# Patient Record
Sex: FEMALE | Race: BLACK OR AFRICAN AMERICAN | NOT HISPANIC OR LATINO | Employment: OTHER | URBAN - METROPOLITAN AREA
[De-identification: names, ages, dates, MRNs, and addresses within clinical notes are randomized per-mention and may not be internally consistent; named-entity substitution may affect disease eponyms.]

---

## 2017-01-06 ENCOUNTER — OFFICE VISIT (OUTPATIENT)
Dept: FAMILY MEDICINE | Facility: CLINIC | Age: 72
End: 2017-01-06
Payer: MEDICARE

## 2017-01-06 ENCOUNTER — PATIENT MESSAGE (OUTPATIENT)
Dept: FAMILY MEDICINE | Facility: CLINIC | Age: 72
End: 2017-01-06

## 2017-01-06 ENCOUNTER — TELEPHONE (OUTPATIENT)
Dept: FAMILY MEDICINE | Facility: CLINIC | Age: 72
End: 2017-01-06

## 2017-01-06 VITALS
SYSTOLIC BLOOD PRESSURE: 138 MMHG | WEIGHT: 177.69 LBS | DIASTOLIC BLOOD PRESSURE: 88 MMHG | BODY MASS INDEX: 33.55 KG/M2 | TEMPERATURE: 98 F | HEIGHT: 61 IN | HEART RATE: 76 BPM

## 2017-01-06 DIAGNOSIS — H92.03 OTALGIA, BILATERAL: Primary | ICD-10-CM

## 2017-01-06 PROCEDURE — 1160F RVW MEDS BY RX/DR IN RCRD: CPT | Mod: S$GLB,,, | Performed by: INTERNAL MEDICINE

## 2017-01-06 PROCEDURE — 1125F AMNT PAIN NOTED PAIN PRSNT: CPT | Mod: S$GLB,,, | Performed by: INTERNAL MEDICINE

## 2017-01-06 PROCEDURE — 1157F ADVNC CARE PLAN IN RCRD: CPT | Mod: S$GLB,,, | Performed by: INTERNAL MEDICINE

## 2017-01-06 PROCEDURE — 99999 PR PBB SHADOW E&M-EST. PATIENT-LVL III: CPT | Mod: PBBFAC,,, | Performed by: INTERNAL MEDICINE

## 2017-01-06 PROCEDURE — 1159F MED LIST DOCD IN RCRD: CPT | Mod: S$GLB,,, | Performed by: INTERNAL MEDICINE

## 2017-01-06 PROCEDURE — 99214 OFFICE O/P EST MOD 30 MIN: CPT | Mod: S$GLB,,, | Performed by: INTERNAL MEDICINE

## 2017-01-06 RX ORDER — ACETIC ACID 20.65 MG/ML
3 SOLUTION AURICULAR (OTIC) EVERY 6 HOURS PRN
Qty: 15 ML | Refills: 1 | Status: SHIPPED | OUTPATIENT
Start: 2017-01-06 | End: 2017-01-23

## 2017-01-06 RX ORDER — HYDROCORTISONE AND ACETIC ACID 20.75; 10.375 MG/ML; MG/ML
3 SOLUTION AURICULAR (OTIC) EVERY 6 HOURS PRN
Qty: 10 ML | Refills: 1 | Status: SHIPPED | OUTPATIENT
Start: 2017-01-06 | End: 2017-01-06

## 2017-01-06 NOTE — TELEPHONE ENCOUNTER
----- Message from Delmis Haywood sent at 1/6/2017 12:25 PM CST -----  Contact: Self- Daisha- [phone]  Patient was told to call if the prescription Dr. Mart prescribed would not be covered by her insurance. Please call to discuss.

## 2017-01-06 NOTE — PROGRESS NOTES
"Subjective:        Patient ID: Daisha Mayo is a 71 y.o. female.    Chief Complaint: Sinus Problem and Otalgia (bilateral, worst post antibiotic)    HPI   Daisha Mayo presents with c/o bilateral ear pain and muffled hearing.  This has been on and off since October.  Pt was initially dx with sinus infection, treated with abx and steroid injection.  She felt a little better afterwards but then traveled by plane and the sx recurred.  She was already taking Zyrtec daily so switched to cetirizine D daily x ~1 week.  The ear ache, pressure and muffled hearing seem to be worse.  Sx come and go throughout the day.  She denies rhinorrhea, postnasal drip, nasal congestion.  She reports "feeling dry" since taking cetirizine D daily.    Review of Systems  as per HPI      Objective:        Vitals:    01/06/17 1137   BP: 138/88   Pulse:    Temp:      Physical Exam   Constitutional: She is oriented to person, place, and time. She appears well-developed and well-nourished. No distress.   HENT:   Head: Normocephalic and atraumatic.   Right Ear: External ear normal.   Left Ear: External ear normal.   Nose: Nose normal.   Mouth/Throat: Oropharynx is clear and moist.   - b/l ear canals clear, TMs normal color and light reflex  - no middle ear effusions  - nasal turbinates normal   Eyes: Conjunctivae and EOM are normal.   Neck: Normal range of motion. Neck supple.   Cardiovascular: Normal rate and regular rhythm.    Pulmonary/Chest: Effort normal. No respiratory distress.   Neurological: She is alert and oriented to person, place, and time.   Vitals reviewed.          Assessment:         1. Otalgia, bilateral              Plan:         Daisha was seen today for sinus problem and otalgia.    Diagnoses and all orders for this visit:    Otalgia, bilateral: No signs of infxn at this time.  Will try acetic acid/hydrocortisone gtt b/l for sx relief.  Pt traveling to [place] (driving) next week.  Recommend trying prophylactic " decongestant before plane travel in the future.  DC scheduled antihistamine, take only as needed for allergic sx.  -     acetic acid-hydrocortisone (VOSOL-HC) otic solution; Place 3 drops into both ears every 6 (six) hours as needed (ear pain).        Pt has appt w ENT next week, will follow up with ENT if otalgia worse or not improving with above gtt.

## 2017-01-06 NOTE — MR AVS SNAPSHOT
[facility]  [address] [facility], [address]  [facility] [address]  Phone: [phone]  Fax: [phone]                  Daisha Mayo   2017 11:20 AM   Office Visit    Description:  Female : 1945   Provider:  Isamar Mart MD   Department:  [facility]           Reason for Visit     Sinus Problem     Otalgia           Diagnoses this Visit        Comments    Otalgia, bilateral    -  Primary            To Do List           Future Appointments        Provider Department Dept Phone    2017 2:30 PM AUDIOGRAM, AUDIO [facility] - Audiology [phone]    2017 3:15 PM MD Cuate [facility] - Otorhinolaryngology [phone]    3/7/2017 8:00 AM LAB, [facility] [phone]    3/14/2017 10:00 AM Odilia Eckert MD [place] - Internal Medicine [phone]      Goals (5 Years of Data)     None      Follow-Up and Disposition     Return if symptoms worsen or fail to improve.       These Medications        Disp Refills Start End    acetic acid-hydrocortisone (VOSOL-HC) otic solution 10 mL 1 2017     Place 3 drops into both ears every 6 (six) hours as needed (ear pain). - Both Ears    Pharmacy: [facility] [address] [place] [address] Ph #: [phone]         [facility] On Call     [facility] -  Assistance  Registered nurses in the [facility] provide clinical advisement, health education, appointment booking, and other advisory services.  Call for this free service at [phone].             Medications           Message regarding Medications     Verify the changes and/or additions to your medication regime listed below are the same as discussed with your clinician today.  If any of these changes or additions are incorrect, please notify your healthcare provider.        START taking these NEW  medications        Refills    acetic acid-hydrocortisone (VOSOL-HC) otic solution 1    Sig: Place 3 drops into both ears every 6 (six) hours as needed (ear pain).    Class: Normal    Route: Both Ears           Verify that the below list of medications is an accurate representation of the medications you are currently taking.  If none reported, the list may be blank. If incorrect, please contact your healthcare provider. Carry this list with you in case of emergency.           Current Medications     ASCORBATE CALCIUM (VITAMIN C ORAL) Take 1,000 mg by mouth once daily.     calcium carbonate (OS-SUNIL) 600 mg (1,500 mg) Tab Take 600 mg by mouth once daily.    cetirizine-pseudoephedrine 5-120 mg Tb12 Take 1 tablet by mouth 2 (two) times daily.    cholecalciferol, vitamin D3, 2,000 unit Cap Take 1 capsule (2,000 Units total) by mouth once daily.    cyanocobalamin (VITAMIN B-12) 250 MCG tablet Take 1,000 mcg by mouth once daily. 1 Tablet Oral Every day    dorzolamide-timolol (COSOPT) 2-0.5 % ophthalmic solution Place 1 drop into both eyes 2 (two) times daily.      ergocalciferol (VITAMIN D2) 50,000 unit Cap Take 1 capsule (50,000 Units total) by mouth every 7 days.    inhalation device (AEROCHAMBER PLUS FLOW-VU) Use with inhaler dispense with mouthpiece    latanoprost 0.005 % ophthalmic solution Place 1 drop into both eyes every evening.    levothyroxine (SYNTHROID) 25 MCG tablet TAKE 1 TABLET ONE TIME DAILY    MULTIVITAMIN WITH MINERALS (HAIR,SKIN AND NAILS ORAL) Take 1 tablet by mouth 2 (two) times daily.    multivitamin-minerals-lutein (CENTRUM SILVER) Tab Take 1 tablet by mouth Daily. 1 Tablet Oral Every day    omega-3 fatty acids-vitamin E (FISH OIL) 1,000 mg Cap     acetic acid-hydrocortisone (VOSOL-HC) otic solution Place 3 drops into both ears every 6 (six) hours as needed (ear pain).           Clinical Reference Information           Vital Signs - Last Recorded  Most recent update: 1/6/2017 11:39 AM by Isamar ALMAZAN  "MD Brittny    BP Pulse Temp Ht Wt BMI    138/88 76 98 °F (36.7 °C) 5' 1" (1.549 m) 80.6 kg (177 lb 11.1 oz) 33.57 kg/m2      Blood Pressure          Most Recent Value    BP  138/88      Allergies as of 1/6/2017     Latex      Immunizations Administered on Date of Encounter - 1/6/2017     None      "

## 2017-01-06 NOTE — TELEPHONE ENCOUNTER
----- Message from Delmis Haywood sent at 1/6/2017 12:25 PM CST -----  Contact: Self- Daisha- [phone]  Patient was told to call if the prescription Dr. Mart prescribed would not be covered by her insurance. Please call to discuss.

## 2017-01-06 NOTE — TELEPHONE ENCOUNTER
----- Message from Delmis Haywood sent at 1/6/2017 12:25 PM CST -----  Contact: Self- Daisha- [phone]  Patient was told to call if the prescription Dr. Mart prescribed would not be covered by her insurance. Please call to discuss.

## 2017-01-11 ENCOUNTER — OFFICE VISIT (OUTPATIENT)
Dept: OTOLARYNGOLOGY | Facility: CLINIC | Age: 72
End: 2017-01-11
Payer: MEDICARE

## 2017-01-11 ENCOUNTER — CLINICAL SUPPORT (OUTPATIENT)
Dept: AUDIOLOGY | Facility: CLINIC | Age: 72
End: 2017-01-11
Payer: MEDICARE

## 2017-01-11 VITALS — HEIGHT: 61 IN | BODY MASS INDEX: 33.71 KG/M2 | WEIGHT: 178.56 LBS

## 2017-01-11 DIAGNOSIS — H91.92 HEARING LOSS OF LEFT EAR, UNSPECIFIED HEARING LOSS TYPE: Primary | ICD-10-CM

## 2017-01-11 DIAGNOSIS — M26.609 TEMPOROMANDIBULAR JOINT DYSFUNCTION: Primary | ICD-10-CM

## 2017-01-11 DIAGNOSIS — H93.19 TINNITUS, UNSPECIFIED LATERALITY: ICD-10-CM

## 2017-01-11 DIAGNOSIS — M26.622 ARTHRALGIA OF LEFT TEMPOROMANDIBULAR JOINT: ICD-10-CM

## 2017-01-11 PROBLEM — M26.629 TMJ ARTHRALGIA: Status: ACTIVE | Noted: 2017-01-11

## 2017-01-11 PROCEDURE — 99213 OFFICE O/P EST LOW 20 MIN: CPT | Mod: S$GLB,,, | Performed by: OTOLARYNGOLOGY

## 2017-01-11 PROCEDURE — 1126F AMNT PAIN NOTED NONE PRSNT: CPT | Mod: S$GLB,,, | Performed by: OTOLARYNGOLOGY

## 2017-01-11 PROCEDURE — 1157F ADVNC CARE PLAN IN RCRD: CPT | Mod: S$GLB,,, | Performed by: OTOLARYNGOLOGY

## 2017-01-11 PROCEDURE — 99999 PR PBB SHADOW E&M-EST. PATIENT-LVL IV: CPT | Mod: PBBFAC,,, | Performed by: OTOLARYNGOLOGY

## 2017-01-11 PROCEDURE — 1159F MED LIST DOCD IN RCRD: CPT | Mod: S$GLB,,, | Performed by: OTOLARYNGOLOGY

## 2017-01-11 PROCEDURE — 1160F RVW MEDS BY RX/DR IN RCRD: CPT | Mod: S$GLB,,, | Performed by: OTOLARYNGOLOGY

## 2017-01-11 PROCEDURE — 92557 COMPREHENSIVE HEARING TEST: CPT | Mod: S$GLB,,, | Performed by: AUDIOLOGIST-HEARING AID FITTER

## 2017-01-11 PROCEDURE — 92567 TYMPANOMETRY: CPT | Mod: S$GLB,,, | Performed by: AUDIOLOGIST-HEARING AID FITTER

## 2017-01-11 PROCEDURE — 99999 PR PBB SHADOW E&M-EST. PATIENT-LVL I: CPT | Mod: PBBFAC,,,

## 2017-01-11 RX ORDER — CARISOPRODOL 350 MG/1
350 TABLET ORAL 2 TIMES DAILY
Qty: 20 TABLET | Refills: 0 | Status: SHIPPED | OUTPATIENT
Start: 2017-01-11 | End: 2017-01-21

## 2017-01-11 RX ORDER — HYDROCODONE BITARTRATE AND ACETAMINOPHEN 10; 325 MG/1; MG/1
1 TABLET ORAL EVERY 6 HOURS PRN
Qty: 30 TABLET | Refills: 0 | Status: SHIPPED | OUTPATIENT
Start: 2017-01-11 | End: 2017-01-21

## 2017-01-11 NOTE — PROGRESS NOTES
Otolaryngology - Head and Neck Surgery  Consultation Report      Chief Complaint: left ear fullness and pain x 3 months    History of Present Illness: 71 y.o. female presents with several month history of left ear pain and fullness following a sinus infection. She states she was treated with steroids and antibiotics at that time and although her PND and cough improved, her left ear fullness and pain have remained essentially unchanged. No change to hearing. Unchanged tinnitus. No otorrhea. She grinds her teeth.     Review of Systems - Negative except as mentioned in HPI.   History obtained from chart review and the patient  General ROS: negative  Psychological ROS: negative  Ophthalmic ROS: negative  ENT ROS: positive for - ear fullness and pain  negative for - hearing change, tinnitus or vertigo  Allergy and Immunology ROS: negative  Hematological and Lymphatic ROS: negative  Endocrine ROS: negative  Respiratory ROS: no cough, shortness of breath, or wheezing  Cardiovascular ROS: no chest pain or dyspnea on exertion  Gastrointestinal ROS: no abdominal pain, change in bowel habits, or black or bloody stools  Genito-Urinary ROS: no dysuria, trouble voiding, or hematuria  Musculoskeletal ROS: negative  Neurological ROS: no TIA or stroke symptoms  Dermatological ROS: negative    Past Medical History: she  has a past medical history of Arthritis; Cataract; Depression; Early cataracts, bilateral; GERD (gastroesophageal reflux disease); Glaucoma; Hyperprolactinemia; Hypothyroidism; Need for prophylactic vaccination against Streptococcus pneumoniae (pneumococcus) (10/14/2015); Osteopenia; Recurrent UTI; Sciatica neuralgia; Seizures (2006); Temporal lobe seizure (2006); Trigeminal neuralgia; and Vitamin D deficiency disease.    Past Surgical History: she  has a past surgical history that includes Cholecystectomy (1994); Hysterectomy (1985); Breast surgery; Colonoscopy; Eye surgery; and Total abdominal hysterectomy w/  bilateral salpingoophorectomy.    Social History: she  reports that she has never smoked. She has never used smokeless tobacco. She reports that she does not drink alcohol or use illicit drugs.    Family History: family history includes Breast cancer (age of onset: 104) in her mother; Cancer (age of onset: 103) in her mother; Cancer (age of onset: 31) in her cousin; Colon cancer (age of onset: 90) in her mother; Fibromyalgia in her brother; Glaucoma in her brother, paternal aunt, paternal grandfather, paternal uncle, and sister; Heart disease in her brother, mother, and sister; Hypertension in her brother, mother, and sister; Kidney disease in her brother and father; Sleep apnea in her brother; Thyroid disease in her mother and sister. There is no history of Celiac disease, Cirrhosis, Colon polyps, Crohn's disease, Cystic fibrosis, Esophageal cancer, Hemochromatosis, Inflammatory bowel disease, Irritable bowel syndrome, Liver cancer, Liver disease, Rectal cancer, Stomach cancer, Ulcerative colitis, or Moses's disease.    Medications:     Allergies: she is allergic to latex.    Physical Exam:  afvss    General: nad, alert, oriented, pleasant  Head: ncat  Eyes: eoomi, perrl  Ears: AU: auricle normal. Pinna, mastoid normal. EAC clear. TM intact. No GRISELDA.   Nose: septum straight, no rhinorrhea or epistaxis  Mouth: +Clicking at TMJ. MMM. Adequate dentition, no lesions.   Neck: soft, supple, no lad  Pulmonary: normal effort  Cardiovascular: RRR            Assessment: 71F with TMJ. No evidence of GRISELDA. Hearing stable.     Plan:   Avoid chewy foods. Low dose NSAID. Soma. If no improvement, may benefit from oral surgery evaluation. RTC PRN.

## 2017-01-11 NOTE — PROGRESS NOTES
Daisha Mayo was seen in the clinic today for a hearing evaluation. Ms. Mayo reported aural fullness and pain, worse in her left ear.      Audiological testing revealed hearing within normal limits in the right ear and a mild hearing loss at 4000 Hz in the left ear. A speech reception threshold was obtained at 5 dBHL in the right ear and 10 dBHL in the left ear. Speech discrimination was 100% in the right ear and 96% in the left ear.    Tympanometry revealed normal Type A tympanograms, bilaterally.    Recommendations:  1. Otologic evaluation  2. Annual hearing evaluation

## 2017-01-11 NOTE — MR AVS SNAPSHOT
[facility] - Otorhinolaryngology  [address]  [facility] [address]  Phone: [phone]  Fax: [phone]                  Daisha Mayo   2017 3:15 PM   Office Visit    Description:  Female : 1945   Provider:  Prudencio Ansari MD   Department:  [facility] - Otorhinolaryngology           Reason for Visit     Otitis Media           Diagnoses this Visit        Comments    Temporomandibular joint dysfunction    -  Primary            To Do List           Future Appointments        Provider Department Dept Phone    3/7/2017 8:00 AM LAB, [facility] [phone]    3/14/2017 10:00 AM Odilia Eckert MD [place] - Internal Medicine [phone]      Goals (5 Years of Data)     None      Follow-Up and Disposition     Return if symptoms worsen or fail to improve.       These Medications        Disp Refills Start End    carisoprodol (SOMA) 350 MG tablet 20 tablet 0 2017    Take 1 tablet (350 mg total) by mouth 2 (two) times daily. - Oral    Pharmacy: [facility] [address] [place] [address] Ph #: [phone]       hydrocodone-acetaminophen 10-325mg (NORCO)  mg Tab 30 tablet 0 2017    Take 1 tablet by mouth every 6 (six) hours as needed. - Oral    Pharmacy: [facility] [address] Ph #: [phone]         [facility] On Call     [facility] Nurse Care [facility] -  Assistance  Registered nurses in the [facility] provide clinical advisement, health education, appointment booking, and other advisory services.  Call for this free service at [phone].             Medications           Message regarding Medications     Verify the changes and/or additions to your medication regime listed below are the same as discussed with your clinician today.  If any of these changes  or additions are incorrect, please notify your healthcare provider.        START taking these NEW medications        Refills    carisoprodol (SOMA) 350 MG tablet 0    Sig: Take 1 tablet (350 mg total) by mouth 2 (two) times daily.    Class: Print    Route: Oral    hydrocodone-acetaminophen 10-325mg (NORCO)  mg Tab 0    Sig: Take 1 tablet by mouth every 6 (six) hours as needed.    Class: Normal    Route: Oral           Verify that the below list of medications is an accurate representation of the medications you are currently taking.  If none reported, the list may be blank. If incorrect, please contact your healthcare provider. Carry this list with you in case of emergency.           Current Medications     acetic acid (VOSOL) 2 % otic solution Place 3 drops into both ears every 6 (six) hours as needed (ear pain).    ASCORBATE CALCIUM (VITAMIN C ORAL) Take 1,000 mg by mouth once daily.     calcium carbonate (OS-SUNIL) 600 mg (1,500 mg) Tab Take 600 mg by mouth once daily.    carisoprodol (SOMA) 350 MG tablet Take 1 tablet (350 mg total) by mouth 2 (two) times daily.    CETIRIZINE HCL (ZYRTEC ORAL) Take by mouth.    cholecalciferol, vitamin D3, 2,000 unit Cap Take 1 capsule (2,000 Units total) by mouth once daily.    cyanocobalamin (VITAMIN B-12) 250 MCG tablet Take 1,000 mcg by mouth once daily. 1 Tablet Oral Every day    dorzolamide-timolol (COSOPT) 2-0.5 % ophthalmic solution Place 1 drop into both eyes 2 (two) times daily.      ergocalciferol (VITAMIN D2) 50,000 unit Cap Take 1 capsule (50,000 Units total) by mouth every 7 days.    hydrocodone-acetaminophen 10-325mg (NORCO)  mg Tab Take 1 tablet by mouth every 6 (six) hours as needed.    inhalation device (AEROCHAMBER PLUS FLOW-VU) Use with inhaler dispense with mouthpiece    latanoprost 0.005 % ophthalmic solution Place 1 drop into both eyes every evening.    levothyroxine (SYNTHROID) 25 MCG tablet TAKE 1 TABLET ONE TIME DAILY    MULTIVITAMIN WITH  "MINERALS (HAIR,SKIN AND NAILS ORAL) Take 1 tablet by mouth 2 (two) times daily.    multivitamin-minerals-lutein (CENTRUM SILVER) Tab Take 1 tablet by mouth Daily. 1 Tablet Oral Every day    omega-3 fatty acids-vitamin E (FISH OIL) 1,000 mg Cap            Clinical Reference Information           Vital Signs - Last Recorded  Most recent update: 1/11/2017  3:18 PM by Emmy Worley MA    Ht Wt BMI          5' 1" (1.549 m) 81 kg (178 lb 9.2 oz) 33.74 kg/m2        Allergies as of 1/11/2017     Latex      Immunizations Administered on Date of Encounter - 1/11/2017     None      "

## 2017-01-12 ENCOUNTER — TELEPHONE (OUTPATIENT)
Dept: OTOLARYNGOLOGY | Facility: CLINIC | Age: 72
End: 2017-01-12

## 2017-01-12 NOTE — TELEPHONE ENCOUNTER
----- Message from Shawnee Ellison sent at 1/11/2017  4:47 PM CST -----  Contact: Pt  Would like someone to call her, regarding her medication. The pharmacy did not receive both medication.     Please call: [phone]    Pharmacy: [phone]

## 2017-01-12 NOTE — TELEPHONE ENCOUNTER
Message left on v/m that Soma Rx was a printed Rx. If she really needs it she would have to come back to the clinic and pick it up. Informed her that Dr. Ansari is in the OR so it would be written in b/w cases. I also stated that this Rx can not be called in, sent electronically or faxed to pharmacy.  Patient would have to come pick it up (if she really needed it) when it's available.  Office # was left on v/m with my name.

## 2017-01-23 ENCOUNTER — OFFICE VISIT (OUTPATIENT)
Dept: INTERNAL MEDICINE | Facility: CLINIC | Age: 72
End: 2017-01-23
Payer: MEDICARE

## 2017-01-23 VITALS
BODY MASS INDEX: 33.22 KG/M2 | HEART RATE: 76 BPM | HEIGHT: 61 IN | TEMPERATURE: 99 F | WEIGHT: 175.94 LBS | SYSTOLIC BLOOD PRESSURE: 142 MMHG | OXYGEN SATURATION: 97 % | DIASTOLIC BLOOD PRESSURE: 90 MMHG

## 2017-01-23 DIAGNOSIS — R05.9 COUGH: Primary | ICD-10-CM

## 2017-01-23 PROCEDURE — 99999 PR PBB SHADOW E&M-EST. PATIENT-LVL IV: CPT | Mod: PBBFAC,,, | Performed by: NURSE PRACTITIONER

## 2017-01-23 PROCEDURE — 1159F MED LIST DOCD IN RCRD: CPT | Mod: S$GLB,,, | Performed by: NURSE PRACTITIONER

## 2017-01-23 PROCEDURE — 1126F AMNT PAIN NOTED NONE PRSNT: CPT | Mod: S$GLB,,, | Performed by: NURSE PRACTITIONER

## 2017-01-23 PROCEDURE — 99213 OFFICE O/P EST LOW 20 MIN: CPT | Mod: S$GLB,,, | Performed by: NURSE PRACTITIONER

## 2017-01-23 PROCEDURE — 1157F ADVNC CARE PLAN IN RCRD: CPT | Mod: S$GLB,,, | Performed by: NURSE PRACTITIONER

## 2017-01-23 PROCEDURE — 1160F RVW MEDS BY RX/DR IN RCRD: CPT | Mod: S$GLB,,, | Performed by: NURSE PRACTITIONER

## 2017-01-23 RX ORDER — FLUTICASONE PROPIONATE 50 MCG
1 SPRAY, SUSPENSION (ML) NASAL DAILY
Qty: 16 G | Refills: 0 | Status: SHIPPED | OUTPATIENT
Start: 2017-01-23 | End: 2017-03-14

## 2017-01-23 RX ORDER — BENZONATATE 200 MG/1
200 CAPSULE ORAL 3 TIMES DAILY PRN
Qty: 30 CAPSULE | Refills: 0 | Status: SHIPPED | OUTPATIENT
Start: 2017-01-23 | End: 2017-02-02

## 2017-01-23 NOTE — MR AVS SNAPSHOT
[facility] - Internal Medicine  [address]  [facility] [address]  Phone: [phone]  Fax: [phone]                  Daisha Mayo   2017 7:00 PM   Office Visit    Description:  Female : 1945   Provider:  Berkley Floyd NP   Department:  [facility] - Internal Medicine           Reason for Visit     URI           Diagnoses this Visit        Comments    Cough    -  Primary            To Do List           Future Appointments        Provider Department Dept Phone    2017 3:00 PM Odilia cEkert MD [facility] Internal Medicine [phone]    2017 11:00 AM Maciej Britt PA-C [facility] - Gastroenterology [phone]    3/7/2017 8:00 AM [facility], [facility] [phone]    3/14/2017 10:00 AM Odilia Eckert MD [facility] Internal Medicine [phone]      Goals (5 Years of Data)     None       These Medications        Disp Refills Start End    cetirizine (ZYRTEC) 10 mg Cap 30 capsule 0 2017    Take 1 tablet by mouth every evening. - Oral    Pharmacy: [facility] [address] Ph #: [phone]       fluticasone (FLONASE) 50 mcg/actuation nasal spray 16 g 0 2017     1 spray by Each Nare route once daily. - Each Nare    Pharmacy: [facility] [address] Ph #: [phone]       benzonatate (TESSALON) 200 MG capsule 30 capsule 0 2017    Take 1 capsule (200 mg total) by mouth 3 (three) times daily as needed for Cough. - Oral    Pharmacy: [facility] [address] [place] [address] Ph #: [phone]         [facility] On Call     [facility] Nurse Care Line -  Assistance  Registered nurses in the [facility] provide clinical advisement, health education, appointment booking, and  other advisory services.  Call for this free service at [phone].             Medications           Message regarding Medications     Verify the changes and/or additions to your medication regime listed below are the same as discussed with your clinician today.  If any of these changes or additions are incorrect, please notify your healthcare provider.        START taking these NEW medications        Refills    fluticasone (FLONASE) 50 mcg/actuation nasal spray 0    Si spray by Each Nare route once daily.    Class: Normal    Route: Each Nare    benzonatate (TESSALON) 200 MG capsule 0    Sig: Take 1 capsule (200 mg total) by mouth 3 (three) times daily as needed for Cough.    Class: Normal    Route: Oral      CHANGE how you are taking these medications     Start Taking Instead of    cetirizine (ZYRTEC) 10 mg Cap CETIRIZINE HCL (ZYRTEC ORAL)    Dosage:  Take 1 tablet by mouth every evening. Dosage:  Take by mouth.    Reason for Change:  Reorder       STOP taking these medications     acetic acid (VOSOL) 2 % otic solution Place 3 drops into both ears every 6 (six) hours as needed (ear pain).           Verify that the below list of medications is an accurate representation of the medications you are currently taking.  If none reported, the list may be blank. If incorrect, please contact your healthcare provider. Carry this list with you in case of emergency.           Current Medications     ASCORBATE CALCIUM (VITAMIN C ORAL) Take 1,000 mg by mouth once daily.     calcium carbonate (OS-SUNIL) 600 mg (1,500 mg) Tab Take 600 mg by mouth once daily.    cetirizine (ZYRTEC) 10 mg Cap Take 1 tablet by mouth every evening.    cholecalciferol, vitamin D3, 2,000 unit Cap Take 1 capsule (2,000 Units total) by mouth once daily.    cyanocobalamin (VITAMIN B-12) 250 MCG tablet Take 1,000 mcg by mouth once daily. 1 Tablet Oral Every day    dorzolamide-timolol (COSOPT) 2-0.5 % ophthalmic solution Place 1 drop into both eyes 2  "(two) times daily.      ergocalciferol (VITAMIN D2) 50,000 unit Cap Take 1 capsule (50,000 Units total) by mouth every 7 days.    inhalation device (AEROCHAMBER PLUS FLOW-VU) Use with inhaler dispense with mouthpiece    latanoprost 0.005 % ophthalmic solution Place 1 drop into both eyes every evening.    levothyroxine (SYNTHROID) 25 MCG tablet TAKE 1 TABLET ONE TIME DAILY    MULTIVITAMIN WITH MINERALS (HAIR,SKIN AND NAILS ORAL) Take 1 tablet by mouth 2 (two) times daily.    multivitamin-minerals-lutein (CENTRUM SILVER) Tab Take 1 tablet by mouth Daily. 1 Tablet Oral Every day    omega-3 fatty acids-vitamin E (FISH OIL) 1,000 mg Cap     benzonatate (TESSALON) 200 MG capsule Take 1 capsule (200 mg total) by mouth 3 (three) times daily as needed for Cough.    fluticasone (FLONASE) 50 mcg/actuation nasal spray 1 spray by Each Nare route once daily.           Clinical Reference Information           Vital Signs - Last Recorded  Most recent update: 1/23/2017  7:20 PM by Berkley Floyd NP    BP Pulse Temp Ht Wt SpO2    (!) 142/90 76 98.5 °F (36.9 °C) (Oral) 5' 1" (1.549 m) 79.8 kg (175 lb 14.8 oz) 97%    BMI                33.24 kg/m2          Blood Pressure          Most Recent Value    BP  (!)  142/90      Allergies as of 1/23/2017     Latex      Immunizations Administered on Date of Encounter - 1/23/2017     None      "

## 2017-01-24 NOTE — PROGRESS NOTES
Subjective:       Patient ID: Daisha Mayo is a 71 y.o. female.    Chief Complaint: URI  Ms Mayo presents today because she has had a cough since October.  She says it comes and goes.  She keeps improving and then getting ill again.  She feels her cough may be related to GERD and has scheduled an appointment with GI later this week.  URI    This is a new problem. The current episode started more than 1 month ago. The problem has been waxing and waning. There has been no fever. Associated symptoms include congestion, coughing, diarrhea, headaches, rhinorrhea, sneezing and a sore throat. Pertinent negatives include no abdominal pain, chest pain, dysuria, ear pain, joint pain, joint swelling, nausea, neck pain, plugged ear sensation, rash, sinus pain, swollen glands, vomiting or wheezing.     Review of Systems   Constitutional: Negative for fever.   HENT: Positive for congestion, rhinorrhea, sneezing and sore throat. Negative for ear pain.    Eyes: Negative for visual disturbance.   Respiratory: Positive for cough. Negative for wheezing.    Cardiovascular: Negative for chest pain.   Gastrointestinal: Positive for diarrhea. Negative for abdominal pain, nausea and vomiting.   Genitourinary: Negative for dysuria.   Musculoskeletal: Negative for joint pain and neck pain.   Skin: Negative for rash.   Neurological: Positive for headaches.   Psychiatric/Behavioral: Negative for confusion.       Objective:      Physical Exam   Constitutional: She is oriented to person, place, and time.   HENT:   Head: Atraumatic.   Right Ear: Ear canal normal. A middle ear effusion is present.   Left Ear: Tympanic membrane and ear canal normal.   Nose: Mucosal edema and rhinorrhea present. No sinus tenderness. Right sinus exhibits no maxillary sinus tenderness and no frontal sinus tenderness. Left sinus exhibits no maxillary sinus tenderness and no frontal sinus tenderness.   Mouth/Throat: Oropharyngeal exudate and posterior  oropharyngeal erythema present.   Eyes: No scleral icterus.   Neck: Normal range of motion. Neck supple.   Cardiovascular: Normal rate, regular rhythm and normal heart sounds.    Pulmonary/Chest: Effort normal and breath sounds normal. No respiratory distress. She has no wheezes. She has no rales.   Bronchospasm with deep inhalation   Abdominal: Soft. Bowel sounds are normal. She exhibits no distension. There is no tenderness.   Musculoskeletal: She exhibits no edema.   Lymphadenopathy:     She has no cervical adenopathy.   Neurological: She is alert and oriented to person, place, and time.   Skin: Skin is warm and dry.   Psychiatric: She has a normal mood and affect. Her behavior is normal.   Nursing note and vitals reviewed.      Assessment:       1. Cough        Plan:   1. Cough  - cetirizine (ZYRTEC) 10 mg Cap; Take 1 tablet by mouth every evening.  Dispense: 30 capsule; Refill: 0  - fluticasone (FLONASE) 50 mcg/actuation nasal spray; 1 spray by Each Nare route once daily.  Dispense: 16 g; Refill: 0  - benzonatate (TESSALON) 200 MG capsule; Take 1 capsule (200 mg total) by mouth 3 (three) times daily as needed for Cough.  Dispense: 30 capsule; Refill: 0      Pt has been given instructions populated from [employer] database and has verbalized understanding of the after visit summary and information contained wherein.    Follow up with a primary care provider. May go to ER for acute shortness of breath, lightheadedness, fever, or any other emergent complaints or changes in condition.

## 2017-01-25 ENCOUNTER — OFFICE VISIT (OUTPATIENT)
Dept: INTERNAL MEDICINE | Facility: CLINIC | Age: 72
End: 2017-01-25
Payer: MEDICARE

## 2017-01-25 ENCOUNTER — HOSPITAL ENCOUNTER (OUTPATIENT)
Dept: RADIOLOGY | Facility: HOSPITAL | Age: 72
Discharge: HOME | End: 2017-01-25
Attending: INTERNAL MEDICINE
Payer: MEDICARE

## 2017-01-25 VITALS
RESPIRATION RATE: 16 BRPM | HEIGHT: 61 IN | BODY MASS INDEX: 33.09 KG/M2 | DIASTOLIC BLOOD PRESSURE: 76 MMHG | HEART RATE: 77 BPM | TEMPERATURE: 98 F | SYSTOLIC BLOOD PRESSURE: 106 MMHG | WEIGHT: 175.25 LBS

## 2017-01-25 DIAGNOSIS — J20.9 ACUTE BRONCHITIS, UNSPECIFIED ORGANISM: Primary | ICD-10-CM

## 2017-01-25 DIAGNOSIS — E03.8 SUBCLINICAL HYPOTHYROIDISM: ICD-10-CM

## 2017-01-25 DIAGNOSIS — J20.9 ACUTE BRONCHITIS, UNSPECIFIED ORGANISM: ICD-10-CM

## 2017-01-25 DIAGNOSIS — R53.83 FATIGUE, UNSPECIFIED TYPE: ICD-10-CM

## 2017-01-25 PROCEDURE — 1157F ADVNC CARE PLAN IN RCRD: CPT | Mod: S$GLB,,, | Performed by: INTERNAL MEDICINE

## 2017-01-25 PROCEDURE — 1126F AMNT PAIN NOTED NONE PRSNT: CPT | Mod: S$GLB,,, | Performed by: INTERNAL MEDICINE

## 2017-01-25 PROCEDURE — 1159F MED LIST DOCD IN RCRD: CPT | Mod: S$GLB,,, | Performed by: INTERNAL MEDICINE

## 2017-01-25 PROCEDURE — 71020 XR CHEST PA AND LATERAL: CPT | Mod: TC,PO

## 2017-01-25 PROCEDURE — 96372 THER/PROPH/DIAG INJ SC/IM: CPT | Mod: S$GLB,,, | Performed by: INTERNAL MEDICINE

## 2017-01-25 PROCEDURE — 99214 OFFICE O/P EST MOD 30 MIN: CPT | Mod: 25,S$GLB,, | Performed by: INTERNAL MEDICINE

## 2017-01-25 PROCEDURE — 1160F RVW MEDS BY RX/DR IN RCRD: CPT | Mod: S$GLB,,, | Performed by: INTERNAL MEDICINE

## 2017-01-25 PROCEDURE — 99999 PR PBB SHADOW E&M-EST. PATIENT-LVL III: CPT | Mod: PBBFAC,,, | Performed by: INTERNAL MEDICINE

## 2017-01-25 PROCEDURE — 71020 XR CHEST PA AND LATERAL: CPT | Mod: 26,,, | Performed by: RADIOLOGY

## 2017-01-25 PROCEDURE — 99499 UNLISTED E&M SERVICE: CPT | Mod: S$GLB,,, | Performed by: INTERNAL MEDICINE

## 2017-01-25 RX ORDER — CYANOCOBALAMIN 1000 UG/ML
1000 INJECTION, SOLUTION INTRAMUSCULAR; SUBCUTANEOUS
Status: COMPLETED | OUTPATIENT
Start: 2017-01-25 | End: 2017-01-25

## 2017-01-25 RX ORDER — METHYLPREDNISOLONE 4 MG/1
TABLET ORAL
Qty: 1 PACKAGE | Refills: 0 | Status: SHIPPED | OUTPATIENT
Start: 2017-01-25 | End: 2017-02-15

## 2017-01-25 RX ADMIN — CYANOCOBALAMIN 1000 MCG: 1000 INJECTION, SOLUTION INTRAMUSCULAR; SUBCUTANEOUS at 04:01

## 2017-01-25 NOTE — PROGRESS NOTES
CC: fatigue and cough  HPI:  The patient is a 71 y.o. year old female who presents to the office for fatigue and cough.  Her symptoms started in October.  She went to urgent care November 1st and was given antibiotics and a steroid shot with temporary improvement.  She went to urgent care and then ENT.  She complains of fatigue, dry cough and difficulty sleeping.  She has taken zyrtec.  She had a CXR in November, which reportedly showed a mild pneumonia.  Sneezing, sore throat and rhinorrhea have resolved.       PAST MEDICAL HISTORY:  Past Medical History   Diagnosis Date    Arthritis     Cataract     Depression      Around [place]; treated with Wellbutrin    Early cataracts, bilateral     GERD (gastroesophageal reflux disease)     Glaucoma     Hyperprolactinemia     Hypothyroidism     Need for prophylactic vaccination against Streptococcus pneumoniae (pneumococcus) 10/14/2015    Osteopenia     Recurrent UTI      Every few months    Sciatica neuralgia     Seizures 2006     temporal lobe seizure    Temporal lobe seizure 2006    Trigeminal neuralgia     Vitamin D deficiency disease        SURGICAL HISTORY:  Past Surgical History   Procedure Laterality Date    Cholecystectomy  1994     laporascopic    Hysterectomy  1985     low transverse incision.    Breast surgery       to remove ducts causing discharge in left breast    Colonoscopy      Eye surgery       LASIK surgery to one eye; cannot remember which one    Total abdominal hysterectomy w/ bilateral salpingoophorectomy       1985; diffinitive treatment for menorrhagia       MEDS:  Medcard reviewed and updated    ALLERGIES: Allergy Card reviewed and updated    SOCIAL HISTORY:   The patient is a nonsmoker.    PE:   APPEARANCE: Well nourished, well developed, in no acute distress.    EARS: TM's intact. No retraction or perforation.    NOSE: Mucosa pink. Airway clear.  MOUTH & THROAT: No tonsillar enlargement. No pharyngeal erythema or exudate. No  stridor.  CHEST: Lungs clear to auscultation with unlabored respirations.  CARDIOVASCULAR: Normal S1, S2. No murmurs. No carotid bruits. No pedal edema.  ABDOMEN: Bowel sounds normal. Not distended. Soft. No tenderness or masses.   PSYCHIATRIC: The patient is oriented to person, place, and time and has a pleasant affect.        ASSESSMENT/PLAN:  Daisha was seen today for cough and fatigue.    Diagnoses and all orders for this visit:    Acute bronchitis, unspecified organism  -     X-Ray Chest PA And Lateral; Future  -     Medrol dosepak    Subclinical hypothyroidism  -     T4, free; Future    Fatigue, unspecified type  -     cyanocobalamin injection 1,000 mcg; Inject 1 mL (1,000 mcg total) into the muscle one time.    Other orders  -     methylPREDNISolone (MEDROL DOSEPACK) 4 mg tablet; use as directed

## 2017-01-25 NOTE — MR AVS SNAPSHOT
[facility]   [facility] [place] [address]  Phone: [phone]  Fax: [phone]                  Daisha Mayo   2017 3:00 PM   Office Visit    Description:  Female : 1945   Provider:  Odilia Eckert MD   Department:  [facility]           Reason for Visit     Cough     Fatigue           Diagnoses this Visit        Comments    Acute bronchitis, unspecified organism    -  Primary     Subclinical hypothyroidism         Fatigue, unspecified type                To Do List           Future Appointments        Provider Department Dept Phone    2017 11:00 AM [facility] - Gastroenterology [phone]    3/7/2017 8:00 AM [facility] [phone]    3/14/2017 10:00 AM Odilia Eckert MD [facility] [phone]      Goals (5 Years of Data)     None      Follow-Up and Disposition     Return in about 2 months (around 3/25/2017).       These Medications        Disp Refills Start End    methylPREDNISolone (MEDROL DOSEPACK) 4 mg tablet 1 Package 0 2017 2/15/2017    use as directed    Pharmacy: [facility] [address] [place] [address] #: [phone]         [facility] On Call     [facility] On Call Nurse [facility] - /7 Assistance  Registered nurses in the [facility] On Call Center provide clinical advisement, health education, appointment booking, and other advisory services.  Call for this free service at [phone].             Medications           Message regarding Medications     Verify the changes and/or additions to your medication regime listed below are the same as discussed with your clinician today.  If any of these changes or additions are incorrect, please notify your healthcare provider.        START taking these NEW medications        Refills    methylPREDNISolone (MEDROL  DOSEPACK) 4 mg tablet 0    Sig: use as directed    Class: Normal      These medications were administered today        Dose Freq    cyanocobalamin injection 1,000 mcg 1,000 mcg Clinic/HOD 1 time    Sig: Inject 1 mL (1,000 mcg total) into the muscle one time.    Class: Normal    Route: Intramuscular           Verify that the below list of medications is an accurate representation of the medications you are currently taking.  If none reported, the list may be blank. If incorrect, please contact your healthcare provider. Carry this list with you in case of emergency.           Current Medications     ASCORBATE CALCIUM (VITAMIN C ORAL) Take 1,000 mg by mouth once daily.     benzonatate (TESSALON) 200 MG capsule Take 1 capsule (200 mg total) by mouth 3 (three) times daily as needed for Cough.    calcium carbonate (OS-SUNIL) 600 mg (1,500 mg) Tab Take 600 mg by mouth once daily.    cetirizine (ZYRTEC) 10 mg Cap Take 1 tablet by mouth every evening.    cholecalciferol, vitamin D3, 2,000 unit Cap Take 1 capsule (2,000 Units total) by mouth once daily.    cyanocobalamin (VITAMIN B-12) 250 MCG tablet Take 1,000 mcg by mouth once daily. 1 Tablet Oral Every day    dorzolamide-timolol (COSOPT) 2-0.5 % ophthalmic solution Place 1 drop into both eyes 2 (two) times daily.      ergocalciferol (VITAMIN D2) 50,000 unit Cap Take 1 capsule (50,000 Units total) by mouth every 7 days.    fluticasone (FLONASE) 50 mcg/actuation nasal spray 1 spray by Each Nare route once daily.    inhalation device (AEROCHAMBER PLUS FLOW-VU) Use with inhaler dispense with mouthpiece    latanoprost 0.005 % ophthalmic solution Place 1 drop into both eyes every evening.    levothyroxine (SYNTHROID) 25 MCG tablet TAKE 1 TABLET ONE TIME DAILY    MULTIVITAMIN WITH MINERALS (HAIR,SKIN AND NAILS ORAL) Take 1 tablet by mouth 2 (two) times daily.    multivitamin-minerals-lutein (CENTRUM SILVER) Tab Take 1 tablet by mouth Daily. 1 Tablet Oral Every day    omega-3 fatty  "acids-vitamin E (FISH OIL) 1,000 mg Cap     methylPREDNISolone (MEDROL DOSEPACK) 4 mg tablet use as directed           Clinical Reference Information           Vital Signs - Last Recorded  Most recent update: 1/25/2017  3:01 PM by Isabelle Benavides LPN    BP Pulse Temp Resp Ht Wt    106/76 (BP Location: Left arm, Patient Position: Sitting, BP Method: Manual) 77 97.9 °F (36.6 °C) (Oral) 16 5' 1" (1.549 m) 79.5 kg (175 lb 4.3 oz)    BMI                33.12 kg/m2          Blood Pressure          Most Recent Value    BP  106/76      Allergies as of 1/25/2017     Latex      Immunizations Administered on Date of Encounter - 1/25/2017     None      Orders Placed During Today's Visit     Future Labs/Procedures Expected by Expdank    T4, free  1/25/2017 3/26/2018    X-Ray Chest PA And Lateral  1/25/2017 1/25/2018      "

## 2017-03-07 ENCOUNTER — LAB VISIT (OUTPATIENT)
Dept: LAB | Facility: HOSPITAL | Age: 72
End: 2017-03-07
Attending: INTERNAL MEDICINE
Payer: MEDICARE

## 2017-03-07 DIAGNOSIS — E22.1 HYPERPROLACTINEMIA: ICD-10-CM

## 2017-03-07 DIAGNOSIS — E03.8 SUBCLINICAL HYPOTHYROIDISM: ICD-10-CM

## 2017-03-07 DIAGNOSIS — M89.9 DISORDER OF BONE: ICD-10-CM

## 2017-03-07 LAB
25(OH)D3+25(OH)D2 SERPL-MCNC: 72 NG/ML
ALBUMIN SERPL BCP-MCNC: 3.4 G/DL
ALP SERPL-CCNC: 95 U/L
ALT SERPL W/O P-5'-P-CCNC: 16 U/L
ANION GAP SERPL CALC-SCNC: 7 MMOL/L
AST SERPL-CCNC: 16 U/L
BASOPHILS # BLD AUTO: 0.03 K/UL
BASOPHILS NFR BLD: 0.6 %
BILIRUB SERPL-MCNC: 0.5 MG/DL
BUN SERPL-MCNC: 10 MG/DL
CALCIUM SERPL-MCNC: 8.7 MG/DL
CHLORIDE SERPL-SCNC: 106 MMOL/L
CHOLEST/HDLC SERPL: 4.3 {RATIO}
CO2 SERPL-SCNC: 28 MMOL/L
CREAT SERPL-MCNC: 0.8 MG/DL
DIFFERENTIAL METHOD: ABNORMAL
EOSINOPHIL # BLD AUTO: 0.1 K/UL
EOSINOPHIL NFR BLD: 1.7 %
ERYTHROCYTE [DISTWIDTH] IN BLOOD BY AUTOMATED COUNT: 13.1 %
EST. GFR  (AFRICAN AMERICAN): >60 ML/MIN/1.73 M^2
EST. GFR  (NON AFRICAN AMERICAN): >60 ML/MIN/1.73 M^2
GLUCOSE SERPL-MCNC: 80 MG/DL
HCT VFR BLD AUTO: 41 %
HDL/CHOLESTEROL RATIO: 23.3 %
HDLC SERPL-MCNC: 215 MG/DL
HDLC SERPL-MCNC: 50 MG/DL
HGB BLD-MCNC: 13.5 G/DL
LDLC SERPL CALC-MCNC: 145.8 MG/DL
LYMPHOCYTES # BLD AUTO: 2 K/UL
LYMPHOCYTES NFR BLD: 40.9 %
MCH RBC QN AUTO: 31.9 PG
MCHC RBC AUTO-ENTMCNC: 32.9 %
MCV RBC AUTO: 97 FL
MONOCYTES # BLD AUTO: 0.4 K/UL
MONOCYTES NFR BLD: 8.5 %
NEUTROPHILS # BLD AUTO: 2.3 K/UL
NEUTROPHILS NFR BLD: 48.1 %
NONHDLC SERPL-MCNC: 165 MG/DL
PLATELET # BLD AUTO: 232 K/UL
PMV BLD AUTO: 10.1 FL
POTASSIUM SERPL-SCNC: 3.9 MMOL/L
PROLACTIN SERPL IA-MCNC: 23.8 NG/ML
PROT SERPL-MCNC: 6.6 G/DL
RBC # BLD AUTO: 4.23 M/UL
SODIUM SERPL-SCNC: 141 MMOL/L
T4 FREE SERPL-MCNC: 1.07 NG/DL
TRIGL SERPL-MCNC: 96 MG/DL
TSH SERPL DL<=0.005 MIU/L-ACNC: 2.62 UIU/ML
WBC # BLD AUTO: 4.84 K/UL

## 2017-03-07 PROCEDURE — 84146 ASSAY OF PROLACTIN: CPT

## 2017-03-07 PROCEDURE — 36415 COLL VENOUS BLD VENIPUNCTURE: CPT | Mod: PO

## 2017-03-07 PROCEDURE — 84443 ASSAY THYROID STIM HORMONE: CPT

## 2017-03-07 PROCEDURE — 82306 VITAMIN D 25 HYDROXY: CPT

## 2017-03-07 PROCEDURE — 84439 ASSAY OF FREE THYROXINE: CPT

## 2017-03-07 PROCEDURE — 85025 COMPLETE CBC W/AUTO DIFF WBC: CPT

## 2017-03-07 PROCEDURE — 80053 COMPREHEN METABOLIC PANEL: CPT

## 2017-03-07 PROCEDURE — 80061 LIPID PANEL: CPT

## 2017-03-13 ENCOUNTER — TELEPHONE (OUTPATIENT)
Dept: INTERNAL MEDICINE | Facility: CLINIC | Age: 72
End: 2017-03-13

## 2017-03-13 NOTE — TELEPHONE ENCOUNTER
----- Message from Fabiana Drew sent at 3/11/2017 11:35 AM CST -----  Contact: SELF [phone]  Patient would like a call back from the office to discuss her appointment on 03/14 . Please call and advise, Thanks !

## 2017-03-14 ENCOUNTER — OFFICE VISIT (OUTPATIENT)
Dept: INTERNAL MEDICINE | Facility: CLINIC | Age: 72
End: 2017-03-14
Payer: MEDICARE

## 2017-03-14 VITALS
DIASTOLIC BLOOD PRESSURE: 80 MMHG | TEMPERATURE: 99 F | RESPIRATION RATE: 16 BRPM | HEIGHT: 61 IN | HEART RATE: 70 BPM | WEIGHT: 174.19 LBS | SYSTOLIC BLOOD PRESSURE: 130 MMHG | BODY MASS INDEX: 32.89 KG/M2

## 2017-03-14 DIAGNOSIS — R53.83 FATIGUE, UNSPECIFIED TYPE: ICD-10-CM

## 2017-03-14 DIAGNOSIS — R07.9 CHEST PAIN, UNSPECIFIED TYPE: Primary | ICD-10-CM

## 2017-03-14 DIAGNOSIS — E03.8 SUBCLINICAL HYPOTHYROIDISM: ICD-10-CM

## 2017-03-14 DIAGNOSIS — E22.1 HYPERPROLACTINEMIA: ICD-10-CM

## 2017-03-14 PROCEDURE — 99499 UNLISTED E&M SERVICE: CPT | Mod: S$GLB,,, | Performed by: INTERNAL MEDICINE

## 2017-03-14 PROCEDURE — 99999 PR PBB SHADOW E&M-EST. PATIENT-LVL IV: CPT | Mod: PBBFAC,,, | Performed by: INTERNAL MEDICINE

## 2017-03-14 PROCEDURE — 1159F MED LIST DOCD IN RCRD: CPT | Mod: S$GLB,,, | Performed by: INTERNAL MEDICINE

## 2017-03-14 PROCEDURE — 1160F RVW MEDS BY RX/DR IN RCRD: CPT | Mod: S$GLB,,, | Performed by: INTERNAL MEDICINE

## 2017-03-14 PROCEDURE — 1126F AMNT PAIN NOTED NONE PRSNT: CPT | Mod: S$GLB,,, | Performed by: INTERNAL MEDICINE

## 2017-03-14 PROCEDURE — 99214 OFFICE O/P EST MOD 30 MIN: CPT | Mod: S$GLB,,, | Performed by: INTERNAL MEDICINE

## 2017-03-14 PROCEDURE — 1157F ADVNC CARE PLAN IN RCRD: CPT | Mod: S$GLB,,, | Performed by: INTERNAL MEDICINE

## 2017-03-14 RX ORDER — CETIRIZINE HYDROCHLORIDE 10 MG/1
TABLET ORAL
Refills: 0 | COMMUNITY
Start: 2017-01-23 | End: 2017-08-25 | Stop reason: SDUPTHER

## 2017-03-14 NOTE — PROGRESS NOTES
CC: Annual review of chronic medical problems  HPI:  The patient is a 71 y.o. old female with hyperprolactinema who presents to the office for annual review of chronic medical problems.  Review of labs reveals an elevated cholesterol.    PAST MEDICAL HISTORY  Past Medical History:   Diagnosis Date    Arthritis     Cataract     Depression     Around [place]; treated with Wellbutrin    Early cataracts, bilateral     GERD (gastroesophageal reflux disease)     Glaucoma     Hyperprolactinemia     Hypothyroidism     Need for prophylactic vaccination against Streptococcus pneumoniae (pneumococcus) 10/14/2015    Osteopenia     Recurrent UTI     Every few months    Sciatica neuralgia     Seizures 2006    temporal lobe seizure    Temporal lobe seizure 2006    Trigeminal neuralgia     Vitamin D deficiency disease        SURGICAL HISTORY:  Past Surgical History:   Procedure Laterality Date    BREAST SURGERY      to remove ducts causing discharge in left breast    CHOLECYSTECTOMY  1994    laporascopic    COLONOSCOPY      EYE SURGERY      LASIK surgery to one eye; cannot remember which one    HYSTERECTOMY  1985    low transverse incision.    TOTAL ABDOMINAL HYSTERECTOMY W/ BILATERAL SALPINGOOPHORECTOMY      1985; diffinitive treatment for menorrhagia         MEDS:  Medcard reviewed and updated    ALLERGIES: Allergy Card reviewed and updated    SOCIAL HISTORY:   The patient is a nonsmoker, denies alcohol or illicit drug use.    ROS:  GENERAL: No fever, chills, fatigability or weight loss.  SKIN: No rashes.  HEAD: No headaches or recent head trauma.  EYES: No photophobia, ocular pain or diplopia.  EARS: Denies ear pain, discharge or vertigo.  NOSE: No epistaxis or postnasal drip.  MOUTH & THROAT: No hoarseness or change in voice.   NODES: Denies swollen glands.  CHEST: Denies shortness of breath or wheezing.  Occasional cough withour sputum production.  CARDIOVASCULAR: Positive chest pain, pressure sensation  about 3 -4 weeks ago or palpitations.  ABDOMEN: Appetite fine. Denies diarrhea, abdominal pain, constipation or blood in stool.  URINARY: No dysuria or hematuria.  MUSCULOSKELETAL: No joint stiffness or swelling. Positive back pain.  NEUROLOGIC: No history of seizures.  ENDOCRINE: Denies polyuria or polydipsia.  PSYCHIATRIC: Denies mood swings, depression, anxiety, homicidal or suicidal thoughts.    SCREENINGS:  Last cholesterol: March 2017  Last colonoscopy: 2014  Last mammogram: 2016  Last Pap smear: unknown  Last tetanus: over 10 years   Zoster: 2015  Last Pneumovax: unknown  Prevnar 13: 2015  Last eye exam: 2017  Last bone density: 2016    PE:   Vitals:  Vitals:    03/14/17 0953   BP: 130/80   Pulse: 70   Resp: 16   Temp: 98.7 °F (37.1 °C)       APPEARANCE: Well nourished, well developed, in no acute distress.    EYES: Sclerae anicteric. PERRL. EOMI.      EARS: TM's intact. No retraction or perforation.    NOSE: Mucosa pink. Airway clear.  MOUTH & THROAT: No tonsillar enlargement. No pharyngeal erythema or exudate. No stridor.  NECK: Supple, no thyromegaly.  NODES: No cervical, axillary or inguinal lymph node enlargement.  CHEST: Lungs clear to auscultation with unlabored respirations.  CARDIOVASCULAR: Normal S1, S2. No murmurs. No carotid bruits. No pedal edema.  ABDOMEN: Bowel sounds normal. Not distended. Soft. No tenderness or masses. No organomegaly.  MUSCULOSKELETAL:  Normal gait, no cyanosis or clubbing.   SKIN: Normal skin turgor, warm and dry.  NEUROLOGIC:       Cranial Nerves: Intact.      DTR's: Knees, 2+ and equal bilaterally.  PSYCHIATRIC: The patient is oriented to person, place, and time and has a pleasant affect.        ASSESSMENT/PLAN:  Daisha was seen today for annual exam.    Diagnoses and all orders for this visit:    Chest pain, unspecified type  -     SCHEDULED EKG 12-LEAD (to Muse); Future    Subclinical hypothyroidism  -     Continue Synthroid    Fatigue, unspecified type  -      Continue Synthroid and vitamin D supplementation    Hyperprolactinemia  -     Stable, PTH is currently within range

## 2017-03-14 NOTE — MR AVS SNAPSHOT
[facility]   [facility]  [place] [address]  Phone: [phone]  Fax: [phone]                  Daisha Mayo   3/14/2017 10:00 AM   Office Visit    Description:  Female : 1945   Provider:  Odilia Eckert MD   Department:  [facility]           Reason for Visit     Annual Exam           Diagnoses this Visit        Comments    Chest pain, unspecified type    -  Primary     Subclinical hypothyroidism         Fatigue, unspecified type         Hyperprolactinemia                To Do List           Goals (5 Years of Data)     None      Follow-Up and Disposition     Return in about 6 months (around 2017).      [facility]     [facility] On Call Nurse Care Line -  Assistance  Registered nurses in the [facility] On Call Center provide clinical advisement, health education, appointment booking, and other advisory services.  Call for this free service at [phone].             Medications           Message regarding Medications     Verify the changes and/or additions to your medication regime listed below are the same as discussed with your clinician today.  If any of these changes or additions are incorrect, please notify your healthcare provider.        STOP taking these medications     fluticasone (FLONASE) 50 mcg/actuation nasal spray 1 spray by Each Nare route once daily.           Verify that the below list of medications is an accurate representation of the medications you are currently taking.  If none reported, the list may be blank. If incorrect, please contact your healthcare provider. Carry this list with you in case of emergency.           Current Medications     ASCORBATE CALCIUM (VITAMIN C ORAL) Take 1,000 mg by mouth once daily.     calcium carbonate (OS-SUNIL) 600 mg (1,500 mg) Tab Take 600 mg by mouth once daily.    cholecalciferol, vitamin D3, 2,000 unit Cap Take 1 capsule (2,000 Units total) by mouth once daily.     "cyanocobalamin (VITAMIN B-12) 250 MCG tablet Take 1,000 mcg by mouth once daily. 1 Tablet Oral Every day    dorzolamide-timolol (COSOPT) 2-0.5 % ophthalmic solution Place 1 drop into both eyes 2 (two) times daily.      ergocalciferol (VITAMIN D2) 50,000 unit Cap Take 1 capsule (50,000 Units total) by mouth every 7 days.    inhalation device (AEROCHAMBER PLUS FLOW-VU) Use with inhaler dispense with mouthpiece    latanoprost 0.005 % ophthalmic solution Place 1 drop into both eyes every evening.    levothyroxine (SYNTHROID) 25 MCG tablet TAKE 1 TABLET ONE TIME DAILY    MULTIVITAMIN WITH MINERALS (HAIR,SKIN AND NAILS ORAL) Take 1 tablet by mouth 2 (two) times daily.    multivitamin-minerals-lutein (CENTRUM SILVER) Tab Take 1 tablet by mouth Daily. 1 Tablet Oral Every day    omega-3 fatty acids-vitamin E (FISH OIL) 1,000 mg Cap     cetirizine (ZYRTEC) 10 mg Cap Take 1 tablet by mouth every evening.    cetirizine (ZYRTEC) 10 MG tablet TK 1 T PO  Q EVENING           Clinical Reference Information           Your Vitals Were     BP Pulse Temp Resp Height Weight    130/80 (BP Location: Left arm, Patient Position: Sitting, BP Method: Manual) 70 98.7 °F (37.1 °C) (Oral) 16 5' 1" (1.549 m) 79 kg (174 lb 2.6 oz)    BMI                32.91 kg/m2          Blood Pressure          Most Recent Value    BP  130/80      Allergies as of 3/14/2017     Latex      Immunizations Administered on Date of Encounter - 3/14/2017     None      Orders Placed During Today's Visit     Future Labs/Procedures Expected by Expires    SCHEDULED EKG 12-LEAD (to Muse)  As directed 3/14/2018      Language Assistance Services     ATTENTION: Language assistance services are available, free of charge. Please call [phone].      ATENCIÓN: Si perrila sohail, tiene a herrera disposición servicios gratuitos de asistencia lingüística. Llame al [phone].     CHÚ Ý: N?u b?n nói Ti?ng Vi?t, có các d?ch v? h? tr? ngôn ng? mi?n phí dành cho b?n. G?i s? " [phone].         [facility] complies with applicable Federal civil rights laws and does not discriminate on the basis of race, color, national origin, age, disability, or sex.

## 2017-03-17 ENCOUNTER — TELEPHONE (OUTPATIENT)
Dept: INTERNAL MEDICINE | Facility: CLINIC | Age: 72
End: 2017-03-17

## 2017-03-17 NOTE — TELEPHONE ENCOUNTER
----- Message from Anna Ch sent at 3/17/2017  8:56 AM CDT -----  Contact: Self. Call [phone]  She want to rescheduled her EKG on 3/21 to 8:00 am. She has a  that she would like to attend. Call to let her know.

## 2017-03-21 ENCOUNTER — CLINICAL SUPPORT (OUTPATIENT)
Dept: INTERNAL MEDICINE | Facility: CLINIC | Age: 72
End: 2017-03-21
Payer: MEDICARE

## 2017-03-21 DIAGNOSIS — R07.9 CHEST PAIN, UNSPECIFIED TYPE: ICD-10-CM

## 2017-03-21 PROCEDURE — 93010 ELECTROCARDIOGRAM REPORT: CPT | Mod: S$GLB,,, | Performed by: INTERNAL MEDICINE

## 2017-03-21 PROCEDURE — 93005 ELECTROCARDIOGRAM TRACING: CPT | Mod: S$GLB,,, | Performed by: INTERNAL MEDICINE

## 2017-03-24 ENCOUNTER — TELEPHONE (OUTPATIENT)
Dept: INTERNAL MEDICINE | Facility: CLINIC | Age: 72
End: 2017-03-24

## 2017-03-24 DIAGNOSIS — R07.9 CHEST PAIN, UNSPECIFIED TYPE: Primary | ICD-10-CM

## 2017-03-24 NOTE — TELEPHONE ENCOUNTER
Please informed patient that EKG shows right bundle branch block.  This is a stable finding from previous EKG.  However, given recent sensation of chest heaviness, I recommend referral to cardiology.

## 2017-03-31 ENCOUNTER — OFFICE VISIT (OUTPATIENT)
Dept: CARDIOLOGY | Facility: CLINIC | Age: 72
End: 2017-03-31
Payer: MEDICARE

## 2017-03-31 VITALS
DIASTOLIC BLOOD PRESSURE: 78 MMHG | HEART RATE: 74 BPM | WEIGHT: 174 LBS | HEIGHT: 61 IN | BODY MASS INDEX: 32.85 KG/M2 | SYSTOLIC BLOOD PRESSURE: 118 MMHG

## 2017-03-31 DIAGNOSIS — Z82.49: ICD-10-CM

## 2017-03-31 DIAGNOSIS — E78.00 PURE HYPERCHOLESTEROLEMIA: ICD-10-CM

## 2017-03-31 DIAGNOSIS — R07.2 PRECORDIAL PAIN: Primary | ICD-10-CM

## 2017-03-31 PROBLEM — E78.1 PURE HYPERGLYCERIDEMIA: Status: ACTIVE | Noted: 2017-03-31

## 2017-03-31 PROCEDURE — 1160F RVW MEDS BY RX/DR IN RCRD: CPT | Mod: S$GLB,,, | Performed by: INTERNAL MEDICINE

## 2017-03-31 PROCEDURE — 99999 PR PBB SHADOW E&M-EST. PATIENT-LVL IV: CPT | Mod: PBBFAC,,, | Performed by: INTERNAL MEDICINE

## 2017-03-31 PROCEDURE — 99204 OFFICE O/P NEW MOD 45 MIN: CPT | Mod: S$GLB,,, | Performed by: INTERNAL MEDICINE

## 2017-03-31 PROCEDURE — 99499 UNLISTED E&M SERVICE: CPT | Mod: S$GLB,,, | Performed by: INTERNAL MEDICINE

## 2017-03-31 PROCEDURE — 1126F AMNT PAIN NOTED NONE PRSNT: CPT | Mod: S$GLB,,, | Performed by: INTERNAL MEDICINE

## 2017-03-31 PROCEDURE — 1157F ADVNC CARE PLAN IN RCRD: CPT | Mod: S$GLB,,, | Performed by: INTERNAL MEDICINE

## 2017-03-31 PROCEDURE — 1159F MED LIST DOCD IN RCRD: CPT | Mod: S$GLB,,, | Performed by: INTERNAL MEDICINE

## 2017-03-31 NOTE — LETTER
March 31, 2017      Odilia Eckert MD  [address] [facility] [place] [address]           [facility] - Cardiology  [address], [address]  [facility] [address]  Phone: [phone]          Patient: Daisha Mayo   MR Number: [id number]   YOB: 1945   Date of Visit: 3/31/2017       Dear Dr. Odilia Eckert:    Thank you for referring Daisha Mayo to me for evaluation. Attached you will find relevant portions of my assessment and plan of care.    If you have questions, please do not hesitate to call me. I look forward to following Daisha Mayo along with you.    Sincerely,    Tad Puri MD    Enclosure  CC:  No Recipients    If you would like to receive this communication electronically, please contact [email or web address] or [phone] to request more information on [employer] Link access.    For providers and/or their staff who would like to refer a patient to [facility], please contact us through our one-stop-shop provider referral line, [facility] , at [phone].    If you feel you have received this communication in error or would no longer like to receive these types of communications, please e-mail [email or web address]

## 2017-03-31 NOTE — PROGRESS NOTES
Subjective:    Patient ID:  Daisha Mayo is a 71 y.o. female who presents for evaluation of Chest Pain      HPI  The patient is a 71 year old female referred by Dr Eckert for evaluation of chest pain. She was awakened at 4 AM with a brief pressure in her chest which has not recurred. A EKG was done by Dr Eckert which  revealed RBBB and is unchanged from prior EKG. She has a distant history of a heart murmur. She denies hypertension, diabetes, and is a non smoker. Her mother has CAD and post MI. Her brother  of an MI.    Lab Results   Component Value Date     2017    K 3.9 2017     2017    CO2 28 2017    BUN 10 2017    CREATININE 0.8 2017    GLU 80 2017    HGBA1C 5.1 2006    MG 2.0 2006    AST 16 2017    ALT 16 2017    ALBUMIN 3.4 (L) 2017    PROT 6.6 2017    BILITOT 0.5 2017    WBC 4.84 2017    HGB 13.5 2017    HCT 41.0 2017    MCV 97 2017     2017    INR 1.0 2006    TSH 2.622 2017         Lab Results   Component Value Date    CHOL 215 (H) 2017    HDL 50 2017    TRIG 96 2017       Lab Results   Component Value Date    LDLCALC 145.8 2017     Past Medical History:   Diagnosis Date    Arthritis     Cataract     Depression     [place]; treated with Wellbutrin    Early cataracts, bilateral     GERD (gastroesophageal reflux disease)     Glaucoma     Hyperprolactinemia     Hypothyroidism     Need for prophylactic vaccination against Streptococcus pneumoniae (pneumococcus) 10/14/2015    Osteopenia     Pure hyperglyceridemia 3/31/2017    Recurrent UTI     Every few months    Sciatica neuralgia     Seizures 2006    temporal lobe seizure    Temporal lobe seizure 2006    Trigeminal neuralgia     Vitamin D deficiency disease      Current Outpatient Prescriptions   Medication Sig    ASCORBATE CALCIUM (VITAMIN C ORAL) Take 1,000  "mg by mouth once daily.     calcium carbonate (OS-SUNIL) 600 mg (1,500 mg) Tab Take 600 mg by mouth once daily.    cetirizine (ZYRTEC) 10 mg Cap Take 1 tablet by mouth every evening.    cetirizine (ZYRTEC) 10 MG tablet TK 1 T PO  Q EVENING    cholecalciferol, vitamin D3, 2,000 unit Cap Take 1 capsule (2,000 Units total) by mouth once daily.    cyanocobalamin (VITAMIN B-12) 250 MCG tablet Take 1,000 mcg by mouth once daily. 1 Tablet Oral Every day    dorzolamide-timolol (COSOPT) 2-0.5 % ophthalmic solution Place 1 drop into both eyes 2 (two) times daily.      ergocalciferol (VITAMIN D2) 50,000 unit Cap Take 1 capsule (50,000 Units total) by mouth every 7 days.    inhalation device (AEROCHAMBER PLUS FLOW-VU) Use with inhaler dispense with mouthpiece    latanoprost 0.005 % ophthalmic solution Place 1 drop into both eyes every evening.    levothyroxine (SYNTHROID) 25 MCG tablet TAKE 1 TABLET ONE TIME DAILY    MULTIVITAMIN WITH MINERALS (HAIR,SKIN AND NAILS ORAL) Take 1 tablet by mouth 2 (two) times daily.    multivitamin-minerals-lutein (CENTRUM SILVER) Tab Take 1 tablet by mouth Daily. 1 Tablet Oral Every day    omega-3 fatty acids-vitamin E (FISH OIL) 1,000 mg Cap      No current facility-administered medications for this visit.      Review of Systems   Cardiovascular: Positive for chest pain and dyspnea on exertion.        Objective:/78  Pulse 74  Ht 5' 1" (1.549 m)  Wt 78.9 kg (174 lb)  BMI 32.88 kg/m2    Physical Exam   Constitutional: She is oriented to person, place, and time. She appears well-developed and well-nourished.   obese   HENT:   Head: Normocephalic.   Right Ear: External ear normal.   Left Ear: External ear normal.   Nose: Nose normal.   Inspection of lips, teeth and gums normal   Eyes: Conjunctivae and EOM are normal. Pupils are equal, round, and reactive to light. No scleral icterus.   Neck: Normal range of motion. No JVD present. No tracheal deviation present. No thyromegaly " present.   Cardiovascular: Normal rate, regular rhythm, normal heart sounds and intact distal pulses.  Exam reveals no gallop and no friction rub.    No murmur heard.  Pulses:       Dorsalis pedis pulses are 2+ on the right side, and 2+ on the left side.        Posterior tibial pulses are 2+ on the right side, and 2+ on the left side.   Pulmonary/Chest: Effort normal and breath sounds normal. No respiratory distress. She has no wheezes. She has no rales. She exhibits no tenderness.   Abdominal: Soft. Bowel sounds are normal. She exhibits no distension. There is no hepatosplenomegaly. There is no tenderness. There is no guarding.   Musculoskeletal: Normal range of motion. She exhibits no edema or tenderness.   Lymphadenopathy:   Palpation of lymph nodes of neck and groin normal   Neurological: She is oriented to person, place, and time. No cranial nerve deficit. She exhibits normal muscle tone. Coordination normal.   Skin: Skin is warm and dry. No rash noted. No erythema. No pallor.   Palpation of skin normal   Psychiatric: She has a normal mood and affect. Her behavior is normal. Judgment and thought content normal.         Assessment:       1. Precordial pain    2. Family history of acute inferior wall myocardial infarction    3. Pure hypercholesterolemia         Plan:       Daisha was seen today for chest pain.    Diagnoses and all orders for this visit:    Precordial pain  -     Exercise stress echo with color flow; Future  -     EKG 12-LEAD - [place]; Future    Family history of acute inferior wall myocardial infarction  -     Exercise stress echo with color flow; Future  -     EKG 12-LEAD - [place]; Future    Pure hypercholesterolemia  -     Exercise stress echo with color flow; Future  -     EKG 12-LEAD - [place]; Future

## 2017-03-31 NOTE — MR AVS SNAPSHOT
[facility] - Cardiology  [address] [place] [address], [address]  [facility] [address]  Phone: [phone]                  Daisha D Gael   3/31/2017 4:00 PM   Office Visit    Description:  Female : 1945   Provider:  Tad Puri MD   Department:  [facility] - Cardiology           Reason for Visit     Chest Pain           Diagnoses this Visit        Comments    Precordial pain    -  Primary     Family history of acute inferior wall myocardial infarction         Pure hypercholesterolemia                To Do List           Goals (5 Years of Data)     None      Follow-Up and Disposition     Return if symptoms worsen or fail to improve.    Follow-up and Disposition History      [facility]     [facility] Care Line -  Assistance  Unless otherwise directed by your provider, please contact [facility], our nurse care line that is available for  assistance.     Registered nurses in the [facility] provide: appointment scheduling, clinical advisement, health education, and other advisory services.  Call: [phone] (toll free)               Medications           Message regarding Medications     Verify the changes and/or additions to your medication regime listed below are the same as discussed with your clinician today.  If any of these changes or additions are incorrect, please notify your healthcare provider.             Verify that the below list of medications is an accurate representation of the medications you are currently taking.  If none reported, the list may be blank. If incorrect, please contact your healthcare provider. Carry this list with you in case of emergency.           Current Medications     ASCORBATE CALCIUM (VITAMIN C ORAL) Take 1,000 mg by mouth once daily.     calcium carbonate (OS-SUNIL) 600 mg (1,500 mg) Tab Take 600 mg by mouth once daily.    cetirizine (ZYRTEC) 10 mg Cap Take 1 tablet by mouth every evening.    cetirizine (ZYRTEC) 10 MG  "tablet TK 1 T PO  Q EVENING    cholecalciferol, vitamin D3, 2,000 unit Cap Take 1 capsule (2,000 Units total) by mouth once daily.    cyanocobalamin (VITAMIN B-12) 250 MCG tablet Take 1,000 mcg by mouth once daily. 1 Tablet Oral Every day    dorzolamide-timolol (COSOPT) 2-0.5 % ophthalmic solution Place 1 drop into both eyes 2 (two) times daily.      ergocalciferol (VITAMIN D2) 50,000 unit Cap Take 1 capsule (50,000 Units total) by mouth every 7 days.    inhalation device (AEROCHAMBER PLUS FLOW-VU) Use with inhaler dispense with mouthpiece    latanoprost 0.005 % ophthalmic solution Place 1 drop into both eyes every evening.    levothyroxine (SYNTHROID) 25 MCG tablet TAKE 1 TABLET ONE TIME DAILY    MULTIVITAMIN WITH MINERALS (HAIR,SKIN AND NAILS ORAL) Take 1 tablet by mouth 2 (two) times daily.    multivitamin-minerals-lutein (CENTRUM SILVER) Tab Take 1 tablet by mouth Daily. 1 Tablet Oral Every day    omega-3 fatty acids-vitamin E (FISH OIL) 1,000 mg Cap            Clinical Reference Information           Your Vitals Were     BP Pulse Height Weight BMI    118/78 74 5' 1" (1.549 m) 78.9 kg (174 lb) 32.88 kg/m2      Blood Pressure          Most Recent Value    Right Arm BP - Sitting  120/70    Left Arm BP - Sitting  118/78    BP  118/78      Allergies as of 3/31/2017     Latex      Immunizations Administered on Date of Encounter - 3/31/2017     None      Orders Placed During Today's Visit     Future Labs/Procedures Expected by Expires    EKG 12-LEAD - [place]  As directed 3/31/2018    Exercise stress echo with color flow  As directed 3/31/2018      Instructions    Exercise  [facility]       Language Assistance Services     ATTENTION: Language assistance services are available, free of charge. Please call [phone].      ATENCIÓN: Si perrila sohail, tiene a herrera disposición servicios gratuitos de asistencia lingüística. Llame al [phone].     CHÚ Ý: N?u b?n nói Ti?ng Vi?t, có các d?ch v? h? tr? ngôn ng? mi?n phí " dành cho b?n. G?i s? [phone].         [facility] complies with applicable Federal civil rights laws and does not discriminate on the basis of race, color, national origin, age, disability, or sex.

## 2017-04-04 ENCOUNTER — HOSPITAL ENCOUNTER (OUTPATIENT)
Dept: CARDIOLOGY | Facility: CLINIC | Age: 72
Discharge: HOME | End: 2017-04-04
Payer: MEDICARE

## 2017-04-04 DIAGNOSIS — Z82.49: ICD-10-CM

## 2017-04-04 DIAGNOSIS — R07.2 PRECORDIAL PAIN: ICD-10-CM

## 2017-04-04 DIAGNOSIS — E78.00 PURE HYPERCHOLESTEROLEMIA: ICD-10-CM

## 2017-04-04 LAB
AORTIC VALVE REGURGITATION: NORMAL
DIASTOLIC DYSFUNCTION: NO
ESTIMATED PA SYSTOLIC PRESSURE: 17.64
RETIRED EF AND QEF - SEE NOTES: 60 (ref 55–65)
TRICUSPID VALVE REGURGITATION: NORMAL

## 2017-04-04 PROCEDURE — 93351 STRESS TTE COMPLETE: CPT | Mod: S$GLB,,, | Performed by: INTERNAL MEDICINE

## 2017-04-04 PROCEDURE — 93320 DOPPLER ECHO COMPLETE: CPT | Mod: S$GLB,,, | Performed by: INTERNAL MEDICINE

## 2017-04-04 PROCEDURE — 93325 DOPPLER ECHO COLOR FLOW MAPG: CPT | Mod: S$GLB,,, | Performed by: INTERNAL MEDICINE

## 2017-06-02 ENCOUNTER — PATIENT MESSAGE (OUTPATIENT)
Dept: INTERNAL MEDICINE | Facility: CLINIC | Age: 72
End: 2017-06-02

## 2017-06-12 ENCOUNTER — TELEPHONE (OUTPATIENT)
Dept: INTERNAL MEDICINE | Facility: CLINIC | Age: 72
End: 2017-06-12

## 2017-06-12 ENCOUNTER — OFFICE VISIT (OUTPATIENT)
Dept: INTERNAL MEDICINE | Facility: CLINIC | Age: 72
End: 2017-06-12
Payer: MEDICARE

## 2017-06-12 VITALS
SYSTOLIC BLOOD PRESSURE: 130 MMHG | TEMPERATURE: 98 F | WEIGHT: 174.81 LBS | HEART RATE: 72 BPM | DIASTOLIC BLOOD PRESSURE: 80 MMHG | HEIGHT: 61 IN | BODY MASS INDEX: 33 KG/M2

## 2017-06-12 DIAGNOSIS — E78.5 HYPERLIPIDEMIA, UNSPECIFIED HYPERLIPIDEMIA TYPE: ICD-10-CM

## 2017-06-12 DIAGNOSIS — H26.9 CATARACT, UNSPECIFIED CATARACT TYPE, UNSPECIFIED LATERALITY: Primary | ICD-10-CM

## 2017-06-12 DIAGNOSIS — R51.9 HEADACHE, UNSPECIFIED HEADACHE TYPE: ICD-10-CM

## 2017-06-12 DIAGNOSIS — Z12.31 ENCOUNTER FOR SCREENING MAMMOGRAM FOR BREAST CANCER: Primary | ICD-10-CM

## 2017-06-12 DIAGNOSIS — I10 ESSENTIAL HYPERTENSION: ICD-10-CM

## 2017-06-12 PROCEDURE — 1159F MED LIST DOCD IN RCRD: CPT | Mod: S$GLB,,, | Performed by: INTERNAL MEDICINE

## 2017-06-12 PROCEDURE — 99499 UNLISTED E&M SERVICE: CPT | Mod: S$GLB,,, | Performed by: INTERNAL MEDICINE

## 2017-06-12 PROCEDURE — 1126F AMNT PAIN NOTED NONE PRSNT: CPT | Mod: S$GLB,,, | Performed by: INTERNAL MEDICINE

## 2017-06-12 PROCEDURE — 99999 PR PBB SHADOW E&M-EST. PATIENT-LVL IV: CPT | Mod: PBBFAC,,, | Performed by: INTERNAL MEDICINE

## 2017-06-12 PROCEDURE — 99214 OFFICE O/P EST MOD 30 MIN: CPT | Mod: S$GLB,,, | Performed by: INTERNAL MEDICINE

## 2017-06-12 RX ORDER — AMLODIPINE BESYLATE 5 MG/1
TABLET ORAL
COMMUNITY
Start: 2017-06-02 | End: 2017-08-25 | Stop reason: ALTCHOICE

## 2017-06-12 RX ORDER — ATORVASTATIN CALCIUM 10 MG/1
10 TABLET, FILM COATED ORAL DAILY
Qty: 90 TABLET | Refills: 1 | Status: SHIPPED | OUTPATIENT
Start: 2017-06-12 | End: 2017-11-14 | Stop reason: SDUPTHER

## 2017-06-12 NOTE — PROGRESS NOTES
CC: Cataract  HPI:  The patient is a 71 y.o. old female who presents to the office for cataract.  She reports ambulatory blood pressures have been elevated recently.  She reports eating a lot crawfish lately.  Blood pressures were 150s-160s, systolic.    PAST MEDICAL HISTORY  Past Medical History:   Diagnosis Date    Arthritis     Cataract     Depression     [place]; treated with Wellbutrin    Early cataracts, bilateral     GERD (gastroesophageal reflux disease)     Glaucoma     Hyperprolactinemia     Hypothyroidism     Need for prophylactic vaccination against Streptococcus pneumoniae (pneumococcus) 10/14/2015    Osteopenia     Pure hyperglyceridemia 3/31/2017    Recurrent UTI     Every few months    Sciatica neuralgia     Seizures 2006    temporal lobe seizure    Temporal lobe seizure 2006    Trigeminal neuralgia     Vitamin D deficiency disease        SURGICAL HISTORY:  Past Surgical History:   Procedure Laterality Date    BREAST SURGERY      to remove ducts causing discharge in left breast    CHOLECYSTECTOMY  1994    laporascopic    COLONOSCOPY      EYE SURGERY      LASIK surgery to one eye; cannot remember which one    HYSTERECTOMY  1985    low transverse incision.    TOTAL ABDOMINAL HYSTERECTOMY W/ BILATERAL SALPINGOOPHORECTOMY      1985; diffinitive treatment for menorrhagia         MEDS:  Medcard reviewed and updated    ALLERGIES: Allergy Card reviewed and updated    SOCIAL HISTORY:   The patient is a nonsmoker, denies alcohol or illicit drug use.    ROS:  GENERAL: No fever, chills, fatigability or weight loss.  SKIN: No rashes.  HEAD: Positive headaches, primarily on the right, intermittent.  Denies recent head trauma.  EYES: Positive photophobia and blurred vision.  EARS: Denies ear pain, discharge or vertigo.  NOSE: No epistaxis or postnasal drip.  MOUTH & THROAT: No hoarseness or change in voice.   NODES: Denies swollen glands.  CHEST: Denies shortness of breath, wheezing,  cough and sputum production.  CARDIOVASCULAR: Denies chest pain or palpitations.  ABDOMEN: Appetite decreased. Denies diarrhea, abdominal pain, constipation or blood in stool.  URINARY: No dysuria or hematuria.  MUSCULOSKELETAL: Positive swelling of hands. Denies back pain.  NEUROLOGIC: Remote history of seizures.  Positive lightheadedness  ENDOCRINE: Positive polyuria.  Denies polydipsia.  PSYCHIATRIC: Denies mood swings, depression, anxiety, homicidal or suicidal thoughts.    PE:   Vitals:  Vitals:    06/12/17 0852   BP: 130/80, recheck 126/84   Pulse: 72   Temp: 97.8 °F (36.6 °C)       APPEARANCE: Well nourished, well developed, in no acute distress.    EYES: Sclerae anicteric. PERRL. EOMI.      EARS: TM's intact. No retraction or perforation.    NOSE: Mucosa pink. Airway clear.  MOUTH & THROAT: No tonsillar enlargement. No pharyngeal erythema or exudate. No stridor.  CHEST: Lungs clear to auscultation with unlabored respirations.  CARDIOVASCULAR: Normal S1, S2. No murmurs. No carotid bruits. No pedal edema.  ABDOMEN: Bowel sounds normal. Not distended. Soft. No tenderness or masses. No organomegaly.  NEUROLOGIC: Cranial Nerves: Intact.  PSYCHIATRIC: The patient is oriented to person, place, and time and has a pleasant affect.        ASSESSMENT/PLAN:  Daisha was seen today for pre-op exam.    Diagnoses and all orders for this visit:    Cataract, unspecified cataract type, unspecified laterality  -     Await cataract surgery    Essential hypertension  -     Comprehensive metabolic panel; Future  -     Lipid panel; Future  -     Blood pressure is controlled, continue current medications    Headache, unspecified headache type  -     C-reactive protein; Future  -     Sedimentation rate, manual; Future    Hyperlipidemia, unspecified hyperlipidemia type  -     CK; Future  -     10 year ASCVD risk is greater than 7.5%, start atorvastatin 10 mg daily    Other orders  -     atorvastatin (LIPITOR) 10 MG tablet; Take 1  tablet (10 mg total) by mouth once daily.

## 2017-06-13 ENCOUNTER — HOSPITAL ENCOUNTER (OUTPATIENT)
Dept: RADIOLOGY | Facility: HOSPITAL | Age: 72
Discharge: HOME | End: 2017-06-13
Attending: INTERNAL MEDICINE
Payer: MEDICARE

## 2017-06-13 DIAGNOSIS — Z12.31 ENCOUNTER FOR SCREENING MAMMOGRAM FOR BREAST CANCER: ICD-10-CM

## 2017-06-13 PROCEDURE — 77067 SCR MAMMO BI INCL CAD: CPT | Mod: 26,,, | Performed by: RADIOLOGY

## 2017-06-13 PROCEDURE — 77063 BREAST TOMOSYNTHESIS BI: CPT | Mod: 26,,, | Performed by: RADIOLOGY

## 2017-06-13 PROCEDURE — 77067 SCR MAMMO BI INCL CAD: CPT | Mod: TC

## 2017-08-17 ENCOUNTER — PATIENT MESSAGE (OUTPATIENT)
Dept: INTERNAL MEDICINE | Facility: CLINIC | Age: 72
End: 2017-08-17

## 2017-08-23 ENCOUNTER — PATIENT MESSAGE (OUTPATIENT)
Dept: INTERNAL MEDICINE | Facility: CLINIC | Age: 72
End: 2017-08-23

## 2017-08-25 ENCOUNTER — LAB VISIT (OUTPATIENT)
Dept: LAB | Facility: HOSPITAL | Age: 72
End: 2017-08-25
Attending: INTERNAL MEDICINE
Payer: MEDICARE

## 2017-08-25 ENCOUNTER — OFFICE VISIT (OUTPATIENT)
Dept: INTERNAL MEDICINE | Facility: CLINIC | Age: 72
End: 2017-08-25
Payer: MEDICARE

## 2017-08-25 VITALS
DIASTOLIC BLOOD PRESSURE: 64 MMHG | SYSTOLIC BLOOD PRESSURE: 133 MMHG | BODY MASS INDEX: 33.68 KG/M2 | WEIGHT: 178.38 LBS | HEART RATE: 63 BPM | TEMPERATURE: 98 F | HEIGHT: 61 IN

## 2017-08-25 DIAGNOSIS — M25.471 ANKLE EDEMA, BILATERAL: ICD-10-CM

## 2017-08-25 DIAGNOSIS — M25.472 ANKLE EDEMA, BILATERAL: ICD-10-CM

## 2017-08-25 DIAGNOSIS — I10 ESSENTIAL HYPERTENSION: Primary | ICD-10-CM

## 2017-08-25 DIAGNOSIS — I10 ESSENTIAL HYPERTENSION: ICD-10-CM

## 2017-08-25 LAB
ALBUMIN SERPL BCP-MCNC: 3.7 G/DL
ALP SERPL-CCNC: 120 U/L
ALT SERPL W/O P-5'-P-CCNC: 17 U/L
ANION GAP SERPL CALC-SCNC: 7 MMOL/L
AST SERPL-CCNC: 20 U/L
BILIRUB SERPL-MCNC: 0.6 MG/DL
BNP SERPL-MCNC: 54 PG/ML
BUN SERPL-MCNC: 9 MG/DL
CALCIUM SERPL-MCNC: 9.5 MG/DL
CHLORIDE SERPL-SCNC: 107 MMOL/L
CO2 SERPL-SCNC: 27 MMOL/L
CREAT SERPL-MCNC: 0.7 MG/DL
EST. GFR  (AFRICAN AMERICAN): >60 ML/MIN/1.73 M^2
EST. GFR  (NON AFRICAN AMERICAN): >60 ML/MIN/1.73 M^2
GLUCOSE SERPL-MCNC: 84 MG/DL
POTASSIUM SERPL-SCNC: 4.4 MMOL/L
PROT SERPL-MCNC: 7.5 G/DL
SODIUM SERPL-SCNC: 141 MMOL/L

## 2017-08-25 PROCEDURE — 3008F BODY MASS INDEX DOCD: CPT | Mod: S$GLB,,, | Performed by: INTERNAL MEDICINE

## 2017-08-25 PROCEDURE — 1159F MED LIST DOCD IN RCRD: CPT | Mod: S$GLB,,, | Performed by: INTERNAL MEDICINE

## 2017-08-25 PROCEDURE — 99999 PR PBB SHADOW E&M-EST. PATIENT-LVL V: CPT | Mod: PBBFAC,,, | Performed by: INTERNAL MEDICINE

## 2017-08-25 PROCEDURE — 3078F DIAST BP <80 MM HG: CPT | Mod: S$GLB,,, | Performed by: INTERNAL MEDICINE

## 2017-08-25 PROCEDURE — 3075F SYST BP GE 130 - 139MM HG: CPT | Mod: S$GLB,,, | Performed by: INTERNAL MEDICINE

## 2017-08-25 PROCEDURE — 99213 OFFICE O/P EST LOW 20 MIN: CPT | Mod: S$GLB,,, | Performed by: INTERNAL MEDICINE

## 2017-08-25 PROCEDURE — 99499 UNLISTED E&M SERVICE: CPT | Mod: S$GLB,,, | Performed by: INTERNAL MEDICINE

## 2017-08-25 PROCEDURE — 36415 COLL VENOUS BLD VENIPUNCTURE: CPT | Mod: PO

## 2017-08-25 PROCEDURE — 83880 ASSAY OF NATRIURETIC PEPTIDE: CPT

## 2017-08-25 PROCEDURE — 80053 COMPREHEN METABOLIC PANEL: CPT

## 2017-08-25 PROCEDURE — 1126F AMNT PAIN NOTED NONE PRSNT: CPT | Mod: S$GLB,,, | Performed by: INTERNAL MEDICINE

## 2017-08-25 RX ORDER — NEPAFENAC 3 MG/ML
SUSPENSION/ DROPS OPHTHALMIC
COMMUNITY
Start: 2017-06-13 | End: 2017-09-22

## 2017-08-25 RX ORDER — DUREZOL 0.5 MG/ML
EMULSION OPHTHALMIC
COMMUNITY
Start: 2017-06-13 | End: 2017-09-22

## 2017-08-25 RX ORDER — HYDROCHLOROTHIAZIDE 25 MG/1
25 TABLET ORAL DAILY
Qty: 30 TABLET | Refills: 0 | Status: SHIPPED | OUTPATIENT
Start: 2017-08-25 | End: 2017-09-22 | Stop reason: SDUPTHER

## 2017-08-25 RX ORDER — CIPROFLOXACIN HYDROCHLORIDE 3 MG/ML
SOLUTION/ DROPS OPHTHALMIC
COMMUNITY
Start: 2017-07-15 | End: 2017-09-22

## 2017-08-25 NOTE — PROGRESS NOTES
Subjective:       Patient ID: Daisha Mayo is a 71 y.o. female who presents for Foot Swelling (both feet)      Edema   This is a new problem. The current episode started in the past 7 days. The problem occurs constantly. The problem has been gradually improving. Pertinent negatives include no abdominal pain, arthralgias, change in bowel habit, chest pain, chills, congestion, coughing, fatigue, fever, headaches, myalgias, nausea, rash, urinary symptoms, vertigo, vomiting or weakness. Nothing aggravates the symptoms. She has tried nothing for the symptoms.   Hypertension   This is a chronic problem. The current episode started more than 1 month ago. The problem is unchanged. The problem is controlled. Associated symptoms include peripheral edema. Pertinent negatives include no anxiety, chest pain, headaches, malaise/fatigue, orthopnea, PND or shortness of breath. Associated agents: started amlodipine a few months ago. Past treatments include calcium channel blockers. The current treatment provides moderate improvement. There is no history of kidney disease, heart failure or a thyroid problem. There is no history of chronic renal disease.      No significant increase in sodium intake, denies CP, no SOB, no ALCARAZ, no PND, no orthopnea, no decrease in urine output. Leg edema is improved when she wakes up in morning but returns each day.    BP range at home 108-135/73-85      Review of Systems   Constitutional: Negative for chills, fatigue, fever and malaise/fatigue.   HENT: Negative for congestion, rhinorrhea and sinus pressure.    Eyes: Negative for visual disturbance.   Respiratory: Negative for cough, chest tightness and shortness of breath.    Cardiovascular: Negative for chest pain, orthopnea, leg swelling and PND.   Gastrointestinal: Negative for abdominal pain, change in bowel habit, diarrhea, nausea and vomiting.   Genitourinary: Negative for decreased urine volume, dysuria and hematuria.   Musculoskeletal:  Negative for arthralgias and myalgias.   Skin: Negative for rash.   Neurological: Negative for dizziness, vertigo, weakness, light-headedness and headaches.       Objective:      Physical Exam   Constitutional: She is oriented to person, place, and time. Vital signs are normal. She appears well-developed and well-nourished. No distress.   HENT:   Head: Normocephalic and atraumatic.   Right Ear: Hearing and external ear normal.   Left Ear: Hearing and external ear normal.   Nose: Nose normal.   Mouth/Throat: Uvula is midline and oropharynx is clear and moist. No oropharyngeal exudate.   Eyes: EOM and lids are normal.   Neck: Normal range of motion.   Cardiovascular: Normal rate, regular rhythm, normal heart sounds and intact distal pulses.    No murmur heard.  Pulmonary/Chest: Effort normal and breath sounds normal. She has no wheezes.   Abdominal: Soft. Bowel sounds are normal. There is no tenderness.   Musculoskeletal: Normal range of motion. She exhibits edema (ankle edema).   Neurological: She is alert and oriented to person, place, and time. Coordination and gait normal.   Skin: Skin is warm, dry and intact. No rash noted. She is not diaphoretic.   Psychiatric: She has a normal mood and affect.   Vitals reviewed.      Assessment:       1. Essential hypertension    2. Ankle edema, bilateral        Plan:       -- will stop amlodipine  -- change to lisinopril 5mg daily  -- check liver and kidney function, bnp  -- elevate legs daily, reduce sodium intake  -- monitor BP regularly and bring BP log to review with PCP at follow-up visit    Zoë Bello MD

## 2017-09-15 ENCOUNTER — LAB VISIT (OUTPATIENT)
Dept: LAB | Facility: HOSPITAL | Age: 72
End: 2017-09-15
Attending: INTERNAL MEDICINE
Payer: MEDICARE

## 2017-09-15 DIAGNOSIS — E78.5 HYPERLIPIDEMIA, UNSPECIFIED HYPERLIPIDEMIA TYPE: ICD-10-CM

## 2017-09-15 DIAGNOSIS — R51.9 HEADACHE, UNSPECIFIED HEADACHE TYPE: ICD-10-CM

## 2017-09-15 DIAGNOSIS — I10 ESSENTIAL HYPERTENSION: ICD-10-CM

## 2017-09-15 LAB
ALBUMIN SERPL BCP-MCNC: 3.5 G/DL
ALP SERPL-CCNC: 108 U/L
ALT SERPL W/O P-5'-P-CCNC: 23 U/L
ANION GAP SERPL CALC-SCNC: 12 MMOL/L
AST SERPL-CCNC: 22 U/L
BILIRUB SERPL-MCNC: 1.2 MG/DL
BUN SERPL-MCNC: 9 MG/DL
CALCIUM SERPL-MCNC: 9.3 MG/DL
CHLORIDE SERPL-SCNC: 97 MMOL/L
CHOLEST SERPL-MCNC: 225 MG/DL
CHOLEST/HDLC SERPL: 4.2 {RATIO}
CK SERPL-CCNC: 78 U/L
CO2 SERPL-SCNC: 27 MMOL/L
CREAT SERPL-MCNC: 0.8 MG/DL
CRP SERPL-MCNC: 3.4 MG/L
ERYTHROCYTE [SEDIMENTATION RATE] IN BLOOD BY WESTERGREN METHOD: 9 MM/HR
EST. GFR  (AFRICAN AMERICAN): >60 ML/MIN/1.73 M^2
EST. GFR  (NON AFRICAN AMERICAN): >60 ML/MIN/1.73 M^2
GLUCOSE SERPL-MCNC: 101 MG/DL
HDLC SERPL-MCNC: 54 MG/DL
HDLC SERPL: 24 %
LDLC SERPL CALC-MCNC: 152 MG/DL
NONHDLC SERPL-MCNC: 171 MG/DL
POTASSIUM SERPL-SCNC: 3.5 MMOL/L
PROT SERPL-MCNC: 6.9 G/DL
SODIUM SERPL-SCNC: 136 MMOL/L
TRIGL SERPL-MCNC: 95 MG/DL

## 2017-09-15 PROCEDURE — 86140 C-REACTIVE PROTEIN: CPT

## 2017-09-15 PROCEDURE — 82550 ASSAY OF CK (CPK): CPT

## 2017-09-15 PROCEDURE — 36415 COLL VENOUS BLD VENIPUNCTURE: CPT | Mod: PO

## 2017-09-15 PROCEDURE — 80061 LIPID PANEL: CPT

## 2017-09-15 PROCEDURE — 80053 COMPREHEN METABOLIC PANEL: CPT

## 2017-09-15 PROCEDURE — 85651 RBC SED RATE NONAUTOMATED: CPT

## 2017-09-22 ENCOUNTER — OFFICE VISIT (OUTPATIENT)
Dept: INTERNAL MEDICINE | Facility: CLINIC | Age: 72
End: 2017-09-22
Payer: MEDICARE

## 2017-09-22 VITALS
WEIGHT: 174.81 LBS | OXYGEN SATURATION: 97 % | TEMPERATURE: 98 F | SYSTOLIC BLOOD PRESSURE: 123 MMHG | HEART RATE: 68 BPM | DIASTOLIC BLOOD PRESSURE: 82 MMHG | HEIGHT: 61 IN | BODY MASS INDEX: 33 KG/M2

## 2017-09-22 DIAGNOSIS — E22.1 HYPERPROLACTINEMIA: ICD-10-CM

## 2017-09-22 DIAGNOSIS — I10 ESSENTIAL HYPERTENSION: Primary | ICD-10-CM

## 2017-09-22 DIAGNOSIS — E03.8 SUBCLINICAL HYPOTHYROIDISM: ICD-10-CM

## 2017-09-22 DIAGNOSIS — E04.2 MULTINODULAR GOITER: ICD-10-CM

## 2017-09-22 DIAGNOSIS — E78.5 HYPERLIPIDEMIA, UNSPECIFIED HYPERLIPIDEMIA TYPE: ICD-10-CM

## 2017-09-22 PROCEDURE — 99499 UNLISTED E&M SERVICE: CPT | Mod: S$GLB,,, | Performed by: INTERNAL MEDICINE

## 2017-09-22 PROCEDURE — 99214 OFFICE O/P EST MOD 30 MIN: CPT | Mod: S$GLB,,, | Performed by: INTERNAL MEDICINE

## 2017-09-22 PROCEDURE — 99999 PR PBB SHADOW E&M-EST. PATIENT-LVL IV: CPT | Mod: PBBFAC,,, | Performed by: INTERNAL MEDICINE

## 2017-09-22 PROCEDURE — 3008F BODY MASS INDEX DOCD: CPT | Mod: S$GLB,,, | Performed by: INTERNAL MEDICINE

## 2017-09-22 PROCEDURE — 3079F DIAST BP 80-89 MM HG: CPT | Mod: S$GLB,,, | Performed by: INTERNAL MEDICINE

## 2017-09-22 PROCEDURE — 1159F MED LIST DOCD IN RCRD: CPT | Mod: S$GLB,,, | Performed by: INTERNAL MEDICINE

## 2017-09-22 PROCEDURE — 3074F SYST BP LT 130 MM HG: CPT | Mod: S$GLB,,, | Performed by: INTERNAL MEDICINE

## 2017-09-22 PROCEDURE — 1126F AMNT PAIN NOTED NONE PRSNT: CPT | Mod: S$GLB,,, | Performed by: INTERNAL MEDICINE

## 2017-09-22 RX ORDER — HYDROCHLOROTHIAZIDE 25 MG/1
25 TABLET ORAL DAILY
Qty: 90 TABLET | Refills: 3 | Status: SHIPPED | OUTPATIENT
Start: 2017-09-22 | End: 2018-09-12 | Stop reason: SDUPTHER

## 2017-09-22 RX ORDER — LEVOTHYROXINE SODIUM 25 UG/1
TABLET ORAL
Qty: 90 TABLET | Refills: 3 | Status: SHIPPED | OUTPATIENT
Start: 2017-09-22 | End: 2018-08-31 | Stop reason: SDUPTHER

## 2017-09-22 RX ORDER — CHOLECALCIFEROL (VITAMIN D3) 1250 MCG
1 TABLET ORAL
Qty: 7 TABLET | Refills: 4 | Status: SHIPPED | OUTPATIENT
Start: 2017-09-22 | End: 2017-11-14

## 2017-09-22 NOTE — PROGRESS NOTES
CC: followup of hypertension and hyperlipidemia  HPI:  The patient is a 71 y.o. year old female who presents to the office for followup of hypertension and hyperlipidemia.  The patient denies any chest pain, shortness of breath, headache, blurred vision, excessive fatigue, nausea or vomiting.  Review of labs reveals elevated cholesterol.  She has not started taking cholesterol medication.  Lower extremity swelling has improved since amlodipine was changed to HCTZ.  She has been feeling depressed lately.  Her sister in law recently passed away.    PAST MEDICAL HISTORY:  Past Medical History:   Diagnosis Date    Arthritis     Cataract     Depression     [place]; treated with Wellbutrin    Early cataracts, bilateral     GERD (gastroesophageal reflux disease)     Glaucoma     Hyperprolactinemia     Hypertension     Hypothyroidism     Need for prophylactic vaccination against Streptococcus pneumoniae (pneumococcus) 10/14/2015    Osteopenia     Pure hyperglyceridemia 3/31/2017    Recurrent UTI     Every few months    Sciatica neuralgia     Seizures 2006    temporal lobe seizure    Temporal lobe seizure 2006    Trigeminal neuralgia     Vitamin D deficiency disease        SURGICAL HISTORY:  Past Surgical History:   Procedure Laterality Date    BREAST SURGERY      to remove ducts causing discharge in left breast    CHOLECYSTECTOMY  1994    laporascopic    COLONOSCOPY      EYE SURGERY      LASIK surgery to one eye; cannot remember which one    HYSTERECTOMY  1985    low transverse incision.    TOTAL ABDOMINAL HYSTERECTOMY W/ BILATERAL SALPINGOOPHORECTOMY      1985; diffinitive treatment for menorrhagia       MEDS:  Medcard reviewed and updated    ALLERGIES: Allergy Card reviewed and updated    SOCIAL HISTORY:   The patient is a nonsmoker.    PE:   APPEARANCE: Well nourished, well developed, in no acute distress.    NECK: Supple, positive left supraclavicular swelling.  CHEST: Lungs clear to  auscultation with unlabored respirations.  CARDIOVASCULAR: Normal S1, S2. No murmurs. No carotid bruits. No pedal edema.  ABDOMEN: Bowel sounds normal. Not distended. Soft. No tenderness or masses.   PSYCHIATRIC: The patient is oriented to person, place, and time and has a pleasant affect.        ASSESSMENT/PLAN:  Daisha was seen today for follow-up.    Diagnoses and all orders for this visit:    Essential hypertension  -     Blood pressure is controlled, continue current medication    Hyperlipidemia, unspecified hyperlipidemia type  -     Cholesterol remains elevated, patient has not started Lipitor yet    Multinodular goiter  -     US Soft Tissue Head Neck Thyroid; Future    Hyperprolactinemia    Subclinical hypothyroidism  -     levothyroxine (SYNTHROID) 25 MCG tablet; TAKE 1 TABLET ONE TIME DAILY    Other orders  -     hydrochlorothiazide (HYDRODIURIL) 25 MG tablet; Take 1 tablet (25 mg total) by mouth once daily.  -     cholecalciferol, vitamin D3, 50,000 unit Tab; Take 1 tablet by mouth every 14 (fourteen) days.

## 2017-09-26 ENCOUNTER — HOSPITAL ENCOUNTER (OUTPATIENT)
Dept: RADIOLOGY | Facility: HOSPITAL | Age: 72
Discharge: HOME | End: 2017-09-26
Attending: INTERNAL MEDICINE
Payer: MEDICARE

## 2017-09-26 DIAGNOSIS — E04.2 MULTINODULAR GOITER: ICD-10-CM

## 2017-09-26 PROCEDURE — 76536 US EXAM OF HEAD AND NECK: CPT | Mod: TC

## 2017-09-26 PROCEDURE — 76536 US EXAM OF HEAD AND NECK: CPT | Mod: 26,,, | Performed by: RADIOLOGY

## 2017-11-06 ENCOUNTER — NURSE TRIAGE (OUTPATIENT)
Dept: ADMINISTRATIVE | Facility: CLINIC | Age: 72
End: 2017-11-06

## 2017-11-06 ENCOUNTER — HOSPITAL ENCOUNTER (EMERGENCY)
Facility: HOSPITAL | Age: 72
Discharge: HOME | End: 2017-11-06
Attending: EMERGENCY MEDICINE
Payer: MEDICARE

## 2017-11-06 ENCOUNTER — TELEPHONE (OUTPATIENT)
Dept: INTERNAL MEDICINE | Facility: CLINIC | Age: 72
End: 2017-11-06

## 2017-11-06 VITALS
BODY MASS INDEX: 32.66 KG/M2 | TEMPERATURE: 98 F | OXYGEN SATURATION: 100 % | DIASTOLIC BLOOD PRESSURE: 81 MMHG | WEIGHT: 173 LBS | HEIGHT: 61 IN | RESPIRATION RATE: 16 BRPM | HEART RATE: 59 BPM | SYSTOLIC BLOOD PRESSURE: 195 MMHG

## 2017-11-06 DIAGNOSIS — R42 DIZZINESS: ICD-10-CM

## 2017-11-06 DIAGNOSIS — J01.90 ACUTE SINUSITIS, RECURRENCE NOT SPECIFIED, UNSPECIFIED LOCATION: Primary | ICD-10-CM

## 2017-11-06 DIAGNOSIS — R93.89 ABNORMAL MRI, NECK: ICD-10-CM

## 2017-11-06 LAB
ANION GAP SERPL CALC-SCNC: 6 MMOL/L
BASOPHILS # BLD AUTO: 0.04 K/UL
BASOPHILS NFR BLD: 0.7 %
BILIRUB UR QL STRIP: NEGATIVE
BUN SERPL-MCNC: 9 MG/DL
CALCIUM SERPL-MCNC: 9.2 MG/DL
CHLORIDE SERPL-SCNC: 98 MMOL/L
CLARITY UR REFRACT.AUTO: CLEAR
CO2 SERPL-SCNC: 31 MMOL/L
COLOR UR AUTO: NORMAL
CREAT SERPL-MCNC: 0.7 MG/DL
DIFFERENTIAL METHOD: ABNORMAL
EOSINOPHIL # BLD AUTO: 0.1 K/UL
EOSINOPHIL NFR BLD: 1.7 %
ERYTHROCYTE [DISTWIDTH] IN BLOOD BY AUTOMATED COUNT: 12.3 %
EST. GFR  (AFRICAN AMERICAN): >60 ML/MIN/1.73 M^2
EST. GFR  (NON AFRICAN AMERICAN): >60 ML/MIN/1.73 M^2
GLUCOSE SERPL-MCNC: 90 MG/DL
GLUCOSE UR QL STRIP: NEGATIVE
HCT VFR BLD AUTO: 40.5 %
HGB BLD-MCNC: 14 G/DL
HGB UR QL STRIP: NEGATIVE
IMM GRANULOCYTES # BLD AUTO: 0.01 K/UL
IMM GRANULOCYTES NFR BLD AUTO: 0.2 %
KETONES UR QL STRIP: NEGATIVE
LEUKOCYTE ESTERASE UR QL STRIP: NEGATIVE
LYMPHOCYTES # BLD AUTO: 2.5 K/UL
LYMPHOCYTES NFR BLD: 43.7 %
MCH RBC QN AUTO: 32.8 PG
MCHC RBC AUTO-ENTMCNC: 34.6 G/DL
MCV RBC AUTO: 95 FL
MONOCYTES # BLD AUTO: 0.5 K/UL
MONOCYTES NFR BLD: 9.3 %
NEUTROPHILS # BLD AUTO: 2.5 K/UL
NEUTROPHILS NFR BLD: 44.4 %
NITRITE UR QL STRIP: NEGATIVE
NRBC BLD-RTO: 0 /100 WBC
PH UR STRIP: 7 [PH] (ref 5–8)
PLATELET # BLD AUTO: 244 K/UL
PMV BLD AUTO: 9.7 FL
POTASSIUM SERPL-SCNC: 3.7 MMOL/L
PROT UR QL STRIP: NEGATIVE
RBC # BLD AUTO: 4.27 M/UL
SODIUM SERPL-SCNC: 135 MMOL/L
SP GR UR STRIP: 1 (ref 1–1.03)
TROPONIN I SERPL DL<=0.01 NG/ML-MCNC: 0.01 NG/ML
URN SPEC COLLECT METH UR: NORMAL
UROBILINOGEN UR STRIP-ACNC: NEGATIVE EU/DL
WBC # BLD AUTO: 5.72 K/UL

## 2017-11-06 PROCEDURE — 99285 EMERGENCY DEPT VISIT HI MDM: CPT | Mod: ,,, | Performed by: PHYSICIAN ASSISTANT

## 2017-11-06 PROCEDURE — 25000003 PHARM REV CODE 250: Performed by: PHYSICIAN ASSISTANT

## 2017-11-06 PROCEDURE — 99284 EMERGENCY DEPT VISIT MOD MDM: CPT | Mod: 25

## 2017-11-06 PROCEDURE — 81003 URINALYSIS AUTO W/O SCOPE: CPT

## 2017-11-06 PROCEDURE — 84484 ASSAY OF TROPONIN QUANT: CPT

## 2017-11-06 PROCEDURE — 93010 ELECTROCARDIOGRAM REPORT: CPT | Mod: ,,, | Performed by: INTERNAL MEDICINE

## 2017-11-06 PROCEDURE — 85025 COMPLETE CBC W/AUTO DIFF WBC: CPT

## 2017-11-06 PROCEDURE — 80048 BASIC METABOLIC PNL TOTAL CA: CPT

## 2017-11-06 PROCEDURE — 93005 ELECTROCARDIOGRAM TRACING: CPT

## 2017-11-06 RX ORDER — FLUTICASONE PROPIONATE 50 MCG
1 SPRAY, SUSPENSION (ML) NASAL 2 TIMES DAILY PRN
Qty: 15 G | Refills: 0 | Status: SHIPPED | OUTPATIENT
Start: 2017-11-06 | End: 2017-11-14

## 2017-11-06 RX ORDER — MECLIZINE HCL 12.5 MG 12.5 MG/1
25 TABLET ORAL
Status: COMPLETED | OUTPATIENT
Start: 2017-11-06 | End: 2017-11-06

## 2017-11-06 RX ORDER — AMOXICILLIN AND CLAVULANATE POTASSIUM 875; 125 MG/1; MG/1
1 TABLET, FILM COATED ORAL EVERY 12 HOURS
Qty: 20 TABLET | Refills: 0 | Status: SHIPPED | OUTPATIENT
Start: 2017-11-06 | End: 2018-01-08 | Stop reason: ALTCHOICE

## 2017-11-06 RX ADMIN — MECLIZINE 25 MG: 12.5 TABLET ORAL at 03:11

## 2017-11-06 NOTE — TELEPHONE ENCOUNTER
Called and spoke with the patient and she states she was feeling Dizzy and she spoke with the OOC Nurse and was advised to go to the ED. Patient states she has spoken with her  and he is on the way to get her and take her to the ED, Termed the call

## 2017-11-06 NOTE — ED NOTES
Patient reports dizziness for a week intermittent, today dizziness has been constant. Reports history of vertigo, but states it is different from what she is experiencing now.  Denies recent changes in vision. Denies SOB, denies chest pain.  Denies recent falls or changes to medications.

## 2017-11-06 NOTE — TELEPHONE ENCOUNTER
----- Message from Fela Rocha sent at 11/6/2017 10:05 AM CST -----  Contact: Patient [phone]  Patient is experiencing dizziness and was triaged by nurse on call but she would like to speak with you asap.    Please call and advise.    Thank You

## 2017-11-06 NOTE — TELEPHONE ENCOUNTER
Pt reports dizziness on and off for about a week. When she stands up she does feel like she is going to pass out sometimes. Unsure if she is going to ER at this time    Please contact patient to advise    Reason for Disposition   SEVERE dizziness (e.g., unable to stand, requires support to walk, feels like passing out now)    Protocols used: ST DIZZINESS-A-[place]

## 2017-11-06 NOTE — ED PROVIDER NOTES
"Encounter Date: 11/6/2017    SCRIBE #1 NOTE: I, Valerie Salas, am scribing for, and in the presence of,  Dr. Cho. I have scribed the following portions of the note - the APC attestation and the EKG reading.       History     Chief Complaint   Patient presents with    Dizziness     dizziness for 1 week, hx vertigo but this is different     The patient presents to the ER for an emergent evaluation due to acute dizziness. She states that she has been having dizzy episodes for the past week. She states that the degree and frequency has been increased over the past 2-3 days. She states that her daughter is a nurse and told her that her "carotid arteries may be blocked". She denies any headache or head injury. She denies any additional symptoms. She denies any mitigating or exacerbating factors.           Review of patient's allergies indicates:   Allergen Reactions    Latex      Other reaction(s): Rash     Past Medical History:   Diagnosis Date    Arthritis     Cataract     Depression     Around [place]; treated with Wellbutrin    Early cataracts, bilateral     GERD (gastroesophageal reflux disease)     Glaucoma     Hyperprolactinemia     Hypertension     Hypothyroidism     Need for prophylactic vaccination against Streptococcus pneumoniae (pneumococcus) 10/14/2015    Osteopenia     Pure hyperglyceridemia 3/31/2017    Recurrent UTI     Every few months    Sciatica neuralgia     Seizures 2006    temporal lobe seizure    Temporal lobe seizure 2006    Trigeminal neuralgia     Vitamin D deficiency disease      Past Surgical History:   Procedure Laterality Date    BREAST SURGERY      to remove ducts causing discharge in left breast    CHOLECYSTECTOMY  1994    laporascopic    COLONOSCOPY      EYE SURGERY      LASIK surgery to one eye; cannot remember which one    HYSTERECTOMY  1985    low transverse incision.    TOTAL ABDOMINAL HYSTERECTOMY W/ BILATERAL SALPINGOOPHORECTOMY      1985; diffinitive " treatment for menorrhagia     Family History   Problem Relation Age of Onset    Heart disease Mother     Hypertension Mother     Thyroid disease Mother     Colon cancer Mother 90    Breast cancer Mother 104    Cancer Mother 103     colon and breast    Kidney disease Father     Heart disease Sister     Hypertension Sister     Thyroid disease Sister     Glaucoma Sister     Fibromyalgia Brother     Kidney disease Brother     Sleep apnea Brother     Hypertension Brother     Glaucoma Brother     Heart disease Brother      sudden death at age 67    Cancer Cousin 31      from breast cancer at age 31    Breast cancer Cousin     Glaucoma Paternal Aunt     Glaucoma Paternal Uncle     Glaucoma Paternal Grandfather      Went blind from Glaucoma    Breast cancer Maternal Aunt     Celiac disease Neg Hx     Cirrhosis Neg Hx     Colon polyps Neg Hx     Crohn's disease Neg Hx     Cystic fibrosis Neg Hx     Esophageal cancer Neg Hx     Hemochromatosis Neg Hx     Inflammatory bowel disease Neg Hx     Irritable bowel syndrome Neg Hx     Liver cancer Neg Hx     Liver disease Neg Hx     Rectal cancer Neg Hx     Stomach cancer Neg Hx     Ulcerative colitis Neg Hx     Moses's disease Neg Hx      Social History   Substance Use Topics    Smoking status: Never Smoker    Smokeless tobacco: Never Used    Alcohol use No     Review of Systems   Constitutional: Negative for activity change, chills, diaphoresis and fever.   HENT: Negative for dental problem, ear pain, facial swelling, nosebleeds, sore throat and voice change.    Eyes: Negative for visual disturbance.   Respiratory: Negative for cough, chest tightness and shortness of breath.    Cardiovascular: Negative for chest pain, palpitations and leg swelling.   Gastrointestinal: Negative for abdominal pain, blood in stool, constipation, nausea and vomiting.   Musculoskeletal: Negative for arthralgias, back pain, gait problem, myalgias and neck  pain.   Skin: Negative for rash.   Neurological: Positive for dizziness. Negative for seizures, syncope, facial asymmetry, speech difficulty, weakness, numbness and headaches.   Psychiatric/Behavioral: Negative for confusion. The patient is nervous/anxious.        Physical Exam     Initial Vitals [11/06/17 1159]   BP Pulse Resp Temp SpO2   (!) 169/83 69 16 97.6 °F (36.4 °C) 100 %      MAP       111.67         Physical Exam    Nursing note and vitals reviewed.  Constitutional: She appears well-developed and well-nourished. She is not diaphoretic.   Alert and ambulatory. Accompanied by her .    HENT:   Mouth/Throat: Oropharynx is clear and moist.   Edematous nasal mucosa. TM bulging bilaterally w/o erythema. Clear throat. Dizziness not worse with head turning or position change.    Eyes: Conjunctivae are normal. Pupils are equal, round, and reactive to light.   Sclera white. No injection or hemorrhage.    Neck: Normal range of motion. Neck supple.   Cardiovascular: Normal rate and intact distal pulses.   Pulmonary/Chest: Breath sounds normal. No respiratory distress.   Abdominal: Soft. There is no tenderness. There is no guarding.   No pulsatile mass.    Musculoskeletal: Normal range of motion. She exhibits no edema or tenderness.   Neurological: She is alert and oriented to person, place, and time. She has normal strength. No cranial nerve deficit.   No facial droop. Equal . Normal gait. No pronator drift. 5/5 strength extremities x 4. AAO x 3. No focal deficit presently.    Skin: Skin is warm and dry. No rash noted.   Psychiatric: She has a normal mood and affect.         ED Course   Procedures  Labs Reviewed   CBC W/ AUTO DIFFERENTIAL - Abnormal; Notable for the following:        Result Value    MCH 32.8 (*)     All other components within normal limits   BASIC METABOLIC PANEL - Abnormal; Notable for the following:     Sodium 135 (*)     CO2 31 (*)     Anion Gap 6 (*)     All other components within  normal limits   TROPONIN I   URINALYSIS     Results for orders placed or performed during the hospital encounter of 11/06/17   CBC auto differential   Result Value Ref Range    WBC 5.72 3.90 - 12.70 K/uL    RBC 4.27 4.00 - 5.40 M/uL    Hemoglobin 14.0 12.0 - 16.0 g/dL    Hematocrit 40.5 37.0 - 48.5 %    MCV 95 82 - 98 fL    MCH 32.8 (H) 27.0 - 31.0 pg    MCHC 34.6 32.0 - 36.0 g/dL    RDW 12.3 11.5 - 14.5 %    Platelets 244 150 - 350 K/uL    MPV 9.7 9.2 - 12.9 fL    Immature Granulocytes 0.2 0.0 - 0.5 %    Gran # 2.5 1.8 - 7.7 K/uL    Immature Grans (Abs) 0.01 0.00 - 0.04 K/uL    Lymph # 2.5 1.0 - 4.8 K/uL    Mono # 0.5 0.3 - 1.0 K/uL    Eos # 0.1 0.0 - 0.5 K/uL    Baso # 0.04 0.00 - 0.20 K/uL    nRBC 0 0 /100 WBC    Gran% 44.4 38.0 - 73.0 %    Lymph% 43.7 18.0 - 48.0 %    Mono% 9.3 4.0 - 15.0 %    Eosinophil% 1.7 0.0 - 8.0 %    Basophil% 0.7 0.0 - 1.9 %    Differential Method Automated    Basic metabolic panel   Result Value Ref Range    Sodium 135 (L) 136 - 145 mmol/L    Potassium 3.7 3.5 - 5.1 mmol/L    Chloride 98 95 - 110 mmol/L    CO2 31 (H) 23 - 29 mmol/L    Glucose 90 70 - 110 mg/dL    BUN, Bld 9 8 - 23 mg/dL    Creatinine 0.7 0.5 - 1.4 mg/dL    Calcium 9.2 8.7 - 10.5 mg/dL    Anion Gap 6 (L) 8 - 16 mmol/L    eGFR if African American >60.0 >60 mL/min/1.73 m^2    eGFR if non African American >60.0 >60 mL/min/1.73 m^2   Troponin I   Result Value Ref Range    Troponin I 0.007 0.000 - 0.026 ng/mL   Urinalysis   Result Value Ref Range    Specimen UA Urine, Clean Catch     Color, UA Straw Yellow, Straw, Lana    Appearance, UA Clear Clear    pH, UA 7.0 5.0 - 8.0    Specific Gravity, UA 1.005 1.005 - 1.030    Protein, UA Negative Negative    Glucose, UA Negative Negative    Ketones, UA Negative Negative    Bilirubin (UA) Negative Negative    Occult Blood UA Negative Negative    Nitrite, UA Negative Negative    Urobilinogen, UA Negative <2.0 EU/dL    Leukocytes, UA Negative Negative     Imaging Results          MRA  Neck without contrast (Final result)  Result time 11/06/17 18:00:45    Final result by Марина Davila MD (11/06/17 18:00:45)                 Impression:        1.  Limited evaluation given motion artifact and regions of signal dropout, and are likely artifactual.  Allowing for this, no high-grade stenosis at the origin of the bilateral internal carotid arteries noting the overall carotid system on the left is decreased in caliber as compared to the right however remains patent.  Further evaluation with ultrasound or CTA as warranted.    2.  Threadlike appearance of the left vertebral artery, distal occlusion is not excluded versus termination at the PICA.  There is a robust right vertebral artery.  CTA as warranted.        Electronically signed by: МАРИНА DAVILA MD  Date:     11/06/17  Time:    18:00              Narrative:    MRA neck without contrast    Clinical history: Dizziness    Comparison: None    Technique:  Multiplanar multisequence MRI images of the neck were obtained using time-of-flight technique for the evaluation of the arterial vasculature.    Findings:  The left common carotid artery is decreased in caliber as compared to the right.  Additionally, there is decreased caliber of the left internal carotid artery as compared to the right.  At the craniocervical junction, there is bilateral signal loss of the internal carotid arteries, likely artifactual.  There is diminutive signal, threadlike, of the left vertebral artery noted robust right vertebral artery.  Distal high grade stenosis of the left vertebral artery cannot be excluded.  The carotid bifurcations are grossly patent.                             MRI Brain Without Contrast (Preliminary result)  Result time 11/06/17 17:21:17    Preliminary result by Davonte Marroquin MD (11/06/17 17:21:17)                 Impression:        No evidence of acute hemorrhage or infarct.    Mild sinusitis.  ______________________________________      Electronically signed by resident: ZA AGUIRRE MD  Date:     11/06/17  Time:    17:21            As the supervising and teaching physician, I personally reviewed the images and resident's interpretation and I agree with the findings.               Narrative:    MRI brain without contrast    11/06/17 16:57:40    Accession# [id number]    CLINICAL INDICATION: 72 year old F with dizziness and giddiness    TECHNIQUE: Multiplanar multisequence MR imaging of the brain was performed without the use of intravenous contrast.    COMPARISON: MRI brain 07/09/2012    FINDINGS:    The ventricles are normal in size for age, without evidence of hydrocephalus.    The brain appears within normal limits. No parenchymal mass, hemorrhage, edema or infarction.    No extra-axial blood or fluid collections.    The T2 skull base flow voids are preserved. Bone marrow signal intensity is unremarkable.    There is mucosal thickening within the right frontal sinus.                             X-Ray Chest PA And Lateral (Final result)  Result time 11/06/17 16:19:45    Final result by Марина Davila MD (11/06/17 16:19:45)                 Impression:     No acute cardiopulmonary process.  Stable chronic findings.        Electronically signed by: МАРИНА DAVILA MD  Date:     11/06/17  Time:    16:19              Narrative:    Chest PA and lateral    Indication:Dizziness    Comparison:1/25/2017    Findings:  The cardiomediastinal silhouette is not enlarged, stable as compared to previous exam, somewhat magnified by technique and shallow category effort.  There is no pleural effusion.  The trachea is midline.  The lungs are symmetrically expanded bilaterally with mildly coarse interstitial attenuation in bilateral basilar subsegmental atelectasis or scarring, left greater than right. No large focal consolidation seen.  There is no pneumothorax.  The osseous structures are remarkable for dextroscoliotic curvature of the spine with superimposed  degenerative changes.                              EKG Readings: (Independently Interpreted)   Sinus rhythm with a normal axis. Right bundle branch block. T wave inversion to inferior lead as well as V2-V5 at 62 bpm.     ECG Results          EKG 12-lead (Final result)  Result time 11/06/17 13:05:30    Final result by Interface, Lab In [facility] (11/06/17 13:05:30)                 Narrative:    Test Reason : R42  Blood Pressure : mmHG  Vent. Rate : 062 BPM     Atrial Rate : 062 BPM     P-R Int : 186 ms          QRS Dur : 132 ms      QT Int : 432 ms       P-R-T Axes : 030 015 -33 degrees     QTc Int : 438 ms    Normal sinus rhythm  Right bundle branch block  T wave abnormality, consider inferolateral ischemia  Abnormal ECG  When compared with ECG of 04-APR-2017 01:16,  Inferolateral T wave inversions are now present  Confirmed by Haydee Richardson MD ([id number]) on 11/6/2017 1:05:24 PM    Referred By: AAAREFERR   SELF           Confirmed By:Haydee Richardson MD                               Medical Decision Making:   History:   Old Medical Records: I decided to obtain old medical records.  Initial Assessment:   Pt here for an emergent evaluation due to acute dizziness for the past week.   Differential Diagnosis:   Vertigo, CVA, Electrolyte abnormality, Bradycardia, Dysrhythmia, Infection, Volume depletion, Hypoglycemia, Cerebellar infarction, Labyrinthitis, Sinusitis, Vertbrabasillar syndrome, Cerebral hypoperfusion, Carotid stenosis, etc   Independently Interpreted Test(s):   I have ordered and independently interpreted EKG Reading(s) - see prior notes  Clinical Tests:   Lab Tests: Reviewed and Ordered  Radiological Study: Reviewed and Ordered  Medical Tests: Reviewed and Ordered  ED Management:  Sinusitis found on MRI, likely source of acute dizziness, will treat with Augmentin, Flonase, and Claritan.     MRI neck abnormal - Discussed the case in detail with on-call vascular neurology. He requests that the patient start taking a  baby aspirin daily and to have the patient follow up with vascular clinic as an outpatient for further evaluation and management. He specifies that nothing further is needed urgently.     I discussed the test results in detail with the patient and her . She verbalizes understanding and agreement with discharge and follow up plan. All questions answered. She is comfortable going home.   Other:   I have discussed this case with another health care provider.       <> Summary of the Discussion: I discussed the case in detail with the ER attending physician.             Scribe Attestation:   Scribe #1: I performed the above scribed service and the documentation accurately describes the services I performed. I attest to the accuracy of the note.    Attending Attestation:     Physician Attestation Statement for NP/PA:   I discussed this assessment and plan of this patient with the NP/PA, but I did not personally examine the patient. The face to face encounter was performed by the NP/PA.    Other NP/PA Attestation Additions:    History of Present Illness: 72 y.o. female presents for evaluation of persistent dizziness.                   ED Course      Clinical Impression:   The primary encounter diagnosis was Acute sinusitis, recurrence not specified, unspecified location. Diagnoses of Dizziness and Abnormal MRI, neck were also pertinent to this visit.    Disposition:   Disposition: Discharged  Condition: Stable                        Timur Longoria PA-C  11/06/17 7039

## 2017-11-07 DIAGNOSIS — I77.89 OTHER SPECIFIED DISORDERS OF ARTERIES AND ARTERIOLES (CODE): ICD-10-CM

## 2017-11-07 DIAGNOSIS — I65.29 STENOSIS OF CAROTID ARTERY, UNSPECIFIED LATERALITY: Primary | ICD-10-CM

## 2017-11-13 ENCOUNTER — TELEPHONE (OUTPATIENT)
Dept: INTERNAL MEDICINE | Facility: CLINIC | Age: 72
End: 2017-11-13

## 2017-11-13 DIAGNOSIS — I10 ESSENTIAL HYPERTENSION: ICD-10-CM

## 2017-11-13 DIAGNOSIS — E55.9 VITAMIN D DEFICIENCY DISEASE: Primary | ICD-10-CM

## 2017-11-13 NOTE — TELEPHONE ENCOUNTER
----- Message from Mary Ann Akhtar sent at 11/13/2017  3:01 PM CST -----  Doctor appointment and lab have been scheduled.  Please link lab orders to the lab appointment.  Date of doctor appointment:  3/16/18  Physical or EP:  Physical  Date of lab appointment:  3/9/18  Comments:

## 2017-11-14 ENCOUNTER — CLINICAL SUPPORT (OUTPATIENT)
Dept: CARDIOLOGY | Facility: CLINIC | Age: 72
End: 2017-11-14
Attending: INTERNAL MEDICINE
Payer: MEDICARE

## 2017-11-14 ENCOUNTER — OFFICE VISIT (OUTPATIENT)
Dept: CARDIOLOGY | Facility: CLINIC | Age: 72
End: 2017-11-14
Payer: MEDICARE

## 2017-11-14 VITALS
WEIGHT: 171.31 LBS | OXYGEN SATURATION: 99 % | HEIGHT: 61 IN | DIASTOLIC BLOOD PRESSURE: 88 MMHG | SYSTOLIC BLOOD PRESSURE: 142 MMHG | HEART RATE: 71 BPM | BODY MASS INDEX: 32.34 KG/M2

## 2017-11-14 DIAGNOSIS — I65.29 ASYMPTOMATIC CAROTID ARTERY STENOSIS, UNSPECIFIED LATERALITY: Primary | ICD-10-CM

## 2017-11-14 DIAGNOSIS — I65.29 STENOSIS OF CAROTID ARTERY, UNSPECIFIED LATERALITY: ICD-10-CM

## 2017-11-14 DIAGNOSIS — I65.21 ASYMPTOMATIC STENOSIS OF RIGHT CAROTID ARTERY: ICD-10-CM

## 2017-11-14 DIAGNOSIS — E78.00 PURE HYPERCHOLESTEROLEMIA: ICD-10-CM

## 2017-11-14 DIAGNOSIS — I10 ESSENTIAL HYPERTENSION: ICD-10-CM

## 2017-11-14 DIAGNOSIS — I77.89 OTHER SPECIFIED DISORDERS OF ARTERIES AND ARTERIOLES (CODE): ICD-10-CM

## 2017-11-14 LAB — INTERNAL CAROTID STENOSIS: ABNORMAL

## 2017-11-14 PROCEDURE — 99999 PR PBB SHADOW E&M-EST. PATIENT-LVL IV: CPT | Mod: PBBFAC,,, | Performed by: INTERNAL MEDICINE

## 2017-11-14 PROCEDURE — 93880 EXTRACRANIAL BILAT STUDY: CPT | Mod: S$GLB,,, | Performed by: INTERNAL MEDICINE

## 2017-11-14 PROCEDURE — 99499 UNLISTED E&M SERVICE: CPT | Mod: S$GLB,,, | Performed by: INTERNAL MEDICINE

## 2017-11-14 PROCEDURE — 99203 OFFICE O/P NEW LOW 30 MIN: CPT | Mod: S$GLB,,, | Performed by: INTERNAL MEDICINE

## 2017-11-14 RX ORDER — ATORVASTATIN CALCIUM 10 MG/1
20 TABLET, FILM COATED ORAL DAILY
Qty: 90 TABLET | Refills: 1 | Status: SHIPPED | OUTPATIENT
Start: 2017-11-14 | End: 2018-01-08 | Stop reason: SDUPTHER

## 2017-11-14 RX ORDER — ASPIRIN 81 MG/1
81 TABLET ORAL DAILY
COMMUNITY
End: 2019-08-27

## 2017-11-14 NOTE — LETTER
November 14, 2017      Odilia Eckert MD  [address]  [place] [address]           [facility]  [address]  [facility] [address]  Phone: [phone]          Patient: Daisha Mayo   MR Number: [id number]   YOB: 1945   Date of Visit: 11/14/2017       Dear Aaareferral Self:    Thank you for referring Daisha Mayo to me for evaluation. Attached you will find relevant portions of my assessment and plan of care.    If you have questions, please do not hesitate to call me. I look forward to following Daisha Mayo along with you.    Sincerely,    Sanjay Schmidt MD    Enclosure  CC:  No Recipients    If you would like to receive this communication electronically, please contact [email or web address] or [phone] to request more information on [employer] access.    For providers and/or their staff who would like to refer a patient to [facility], please contact us through our one-stop-shop provider referral line, [facility], at [phone].    If you feel you have received this communication in error or would no longer like to receive these types of communications, please e-mail [email or web address]

## 2017-11-14 NOTE — PROGRESS NOTES
Subjective:   Patient ID:  Daisha Mayo is a 72 y.o. female who presents for evaluation of Dizziness      HPI: Ms. Daisha Mayo is a 71 yo F with PMH of HTN, HLD, multinodular goiter, hyperprolactinemia, GERD who presents to the interventional cardiology clinic to discuss findings on imaging noted on her recent ED visit for acute onset dizziness.    She reports she suffers from vertigo, chronic left ear tinnitus, changes in her visual acuity with recent cataract surgery. She has been under a great deal of stress from her sister-in-law passing away requiring two trips to [place] and a trip to [place] for her granddaughter's wedding. She reports suffering a 3 week viral URTI that lasted from late August until September, and had not fully recovered with a post-nasal drip and dry cough. She noted developing dizziness described as a room spinning sensation and sometimes lightheadedness on top of prior mentioned complaints that was getting worse over 10 days prior to her visit to the ED on 11/6/17. Her symptoms would occur intermittently for hours up to 2-3x per day. Due to her symptoms and her daughter mentioning to her that her carotid arteries may be blocked, the ED ordered a MRI/MRA of head and neck. MRI of the brain suggested sinusitis with no evidence of bleeding or infarction. MRA was limited due to motion artifact however did not reveal any high grade stenosis in the vasculature, however her left ICA was of less caliber than right right ICA. Her left vertebral artery showed a thread-like appearance suggesting distal occlusion vs termination at the PICA. The ED team discussed her case with the vascular neurology team who recommended to start the patient on ASA 81 mg and follow up at the vascular clinic. She was also prescribed a 10 day course of Augmentin for sinusitis.    Upon arrival to the clinic today, she had a carotid U/S which revealed non-obstructive right ICA stenosis (40-49%). She reports 3  days after her ED visit all her symptoms resolved. Denies any current complaints however feels overwhelmed with symptoms of tinnitus, vision changes post-cataract, tinnitus and stress. She was prescribed lipitor by her PCP after her ASCVD 10 year risk was calculated to be >7.5% however due to her  developing an allergic reaction, she opted to not take it. In addition, she saw Dr. Puri for atypical chest pain for which she underwent an exercise stress ECHO on 2017 which was negtive for stress induced ischemia. Her LVEF was preserved at 60-65%.     She denies smoking, alcohol or recreational drug use. Reports compliance with her meds except lipitor. Has a positive family hx of CAD in her mother and brother, both who  from MI in their 60s.    Past Medical History:   Diagnosis Date    Arthritis     Cataract     Depression     [place]; treated with Wellbutrin    Early cataracts, bilateral     GERD (gastroesophageal reflux disease)     Glaucoma     Hyperprolactinemia     Hypertension     Hypothyroidism     Need for prophylactic vaccination against Streptococcus pneumoniae (pneumococcus) 10/14/2015    Osteopenia     Pure hyperglyceridemia 3/31/2017    Recurrent UTI     Every few months    Sciatica neuralgia     Seizures 2006    temporal lobe seizure    Temporal lobe seizure 2006    Trigeminal neuralgia     Vitamin D deficiency disease        Past Surgical History:   Procedure Laterality Date    BREAST SURGERY      to remove ducts causing discharge in left breast    CHOLECYSTECTOMY      laporascopic    COLONOSCOPY      EYE SURGERY      LASIK surgery to one eye; cannot remember which one    HYSTERECTOMY      low transverse incision.    TOTAL ABDOMINAL HYSTERECTOMY W/ BILATERAL SALPINGOOPHORECTOMY      ; diffinitive treatment for menorrhagia       Social History     Social History    Marital status:      Spouse name: N/A    Number of children: N/A    Years  of education: N/A     Social History Main Topics    Smoking status: Never Smoker    Smokeless tobacco: Never Used    Alcohol use No    Drug use: No    Sexual activity: No     Other Topics Concern    None     Social History Narrative    None       Family History   Problem Relation Age of Onset    Heart disease Mother     Hypertension Mother     Thyroid disease Mother     Colon cancer Mother 90    Breast cancer Mother 104    Cancer Mother 103     colon and breast    Kidney disease Father     Heart disease Sister     Hypertension Sister     Thyroid disease Sister     Glaucoma Sister     Fibromyalgia Brother     Kidney disease Brother     Sleep apnea Brother     Hypertension Brother     Glaucoma Brother     Heart disease Brother      sudden death at age 67    Cancer Cousin 31      from breast cancer at age 31    Breast cancer Cousin     Glaucoma Paternal Aunt     Glaucoma Paternal Uncle     Glaucoma Paternal Grandfather      Went blind from Glaucoma    Breast cancer Maternal Aunt     Celiac disease Neg Hx     Cirrhosis Neg Hx     Colon polyps Neg Hx     Crohn's disease Neg Hx     Cystic fibrosis Neg Hx     Esophageal cancer Neg Hx     Hemochromatosis Neg Hx     Inflammatory bowel disease Neg Hx     Irritable bowel syndrome Neg Hx     Liver cancer Neg Hx     Liver disease Neg Hx     Rectal cancer Neg Hx     Stomach cancer Neg Hx     Ulcerative colitis Neg Hx     Moses's disease Neg Hx        Patient's Medications   New Prescriptions    No medications on file   Previous Medications    AMOXICILLIN-CLAVULANATE 875-125MG (AUGMENTIN) 875-125 MG PER TABLET    Take 1 tablet by mouth every 12 (twelve) hours.    ASCORBATE CALCIUM (VITAMIN C ORAL)    Take 1,000 mg by mouth once daily.     ASPIRIN (ECOTRIN) 81 MG EC TABLET    Take 81 mg by mouth once daily.    CALCIUM CARBONATE (OS-SUNIL) 600 MG (1,500 MG) TAB    Take 600 mg by mouth once daily.    CHOLECALCIFEROL, VITAMIN D3, 2,000  UNIT CAP    Take 1 capsule (2,000 Units total) by mouth once daily.    CYANOCOBALAMIN (VITAMIN B-12) 250 MCG TABLET    Take 1,000 mcg by mouth once daily. 1 Tablet Oral Every day    DORZOLAMIDE-TIMOLOL (COSOPT) 2-0.5 % OPHTHALMIC SOLUTION    Place 1 drop into both eyes 2 (two) times daily.      ERGOCALCIFEROL (VITAMIN D2) 50,000 UNIT CAP    Take 1 capsule (50,000 Units total) by mouth every 7 days.    HYDROCHLOROTHIAZIDE (HYDRODIURIL) 25 MG TABLET    Take 1 tablet (25 mg total) by mouth once daily.    LATANOPROST 0.005 % OPHTHALMIC SOLUTION    Place 1 drop into both eyes every evening.    LEVOTHYROXINE (SYNTHROID) 25 MCG TABLET    TAKE 1 TABLET ONE TIME DAILY    MULTIVITAMIN WITH MINERALS (HAIR,SKIN AND NAILS ORAL)    Take 1 tablet by mouth 2 (two) times daily.    MULTIVITAMIN-MINERALS-LUTEIN (CENTRUM SILVER) TAB    Take 1 tablet by mouth Daily. 1 Tablet Oral Every day    OMEGA-3 FATTY ACIDS-VITAMIN E (FISH OIL) 1,000 MG CAP       Modified Medications    Modified Medication Previous Medication    ATORVASTATIN (LIPITOR) 10 MG TABLET atorvastatin (LIPITOR) 10 MG tablet       Take 2 tablets (20 mg total) by mouth once daily.    Take 1 tablet (10 mg total) by mouth once daily.   Discontinued Medications    CHOLECALCIFEROL, VITAMIN D3, 50,000 UNIT TAB    Take 1 tablet by mouth every 14 (fourteen) days.    FLUTICASONE (FLONASE) 50 MCG/ACTUATION NASAL SPRAY    1 spray by Each Nare route 2 (two) times daily as needed (Nasal congestion/drainage).       ROS  Constitution: Negative for fever, chills, weight loss or gain.   HENT: Negative for sore throat, rhinorrhea, or headache.  Eyes: Negative for blurred or double vision.   Cardiovascular: See above  Pulmonary: Negative for SOB   Gastrointestinal: Negative for abdominal pain, nausea, vomiting, or diarrhea.   : Negative for dysuria.   Neurological: Negative for focal weakness or sensory changes.    BP (!) 142/88 (BP Location: Left arm, Patient Position: Sitting, BP Method:  "Large (Manual))   Pulse 71   Ht 5' 1" (1.549 m)   Wt 77.7 kg (171 lb 4.8 oz)   SpO2 99%   BMI 32.37 kg/m²     Objective:   Physical Exam  Constitutional: NAD, conversant  HEENT: Sclera anicteric, PERRLA, EOMI. No nystagmus.  Neck: No JVD, no carotid bruits  CV: RRR, no murmur, normal S1/S2, No Pericardial rub  Pulm: CTAB with no wheezes, rales, or rhonchi  GI: Abdomen soft, NTND, +BS  Extremities: No LE edema, warm and well perfused, No cyanosis, No clubbing  Skin: No ecchymosis, erythema, or ulcers  Psych: AOx3, appropriate affect  Neuro: CNII-XII intact, no focal deficits    Lab Results   Component Value Date     (L) 11/06/2017    K 3.7 11/06/2017    CL 98 11/06/2017    CO2 31 (H) 11/06/2017    BUN 9 11/06/2017    CREATININE 0.7 11/06/2017    GLU 90 11/06/2017    HGBA1C 5.1 11/26/2006    MG 2.0 11/26/2006    AST 22 09/15/2017    ALT 23 09/15/2017    ALBUMIN 3.5 09/15/2017    PROT 6.9 09/15/2017    BILITOT 1.2 (H) 09/15/2017    WBC 5.72 11/06/2017    HGB 14.0 11/06/2017    HCT 40.5 11/06/2017    MCV 95 11/06/2017     11/06/2017    INR 1.0 11/26/2006    TSH 2.622 03/07/2017    CHOL 225 (H) 09/15/2017    HDL 54 09/15/2017    LDLCALC 152.0 09/15/2017    TRIG 95 09/15/2017    BNP 54 08/25/2017       Assessment:     1. Asymptomatic carotid artery stenosis, unspecified laterality    2. Other specified disorders of arteries and arterioles (CODE)     3. Asymptomatic stenosis of right carotid artery    4. Pure hypercholesterolemia    5. Essential hypertension        Plan:     Daisha was seen today for dizziness.    Diagnoses and all orders for this visit:    Dizziness / Vertigo  - related to viral URTI induced inner ear involvement  - resolved    Asymptomatic stenosis of right carotid artery  - C/w ASA 81 mg QD  - advised to start taking lipitor to 10 mg QD - if tolerable would increase to 20 mg QD  - , HDL 54 (9/2017)    Pure hypercholesterolemia  - start lipitor, see above    Essential " hypertension  - controlled with HCTZ 25 mg QD    Other orders  - atorvastatin (LIPITOR) 10 MG tablet; Take 2 tablets (20 mg total) by mouth once daily.    Thank you for allowing me to participate in this patient's care. Please do not hesitate to contact me with any questions or concerns.  RTC in 1 year with repeat carotid US.    Regla Olivares MD  Cardiology Fellow  Pager: [phone]  11/14/2017 6:19 PM    I have personally seen, taken the history and examined the patient.  I agree with the housestaff whose note reflects my findings and plan as above.

## 2017-12-29 ENCOUNTER — LAB VISIT (OUTPATIENT)
Dept: LAB | Facility: HOSPITAL | Age: 72
End: 2017-12-29
Attending: INTERNAL MEDICINE
Payer: MEDICARE

## 2017-12-29 DIAGNOSIS — E78.5 HYPERLIPIDEMIA, UNSPECIFIED HYPERLIPIDEMIA TYPE: ICD-10-CM

## 2017-12-29 LAB
ALBUMIN SERPL BCP-MCNC: 3.4 G/DL
ALP SERPL-CCNC: 113 U/L
ALT SERPL W/O P-5'-P-CCNC: 19 U/L
ANION GAP SERPL CALC-SCNC: 6 MMOL/L
AST SERPL-CCNC: 20 U/L
BILIRUB SERPL-MCNC: 0.8 MG/DL
BUN SERPL-MCNC: 9 MG/DL
CALCIUM SERPL-MCNC: 8.8 MG/DL
CHLORIDE SERPL-SCNC: 97 MMOL/L
CHOLEST SERPL-MCNC: 161 MG/DL
CHOLEST/HDLC SERPL: 3 {RATIO}
CK SERPL-CCNC: 94 U/L
CO2 SERPL-SCNC: 30 MMOL/L
CREAT SERPL-MCNC: 0.7 MG/DL
EST. GFR  (AFRICAN AMERICAN): >60 ML/MIN/1.73 M^2
EST. GFR  (NON AFRICAN AMERICAN): >60 ML/MIN/1.73 M^2
GLUCOSE SERPL-MCNC: 101 MG/DL
HDLC SERPL-MCNC: 54 MG/DL
HDLC SERPL: 33.5 %
LDLC SERPL CALC-MCNC: 92.8 MG/DL
NONHDLC SERPL-MCNC: 107 MG/DL
POTASSIUM SERPL-SCNC: 3.5 MMOL/L
PROT SERPL-MCNC: 6.8 G/DL
SODIUM SERPL-SCNC: 133 MMOL/L
TRIGL SERPL-MCNC: 71 MG/DL

## 2017-12-29 PROCEDURE — 80053 COMPREHEN METABOLIC PANEL: CPT

## 2017-12-29 PROCEDURE — 82550 ASSAY OF CK (CPK): CPT

## 2017-12-29 PROCEDURE — 36415 COLL VENOUS BLD VENIPUNCTURE: CPT | Mod: PO

## 2017-12-29 PROCEDURE — 80061 LIPID PANEL: CPT

## 2018-01-08 ENCOUNTER — OFFICE VISIT (OUTPATIENT)
Dept: INTERNAL MEDICINE | Facility: CLINIC | Age: 73
End: 2018-01-08
Payer: MEDICARE

## 2018-01-08 VITALS
HEART RATE: 76 BPM | HEIGHT: 61 IN | RESPIRATION RATE: 18 BRPM | BODY MASS INDEX: 33.25 KG/M2 | WEIGHT: 176.13 LBS | DIASTOLIC BLOOD PRESSURE: 80 MMHG | TEMPERATURE: 98 F | SYSTOLIC BLOOD PRESSURE: 130 MMHG

## 2018-01-08 DIAGNOSIS — I10 ESSENTIAL HYPERTENSION: Primary | ICD-10-CM

## 2018-01-08 DIAGNOSIS — Z12.11 COLON CANCER SCREENING: ICD-10-CM

## 2018-01-08 DIAGNOSIS — E78.5 HYPERLIPIDEMIA, UNSPECIFIED HYPERLIPIDEMIA TYPE: ICD-10-CM

## 2018-01-08 DIAGNOSIS — E03.8 SUBCLINICAL HYPOTHYROIDISM: ICD-10-CM

## 2018-01-08 PROCEDURE — 99214 OFFICE O/P EST MOD 30 MIN: CPT | Mod: S$GLB,,, | Performed by: INTERNAL MEDICINE

## 2018-01-08 PROCEDURE — 99999 PR PBB SHADOW E&M-EST. PATIENT-LVL IV: CPT | Mod: PBBFAC,,, | Performed by: INTERNAL MEDICINE

## 2018-01-08 PROCEDURE — 99499 UNLISTED E&M SERVICE: CPT | Mod: S$PBB,,, | Performed by: INTERNAL MEDICINE

## 2018-01-08 RX ORDER — ATORVASTATIN CALCIUM 10 MG/1
10 TABLET, FILM COATED ORAL DAILY
Qty: 90 TABLET | Refills: 3 | Status: SHIPPED | OUTPATIENT
Start: 2018-01-08 | End: 2018-11-12 | Stop reason: SDUPTHER

## 2018-01-08 NOTE — PROGRESS NOTES
CC: followup of hypertension and hyperlipidemia  HPI:  The patient is a 72 y.o. year old female who presents to the office for followup of hypertension and hyperlipidemia.  The patient denies any chest pain, shortness of breath, headache, blurred vision, excessive fatigue, nausea or vomiting.  Review of labs reveals improved lipid profile and mildly depressed sodium.  She reports recent fall while going down the steps of her motor home.    PAST MEDICAL HISTORY:  Past Medical History:   Diagnosis Date    Arthritis     Cataract     Depression     [place]; treated with Wellbutrin    Early cataracts, bilateral     GERD (gastroesophageal reflux disease)     Glaucoma     Hyperprolactinemia     Hypertension     Hypothyroidism     Need for prophylactic vaccination against Streptococcus pneumoniae (pneumococcus) 10/14/2015    Osteopenia     Pure hyperglyceridemia 3/31/2017    Recurrent UTI     Every few months    Sciatica neuralgia     Seizures 2006    temporal lobe seizure    Temporal lobe seizure 2006    Trigeminal neuralgia     Vitamin D deficiency disease        SURGICAL HISTORY:  Past Surgical History:   Procedure Laterality Date    BREAST SURGERY      to remove ducts causing discharge in left breast    CHOLECYSTECTOMY  1994    laporascopic    COLONOSCOPY      EYE SURGERY      LASIK surgery to one eye; cannot remember which one    HYSTERECTOMY  1985    low transverse incision.    TOTAL ABDOMINAL HYSTERECTOMY W/ BILATERAL SALPINGOOPHORECTOMY      1985; diffinitive treatment for menorrhagia       MEDS:  Medcard reviewed and updated    ALLERGIES: Allergy Card reviewed and updated    SOCIAL HISTORY:   The patient is a nonsmoker.    PE:   APPEARANCE: Well nourished, well developed, in no acute distress.    CHEST: Lungs clear to auscultation with unlabored respirations.  CARDIOVASCULAR: Normal S1, S2. No murmurs. No carotid bruits. No pedal edema.  ABDOMEN: Bowel sounds normal. Not distended.  Soft. No tenderness or masses.   PSYCHIATRIC: The patient is oriented to person, place, and time and has a pleasant affect.        ASSESSMENT/PLAN:  Daisha was seen today for follow-up.    Diagnoses and all orders for this visit:    Essential hypertension  -     Blood pressure is controlled    Hyperlipidemia, unspecified hyperlipidemia type  -     Improved, continue atorvastatin    Subclinical hypothyroidism  -      Continue Synthroid    Colon cancer screening  -     Fecal Immunochemical Test (iFOBT); Future    Other orders  -     atorvastatin (LIPITOR) 10 MG tablet; Take 1 tablet (10 mg total) by mouth once daily.

## 2018-01-09 ENCOUNTER — LAB VISIT (OUTPATIENT)
Dept: LAB | Facility: HOSPITAL | Age: 73
End: 2018-01-09
Attending: INTERNAL MEDICINE
Payer: MEDICARE

## 2018-01-09 DIAGNOSIS — Z12.11 COLON CANCER SCREENING: ICD-10-CM

## 2018-01-09 PROCEDURE — 82274 ASSAY TEST FOR BLOOD FECAL: CPT

## 2018-01-12 LAB — HEMOCCULT STL QL IA: NEGATIVE

## 2018-01-15 ENCOUNTER — PATIENT MESSAGE (OUTPATIENT)
Dept: INTERNAL MEDICINE | Facility: CLINIC | Age: 73
End: 2018-01-15

## 2018-03-05 ENCOUNTER — PES CALL (OUTPATIENT)
Dept: ADMINISTRATIVE | Facility: CLINIC | Age: 73
End: 2018-03-05

## 2018-03-19 ENCOUNTER — LAB VISIT (OUTPATIENT)
Dept: LAB | Facility: HOSPITAL | Age: 73
End: 2018-03-19
Attending: INTERNAL MEDICINE
Payer: MEDICARE

## 2018-03-19 DIAGNOSIS — E55.9 VITAMIN D DEFICIENCY DISEASE: ICD-10-CM

## 2018-03-19 DIAGNOSIS — I10 ESSENTIAL HYPERTENSION: ICD-10-CM

## 2018-03-19 LAB
25(OH)D3+25(OH)D2 SERPL-MCNC: 50 NG/ML
ALBUMIN SERPL BCP-MCNC: 3.7 G/DL
ALP SERPL-CCNC: 91 U/L
ALT SERPL W/O P-5'-P-CCNC: 21 U/L
ANION GAP SERPL CALC-SCNC: 10 MMOL/L
AST SERPL-CCNC: 19 U/L
BASOPHILS # BLD AUTO: 0.04 K/UL
BASOPHILS NFR BLD: 0.7 %
BILIRUB SERPL-MCNC: 0.9 MG/DL
BUN SERPL-MCNC: 12 MG/DL
CALCIUM SERPL-MCNC: 9.2 MG/DL
CHLORIDE SERPL-SCNC: 98 MMOL/L
CHOLEST SERPL-MCNC: 155 MG/DL
CHOLEST/HDLC SERPL: 2.8 {RATIO}
CO2 SERPL-SCNC: 28 MMOL/L
CREAT SERPL-MCNC: 0.7 MG/DL
DIFFERENTIAL METHOD: ABNORMAL
EOSINOPHIL # BLD AUTO: 0.1 K/UL
EOSINOPHIL NFR BLD: 2 %
ERYTHROCYTE [DISTWIDTH] IN BLOOD BY AUTOMATED COUNT: 12.3 %
EST. GFR  (AFRICAN AMERICAN): >60 ML/MIN/1.73 M^2
EST. GFR  (NON AFRICAN AMERICAN): >60 ML/MIN/1.73 M^2
GLUCOSE SERPL-MCNC: 91 MG/DL
HCT VFR BLD AUTO: 40.8 %
HDLC SERPL-MCNC: 55 MG/DL
HDLC SERPL: 35.5 %
HGB BLD-MCNC: 13.6 G/DL
IMM GRANULOCYTES # BLD AUTO: 0.01 K/UL
IMM GRANULOCYTES NFR BLD AUTO: 0.2 %
LDLC SERPL CALC-MCNC: 83.6 MG/DL
LYMPHOCYTES # BLD AUTO: 2.1 K/UL
LYMPHOCYTES NFR BLD: 34.4 %
MCH RBC QN AUTO: 33.2 PG
MCHC RBC AUTO-ENTMCNC: 33.3 G/DL
MCV RBC AUTO: 100 FL
MONOCYTES # BLD AUTO: 0.7 K/UL
MONOCYTES NFR BLD: 11.9 %
NEUTROPHILS # BLD AUTO: 3.1 K/UL
NEUTROPHILS NFR BLD: 50.8 %
NONHDLC SERPL-MCNC: 100 MG/DL
NRBC BLD-RTO: 0 /100 WBC
PLATELET # BLD AUTO: 233 K/UL
PMV BLD AUTO: 10.1 FL
POTASSIUM SERPL-SCNC: 3.6 MMOL/L
PROT SERPL-MCNC: 7 G/DL
RBC # BLD AUTO: 4.1 M/UL
SODIUM SERPL-SCNC: 136 MMOL/L
TRIGL SERPL-MCNC: 82 MG/DL
TSH SERPL DL<=0.005 MIU/L-ACNC: 2.45 UIU/ML
WBC # BLD AUTO: 6.11 K/UL

## 2018-03-19 PROCEDURE — 80053 COMPREHEN METABOLIC PANEL: CPT

## 2018-03-19 PROCEDURE — 85025 COMPLETE CBC W/AUTO DIFF WBC: CPT

## 2018-03-19 PROCEDURE — 82306 VITAMIN D 25 HYDROXY: CPT

## 2018-03-19 PROCEDURE — 36415 COLL VENOUS BLD VENIPUNCTURE: CPT | Mod: PO

## 2018-03-19 PROCEDURE — 84443 ASSAY THYROID STIM HORMONE: CPT

## 2018-03-19 PROCEDURE — 80061 LIPID PANEL: CPT

## 2018-03-21 ENCOUNTER — TELEPHONE (OUTPATIENT)
Dept: INTERNAL MEDICINE | Facility: CLINIC | Age: 73
End: 2018-03-21

## 2018-03-21 NOTE — TELEPHONE ENCOUNTER
Called to speak with the patient and advised trying to accommodate request and will follow up after PCP has confirmed.

## 2018-03-21 NOTE — TELEPHONE ENCOUNTER
----- Message from Yovany Crowell sent at 3/20/2018  9:59 AM CDT -----  Contact: Patient [phone]  She has an annual physical appointment on 3/26/18 and she would like to know if you can reschedule her for later the same day or anytime from 3/27/18 to 4/15/18.    Thank you

## 2018-03-26 ENCOUNTER — OFFICE VISIT (OUTPATIENT)
Dept: INTERNAL MEDICINE | Facility: CLINIC | Age: 73
End: 2018-03-26
Payer: MEDICARE

## 2018-03-26 VITALS
SYSTOLIC BLOOD PRESSURE: 124 MMHG | DIASTOLIC BLOOD PRESSURE: 70 MMHG | RESPIRATION RATE: 16 BRPM | WEIGHT: 173.31 LBS | TEMPERATURE: 98 F | OXYGEN SATURATION: 97 % | HEART RATE: 75 BPM | HEIGHT: 61 IN | BODY MASS INDEX: 32.72 KG/M2

## 2018-03-26 DIAGNOSIS — Z23 NEED FOR 23-POLYVALENT PNEUMOCOCCAL POLYSACCHARIDE VACCINE: ICD-10-CM

## 2018-03-26 DIAGNOSIS — E78.5 HYPERLIPIDEMIA, UNSPECIFIED HYPERLIPIDEMIA TYPE: ICD-10-CM

## 2018-03-26 DIAGNOSIS — I10 ESSENTIAL HYPERTENSION: Primary | ICD-10-CM

## 2018-03-26 PROCEDURE — 99999 PR PBB SHADOW E&M-EST. PATIENT-LVL IV: CPT | Mod: PBBFAC,,, | Performed by: INTERNAL MEDICINE

## 2018-03-26 PROCEDURE — 3074F SYST BP LT 130 MM HG: CPT | Mod: CPTII,S$GLB,, | Performed by: INTERNAL MEDICINE

## 2018-03-26 PROCEDURE — G0009 ADMIN PNEUMOCOCCAL VACCINE: HCPCS | Mod: S$GLB,,, | Performed by: INTERNAL MEDICINE

## 2018-03-26 PROCEDURE — 99213 OFFICE O/P EST LOW 20 MIN: CPT | Mod: S$GLB,,, | Performed by: INTERNAL MEDICINE

## 2018-03-26 PROCEDURE — 3078F DIAST BP <80 MM HG: CPT | Mod: CPTII,S$GLB,, | Performed by: INTERNAL MEDICINE

## 2018-03-26 PROCEDURE — 90732 PPSV23 VACC 2 YRS+ SUBQ/IM: CPT | Mod: S$GLB,,, | Performed by: INTERNAL MEDICINE

## 2018-03-26 PROCEDURE — 99499 UNLISTED E&M SERVICE: CPT | Mod: S$PBB,,, | Performed by: INTERNAL MEDICINE

## 2018-03-26 NOTE — PROGRESS NOTES
CC: Annual review of chronic medical problems   HPI:  The patient is a 72 y.o. old female who presents to the office for annual review of chronic medical problems.  Review of labs reveals essentially normal results.    PAST MEDICAL HISTORY  Past Medical History:   Diagnosis Date    Arthritis     Cataract     Depression     [place]; treated with Wellbutrin    Early cataracts, bilateral     GERD (gastroesophageal reflux disease)     Glaucoma     Hyperprolactinemia     Hypertension     Hypothyroidism     Need for prophylactic vaccination against Streptococcus pneumoniae (pneumococcus) 10/14/2015    Osteopenia     Pure hyperglyceridemia 3/31/2017    Recurrent UTI     Every few months    Sciatica neuralgia     Seizures 2006    temporal lobe seizure    Temporal lobe seizure 2006    Trigeminal neuralgia     Vitamin D deficiency disease        SURGICAL HISTORY:  Past Surgical History:   Procedure Laterality Date    BREAST SURGERY      to remove ducts causing discharge in left breast    CHOLECYSTECTOMY  1994    laporascopic    COLONOSCOPY      EYE SURGERY      LASIK surgery to one eye; cannot remember which one    HYSTERECTOMY  1985    low transverse incision.    TOTAL ABDOMINAL HYSTERECTOMY W/ BILATERAL SALPINGOOPHORECTOMY      1985; diffinitive treatment for menorrhagia         MEDS:  Medcard reviewed and updated    ALLERGIES: Allergy Card reviewed and updated    SOCIAL HISTORY:   The patient is a nonsmoker, denies alcohol or illicit drug use.    ROS:  GENERAL: No fever, chills, fatigability or weight loss.  SKIN: No rashes.  HEAD: No headaches or recent head trauma.  EYES: No photophobia, ocular pain or diplopia.  EARS: Denies ear pain, discharge or vertigo.  NOSE: No epistaxis or postnasal drip.  MOUTH & THROAT: No hoarseness or change in voice.   NODES: Denies swollen glands.  CHEST: Denies shortness of breath, wheezing, cough and sputum production.  CARDIOVASCULAR: Denies chest pain or  palpitations.  ABDOMEN: Appetite fine. Denies diarrhea, abdominal pain, constipation or blood in stool.  URINARY: No dysuria or hematuria.  MUSCULOSKELETAL: No joint stiffness or swelling.  Positive back pain due to sciatica.  NEUROLOGIC: No history of seizures.  ENDOCRINE: Denies polyuria or polydipsia.  PSYCHIATRIC: Denies mood swings, depression, anxiety, homicidal or suicidal thoughts.    SCREENINGS:  Last cholesterol: March 2018  Last colonoscopy/FOBT: January 19, 2018  Last mammogram: June 13, 2017  Last Pap smear: several years ago  Last tetanus: over 10 years ago  Last Pneumovax: 2015  Last eye exam: February 2018  Last bone density: November 29, 2016  Last menstrual period: Postmenopausal    PE:   Vitals:  Vitals:    03/26/18 1258   BP: 138/84   Pulse: 75   Resp: 16   Temp: 97.6 °F (36.4 °C)       APPEARANCE: Well nourished, well developed, in no acute distress.    EYES: Sclerae anicteric. PERRL. EOMI.      EARS: TM's intact. No retraction or perforation.    NOSE: Mucosa pink. Airway clear.  MOUTH & THROAT: No tonsillar enlargement. No pharyngeal erythema or exudate. No stridor.  NECK: Supple, no thyromegaly.  CHEST: Lungs clear to auscultation with unlabored respirations.  CARDIOVASCULAR: Normal S1, S2. No murmurs. No carotid bruits. No pedal edema.  ABDOMEN: Bowel sounds normal. Not distended. Soft. No tenderness or masses. No organomegaly.  MUSCULOSKELETAL:  Normal gait, no cyanosis or clubbing.   SKIN: Normal skin turgor, warm and dry.  NEUROLOGIC: Cranial Nerves: Intact.  PSYCHIATRIC: The patient is oriented to person, place, and time and has a pleasant affect.        ASSESSMENT/PLAN:  Daisha was seen today for annual exam.    Diagnoses and all orders for this visit:    Essential hypertension    Hyperlipidemia, unspecified hyperlipidemia type    Need for 23-polyvalent pneumococcal polysaccharide vaccine  -     (In Office Administered) Pneumococcal Polysaccharide Vaccine (23 Valent)  (SQ/IM)

## 2018-05-02 ENCOUNTER — OFFICE VISIT (OUTPATIENT)
Dept: INTERNAL MEDICINE | Facility: CLINIC | Age: 73
End: 2018-05-02
Payer: MEDICARE

## 2018-05-02 VITALS
WEIGHT: 173.94 LBS | OXYGEN SATURATION: 98 % | DIASTOLIC BLOOD PRESSURE: 82 MMHG | TEMPERATURE: 98 F | BODY MASS INDEX: 32.84 KG/M2 | SYSTOLIC BLOOD PRESSURE: 130 MMHG | HEIGHT: 61 IN | HEART RATE: 61 BPM

## 2018-05-02 DIAGNOSIS — K59.00 CONSTIPATION, UNSPECIFIED CONSTIPATION TYPE: ICD-10-CM

## 2018-05-02 DIAGNOSIS — K21.9 GASTROESOPHAGEAL REFLUX DISEASE, ESOPHAGITIS PRESENCE NOT SPECIFIED: Primary | ICD-10-CM

## 2018-05-02 PROCEDURE — 3079F DIAST BP 80-89 MM HG: CPT | Mod: CPTII,S$GLB,, | Performed by: INTERNAL MEDICINE

## 2018-05-02 PROCEDURE — 99999 PR PBB SHADOW E&M-EST. PATIENT-LVL IV: CPT | Mod: PBBFAC,,, | Performed by: INTERNAL MEDICINE

## 2018-05-02 PROCEDURE — 3075F SYST BP GE 130 - 139MM HG: CPT | Mod: CPTII,S$GLB,, | Performed by: INTERNAL MEDICINE

## 2018-05-02 PROCEDURE — 99213 OFFICE O/P EST LOW 20 MIN: CPT | Mod: S$GLB,,, | Performed by: INTERNAL MEDICINE

## 2018-05-02 PROCEDURE — 99499 UNLISTED E&M SERVICE: CPT | Mod: S$PBB,,, | Performed by: INTERNAL MEDICINE

## 2018-05-02 RX ORDER — OMEPRAZOLE 20 MG/1
20 CAPSULE, DELAYED RELEASE ORAL DAILY
Qty: 90 CAPSULE | Refills: 3 | Status: SHIPPED | OUTPATIENT
Start: 2018-05-02 | End: 2019-11-29

## 2018-05-02 NOTE — PATIENT INSTRUCTIONS
1. Start Omeprazole 20 mgm daily for the acid reflux.  2. Referral to GI medicine for both the acid reflux and the constipation.  3. Try daily Miralax for the constipation for now.  4. Continue all regular meds.

## 2018-05-02 NOTE — PROGRESS NOTES
Subjective:       Patient ID: Daisha Mayo is a 72 y.o. female.    Chief Complaint: Gastroesophageal Reflux and Constipation    Patient presents for evaluation of GI problems:  1. Having recurrent acid reflux symptoms.  Has had this in the past and was on omeprazole with good results.  She describes burning with some dyspepsia.  No nocturnal issues.  2. Constipation which has been progressive over the past month.  She describes hard stool that she has had to manually extract.  She tried a Metamucil like fiber pill but probably isn't drinking enough water with it.  No sign of blood in stool.  No N/V.  Not much in way of abdominal pain.    Had normal colonoscopy in 2014.  Had a FOBT this past year which was negative.  Mother had colon cancer but lived to be over 100.      Gastroesophageal Reflux   She reports no abdominal pain or no nausea.   Constipation   Pertinent negatives include no abdominal pain, fever, nausea or rectal pain.     Review of Systems   Constitutional: Negative for chills and fever.   Respiratory: Negative.    Cardiovascular: Negative.    Gastrointestinal: Positive for constipation. Negative for abdominal distention, abdominal pain, anal bleeding, blood in stool, nausea and rectal pain.       Objective:      Physical Exam   Constitutional: She is oriented to person, place, and time. She appears well-developed and well-nourished. No distress.   Eyes: No scleral icterus.   Cardiovascular: Normal rate, regular rhythm, normal heart sounds and intact distal pulses.    Pulmonary/Chest: Effort normal and breath sounds normal. No respiratory distress.   Abdominal: Soft. Bowel sounds are normal. She exhibits no distension and no mass. There is no tenderness. There is no rebound and no guarding.   Digital rectal exam normal with no stool in the rectal vault.   Neurological: She is alert and oriented to person, place, and time.   Psychiatric: She has a normal mood and affect. Her behavior is normal.  Judgment and thought content normal.   Vitals reviewed.      Assessment:       1. Gastroesophageal reflux disease, esophagitis presence not specified    2. Constipation, unspecified constipation type        Plan:   1. Restart omeprazole 20 mgm daily.  2. Try daily Miralax for now for the constipation.  3. High oral fluid intake.  4. Continue all regular meds.  5. Referral to GI medicine for both the reflux and the constipation.

## 2018-06-08 ENCOUNTER — TELEPHONE (OUTPATIENT)
Dept: INTERNAL MEDICINE | Facility: CLINIC | Age: 73
End: 2018-06-08

## 2018-06-08 DIAGNOSIS — Z12.31 VISIT FOR SCREENING MAMMOGRAM: Primary | ICD-10-CM

## 2018-06-08 NOTE — TELEPHONE ENCOUNTER
----- Message from Maile Buck sent at 6/8/2018 10:09 AM CDT -----  Contact: Self [phone] or [phone]  Caller is requesting to schedule their annual screening mammogram appointment. Order is not listed in Epic.  Please enter order and contact patient to schedule.    Where would they like the mammogram performed?:  [facility]  Additional information:

## 2018-06-19 ENCOUNTER — HOSPITAL ENCOUNTER (OUTPATIENT)
Dept: RADIOLOGY | Facility: HOSPITAL | Age: 73
Discharge: HOME | End: 2018-06-19
Attending: INTERNAL MEDICINE
Payer: MEDICARE

## 2018-06-19 DIAGNOSIS — Z12.31 VISIT FOR SCREENING MAMMOGRAM: ICD-10-CM

## 2018-06-19 PROCEDURE — 77063 BREAST TOMOSYNTHESIS BI: CPT | Mod: 26,,, | Performed by: RADIOLOGY

## 2018-06-19 PROCEDURE — 77067 SCR MAMMO BI INCL CAD: CPT | Mod: TC,PO

## 2018-06-19 PROCEDURE — 77067 SCR MAMMO BI INCL CAD: CPT | Mod: 26,,, | Performed by: RADIOLOGY

## 2018-06-28 ENCOUNTER — TELEPHONE (OUTPATIENT)
Dept: ENDOSCOPY | Facility: HOSPITAL | Age: 73
End: 2018-06-28

## 2018-06-28 NOTE — TELEPHONE ENCOUNTER
Several attempts made to schedule repeat colonoscopy.  Letter sent to contact Endoscopy Scheduling at [phone] to schedule procedure.

## 2018-08-09 ENCOUNTER — TELEPHONE (OUTPATIENT)
Dept: INTERNAL MEDICINE | Facility: CLINIC | Age: 73
End: 2018-08-09

## 2018-08-09 NOTE — TELEPHONE ENCOUNTER
----- Message from Pedro Castro MA sent at 8/7/2018  5:22 PM CDT -----  Contact: Pt  Pt called and would like a call back from the nurse regarding labs.      Pt can be reached at [phone].      Thanks

## 2018-08-09 NOTE — TELEPHONE ENCOUNTER
Called and spoke with the patient and she advised she will get the labs completed at the [place] location. Termed the call

## 2018-08-23 ENCOUNTER — PES CALL (OUTPATIENT)
Dept: ADMINISTRATIVE | Facility: CLINIC | Age: 73
End: 2018-08-23

## 2018-08-24 ENCOUNTER — LAB VISIT (OUTPATIENT)
Dept: LAB | Facility: HOSPITAL | Age: 73
End: 2018-08-24
Attending: INTERNAL MEDICINE
Payer: MEDICARE

## 2018-08-24 DIAGNOSIS — E78.5 HYPERLIPIDEMIA, UNSPECIFIED HYPERLIPIDEMIA TYPE: ICD-10-CM

## 2018-08-24 DIAGNOSIS — E03.8 SUBCLINICAL HYPOTHYROIDISM: ICD-10-CM

## 2018-08-24 LAB
ALBUMIN SERPL BCP-MCNC: 3.6 G/DL
ALP SERPL-CCNC: 116 U/L
ALT SERPL W/O P-5'-P-CCNC: 27 U/L
ANION GAP SERPL CALC-SCNC: 5 MMOL/L
AST SERPL-CCNC: 21 U/L
BILIRUB SERPL-MCNC: 0.5 MG/DL
BUN SERPL-MCNC: 15 MG/DL
CALCIUM SERPL-MCNC: 9.1 MG/DL
CHLORIDE SERPL-SCNC: 98 MMOL/L
CHOLEST SERPL-MCNC: 155 MG/DL
CHOLEST/HDLC SERPL: 2.8 {RATIO}
CO2 SERPL-SCNC: 32 MMOL/L
CREAT SERPL-MCNC: 0.8 MG/DL
EST. GFR  (AFRICAN AMERICAN): >60 ML/MIN/1.73 M^2
EST. GFR  (NON AFRICAN AMERICAN): >60 ML/MIN/1.73 M^2
GLUCOSE SERPL-MCNC: 100 MG/DL
HDLC SERPL-MCNC: 55 MG/DL
HDLC SERPL: 35.5 %
LDLC SERPL CALC-MCNC: 84.6 MG/DL
NONHDLC SERPL-MCNC: 100 MG/DL
POTASSIUM SERPL-SCNC: 4 MMOL/L
PROT SERPL-MCNC: 6.7 G/DL
SODIUM SERPL-SCNC: 135 MMOL/L
TRIGL SERPL-MCNC: 77 MG/DL
TSH SERPL DL<=0.005 MIU/L-ACNC: 2.2 UIU/ML

## 2018-08-24 PROCEDURE — 80061 LIPID PANEL: CPT

## 2018-08-24 PROCEDURE — 36415 COLL VENOUS BLD VENIPUNCTURE: CPT | Mod: PO

## 2018-08-24 PROCEDURE — 84443 ASSAY THYROID STIM HORMONE: CPT

## 2018-08-24 PROCEDURE — 80053 COMPREHEN METABOLIC PANEL: CPT

## 2018-08-31 ENCOUNTER — OFFICE VISIT (OUTPATIENT)
Dept: INTERNAL MEDICINE | Facility: CLINIC | Age: 73
End: 2018-08-31
Payer: MEDICARE

## 2018-08-31 VITALS
HEART RATE: 69 BPM | DIASTOLIC BLOOD PRESSURE: 88 MMHG | OXYGEN SATURATION: 99 % | HEIGHT: 61 IN | BODY MASS INDEX: 32.38 KG/M2 | TEMPERATURE: 99 F | SYSTOLIC BLOOD PRESSURE: 124 MMHG | RESPIRATION RATE: 16 BRPM | WEIGHT: 171.5 LBS

## 2018-08-31 DIAGNOSIS — I10 ESSENTIAL HYPERTENSION: Primary | ICD-10-CM

## 2018-08-31 DIAGNOSIS — E03.8 SUBCLINICAL HYPOTHYROIDISM: ICD-10-CM

## 2018-08-31 DIAGNOSIS — E78.5 HYPERLIPIDEMIA, UNSPECIFIED HYPERLIPIDEMIA TYPE: ICD-10-CM

## 2018-08-31 PROCEDURE — 99213 OFFICE O/P EST LOW 20 MIN: CPT | Mod: S$GLB,,, | Performed by: INTERNAL MEDICINE

## 2018-08-31 PROCEDURE — 3074F SYST BP LT 130 MM HG: CPT | Mod: CPTII,S$GLB,, | Performed by: INTERNAL MEDICINE

## 2018-08-31 PROCEDURE — 3079F DIAST BP 80-89 MM HG: CPT | Mod: CPTII,S$GLB,, | Performed by: INTERNAL MEDICINE

## 2018-08-31 PROCEDURE — 99999 PR PBB SHADOW E&M-EST. PATIENT-LVL IV: CPT | Mod: PBBFAC,,, | Performed by: INTERNAL MEDICINE

## 2018-08-31 RX ORDER — LEVOTHYROXINE SODIUM 25 UG/1
TABLET ORAL
Qty: 90 TABLET | Refills: 3 | Status: SHIPPED | OUTPATIENT
Start: 2018-08-31 | End: 2020-04-21

## 2018-08-31 NOTE — PROGRESS NOTES
CC: followup of hyperlipidemia  HPI:  The patient is a 72 y.o. year old female who presents to the office for followup of hyperlipidemia.  The patient denies any chest pain, shortness of breath, headache, blurred vision, excessive fatigue, nausea or vomiting.  She denies any muscle aches with  Review of labs reveals stable lipid profile.    PAST MEDICAL HISTORY:  Past Medical History:   Diagnosis Date    Arthritis     Cataract     Depression     Around [place]; treated with Wellbutrin    Early cataracts, bilateral     GERD (gastroesophageal reflux disease)     Glaucoma     Hyperprolactinemia     Hypertension     Hypothyroidism     Need for prophylactic vaccination against Streptococcus pneumoniae (pneumococcus) 10/14/2015    Osteopenia     Pure hyperglyceridemia 3/31/2017    Recurrent UTI     Every few months    Sciatica neuralgia     Seizures 2006    temporal lobe seizure    Temporal lobe seizure 2006    Trigeminal neuralgia     Vitamin D deficiency disease        SURGICAL HISTORY:  Past Surgical History:   Procedure Laterality Date    BREAST BIOPSY      duct excision    CHOLECYSTECTOMY  1994    laporascopic    COLONOSCOPY      EYE SURGERY      LASIK surgery to one eye; cannot remember which one    HYSTERECTOMY  1985    low transverse incision.    TOTAL ABDOMINAL HYSTERECTOMY W/ BILATERAL SALPINGOOPHORECTOMY      1985; diffinitive treatment for menorrhagia       MEDS:  Medcard reviewed and updated    ALLERGIES: Allergy Card reviewed and updated    SOCIAL HISTORY:   The patient is a nonsmoker.    PE:   APPEARANCE: Well nourished, well developed, in no acute distress.    CHEST: Lungs clear to auscultation with unlabored respirations.  CARDIOVASCULAR: Normal S1, S2. No murmurs. No carotid bruits. No pedal edema.  ABDOMEN: Bowel sounds normal. Not distended. Soft. No tenderness or masses.   PSYCHIATRIC: The patient is oriented to person, place, and time and has a pleasant affect.         ASSESSMENT/PLAN:  Daisha was seen today for follow-up.    Diagnoses and all orders for this visit:    Essential hypertension  -     blood pressure is controlled, continue current medication    Hyperlipidemia, unspecified hyperlipidemia type  -     controlled, continue atorvastatin    Subclinical hypothyroidism  -     levothyroxine (SYNTHROID) 25 MCG tablet; TAKE 1 TABLET ONE TIME DAILY        Answers for HPI/ROS submitted by the patient on 8/29/2018   activity change: No  unexpected weight change: No  neck pain: No  hearing loss: No  rhinorrhea: No  trouble swallowing: No  eye discharge: No  visual disturbance: No  chest tightness: No  wheezing: No  chest pain: No  palpitations: No  blood in stool: No  constipation: No  vomiting: No  diarrhea: No  polydipsia: No  polyuria: No  difficulty urinating: No  hematuria: No  menstrual problem: No  dysuria: No  joint swelling: No  arthralgias: No  headaches: No  weakness: No  confusion: No  dysphoric mood: No

## 2018-09-13 RX ORDER — HYDROCHLOROTHIAZIDE 25 MG/1
25 TABLET ORAL DAILY
Qty: 90 TABLET | Refills: 3 | Status: SHIPPED | OUTPATIENT
Start: 2018-09-13 | End: 2019-03-27 | Stop reason: SDUPTHER

## 2018-10-08 ENCOUNTER — PES CALL (OUTPATIENT)
Dept: ADMINISTRATIVE | Facility: CLINIC | Age: 73
End: 2018-10-08

## 2018-10-19 ENCOUNTER — OFFICE VISIT (OUTPATIENT)
Dept: INTERNAL MEDICINE | Facility: CLINIC | Age: 73
End: 2018-10-19
Payer: MEDICARE

## 2018-10-19 VITALS
SYSTOLIC BLOOD PRESSURE: 118 MMHG | WEIGHT: 174.19 LBS | DIASTOLIC BLOOD PRESSURE: 70 MMHG | HEART RATE: 60 BPM | BODY MASS INDEX: 32.91 KG/M2

## 2018-10-19 DIAGNOSIS — F41.9 ANXIETY: ICD-10-CM

## 2018-10-19 DIAGNOSIS — E66.09 CLASS 1 OBESITY DUE TO EXCESS CALORIES WITH SERIOUS COMORBIDITY AND BODY MASS INDEX (BMI) OF 30.0 TO 30.9 IN ADULT: ICD-10-CM

## 2018-10-19 DIAGNOSIS — E22.1 HYPERPROLACTINEMIA: ICD-10-CM

## 2018-10-19 DIAGNOSIS — E03.8 SUBCLINICAL HYPOTHYROIDISM: ICD-10-CM

## 2018-10-19 DIAGNOSIS — I10 ESSENTIAL HYPERTENSION: ICD-10-CM

## 2018-10-19 DIAGNOSIS — E78.00 PURE HYPERCHOLESTEROLEMIA: ICD-10-CM

## 2018-10-19 DIAGNOSIS — Z00.00 ENCOUNTER FOR PREVENTIVE HEALTH EXAMINATION: Primary | ICD-10-CM

## 2018-10-19 DIAGNOSIS — M85.80 SENILE OSTEOPENIA: ICD-10-CM

## 2018-10-19 DIAGNOSIS — K21.9 GASTROESOPHAGEAL REFLUX DISEASE, ESOPHAGITIS PRESENCE NOT SPECIFIED: ICD-10-CM

## 2018-10-19 DIAGNOSIS — Z87.898 HISTORY OF SEIZURE: ICD-10-CM

## 2018-10-19 DIAGNOSIS — E55.9 VITAMIN D DEFICIENCY DISEASE: ICD-10-CM

## 2018-10-19 DIAGNOSIS — I77.9 RIGHT-SIDED CAROTID ARTERY DISEASE, UNSPECIFIED TYPE: ICD-10-CM

## 2018-10-19 DIAGNOSIS — E78.1 PURE HYPERGLYCERIDEMIA: ICD-10-CM

## 2018-10-19 PROBLEM — R07.2 PRECORDIAL PAIN: Status: RESOLVED | Noted: 2017-03-31 | Resolved: 2018-10-19

## 2018-10-19 PROBLEM — E66.811 CLASS 1 OBESITY DUE TO EXCESS CALORIES IN ADULT: Status: ACTIVE | Noted: 2018-10-19

## 2018-10-19 PROCEDURE — 99999 PR PBB SHADOW E&M-EST. PATIENT-LVL V: CPT | Mod: PBBFAC,,, | Performed by: NURSE PRACTITIONER

## 2018-10-19 PROCEDURE — 3078F DIAST BP <80 MM HG: CPT | Mod: CPTII,,, | Performed by: NURSE PRACTITIONER

## 2018-10-19 PROCEDURE — 3074F SYST BP LT 130 MM HG: CPT | Mod: CPTII,,, | Performed by: NURSE PRACTITIONER

## 2018-10-19 PROCEDURE — 90662 IIV NO PRSV INCREASED AG IM: CPT | Mod: PBBFAC,PO

## 2018-10-19 PROCEDURE — 99499 UNLISTED E&M SERVICE: CPT | Mod: S$GLB,,, | Performed by: NURSE PRACTITIONER

## 2018-10-19 PROCEDURE — 99215 OFFICE O/P EST HI 40 MIN: CPT | Mod: PBBFAC,PO | Performed by: NURSE PRACTITIONER

## 2018-10-19 PROCEDURE — G0439 PPPS, SUBSEQ VISIT: HCPCS | Mod: ,,, | Performed by: NURSE PRACTITIONER

## 2018-10-19 NOTE — Clinical Note
Here for HRA. She was very tearful and anxious. Reports recent increased stress and anxiety. PHQ-2 score. Referral to Psychiatry SW placed. Thanks

## 2018-10-19 NOTE — PATIENT INSTRUCTIONS
Counseling and Referral of Other Preventative  (Italic type indicates deductible and co-insurance are waived)    Patient Name: Daisha Mayo  Today's Date: 10/19/2018    Health Maintenance       Date Due Completion Date    TETANUS VACCINE 10/04/1963 Need to obtain    Influenza Vaccine 08/01/2018 10/6/2015 (Done)    Override on 10/6/2015: Done    Fecal Occult Blood Test (FOBT)/FitKit 01/09/2019 1/9/2018    Mammogram 06/19/2019 6/19/2018    Lipid Panel 08/24/2019 8/24/2018    High Dose Statin 10/19/2019 10/19/2018    DEXA SCAN 11/29/2019 11/29/2016        No orders of the defined types were placed in this encounter.    The following information is provided to all patients.  This information is to help you find resources for any of the problems found today that may be affecting your health:                Living healthy guide: www.[facility].[place].gov      Understanding Diabetes: www.diabetes.org      Eating healthy: www.cdc.gov/healthyweight      [facility] home safety checklist: www.cdc.gov/steadi/patient.html      Agency on Aging: www.goea.[place].[facility]      Alcoholics anonymous (AA): www.aa.org      Physical Activity: www.raymundo.nih.gov/yt4oidc      Tobacco use: www.quitwithusla.org

## 2018-10-19 NOTE — PROGRESS NOTES
Daisha Mayo presented for a  Medicare AWV and comprehensive Health Risk Assessment today. The following components were reviewed and updated:    · Medical history  · Family History  · Social history  · Allergies and Current Medications  · Health Risk Assessment  · Health Maintenance  · Care Team     ** See Completed Assessments for Annual Wellness Visit within the encounter summary.**       The following assessments were completed:  · Living Situation  · CAGE  · Depression Screening  · Timed Get Up and Go  · Whisper Test  · Cognitive Function Screening  ·   ·   ·   · Nutrition Screening  · ADL Screening  · PAQ Screening    Vitals:    10/19/18 0830   BP: 118/70   BP Location: Left arm   Pulse: 60   Weight: 79 kg (174 lb 2.6 oz)     Body mass index is 32.91 kg/m².  Physical Exam   Constitutional: She is oriented to person, place, and time. She appears well-developed and well-nourished.   HENT:   Head: Normocephalic.   Cardiovascular: Normal rate, regular rhythm and intact distal pulses.   Pulmonary/Chest: Effort normal and breath sounds normal.   Abdominal: Soft. Bowel sounds are normal.   Musculoskeletal: Normal range of motion. She exhibits no edema.   Neurological: She is alert and oriented to person, place, and time.   Skin: Skin is warm and dry.   Psychiatric: She has a normal mood and affect.   Nursing note and vitals reviewed.        Diagnoses and health risks identified today and associated recommendations/orders:    1. Encounter for preventive health examination  Here for Health Risk Assessment/Annual Wellness Visit.  Health maintenance reviewed and updated. Follow up in one year.  Prescription given for TDAP and Shingrix    2. Anxiety  Chronic, reports increased anxiety recently and some episodes of depression. PHQ-2 score 2. PCP notified.  - Ambulatory consult to Psychiatry    3. Essential hypertension  Chronic, stable on current medications. Followed by PCP.    4. Pure hypercholesterolemia  Chronic,  stable on current medications. Followed by PCP.    5. Pure hyperglyceridemia  Chronic, stable on current medications. Followed by PCP.    6. Right-sided carotid artery disease, unspecified type  Chronic, stable on current medications. 40-49% stenosis right, 0-19% left note on U/S 11/14/17. Followed by PCP.    7. History of seizure  Stable. Followed by PCP.    8. Senile osteopenia  Chronic, stable on current medications. Followed by PCP.    9. Vitamin D deficiency disease  Chronic, stable on current medication. Followed by PCP.    10. Gastroesophageal reflux disease, esophagitis presence not specified  Chronic, stable on current PRN medication. Followed by PCP.    11. Subclinical hypothyroidism  Chronic, stable on current medication. Followed by PCP.    12. Hyperprolactinemia  Chronic, stable. Followed by PCP.    13. Class 1 obesity due to excess calories with serious comorbidity and body mass index (BMI) of 30.0 to 30.9 in adult  Chronic, reinforced diet/exercise per PCP recommendations. Followed by PCP.      Provided Daisha with a 5-10 year written screening schedule and personal prevention plan. Recommendations were developed using the USPSTF age appropriate recommendations. Education, counseling, and referrals were provided as needed. After Visit Summary printed and given to patient which includes a list of additional screenings\tests needed.    Follow-up in about 5 months (around 3/27/2019).with PCP    Mi Wilson NP

## 2018-11-13 RX ORDER — ATORVASTATIN CALCIUM 10 MG/1
10 TABLET, FILM COATED ORAL DAILY
Qty: 90 TABLET | Refills: 3 | Status: SHIPPED | OUTPATIENT
Start: 2018-11-13 | End: 2020-04-21 | Stop reason: ALTCHOICE

## 2019-01-08 ENCOUNTER — OFFICE VISIT (OUTPATIENT)
Dept: INTERNAL MEDICINE | Facility: CLINIC | Age: 74
End: 2019-01-08
Payer: MEDICARE

## 2019-01-08 VITALS
WEIGHT: 170.44 LBS | SYSTOLIC BLOOD PRESSURE: 129 MMHG | RESPIRATION RATE: 18 BRPM | HEIGHT: 61 IN | BODY MASS INDEX: 32.18 KG/M2 | DIASTOLIC BLOOD PRESSURE: 77 MMHG | TEMPERATURE: 99 F | HEART RATE: 75 BPM

## 2019-01-08 DIAGNOSIS — J20.9 ACUTE BRONCHITIS, UNSPECIFIED ORGANISM: Primary | ICD-10-CM

## 2019-01-08 PROCEDURE — 3078F PR MOST RECENT DIASTOLIC BLOOD PRESSURE < 80 MM HG: ICD-10-PCS | Mod: CPTII,HCNC,S$GLB, | Performed by: INTERNAL MEDICINE

## 2019-01-08 PROCEDURE — 3074F SYST BP LT 130 MM HG: CPT | Mod: CPTII,HCNC,S$GLB, | Performed by: INTERNAL MEDICINE

## 2019-01-08 PROCEDURE — 99999 PR PBB SHADOW E&M-EST. PATIENT-LVL IV: CPT | Mod: PBBFAC,HCNC,, | Performed by: INTERNAL MEDICINE

## 2019-01-08 PROCEDURE — 1101F PR PT FALLS ASSESS DOC 0-1 FALLS W/OUT INJ PAST YR: ICD-10-PCS | Mod: CPTII,HCNC,S$GLB, | Performed by: INTERNAL MEDICINE

## 2019-01-08 PROCEDURE — 94640 AIRWAY INHALATION TREATMENT: CPT | Mod: HCNC,S$GLB,, | Performed by: INTERNAL MEDICINE

## 2019-01-08 PROCEDURE — 94640 PR INHAL RX, AIRWAY OBST/DX SPUTUM INDUCT: ICD-10-PCS | Mod: HCNC,S$GLB,, | Performed by: INTERNAL MEDICINE

## 2019-01-08 PROCEDURE — 99213 OFFICE O/P EST LOW 20 MIN: CPT | Mod: 25,HCNC,S$GLB, | Performed by: INTERNAL MEDICINE

## 2019-01-08 PROCEDURE — 3074F PR MOST RECENT SYSTOLIC BLOOD PRESSURE < 130 MM HG: ICD-10-PCS | Mod: CPTII,HCNC,S$GLB, | Performed by: INTERNAL MEDICINE

## 2019-01-08 PROCEDURE — 1101F PT FALLS ASSESS-DOCD LE1/YR: CPT | Mod: CPTII,HCNC,S$GLB, | Performed by: INTERNAL MEDICINE

## 2019-01-08 PROCEDURE — 99999 PR PBB SHADOW E&M-EST. PATIENT-LVL IV: ICD-10-PCS | Mod: PBBFAC,HCNC,, | Performed by: INTERNAL MEDICINE

## 2019-01-08 PROCEDURE — 99213 PR OFFICE/OUTPT VISIT, EST, LEVL III, 20-29 MIN: ICD-10-PCS | Mod: 25,HCNC,S$GLB, | Performed by: INTERNAL MEDICINE

## 2019-01-08 PROCEDURE — 3078F DIAST BP <80 MM HG: CPT | Mod: CPTII,HCNC,S$GLB, | Performed by: INTERNAL MEDICINE

## 2019-01-08 RX ORDER — ALBUTEROL SULFATE 90 UG/1
2 AEROSOL, METERED RESPIRATORY (INHALATION) EVERY 6 HOURS PRN
Qty: 1 INHALER | Refills: 1 | Status: SHIPPED | OUTPATIENT
Start: 2019-01-08 | End: 2019-03-27

## 2019-01-08 RX ORDER — LEVALBUTEROL INHALATION SOLUTION 1.25 MG/3ML
1.25 SOLUTION RESPIRATORY (INHALATION)
Status: COMPLETED | OUTPATIENT
Start: 2019-01-08 | End: 2019-01-08

## 2019-01-08 RX ORDER — HYDROCODONE BITARTRATE AND HOMATROPINE METHYLBROMIDE ORAL SOLUTION 5; 1.5 MG/5ML; MG/5ML
5 LIQUID ORAL NIGHTLY PRN
Qty: 120 ML | Refills: 0 | Status: SHIPPED | OUTPATIENT
Start: 2019-01-08 | End: 2019-03-27

## 2019-01-08 RX ADMIN — LEVALBUTEROL INHALATION SOLUTION 1.25 MG: 1.25 SOLUTION RESPIRATORY (INHALATION) at 03:01

## 2019-01-08 NOTE — PROGRESS NOTES
CC:  Cold symptoms  HPI:  The patient is a 73 y.o. year old female who presents to the office for cold symptoms.  Her symptoms started 12/28/18.  She reports non productive cough, chest pain and sneezing.  She denies wheezing, shortness of breath or fever.  The patient has taken alexus seltzer, mucinex, tylenol cold and flu and robitussin DM.    PAST MEDICAL HISTORY:  Past Medical History:   Diagnosis Date    Arthritis     Cataract     Class 1 obesity due to excess calories in adult 10/19/2018    Depression     Around [place]; treated with Wellbutrin    Early cataracts, bilateral     GERD (gastroesophageal reflux disease)     Glaucoma     Hyperprolactinemia     Hypertension     Hypothyroidism     Need for prophylactic vaccination against Streptococcus pneumoniae (pneumococcus) 10/14/2015    Osteopenia     Pure hyperglyceridemia 3/31/2017    Recurrent UTI     Every few months    Sciatica neuralgia     Seizures 2006    temporal lobe seizure    Temporal lobe seizure 2006    Trigeminal neuralgia     Vitamin D deficiency disease        SURGICAL HISTORY:  Past Surgical History:   Procedure Laterality Date    BREAST BIOPSY      duct excision    CHOLECYSTECTOMY  1994    laporascopic    COLONOSCOPY      COLONOSCOPY N/A 9/18/2014    Performed by Newton Martinez MD at [facility] ([address] [place] [address])    EYE SURGERY      LASIK surgery to one eye; cannot remember which one    HYSTERECTOMY  1985    low transverse incision.    TOTAL ABDOMINAL HYSTERECTOMY W/ BILATERAL SALPINGOOPHORECTOMY      1985; diffinitive treatment for menorrhagia       MEDS:  Medcard reviewed and updated    ALLERGIES: Allergy Card reviewed and updated    SOCIAL HISTORY:   The patient is a nonsmoker.    PE:   APPEARANCE: Well nourished, well developed, in no acute distress.    EARS: TM's intact. No retraction or perforation.    NOSE: Mucosa pink. Airway clear.  MOUTH & THROAT: No tonsillar enlargement. No pharyngeal erythema or exudate. No  stridor.  CHEST: Lungs clear to auscultation with unlabored respirations.  CARDIOVASCULAR: Normal S1, S2. No murmurs. No carotid bruits. No pedal edema.  ABDOMEN: Bowel sounds normal. Not distended. Soft. No tenderness or masses.   PSYCHIATRIC: The patient is oriented to person, place, and time and has a pleasant affect.        ASSESSMENT/PLAN:  Daisha was seen today for uri and cough.    Diagnoses and all orders for this visit:    Acute bronchitis, unspecified organism  -     levalbuterol nebulizer solution 1.25 mg  -     prescribed Hycodan and albuterol inhaler if needed    Other orders  -     hydrocodone-homatropine 5-1.5 mg/5 ml (HYCODAN) 5-1.5 mg/5 mL Syrp; Take 5 mLs by mouth nightly as needed.  -     albuterol (VENTOLIN HFA) 90 mcg/actuation inhaler; Inhale 2 puffs into the lungs every 6 (six) hours as needed for Wheezing. Rescue

## 2019-02-05 ENCOUNTER — OFFICE VISIT (OUTPATIENT)
Dept: PSYCHIATRY | Facility: CLINIC | Age: 74
End: 2019-02-05
Payer: MEDICARE

## 2019-02-05 DIAGNOSIS — F32.A DEPRESSION, UNSPECIFIED DEPRESSION TYPE: Primary | ICD-10-CM

## 2019-02-05 DIAGNOSIS — F41.1 GAD (GENERALIZED ANXIETY DISORDER): ICD-10-CM

## 2019-02-05 PROCEDURE — 99999 PR PBB SHADOW E&M-EST. PATIENT-LVL II: ICD-10-PCS | Mod: PBBFAC,HCNC,, | Performed by: SOCIAL WORKER

## 2019-02-05 PROCEDURE — 90791 PSYCH DIAGNOSTIC EVALUATION: CPT | Mod: HCNC,S$GLB,, | Performed by: SOCIAL WORKER

## 2019-02-05 PROCEDURE — 90791 PR PSYCHIATRIC DIAGNOSTIC EVALUATION: ICD-10-PCS | Mod: HCNC,S$GLB,, | Performed by: SOCIAL WORKER

## 2019-02-05 PROCEDURE — 99999 PR PBB SHADOW E&M-EST. PATIENT-LVL II: CPT | Mod: PBBFAC,HCNC,, | Performed by: SOCIAL WORKER

## 2019-02-05 NOTE — PROGRESS NOTES
Psychiatry Initial Visit (PhD/LCSW)  Diagnostic Interview - CPT 22907    Date: 2/5/2019    Site: [facility]    Referral source: an MD and herself,  also present    Clinical status of patient: Outpatient    Daisha Mayo, a 73 y.o. female, for initial evaluation visit.  Met with patient and spouse.    Chief complaint/reason for encounter: depression and anxiety    History of present illness: discussed depression and anxiety symptoms and re-occurring nightmares, on and off these all occur, tearful today, has high blood pressure, and wants coping skills, and how to relax and improve and how to take care of herself,  age 74 present, they are both retired, some sleep issues, and worry a lot over different things, and not suicidal and no homicidal and no drugs and no alcohol and no psychosis, and wants to improve her situation and they have one daughter age 40 who is a nurse and works, and no grand children. 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 She cries at times, and has sadness and moodiness. And other depression symptoms such as low self-esteem, negative thinking and negative self-talk, grief and loss, sleep, mood, irritable, and at times shuts down and gets over whelmed, worries a lot, has had suicidal thoughts in the past but not recently.    Pain: 0    Symptoms:   · Mood: depressed mood, diminished interest, insomnia, fatigue, worthlessness/guilt, poor concentration, tearfulness and social isolation  · Anxiety: decreased memory, excessive anxiety/worry, restlessness/keyed up, irritability and muscle tension  · Substance abuse: denied  · Cognitive functioning: denied  · Health behaviors: sleep, stress, anxiety, loss and grief, depression and higjh blood pressure    Psychiatric history: has participated in counseling/psychotherapy on an  outpatient basis in the past    Medical history: see the above    Family history of psychiatric illness: none    Social history (marriage, employment, etc.): bother her and  are retired, and she is age 73. One daughter age 40 who works as a nurse, no grand children, live in the [facility].    Substance use:   Alcohol: none   Drugs: none   Tobacco: none   Caffeine: some    Current medications and drug reactions (include OTC, herbal): see medication list none    Strengths and liabilities: Strength: Patient accepts guidance/feedback, Strength: Patient is expressive/articulate., Strength: Patient is intelligent., Strength: Patient is motivated for change., Strength: Patient is physically healthy., Strength: Patient has positive support network., Strength: Patient has reasonable judgment., Liability: Patient is unstable., Liability: Patient lacks coping skills.    Current Evaluation:     Mental Status Exam:  General Appearance:  unremarkable, age appropriate   Speech: normal tone, normal rate, normal pitch, normal volume      Level of Cooperation: cooperative      Thought Processes: normal and logical   Mood: anxious, depressed, irritable, sad      Thought Content: normal, no suicidality, no homicidality, delusions, or paranoia   Affect: congruent and appropriate   Orientation: Oriented x3   Memory: recent >  intact, remote >  intact   Attention Span & Concentration: intact   Fund of General Knowledge: intact and appropriate to age and level of education   Abstract Reasoning: interpretation of similarities was concrete   Judgment & Insight: good     Language  intact     Diagnostic Impression - Plan:       ICD-10-CM ICD-9-CM   1. Depression, unspecified depression type F32.9 311   2. SAMSON (generalized anxiety disorder) F41.1 300.02       Plan:individual psychotherapy, group psychotherapy, consult psychiatrist for medication evaluation and medication management by physician    Return to Clinic: 1 week    Length of  Service (minutes): 30  Suggested therapy, see MD and or PNP and also do group therapy. She does not want medications really. Has taken wellbrutin in the past and was helpful, has sciatic nerve issues at times.

## 2019-03-19 ENCOUNTER — OFFICE VISIT (OUTPATIENT)
Dept: PSYCHIATRY | Facility: CLINIC | Age: 74
End: 2019-03-19
Payer: MEDICARE

## 2019-03-19 DIAGNOSIS — F41.1 GAD (GENERALIZED ANXIETY DISORDER): Primary | ICD-10-CM

## 2019-03-19 PROCEDURE — 90847 PR FAMILY PSYCHOTHERAPY W/ PT, 50 MIN: ICD-10-PCS | Mod: HCNC,S$GLB,, | Performed by: SOCIAL WORKER

## 2019-03-19 PROCEDURE — 99999 PR PBB SHADOW E&M-EST. PATIENT-LVL II: ICD-10-PCS | Mod: PBBFAC,HCNC,, | Performed by: SOCIAL WORKER

## 2019-03-19 PROCEDURE — 99999 PR PBB SHADOW E&M-EST. PATIENT-LVL II: CPT | Mod: PBBFAC,HCNC,, | Performed by: SOCIAL WORKER

## 2019-03-19 PROCEDURE — 90847 FAMILY PSYTX W/PT 50 MIN: CPT | Mod: HCNC,S$GLB,, | Performed by: SOCIAL WORKER

## 2019-03-19 NOTE — PROGRESS NOTES
Family Psychotherapy (PhD/LCSW)    3/19/2019    Site: [facility]    Length of service: 30    Therapeutic intervention: 90807-Family therapy with patient; needed because follow up    Persons present: patient and spouse     Interval history: doing a lot better, more positive, more assertive, and doing better with self-esteem, less depression and more calm and confident,  is supportive, see again in four to six weeks for how she is doing.    Target symptoms: depression, anxiety , adjustment     Patient's interpersonal/verbal exchanges: [id number]-Family therapy with patient:  active listening, frequent questions and self-disclosure    Progress toward goals: progressing slowly    Diagnosis: SAMSON, depression    Plan: individual psychotherapy  family psychotherapy  medication management by physician    Return to clinic: 1 month

## 2019-03-27 ENCOUNTER — TELEPHONE (OUTPATIENT)
Dept: INTERNAL MEDICINE | Facility: CLINIC | Age: 74
End: 2019-03-27

## 2019-03-27 ENCOUNTER — PATIENT MESSAGE (OUTPATIENT)
Dept: INTERNAL MEDICINE | Facility: CLINIC | Age: 74
End: 2019-03-27

## 2019-03-27 ENCOUNTER — OFFICE VISIT (OUTPATIENT)
Dept: INTERNAL MEDICINE | Facility: CLINIC | Age: 74
End: 2019-03-27
Payer: MEDICARE

## 2019-03-27 VITALS
HEIGHT: 61 IN | BODY MASS INDEX: 30.39 KG/M2 | WEIGHT: 160.94 LBS | DIASTOLIC BLOOD PRESSURE: 70 MMHG | TEMPERATURE: 98 F | SYSTOLIC BLOOD PRESSURE: 120 MMHG | HEART RATE: 80 BPM

## 2019-03-27 DIAGNOSIS — E55.9 VITAMIN D DEFICIENCY DISEASE: ICD-10-CM

## 2019-03-27 DIAGNOSIS — E78.5 HYPERLIPIDEMIA, UNSPECIFIED HYPERLIPIDEMIA TYPE: Primary | ICD-10-CM

## 2019-03-27 DIAGNOSIS — Z12.31 ENCOUNTER FOR SCREENING MAMMOGRAM FOR BREAST CANCER: Primary | ICD-10-CM

## 2019-03-27 PROCEDURE — 99999 PR PBB SHADOW E&M-EST. PATIENT-LVL III: CPT | Mod: PBBFAC,HCNC,, | Performed by: INTERNAL MEDICINE

## 2019-03-27 PROCEDURE — 3078F DIAST BP <80 MM HG: CPT | Mod: HCNC,CPTII,S$GLB, | Performed by: INTERNAL MEDICINE

## 2019-03-27 PROCEDURE — 99213 OFFICE O/P EST LOW 20 MIN: CPT | Mod: HCNC,S$GLB,, | Performed by: INTERNAL MEDICINE

## 2019-03-27 PROCEDURE — 99213 PR OFFICE/OUTPT VISIT, EST, LEVL III, 20-29 MIN: ICD-10-PCS | Mod: HCNC,S$GLB,, | Performed by: INTERNAL MEDICINE

## 2019-03-27 PROCEDURE — 1101F PT FALLS ASSESS-DOCD LE1/YR: CPT | Mod: HCNC,CPTII,S$GLB, | Performed by: INTERNAL MEDICINE

## 2019-03-27 PROCEDURE — 3078F PR MOST RECENT DIASTOLIC BLOOD PRESSURE < 80 MM HG: ICD-10-PCS | Mod: HCNC,CPTII,S$GLB, | Performed by: INTERNAL MEDICINE

## 2019-03-27 PROCEDURE — 99999 PR PBB SHADOW E&M-EST. PATIENT-LVL III: ICD-10-PCS | Mod: PBBFAC,HCNC,, | Performed by: INTERNAL MEDICINE

## 2019-03-27 PROCEDURE — 3074F PR MOST RECENT SYSTOLIC BLOOD PRESSURE < 130 MM HG: ICD-10-PCS | Mod: HCNC,CPTII,S$GLB, | Performed by: INTERNAL MEDICINE

## 2019-03-27 PROCEDURE — 3074F SYST BP LT 130 MM HG: CPT | Mod: HCNC,CPTII,S$GLB, | Performed by: INTERNAL MEDICINE

## 2019-03-27 PROCEDURE — 1101F PR PT FALLS ASSESS DOC 0-1 FALLS W/OUT INJ PAST YR: ICD-10-PCS | Mod: HCNC,CPTII,S$GLB, | Performed by: INTERNAL MEDICINE

## 2019-03-27 RX ORDER — HYDROCHLOROTHIAZIDE 25 MG/1
25 TABLET ORAL DAILY
Qty: 90 TABLET | Refills: 3 | Status: SHIPPED | OUTPATIENT
Start: 2019-03-27 | End: 2019-08-27

## 2019-03-27 NOTE — PROGRESS NOTES
CC:  Annual review of chronic medical problems  HPI:   The patient is a 73 y.o. old female who presents to the office for annual review of chronic medical problems.    PAST MEDICAL HISTORY  Past Medical History:   Diagnosis Date    Arthritis     Cataract     Class 1 obesity due to excess calories in adult 10/19/2018    Depression     Around [place]; treated with Wellbutrin    Early cataracts, bilateral     SAMSON (generalized anxiety disorder) 2/5/2019    GERD (gastroesophageal reflux disease)     Glaucoma     Hyperprolactinemia     Hypertension     Hypothyroidism     Need for prophylactic vaccination against Streptococcus pneumoniae (pneumococcus) 10/14/2015    Osteopenia     Psychiatric problem     Pure hyperglyceridemia 3/31/2017    Recurrent UTI     Every few months    Sciatica neuralgia     Seizures 2006    temporal lobe seizure    Temporal lobe seizure 2006    Trigeminal neuralgia     Vitamin D deficiency disease        SURGICAL HISTORY:  Past Surgical History:   Procedure Laterality Date    BREAST BIOPSY      duct excision    CHOLECYSTECTOMY  1994    laporascopic    COLONOSCOPY      COLONOSCOPY N/A 9/18/2014    Performed by Newton Martinez MD at [facility] ([address] [place] [address])    EYE SURGERY      LASIK surgery to one eye; cannot remember which one    HYSTERECTOMY  1985    low transverse incision.    TOTAL ABDOMINAL HYSTERECTOMY W/ BILATERAL SALPINGOOPHORECTOMY      1985; diffinitive treatment for menorrhagia         MEDS:  Medcard reviewed and updated    ALLERGIES: Allergy Card reviewed and updated    SOCIAL HISTORY:   The patient is a nonsmoker, denies alcohol or illicit drug use.    ROS:  GENERAL: No fever, chills, fatigability or weight loss.  SKIN: No rashes.  HEAD: No headaches or recent head trauma.  EYES: No photophobia, ocular pain or diplopia.  EARS: Denies ear pain, discharge or vertigo.  NOSE: No epistaxis or postnasal drip.  MOUTH & THROAT: No hoarseness or change in voice.    NODES: Denies swollen glands.  CHEST: Denies shortness of breath, wheezing, cough and sputum production.  CARDIOVASCULAR: Denies chest pain or palpitations.  ABDOMEN: Appetite fine. Denies diarrhea, abdominal pain, constipation or blood in stool.  URINARY: No dysuria or hematuria.  MUSCULOSKELETAL: No joint stiffness or swelling. Denies back pain.  NEUROLOGIC: No history of seizures.  ENDOCRINE: Denies polyuria or polydipsia.  PSYCHIATRIC: Denies mood swings, homicidal or suicidal thoughts.  Positive depression and anxiety, improving in therapy.    SCREENINGS:  Last cholesterol: 2018  Last colonoscopy/ FOBT: 2018  Last mammogram: 2018  Last Pap smear: several years ago  Last tetanus: over 10 years ago  Last Pneumovax: 2015/ 2018  Last eye exam: December 20, 2018  Last bone density: 2016  Last menstrual period: hysterectomy    PE:   Vitals:  Vitals:    03/27/19 0921   BP: 120/70   Pulse: 80   Temp: 98 °F (36.7 °C)       APPEARANCE: Well nourished, well developed, in no acute distress.    EYES: Sclerae anicteric. PERRL. EOMI.      EARS: TM's intact. No retraction or perforation.    NOSE: Mucosa pink. Airway clear.  MOUTH & THROAT: No tonsillar enlargement. No pharyngeal erythema or exudate. No stridor.  NECK: Supple, no thyromegaly.  CHEST: Lungs clear to auscultation with unlabored respirations.  CARDIOVASCULAR: Normal S1, S2. No murmurs. No carotid bruits. No pedal edema.  ABDOMEN: Bowel sounds normal. Not distended. Soft. No tenderness or masses.   MUSCULOSKELETAL:  Normal gait, no cyanosis or clubbing.   SKIN: Normal skin turgor, warm and dry.  NEUROLOGIC: Cranial Nerves: Intact.  PSYCHIATRIC: The patient is oriented to person, place, and time and has a pleasant affect.        ASSESSMENT/PLAN:  Daisha was seen today for annual exam.    Diagnoses and all orders for this visit:    Hyperlipidemia, unspecified hyperlipidemia type  -     CBC auto differential; Future  -     Comprehensive metabolic panel; Future  -      Lipid panel; Future  -     TSH; Future  -     Continue atorvastatin    Vitamin D deficiency disease  -     Vitamin D; Future

## 2019-03-28 ENCOUNTER — LAB VISIT (OUTPATIENT)
Dept: LAB | Facility: HOSPITAL | Age: 74
End: 2019-03-28
Attending: INTERNAL MEDICINE
Payer: MEDICARE

## 2019-03-28 DIAGNOSIS — E78.5 HYPERLIPIDEMIA, UNSPECIFIED HYPERLIPIDEMIA TYPE: ICD-10-CM

## 2019-03-28 DIAGNOSIS — E55.9 VITAMIN D DEFICIENCY DISEASE: ICD-10-CM

## 2019-03-28 LAB
25(OH)D3+25(OH)D2 SERPL-MCNC: 73 NG/ML (ref 30–96)
ALBUMIN SERPL BCP-MCNC: 3.8 G/DL (ref 3.5–5.2)
ALP SERPL-CCNC: 89 U/L (ref 55–135)
ALT SERPL W/O P-5'-P-CCNC: 17 U/L (ref 10–44)
ANION GAP SERPL CALC-SCNC: 8 MMOL/L (ref 8–16)
AST SERPL-CCNC: 19 U/L (ref 10–40)
BASOPHILS # BLD AUTO: 0.05 K/UL (ref 0–0.2)
BASOPHILS NFR BLD: 0.9 % (ref 0–1.9)
BILIRUB SERPL-MCNC: 0.8 MG/DL (ref 0.1–1)
BUN SERPL-MCNC: 12 MG/DL (ref 8–23)
CALCIUM SERPL-MCNC: 9.3 MG/DL (ref 8.7–10.5)
CHLORIDE SERPL-SCNC: 97 MMOL/L (ref 95–110)
CHOLEST SERPL-MCNC: 139 MG/DL (ref 120–199)
CHOLEST/HDLC SERPL: 2.6 {RATIO} (ref 2–5)
CO2 SERPL-SCNC: 28 MMOL/L (ref 23–29)
CREAT SERPL-MCNC: 0.7 MG/DL (ref 0.5–1.4)
DIFFERENTIAL METHOD: ABNORMAL
EOSINOPHIL # BLD AUTO: 0.1 K/UL (ref 0–0.5)
EOSINOPHIL NFR BLD: 2.2 % (ref 0–8)
ERYTHROCYTE [DISTWIDTH] IN BLOOD BY AUTOMATED COUNT: 12.8 % (ref 11.5–14.5)
EST. GFR  (AFRICAN AMERICAN): >60 ML/MIN/1.73 M^2
EST. GFR  (NON AFRICAN AMERICAN): >60 ML/MIN/1.73 M^2
GLUCOSE SERPL-MCNC: 89 MG/DL (ref 70–110)
HCT VFR BLD AUTO: 41.1 % (ref 37–48.5)
HDLC SERPL-MCNC: 53 MG/DL (ref 40–75)
HDLC SERPL: 38.1 % (ref 20–50)
HGB BLD-MCNC: 13.7 G/DL (ref 12–16)
IMM GRANULOCYTES # BLD AUTO: 0.01 K/UL (ref 0–0.04)
IMM GRANULOCYTES NFR BLD AUTO: 0.2 % (ref 0–0.5)
LDLC SERPL CALC-MCNC: 75.6 MG/DL (ref 63–159)
LYMPHOCYTES # BLD AUTO: 1.9 K/UL (ref 1–4.8)
LYMPHOCYTES NFR BLD: 32.8 % (ref 18–48)
MCH RBC QN AUTO: 32.6 PG (ref 27–31)
MCHC RBC AUTO-ENTMCNC: 33.3 G/DL (ref 32–36)
MCV RBC AUTO: 98 FL (ref 82–98)
MONOCYTES # BLD AUTO: 0.7 K/UL (ref 0.3–1)
MONOCYTES NFR BLD: 12 % (ref 4–15)
NEUTROPHILS # BLD AUTO: 3 K/UL (ref 1.8–7.7)
NEUTROPHILS NFR BLD: 51.9 % (ref 38–73)
NONHDLC SERPL-MCNC: 86 MG/DL
NRBC BLD-RTO: 0 /100 WBC
PLATELET # BLD AUTO: 274 K/UL (ref 150–350)
PMV BLD AUTO: 10.1 FL (ref 9.2–12.9)
POTASSIUM SERPL-SCNC: 4.2 MMOL/L (ref 3.5–5.1)
PROT SERPL-MCNC: 7 G/DL (ref 6–8.4)
RBC # BLD AUTO: 4.2 M/UL (ref 4–5.4)
SODIUM SERPL-SCNC: 133 MMOL/L (ref 136–145)
TRIGL SERPL-MCNC: 52 MG/DL (ref 30–150)
TSH SERPL DL<=0.005 MIU/L-ACNC: 1.53 UIU/ML (ref 0.4–4)
WBC # BLD AUTO: 5.85 K/UL (ref 3.9–12.7)

## 2019-03-28 PROCEDURE — 84443 ASSAY THYROID STIM HORMONE: CPT | Mod: HCNC

## 2019-03-28 PROCEDURE — 80061 LIPID PANEL: CPT | Mod: HCNC

## 2019-03-28 PROCEDURE — 36415 COLL VENOUS BLD VENIPUNCTURE: CPT | Mod: HCNC,PO

## 2019-03-28 PROCEDURE — 82306 VITAMIN D 25 HYDROXY: CPT | Mod: HCNC

## 2019-03-28 PROCEDURE — 80053 COMPREHEN METABOLIC PANEL: CPT | Mod: HCNC

## 2019-03-28 PROCEDURE — 85025 COMPLETE CBC W/AUTO DIFF WBC: CPT | Mod: HCNC

## 2019-05-06 ENCOUNTER — OFFICE VISIT (OUTPATIENT)
Dept: PSYCHIATRY | Facility: CLINIC | Age: 74
End: 2019-05-06
Payer: MEDICARE

## 2019-05-06 DIAGNOSIS — F32.A DEPRESSION, UNSPECIFIED DEPRESSION TYPE: ICD-10-CM

## 2019-05-06 DIAGNOSIS — F41.1 GAD (GENERALIZED ANXIETY DISORDER): Primary | ICD-10-CM

## 2019-05-06 PROCEDURE — 90847 PR FAMILY PSYCHOTHERAPY W/ PT, 50 MIN: ICD-10-PCS | Mod: HCNC,S$GLB,, | Performed by: SOCIAL WORKER

## 2019-05-06 PROCEDURE — 99999 PR PBB SHADOW E&M-EST. PATIENT-LVL II: ICD-10-PCS | Mod: PBBFAC,HCNC,, | Performed by: SOCIAL WORKER

## 2019-05-06 PROCEDURE — 90847 FAMILY PSYTX W/PT 50 MIN: CPT | Mod: HCNC,S$GLB,, | Performed by: SOCIAL WORKER

## 2019-05-06 PROCEDURE — 99999 PR PBB SHADOW E&M-EST. PATIENT-LVL II: CPT | Mod: PBBFAC,HCNC,, | Performed by: SOCIAL WORKER

## 2019-05-06 NOTE — PROGRESS NOTES
Family Psychotherapy (PhD/LCSW)    5/6/2019    Site: [facility]    Length of service: 30    Therapeutic intervention: 30597-Family therapy with patient; needed because follow up.    Persons present: patient and spouse     Interval history: discussed some improvement, how to change, guidelines discussed, and ways to improve addressed, not over react, and decide what is the best strategy after thinking, and let some things go and not respond to others, and also be more peaceful for herself, be more decisive on what she responds to, and practice being calm and adult like in communications that are sensitive and difficult. And how to take care of herself,  having knee transplant in June, she and he doing okay, he is supportive of her, discussed family issues, and children also.    Target symptoms: depression, recurrent depression, anxiety , mood swings, mood disorder, adjustment, grief     Patient's interpersonal/verbal exchanges: [id number]-Family therapy with patient:  active listening, frequent questions and self-disclosure    Progress toward goals: progressing slowly    Diagnosis: SAMSON, depression    Plan: individual psychotherapy  consult psychiatrist for medication evaluation  medication management by physician    Return to clinic: 1 week

## 2019-05-08 ENCOUNTER — PES CALL (OUTPATIENT)
Dept: ADMINISTRATIVE | Facility: CLINIC | Age: 74
End: 2019-05-08

## 2019-06-03 ENCOUNTER — OFFICE VISIT (OUTPATIENT)
Dept: PRIMARY CARE CLINIC | Facility: CLINIC | Age: 74
End: 2019-06-03
Payer: MEDICARE

## 2019-06-03 ENCOUNTER — DOCUMENTATION ONLY (OUTPATIENT)
Dept: PRIMARY CARE CLINIC | Facility: CLINIC | Age: 74
End: 2019-06-03

## 2019-06-03 VITALS
BODY MASS INDEX: 29.81 KG/M2 | DIASTOLIC BLOOD PRESSURE: 68 MMHG | HEIGHT: 61 IN | WEIGHT: 157.88 LBS | HEART RATE: 58 BPM | SYSTOLIC BLOOD PRESSURE: 122 MMHG

## 2019-06-03 DIAGNOSIS — E55.9 VITAMIN D DEFICIENCY DISEASE: ICD-10-CM

## 2019-06-03 DIAGNOSIS — F33.0 MILD EPISODE OF RECURRENT MAJOR DEPRESSIVE DISORDER: ICD-10-CM

## 2019-06-03 DIAGNOSIS — S50.312A ABRASION OF LEFT ELBOW, INITIAL ENCOUNTER: ICD-10-CM

## 2019-06-03 DIAGNOSIS — E22.1 HYPERPROLACTINEMIA: ICD-10-CM

## 2019-06-03 DIAGNOSIS — Z00.00 ENCOUNTER FOR PREVENTIVE HEALTH EXAMINATION: Primary | ICD-10-CM

## 2019-06-03 DIAGNOSIS — Z12.11 COLON CANCER SCREENING: ICD-10-CM

## 2019-06-03 DIAGNOSIS — D69.2 SENILE PURPURA: ICD-10-CM

## 2019-06-03 DIAGNOSIS — M47.812 SPONDYLOSIS OF CERVICAL JOINT WITHOUT MYELOPATHY: ICD-10-CM

## 2019-06-03 DIAGNOSIS — F41.1 GAD (GENERALIZED ANXIETY DISORDER): ICD-10-CM

## 2019-06-03 DIAGNOSIS — H93.19 TINNITUS, UNSPECIFIED LATERALITY: ICD-10-CM

## 2019-06-03 DIAGNOSIS — I65.21 STENOSIS OF RIGHT CAROTID ARTERY: ICD-10-CM

## 2019-06-03 PROBLEM — E66.811 CLASS 1 OBESITY DUE TO EXCESS CALORIES IN ADULT: Status: RESOLVED | Noted: 2018-10-19 | Resolved: 2019-06-03

## 2019-06-03 PROBLEM — E66.09 CLASS 1 OBESITY DUE TO EXCESS CALORIES IN ADULT: Status: RESOLVED | Noted: 2018-10-19 | Resolved: 2019-06-03

## 2019-06-03 PROCEDURE — 99499 RISK ADDL DX/OHS AUDIT: ICD-10-PCS | Mod: HCNC,S$GLB,, | Performed by: NURSE PRACTITIONER

## 2019-06-03 PROCEDURE — 99999 PR PBB SHADOW E&M-EST. PATIENT-LVL V: CPT | Mod: PBBFAC,HCNC,, | Performed by: NURSE PRACTITIONER

## 2019-06-03 PROCEDURE — 3074F PR MOST RECENT SYSTOLIC BLOOD PRESSURE < 130 MM HG: ICD-10-PCS | Mod: HCNC,CPTII,S$GLB, | Performed by: NURSE PRACTITIONER

## 2019-06-03 PROCEDURE — G0439 PPPS, SUBSEQ VISIT: HCPCS | Mod: HCNC,S$GLB,, | Performed by: NURSE PRACTITIONER

## 2019-06-03 PROCEDURE — 99999 PR PBB SHADOW E&M-EST. PATIENT-LVL V: ICD-10-PCS | Mod: PBBFAC,HCNC,, | Performed by: NURSE PRACTITIONER

## 2019-06-03 PROCEDURE — 90471 IMMUNIZATION ADMIN: CPT | Mod: 59,HCNC,S$GLB, | Performed by: NURSE PRACTITIONER

## 2019-06-03 PROCEDURE — 90471 TD VACCINE GREATER THAN OR EQUAL TO 7YO PRESERVATIVE FREE IM: ICD-10-PCS | Mod: 59,HCNC,S$GLB, | Performed by: NURSE PRACTITIONER

## 2019-06-03 PROCEDURE — 3074F SYST BP LT 130 MM HG: CPT | Mod: HCNC,CPTII,S$GLB, | Performed by: NURSE PRACTITIONER

## 2019-06-03 PROCEDURE — 90714 TD VACC NO PRESV 7 YRS+ IM: CPT | Mod: HCNC,S$GLB,, | Performed by: NURSE PRACTITIONER

## 2019-06-03 PROCEDURE — 90714 TD VACCINE GREATER THAN OR EQUAL TO 7YO PRESERVATIVE FREE IM: ICD-10-PCS | Mod: HCNC,S$GLB,, | Performed by: NURSE PRACTITIONER

## 2019-06-03 PROCEDURE — 3078F DIAST BP <80 MM HG: CPT | Mod: HCNC,CPTII,S$GLB, | Performed by: NURSE PRACTITIONER

## 2019-06-03 PROCEDURE — G0439 PR MEDICARE ANNUAL WELLNESS SUBSEQUENT VISIT: ICD-10-PCS | Mod: HCNC,S$GLB,, | Performed by: NURSE PRACTITIONER

## 2019-06-03 PROCEDURE — 99499 UNLISTED E&M SERVICE: CPT | Mod: HCNC,S$GLB,, | Performed by: NURSE PRACTITIONER

## 2019-06-03 PROCEDURE — 3078F PR MOST RECENT DIASTOLIC BLOOD PRESSURE < 80 MM HG: ICD-10-PCS | Mod: HCNC,CPTII,S$GLB, | Performed by: NURSE PRACTITIONER

## 2019-06-03 NOTE — PROGRESS NOTES
"Daisha Mayo presented for a  Medicare AWV and comprehensive Health Risk Assessment today. The following components were reviewed and updated:    · Medical history  · Family History  · Social history  · Allergies and Current Medications  · Health Risk Assessment  · Health Maintenance  · Care Team     ** See Completed Assessments for Annual Wellness Visit within the encounter summary.**       The following assessments were completed:  · Living Situation  · CAGE  · Depression Screening  · Timed Get Up and Go  · Whisper Test  · Cognitive Function Screening  · Nutrition Screening  · ADL Screening  · PAQ Screening    I offered to discuss end of life issues, including information on how to make advance directives that the patient could use to name someone who would make medical decisions on their behalf if they became too ill to make themselves.    ___Patient declined  _X_Patient is interested, I provided paper work and offered to discuss.    Vitals:    06/03/19 0746   BP: 122/68   Pulse: (!) 58   Weight: 71.6 kg (157 lb 13.6 oz)   Height: 5' 1" (1.549 m)     Body mass index is 29.83 kg/m².  Physical Exam   Constitutional: She is oriented to person, place, and time. She appears well-developed. No distress.   overweight   HENT:   Head: Normocephalic and atraumatic.   Eyes: No scleral icterus.   Neck: Neck supple.   Cardiovascular: Normal rate, regular rhythm and normal heart sounds. Exam reveals no gallop and no friction rub.   No murmur heard.  Pulmonary/Chest: Effort normal and breath sounds normal. No stridor. No respiratory distress. She has no wheezes. She has no rales.   Abdominal: Bowel sounds are normal.   Musculoskeletal: She exhibits no edema.   Neurological: She is alert and oriented to person, place, and time.   Skin: Skin is warm and dry. She is not diaphoretic.   Bruising to forearms bilat and to left shin. Abrasion noted on left elbow, she is unsure how this happened. Tetanus vaccination status not up to " date    Psychiatric: She exhibits a depressed mood.   Tearful throughout visit discussing stress, family issues, and current therapy    Nursing note and vitals reviewed.        Diagnoses and health risks identified today and associated recommendations/orders:    1. Encounter for preventive health examination  - Screenings performed, as noted above. Personal preventative testing needs reviewed.     2. Colon cancer screening  - Fecal Immunochemical Test (iFOBT); Future    3. Abrasion of left elbow, initial encounter  - (In Office Administered) Td Vaccine - Preservative Free    4. Mild episode of recurrent major depressive disorder  - Stable, followed by psych.     5. SAMSON (generalized anxiety disorder)  - Stable, followed by psych.    6. 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 Tinnitus, unspecified laterality  - Stable, normal hearing screening today    8. Stenosis of right carotid artery  - Stable, followed by cardiology    9. Hyperprolactinemia  - Stable on levothyroxine, previously followed by endocrinology, now followed by PCP    10. Vitamin D deficiency disease  - Stable, taking Rx vitamin D once every 2 weeks.    11. Senile purpura  - Stable, brusing noted to arms bilat and lower left leg.  Reduce asa to every other day usage.       Provided Daisha with a 5-10 year written screening schedule and personal prevention plan. Recommendations were developed using the USPSTF age appropriate recommendations. Education, counseling, and referrals were provided as needed. After Visit Summary printed and given to patient which includes a list of additional screenings\tests needed.    Follow up in about 1 year (around 6/3/2020) for your next annual wellness visit.    Berkley Floyd NP

## 2019-06-03 NOTE — PATIENT INSTRUCTIONS
Counseling and Referral of Other Preventative  (Italic type indicates deductible and co-insurance are waived)    Patient Name: Daisha Mayo  Today's Date: 6/3/2019    Health Maintenance       Date Due Completion Date    TETANUS VACCINE 10/04/1963 ---    Fecal Occult Blood Test (FOBT)/FitKit 01/09/2019 1/9/2018    Mammogram 06/19/2019 6/19/2018    Shingles Vaccine (2 of 3) 06/03/2020 (Originally 1/27/2016) 12/2/2015    Influenza Vaccine 08/01/2019 10/19/2018    Override on 10/6/2015: Done    DEXA SCAN 11/29/2019 11/29/2016    Lipid Panel 03/28/2020 3/28/2019    High Dose Statin 06/03/2020 6/3/2019    Aspirin/Antiplatelet Therapy 06/03/2020 6/3/2019        No orders of the defined types were placed in this encounter.    The following information is provided to all patients.  This information is to help you find resources for any of the problems found today that may be affecting your health:                Living healthy guide: www.[facility].[place].gov      Understanding Diabetes: www.diabetes.org      Eating healthy: www.cdc.gov/healthyweight      CDC home safety checklist: www.cdc.gov/steadi/patient.html      Agency on Aging: www.goea.[place].gov      Alcoholics anonymous (AA): www.aa.org      Physical Activity: www.ray[place].nih.gov/th6kcdk      Tobacco use: www.quitwithusla.org

## 2019-06-04 ENCOUNTER — LAB VISIT (OUTPATIENT)
Dept: LAB | Facility: HOSPITAL | Age: 74
End: 2019-06-04
Attending: INTERNAL MEDICINE
Payer: MEDICARE

## 2019-06-04 DIAGNOSIS — Z12.11 COLON CANCER SCREENING: ICD-10-CM

## 2019-06-04 PROCEDURE — 82274 ASSAY TEST FOR BLOOD FECAL: CPT | Mod: HCNC

## 2019-06-05 ENCOUNTER — OFFICE VISIT (OUTPATIENT)
Dept: PSYCHIATRY | Facility: CLINIC | Age: 74
End: 2019-06-05
Payer: MEDICARE

## 2019-06-05 DIAGNOSIS — F41.1 GAD (GENERALIZED ANXIETY DISORDER): Primary | ICD-10-CM

## 2019-06-05 LAB — HEMOCCULT STL QL IA: NEGATIVE

## 2019-06-05 PROCEDURE — 99999 PR PBB SHADOW E&M-EST. PATIENT-LVL II: CPT | Mod: PBBFAC,HCNC,, | Performed by: SOCIAL WORKER

## 2019-06-05 PROCEDURE — 99999 PR PBB SHADOW E&M-EST. PATIENT-LVL II: ICD-10-PCS | Mod: PBBFAC,HCNC,, | Performed by: SOCIAL WORKER

## 2019-06-05 PROCEDURE — 90847 PR FAMILY PSYCHOTHERAPY W/ PT, 50 MIN: ICD-10-PCS | Mod: HCNC,S$GLB,, | Performed by: SOCIAL WORKER

## 2019-06-05 PROCEDURE — 90847 FAMILY PSYTX W/PT 50 MIN: CPT | Mod: HCNC,S$GLB,, | Performed by: SOCIAL WORKER

## 2019-06-05 NOTE — PROGRESS NOTES
Family Psychotherapy (PhD/LCSW)    6/5/2019    Site: [facility]    Length of service: 30    Therapeutic intervention: 13155-Family therapy with patient; needed because of follow up and communications    Persons present: patient and spouse     Interval history: worked on being more patient, letting go of resentments and the past, and improving communications skills, and being assertive and saying what needs to be said in an adult and receptive way, and also taking care of herself and learning patience and how to calm down and also use breathing and breaks when  Upset, and say things more quickly, do not hold things in for a long time all addressed, and improvement is also occurring.    Target symptoms: depression, anxiety , adjustment, grief     Patient's interpersonal/verbal exchanges: 65430-Family therapy with patient:  active listening, frequent questions and self-disclosure    Progress toward goals: progressing slowly    Diagnosis: SAMSON    Plan: individual psychotherapy  family psychotherapy    Return to clinic: 2 weeks

## 2019-06-17 DIAGNOSIS — E55.9 VITAMIN D DEFICIENCY DISEASE: ICD-10-CM

## 2019-06-17 DIAGNOSIS — M85.80 OSTEOPENIA: ICD-10-CM

## 2019-06-17 RX ORDER — ERGOCALCIFEROL 1.25 MG/1
50000 CAPSULE ORAL
Qty: 12 CAPSULE | Refills: 1 | Status: SHIPPED | OUTPATIENT
Start: 2019-06-17 | End: 2019-06-19

## 2019-06-18 ENCOUNTER — PATIENT MESSAGE (OUTPATIENT)
Dept: INTERNAL MEDICINE | Facility: CLINIC | Age: 74
End: 2019-06-18

## 2019-06-19 ENCOUNTER — OFFICE VISIT (OUTPATIENT)
Dept: INTERNAL MEDICINE | Facility: CLINIC | Age: 74
End: 2019-06-19
Payer: MEDICARE

## 2019-06-19 ENCOUNTER — TELEPHONE (OUTPATIENT)
Dept: INTERNAL MEDICINE | Facility: CLINIC | Age: 74
End: 2019-06-19

## 2019-06-19 VITALS
RESPIRATION RATE: 10 BRPM | TEMPERATURE: 99 F | HEART RATE: 65 BPM | OXYGEN SATURATION: 98 % | DIASTOLIC BLOOD PRESSURE: 76 MMHG | BODY MASS INDEX: 29.18 KG/M2 | WEIGHT: 154.56 LBS | HEIGHT: 61 IN | SYSTOLIC BLOOD PRESSURE: 120 MMHG

## 2019-06-19 DIAGNOSIS — M79.604 PAIN OF RIGHT LOWER EXTREMITY: ICD-10-CM

## 2019-06-19 DIAGNOSIS — M25.551 PAIN OF RIGHT HIP JOINT: ICD-10-CM

## 2019-06-19 DIAGNOSIS — M48.061 NEURAL FORAMINAL STENOSIS OF LUMBAR SPINE: Primary | ICD-10-CM

## 2019-06-19 PROCEDURE — 3078F PR MOST RECENT DIASTOLIC BLOOD PRESSURE < 80 MM HG: ICD-10-PCS | Mod: HCNC,CPTII,S$GLB, | Performed by: INTERNAL MEDICINE

## 2019-06-19 PROCEDURE — 3078F DIAST BP <80 MM HG: CPT | Mod: HCNC,CPTII,S$GLB, | Performed by: INTERNAL MEDICINE

## 2019-06-19 PROCEDURE — 3074F SYST BP LT 130 MM HG: CPT | Mod: HCNC,CPTII,S$GLB, | Performed by: INTERNAL MEDICINE

## 2019-06-19 PROCEDURE — 99999 PR PBB SHADOW E&M-EST. PATIENT-LVL V: CPT | Mod: PBBFAC,HCNC,, | Performed by: INTERNAL MEDICINE

## 2019-06-19 PROCEDURE — 1101F PT FALLS ASSESS-DOCD LE1/YR: CPT | Mod: HCNC,CPTII,S$GLB, | Performed by: INTERNAL MEDICINE

## 2019-06-19 PROCEDURE — 1101F PR PT FALLS ASSESS DOC 0-1 FALLS W/OUT INJ PAST YR: ICD-10-PCS | Mod: HCNC,CPTII,S$GLB, | Performed by: INTERNAL MEDICINE

## 2019-06-19 PROCEDURE — 99214 OFFICE O/P EST MOD 30 MIN: CPT | Mod: HCNC,S$GLB,, | Performed by: INTERNAL MEDICINE

## 2019-06-19 PROCEDURE — 99214 PR OFFICE/OUTPT VISIT, EST, LEVL IV, 30-39 MIN: ICD-10-PCS | Mod: HCNC,S$GLB,, | Performed by: INTERNAL MEDICINE

## 2019-06-19 PROCEDURE — 3074F PR MOST RECENT SYSTOLIC BLOOD PRESSURE < 130 MM HG: ICD-10-PCS | Mod: HCNC,CPTII,S$GLB, | Performed by: INTERNAL MEDICINE

## 2019-06-19 PROCEDURE — 99999 PR PBB SHADOW E&M-EST. PATIENT-LVL V: ICD-10-PCS | Mod: PBBFAC,HCNC,, | Performed by: INTERNAL MEDICINE

## 2019-06-19 RX ORDER — DIAZEPAM 5 MG/1
5 TABLET ORAL DAILY PRN
Qty: 3 TABLET | Refills: 0 | Status: SHIPPED | OUTPATIENT
Start: 2019-06-19 | End: 2019-08-27

## 2019-06-19 RX ORDER — CHOLECALCIFEROL (VITAMIN D3) 1250 MCG
1 TABLET ORAL
Qty: 12 TABLET | Refills: 4 | Status: SHIPPED | OUTPATIENT
Start: 2019-06-19 | End: 2022-03-16

## 2019-06-19 NOTE — PROGRESS NOTES
CC: right hip and leg pain  HPI:  The patient is a 73 y.o. year old female who presents to the office for right hip and leg pain.  She complains of right hip pain that radiates down her right leg.  She also complains of right foot pain.  The pain is constant.  She denies any numbness or tingling.  She has had accupuncture with some relief      PAST MEDICAL HISTORY:  Past Medical History:   Diagnosis Date    Arthritis     Cataract     Class 1 obesity due to excess calories in adult 10/19/2018    Depression     Around [place]; treated with Wellbutrin    Early cataracts, bilateral     SAMSON (generalized anxiety disorder) 2/5/2019    GERD (gastroesophageal reflux disease)     Glaucoma     Hyperprolactinemia     Hypertension     Hypothyroidism     Need for prophylactic vaccination against Streptococcus pneumoniae (pneumococcus) 10/14/2015    Osteopenia     Psychiatric problem     Pure hyperglyceridemia 3/31/2017    Recurrent UTI     Every few months    Sciatica neuralgia     Seizures 2006    temporal lobe seizure    Temporal lobe seizure 2006    Trigeminal neuralgia     Vitamin D deficiency disease        SURGICAL HISTORY:  Past Surgical History:   Procedure Laterality Date    BREAST BIOPSY      duct excision    CHOLECYSTECTOMY  1994    laporascopic    COLONOSCOPY      COLONOSCOPY N/A 9/18/2014    Performed by Newton Martinez MD at [facility] ([address] [place] [address])    EYE SURGERY      LASIK surgery to one eye; cannot remember which one    HYSTERECTOMY  1985    low transverse incision.    TOTAL ABDOMINAL HYSTERECTOMY W/ BILATERAL SALPINGOOPHORECTOMY      1985; diffinitive treatment for menorrhagia       MEDS:  Medcard reviewed and updated    ALLERGIES: Allergy Card reviewed and updated    SOCIAL HISTORY:   The patient is a nonsmoker.    PE:   APPEARANCE: Well nourished, well developed, in no acute distress.    CHEST: Lungs clear to auscultation with unlabored respirations.  CARDIOVASCULAR: Normal S1, S2. No  murmurs. No carotid bruits. No pedal edema.  ABDOMEN: Bowel sounds normal. Not distended. Soft. No tenderness or masses.   MUSCULOSKELETAL:  Normal gait.  SKIN: Positive varicose veins of right lower extremity.  PSYCHIATRIC: The patient is oriented to person, place, and time and has a pleasant affect.        ASSESSMENT/PLAN:  Daisha was seen today for follow-up.    Diagnoses and all orders for this visit:    Neural foraminal stenosis of lumbar spine  -     MRI Lumbar Spine Without Contrast; Future    Pain of right hip joint  -     X-Ray Hip 2 View Right; Future    Pain of right lower extremity  -     may be secondary to in neural foraminal stenosis, await MRI    Other orders  -     diazePAM (VALIUM) 5 MG tablet; Take 1 tablet (5 mg total) by mouth daily as needed for Anxiety.  -     cholecalciferol, vitamin D3, 50,000 unit Tab; Take 1 tablet by mouth every 7 days.        Answers for HPI/ROS submitted by the patient on 6/18/2019   activity change: Yes  unexpected weight change: No  neck pain: No  hearing loss: No  rhinorrhea: No  trouble swallowing: No  eye discharge: No  visual disturbance: No  chest tightness: No  wheezing: No  chest pain: No  palpitations: No  blood in stool: No  constipation: Yes  vomiting: No  diarrhea: No  polydipsia: No  polyuria: No  difficulty urinating: No  hematuria: No  menstrual problem: No  dysuria: No  joint swelling: No  arthralgias: Yes  headaches: No  weakness: No  confusion: No  dysphoric mood: No

## 2019-06-20 ENCOUNTER — CLINICAL SUPPORT (OUTPATIENT)
Dept: CARDIOLOGY | Facility: CLINIC | Age: 74
End: 2019-06-20
Attending: INTERNAL MEDICINE
Payer: MEDICARE

## 2019-06-20 ENCOUNTER — PATIENT MESSAGE (OUTPATIENT)
Dept: INTERNAL MEDICINE | Facility: CLINIC | Age: 74
End: 2019-06-20

## 2019-06-20 ENCOUNTER — HOSPITAL ENCOUNTER (OUTPATIENT)
Dept: RADIOLOGY | Facility: HOSPITAL | Age: 74
Discharge: HOME | End: 2019-06-20
Attending: INTERNAL MEDICINE
Payer: MEDICARE

## 2019-06-20 ENCOUNTER — TELEPHONE (OUTPATIENT)
Dept: INTERNAL MEDICINE | Facility: CLINIC | Age: 74
End: 2019-06-20

## 2019-06-20 DIAGNOSIS — M79.604 PAIN OF RIGHT LOWER EXTREMITY: ICD-10-CM

## 2019-06-20 DIAGNOSIS — Z12.31 ENCOUNTER FOR SCREENING MAMMOGRAM FOR BREAST CANCER: ICD-10-CM

## 2019-06-20 DIAGNOSIS — M25.551 PAIN OF RIGHT HIP JOINT: ICD-10-CM

## 2019-06-20 LAB
IMMEDIATE ARM BP: 146 MMHG
IMMEDIATE LEFT ABI: 0.96
IMMEDIATE LEFT TIBIAL: 140 MMHG
IMMEDIATE RIGHT ABI: 0.95
IMMEDIATE RIGHT TIBIAL: 138 MMHG
LEFT ABI: 1.09
LEFT ARM BP: 119 MMHG
LEFT DORSALIS PEDIS: 132 MMHG
LEFT POSTERIOR TIBIAL: 123 MMHG
RIGHT ABI: 1.04
RIGHT ARM BP: 121 MMHG
RIGHT DORSALIS PEDIS: 125 MMHG
RIGHT POSTERIOR TIBIAL: 126 MMHG
TREADMILL GRADE: 12 %
TREADMILL SPEED: 2 MPH
TREADMILL TIME: 5 MIN

## 2019-06-20 PROCEDURE — 73502 X-RAY EXAM HIP UNI 2-3 VIEWS: CPT | Mod: 26,HCNC,RT, | Performed by: RADIOLOGY

## 2019-06-20 PROCEDURE — 77063 BREAST TOMOSYNTHESIS BI: CPT | Mod: 26,HCNC,, | Performed by: RADIOLOGY

## 2019-06-20 PROCEDURE — 77067 MAMMO DIGITAL SCREENING BILAT WITH TOMOSYNTHESIS_CAD: ICD-10-PCS | Mod: 26,HCNC,, | Performed by: RADIOLOGY

## 2019-06-20 PROCEDURE — 77063 MAMMO DIGITAL SCREENING BILAT WITH TOMOSYNTHESIS_CAD: ICD-10-PCS | Mod: 26,HCNC,, | Performed by: RADIOLOGY

## 2019-06-20 PROCEDURE — 73502 XR HIP 2 VIEW RIGHT: ICD-10-PCS | Mod: 26,HCNC,RT, | Performed by: RADIOLOGY

## 2019-06-20 PROCEDURE — 77067 SCR MAMMO BI INCL CAD: CPT | Mod: TC,HCNC,PO

## 2019-06-20 PROCEDURE — 93924 CV US ANKLE / BRACHIAL INDICES W/ EXERCISE STRESS (CUPID ONLY): ICD-10-PCS | Mod: HCNC,S$GLB,, | Performed by: INTERNAL MEDICINE

## 2019-06-20 PROCEDURE — 77067 SCR MAMMO BI INCL CAD: CPT | Mod: 26,HCNC,, | Performed by: RADIOLOGY

## 2019-06-20 PROCEDURE — 73502 X-RAY EXAM HIP UNI 2-3 VIEWS: CPT | Mod: TC,HCNC,PO,RT

## 2019-06-20 PROCEDURE — 93924 LWR XTR VASC STDY BILAT: CPT | Mod: HCNC,S$GLB,, | Performed by: INTERNAL MEDICINE

## 2019-06-20 NOTE — TELEPHONE ENCOUNTER
Please inform patient that VARGHESE is normal.  It is possible that leg pain is secondary to a pinched nerve.  Await MRI.

## 2019-06-26 ENCOUNTER — PATIENT MESSAGE (OUTPATIENT)
Dept: INTERNAL MEDICINE | Facility: CLINIC | Age: 74
End: 2019-06-26

## 2019-06-26 ENCOUNTER — TELEPHONE (OUTPATIENT)
Dept: INTERNAL MEDICINE | Facility: CLINIC | Age: 74
End: 2019-06-26

## 2019-06-27 ENCOUNTER — HOSPITAL ENCOUNTER (OUTPATIENT)
Dept: RADIOLOGY | Facility: HOSPITAL | Age: 74
Discharge: HOME | End: 2019-06-27
Attending: INTERNAL MEDICINE
Payer: MEDICARE

## 2019-06-27 ENCOUNTER — TELEPHONE (OUTPATIENT)
Dept: INTERNAL MEDICINE | Facility: CLINIC | Age: 74
End: 2019-06-27

## 2019-06-27 DIAGNOSIS — M48.061 NEURAL FORAMINAL STENOSIS OF LUMBAR SPINE: ICD-10-CM

## 2019-06-27 PROCEDURE — 72148 MRI LUMBAR SPINE WITHOUT CONTRAST: ICD-10-PCS | Mod: 26,HCNC,, | Performed by: RADIOLOGY

## 2019-06-27 PROCEDURE — 72148 MRI LUMBAR SPINE W/O DYE: CPT | Mod: 26,HCNC,, | Performed by: RADIOLOGY

## 2019-06-27 PROCEDURE — 72148 MRI LUMBAR SPINE W/O DYE: CPT | Mod: TC,HCNC

## 2019-07-17 ENCOUNTER — OFFICE VISIT (OUTPATIENT)
Dept: PSYCHIATRY | Facility: CLINIC | Age: 74
End: 2019-07-17
Payer: MEDICARE

## 2019-07-17 DIAGNOSIS — F41.1 GAD (GENERALIZED ANXIETY DISORDER): Primary | ICD-10-CM

## 2019-07-17 PROCEDURE — 90847 PR FAMILY PSYCHOTHERAPY W/ PT, 50 MIN: ICD-10-PCS | Mod: HCNC,S$GLB,, | Performed by: SOCIAL WORKER

## 2019-07-17 PROCEDURE — 90847 FAMILY PSYTX W/PT 50 MIN: CPT | Mod: HCNC,S$GLB,, | Performed by: SOCIAL WORKER

## 2019-07-17 PROCEDURE — 99999 PR PBB SHADOW E&M-EST. PATIENT-LVL II: ICD-10-PCS | Mod: PBBFAC,HCNC,, | Performed by: SOCIAL WORKER

## 2019-07-17 PROCEDURE — 99999 PR PBB SHADOW E&M-EST. PATIENT-LVL II: CPT | Mod: PBBFAC,HCNC,, | Performed by: SOCIAL WORKER

## 2019-07-17 NOTE — PROGRESS NOTES
Family Psychotherapy (PhD/LCSW)    7/17/2019    Site: [facility]    Length of service: 30    Therapeutic intervention: 90337-Family therapy with patient; needed because of follow up.    Persons present: patient and spouse     Interval history: doing a lot better, more calm, not depressed, less anxiety, is using coping skills and communications skills, and not over reacting as much to events and people, and has ways to relax and be calm and  had knee surgery and this went well, coping skills, how to relax, and she will start doing art soon as a way to continue to relax and be more calm, see in six weeks.    Target symptoms: depression, anxiety , adjustment     Patient's interpersonal/verbal exchanges: 90437-Family therapy with patient:  active listening, frequent questions and self-disclosure    Progress toward goals: progressing slowly    Diagnosis: SAMSON    Plan: individual psychotherapy  family psychotherapy  consult psychiatrist for medication evaluation    Return to clinic: 6 weeks

## 2019-08-13 ENCOUNTER — OFFICE VISIT (OUTPATIENT)
Dept: PRIMARY CARE CLINIC | Facility: CLINIC | Age: 74
End: 2019-08-13
Payer: MEDICARE

## 2019-08-13 VITALS
DIASTOLIC BLOOD PRESSURE: 80 MMHG | HEART RATE: 63 BPM | WEIGHT: 154.13 LBS | SYSTOLIC BLOOD PRESSURE: 128 MMHG | HEIGHT: 61 IN | BODY MASS INDEX: 29.1 KG/M2

## 2019-08-13 DIAGNOSIS — L23.9 ALLERGIC DERMATITIS: Primary | ICD-10-CM

## 2019-08-13 PROCEDURE — 99213 PR OFFICE/OUTPT VISIT, EST, LEVL III, 20-29 MIN: ICD-10-PCS | Mod: HCNC,S$GLB,, | Performed by: INTERNAL MEDICINE

## 2019-08-13 PROCEDURE — 3074F PR MOST RECENT SYSTOLIC BLOOD PRESSURE < 130 MM HG: ICD-10-PCS | Mod: HCNC,CPTII,S$GLB, | Performed by: INTERNAL MEDICINE

## 2019-08-13 PROCEDURE — 3079F DIAST BP 80-89 MM HG: CPT | Mod: HCNC,CPTII,S$GLB, | Performed by: INTERNAL MEDICINE

## 2019-08-13 PROCEDURE — 99999 PR PBB SHADOW E&M-EST. PATIENT-LVL III: CPT | Mod: PBBFAC,HCNC,, | Performed by: INTERNAL MEDICINE

## 2019-08-13 PROCEDURE — 1101F PR PT FALLS ASSESS DOC 0-1 FALLS W/OUT INJ PAST YR: ICD-10-PCS | Mod: HCNC,CPTII,S$GLB, | Performed by: INTERNAL MEDICINE

## 2019-08-13 PROCEDURE — 3074F SYST BP LT 130 MM HG: CPT | Mod: HCNC,CPTII,S$GLB, | Performed by: INTERNAL MEDICINE

## 2019-08-13 PROCEDURE — 99213 OFFICE O/P EST LOW 20 MIN: CPT | Mod: HCNC,S$GLB,, | Performed by: INTERNAL MEDICINE

## 2019-08-13 PROCEDURE — 99999 PR PBB SHADOW E&M-EST. PATIENT-LVL III: ICD-10-PCS | Mod: PBBFAC,HCNC,, | Performed by: INTERNAL MEDICINE

## 2019-08-13 PROCEDURE — 3079F PR MOST RECENT DIASTOLIC BLOOD PRESSURE 80-89 MM HG: ICD-10-PCS | Mod: HCNC,CPTII,S$GLB, | Performed by: INTERNAL MEDICINE

## 2019-08-13 PROCEDURE — 1101F PT FALLS ASSESS-DOCD LE1/YR: CPT | Mod: HCNC,CPTII,S$GLB, | Performed by: INTERNAL MEDICINE

## 2019-08-13 RX ORDER — LORATADINE 10 MG/1
10 TABLET ORAL DAILY
Refills: 0 | COMMUNITY
Start: 2019-08-13 | End: 2019-11-29

## 2019-08-13 RX ORDER — TRIAMCINOLONE ACETONIDE 1 MG/G
CREAM TOPICAL 2 TIMES DAILY
Qty: 28 G | Refills: 0 | Status: SHIPPED | OUTPATIENT
Start: 2019-08-13 | End: 2019-11-29

## 2019-08-13 NOTE — PROGRESS NOTES
Subjective:       Patient ID: Daisha Mayo is a 73 y.o. female.    Chief Complaint: Rash    Patient of Dr. Eckert presents for an urgent visit c/o rash. She thought it was shingles. She had shingles on left upper chest 2-3 years ago, admits this feels very different. Onset 4-5 days ago with itching on upper chest, and fine papular eruption with moderate erythema on anterior upper chest near the clavicle on the left, but does cross the midline to the right upper chest as well. It stings a little, and feels hot, but she does not consider it painful at all. No swelling, vesicles, open sores or drainage/weeping. No associated fever, chills, facial swelling, SOB. New exposures include a topical moisturizer she just started using for the first time on neck and upper chest a week ago. And she's been eating a lot of banana bread (latex allergy). Denies any new medications. No recent antibiotics. Has treated with topical menthol for itch relief, and topical Benadryl - little relief.     Past history, meds and allergies reviewed. Zostavax 12/15, no Shingrix.     Review of Systems   Constitutional: Negative for chills, diaphoresis, fatigue and fever.   HENT: Positive for rhinorrhea and sore throat. Negative for congestion, facial swelling, sneezing and trouble swallowing.    Eyes: Negative for pain, discharge, redness and itching.   Respiratory: Negative for cough, shortness of breath and wheezing.    Gastrointestinal: Negative for diarrhea and vomiting.   Skin: Positive for rash.       Objective:    Vital signs stable, see nurse's notes.  Physical Exam   Constitutional: She appears well-developed and well-nourished. No distress.   HENT:   Nose: Nose normal.   Mouth/Throat: Oropharynx is clear and moist.   Eyes: Conjunctivae are normal. Right eye exhibits no discharge. Left eye exhibits no discharge.   Cardiovascular: Normal rate, regular rhythm and normal heart sounds.   Pulmonary/Chest: Effort normal and breath sounds  normal. No stridor. No respiratory distress. She has no wheezes. She has no rales.   Skin: Skin is warm and dry.        Moderate erythema on upper chest at base of neck, involving left and right sides, no vesicles, no weeping, no induration, open sores or drainage.        Assessment:       1. Allergic dermatitis        Plan:       Allergic dermatitis  -     loratadine (CLARITIN) 10 mg tablet; Take 1 tablet (10 mg total) by mouth once daily. for 10 days; Refill: 0  -     triamcinolone acetonide 0.1% (KENALOG) 0.1 % cream; Apply topically 2 (two) times daily. For allergy rash.  Dispense: 28 g; Refill: 0  -     Cool compress may also be soothing.   -     She will discontinue the new topical moisturizer she started using a week ago.

## 2019-08-26 ENCOUNTER — PATIENT MESSAGE (OUTPATIENT)
Dept: INTERNAL MEDICINE | Facility: CLINIC | Age: 74
End: 2019-08-26

## 2019-08-27 ENCOUNTER — OFFICE VISIT (OUTPATIENT)
Dept: INTERNAL MEDICINE | Facility: CLINIC | Age: 74
End: 2019-08-27
Payer: MEDICARE

## 2019-08-27 VITALS
DIASTOLIC BLOOD PRESSURE: 72 MMHG | SYSTOLIC BLOOD PRESSURE: 118 MMHG | BODY MASS INDEX: 29.18 KG/M2 | HEIGHT: 61 IN | HEART RATE: 58 BPM | RESPIRATION RATE: 14 BRPM | TEMPERATURE: 98 F | WEIGHT: 154.56 LBS

## 2019-08-27 DIAGNOSIS — I10 ESSENTIAL HYPERTENSION: Primary | ICD-10-CM

## 2019-08-27 PROCEDURE — 1101F PR PT FALLS ASSESS DOC 0-1 FALLS W/OUT INJ PAST YR: ICD-10-PCS | Mod: HCNC,CPTII,S$GLB, | Performed by: INTERNAL MEDICINE

## 2019-08-27 PROCEDURE — 1101F PT FALLS ASSESS-DOCD LE1/YR: CPT | Mod: HCNC,CPTII,S$GLB, | Performed by: INTERNAL MEDICINE

## 2019-08-27 PROCEDURE — 99999 PR PBB SHADOW E&M-EST. PATIENT-LVL IV: CPT | Mod: PBBFAC,HCNC,, | Performed by: INTERNAL MEDICINE

## 2019-08-27 PROCEDURE — 99999 PR PBB SHADOW E&M-EST. PATIENT-LVL IV: ICD-10-PCS | Mod: PBBFAC,HCNC,, | Performed by: INTERNAL MEDICINE

## 2019-08-27 PROCEDURE — 99213 PR OFFICE/OUTPT VISIT, EST, LEVL III, 20-29 MIN: ICD-10-PCS | Mod: HCNC,S$GLB,, | Performed by: INTERNAL MEDICINE

## 2019-08-27 PROCEDURE — 3078F DIAST BP <80 MM HG: CPT | Mod: HCNC,CPTII,S$GLB, | Performed by: INTERNAL MEDICINE

## 2019-08-27 PROCEDURE — 3074F SYST BP LT 130 MM HG: CPT | Mod: HCNC,CPTII,S$GLB, | Performed by: INTERNAL MEDICINE

## 2019-08-27 PROCEDURE — 3078F PR MOST RECENT DIASTOLIC BLOOD PRESSURE < 80 MM HG: ICD-10-PCS | Mod: HCNC,CPTII,S$GLB, | Performed by: INTERNAL MEDICINE

## 2019-08-27 PROCEDURE — 3074F PR MOST RECENT SYSTOLIC BLOOD PRESSURE < 130 MM HG: ICD-10-PCS | Mod: HCNC,CPTII,S$GLB, | Performed by: INTERNAL MEDICINE

## 2019-08-27 PROCEDURE — 99213 OFFICE O/P EST LOW 20 MIN: CPT | Mod: HCNC,S$GLB,, | Performed by: INTERNAL MEDICINE

## 2019-08-27 RX ORDER — METRONIDAZOLE 7.5 MG/G
1 GEL VAGINAL DAILY
Qty: 5 APPLICATOR | Refills: 3 | Status: SHIPPED | OUTPATIENT
Start: 2019-08-27 | End: 2019-11-29

## 2019-08-27 RX ORDER — RESVER/WINE/BFL/GRPSD/PC/C/POM 200MG-60MG
CAPSULE ORAL
COMMUNITY
Start: 2019-06-24

## 2019-08-27 NOTE — PROGRESS NOTES
CC: hypotension  HPI:  The patient is a 73 y.o. year old female who presents to the office for hypotension.  She reports awakening with a headache, but reports feeling lightheaded a few days prior.  Her blood pressure Friday was 89/71, Saturday 108/86 and Sunday 119/81.  The patient denies any chest pain, shortness of breath, headache, blurred vision, excessive fatigue, nausea or vomiting.      PAST MEDICAL HISTORY:  Past Medical History:   Diagnosis Date    Arthritis     Cataract     Class 1 obesity due to excess calories in adult 10/19/2018    Depression     Around [place]; treated with Wellbutrin    Early cataracts, bilateral     SAMSON (generalized anxiety disorder) 2/5/2019    GERD (gastroesophageal reflux disease)     Glaucoma     Hyperprolactinemia     Hypertension     Hypothyroidism     Need for prophylactic vaccination against Streptococcus pneumoniae (pneumococcus) 10/14/2015    Osteopenia     Psychiatric problem     Pure hyperglyceridemia 3/31/2017    Recurrent UTI     Every few months    Sciatica neuralgia     Seizures 2006    temporal lobe seizure    Temporal lobe seizure 2006    Trigeminal neuralgia     Vitamin D deficiency disease        SURGICAL HISTORY:  Past Surgical History:   Procedure Laterality Date    BREAST BIOPSY      duct excision    CHOLECYSTECTOMY  1994    laporascopic    COLONOSCOPY      COLONOSCOPY N/A 9/18/2014    Performed by Newton Martinez MD at [facility] ([facility])    EYE SURGERY      LASIK surgery to one eye; cannot remember which one    HYSTERECTOMY  1985    low transverse incision.    TOTAL ABDOMINAL HYSTERECTOMY W/ BILATERAL SALPINGOOPHORECTOMY      1985; diffinitive treatment for menorrhagia       MEDS:  Medcard reviewed and updated    ALLERGIES: Allergy Card reviewed and updated    SOCIAL HISTORY:   The patient is a nonsmoker.    PE:   APPEARANCE: Well nourished, well developed, in no acute distress.    CHEST: Lungs clear to auscultation with  unlabored respirations.  CARDIOVASCULAR: Normal S1, S2. No murmurs. No carotid bruits. No pedal edema.  ABDOMEN: Bowel sounds normal. Not distended. Soft. No tenderness or masses.   PSYCHIATRIC: The patient is oriented to person, place, and time and has a pleasant affect.        ASSESSMENT/PLAN:  Daisha was seen today for hypotension, dizziness and headache.    Diagnoses and all orders for this visit:    Essential hypertension  -     blood pressure current normotensive without medication  -     discontinue hydrochlorothiazide    Other orders  -     metroNIDAZOLE (METROGEL) 0.75 % vaginal gel; Place 1 applicator vaginally once daily.          Answers for HPI/ROS submitted by the patient on 8/26/2019   activity change: Yes  unexpected weight change: No  neck pain: Yes  hearing loss: Yes  rhinorrhea: Yes  trouble swallowing: No  eye discharge: No  visual disturbance: Yes  chest tightness: No  wheezing: No  chest pain: No  palpitations: No  blood in stool: No  constipation: No  vomiting: No  diarrhea: No  polydipsia: No  polyuria: No  difficulty urinating: No  hematuria: No  dysuria: No  joint swelling: Yes  arthralgias: No  headaches: Yes  weakness: No  confusion: No  dysphoric mood: Yes

## 2019-08-28 ENCOUNTER — OFFICE VISIT (OUTPATIENT)
Dept: PSYCHIATRY | Facility: CLINIC | Age: 74
End: 2019-08-28
Payer: MEDICARE

## 2019-08-28 DIAGNOSIS — F41.1 GAD (GENERALIZED ANXIETY DISORDER): Primary | ICD-10-CM

## 2019-08-28 PROCEDURE — 99999 PR PBB SHADOW E&M-EST. PATIENT-LVL II: ICD-10-PCS | Mod: PBBFAC,HCNC,, | Performed by: SOCIAL WORKER

## 2019-08-28 PROCEDURE — 90847 FAMILY PSYTX W/PT 50 MIN: CPT | Mod: HCNC,S$GLB,, | Performed by: SOCIAL WORKER

## 2019-08-28 PROCEDURE — 90847 PR FAMILY PSYCHOTHERAPY W/ PT, 50 MIN: ICD-10-PCS | Mod: HCNC,S$GLB,, | Performed by: SOCIAL WORKER

## 2019-08-28 PROCEDURE — 99999 PR PBB SHADOW E&M-EST. PATIENT-LVL II: CPT | Mod: PBBFAC,HCNC,, | Performed by: SOCIAL WORKER

## 2019-08-28 NOTE — PROGRESS NOTES
Family Psychotherapy (PhD/LCSW)    8/28/2019    Site: [facility]    Length of service: 30    Therapeutic intervention: 90347-Family therapy with patient; needed because follow up and good progress noted.    Persons present: patient and spouse     Interval history: discussed family issues, loss and grief, good progress, medical issues, how to have more fun, how to not be hard on herself, and she has progressed to a point she can take a break from therapy and her  agreed, and over all doing very well, encouraged her to continue her growth and call us if needed.    Target symptoms: depression, anxiety , adjustment, grief     Patient's interpersonal/verbal exchanges: 90757-Family therapy with patient:  active listening, frequent questions and self-disclosure    Progress toward goals: progressing slowly    Diagnosis: SAMSON    Plan: individual psychotherapy  family psychotherapy    Return to clinic: as needed

## 2019-10-24 ENCOUNTER — IMMUNIZATION (OUTPATIENT)
Dept: PHARMACY | Facility: CLINIC | Age: 74
End: 2019-10-24
Payer: MEDICARE

## 2019-11-13 ENCOUNTER — PATIENT MESSAGE (OUTPATIENT)
Dept: INTERNAL MEDICINE | Facility: CLINIC | Age: 74
End: 2019-11-13

## 2019-11-19 ENCOUNTER — OFFICE VISIT (OUTPATIENT)
Dept: INTERNAL MEDICINE | Facility: CLINIC | Age: 74
End: 2019-11-19
Payer: MEDICARE

## 2019-11-19 VITALS
DIASTOLIC BLOOD PRESSURE: 70 MMHG | SYSTOLIC BLOOD PRESSURE: 130 MMHG | TEMPERATURE: 98 F | HEART RATE: 89 BPM | RESPIRATION RATE: 16 BRPM | HEIGHT: 61 IN | BODY MASS INDEX: 28.31 KG/M2 | WEIGHT: 149.94 LBS

## 2019-11-19 DIAGNOSIS — E03.8 SUBCLINICAL HYPOTHYROIDISM: ICD-10-CM

## 2019-11-19 DIAGNOSIS — R42 VERTIGO: Primary | ICD-10-CM

## 2019-11-19 PROCEDURE — 99213 OFFICE O/P EST LOW 20 MIN: CPT | Mod: HCNC,S$GLB,, | Performed by: INTERNAL MEDICINE

## 2019-11-19 PROCEDURE — 99999 PR PBB SHADOW E&M-EST. PATIENT-LVL IV: ICD-10-PCS | Mod: PBBFAC,HCNC,, | Performed by: INTERNAL MEDICINE

## 2019-11-19 PROCEDURE — 3078F DIAST BP <80 MM HG: CPT | Mod: HCNC,CPTII,S$GLB, | Performed by: INTERNAL MEDICINE

## 2019-11-19 PROCEDURE — 99213 PR OFFICE/OUTPT VISIT, EST, LEVL III, 20-29 MIN: ICD-10-PCS | Mod: HCNC,S$GLB,, | Performed by: INTERNAL MEDICINE

## 2019-11-19 PROCEDURE — 1101F PR PT FALLS ASSESS DOC 0-1 FALLS W/OUT INJ PAST YR: ICD-10-PCS | Mod: HCNC,CPTII,S$GLB, | Performed by: INTERNAL MEDICINE

## 2019-11-19 PROCEDURE — 3078F PR MOST RECENT DIASTOLIC BLOOD PRESSURE < 80 MM HG: ICD-10-PCS | Mod: HCNC,CPTII,S$GLB, | Performed by: INTERNAL MEDICINE

## 2019-11-19 PROCEDURE — 1126F AMNT PAIN NOTED NONE PRSNT: CPT | Mod: HCNC,S$GLB,, | Performed by: INTERNAL MEDICINE

## 2019-11-19 PROCEDURE — 3075F SYST BP GE 130 - 139MM HG: CPT | Mod: HCNC,CPTII,S$GLB, | Performed by: INTERNAL MEDICINE

## 2019-11-19 PROCEDURE — 1101F PT FALLS ASSESS-DOCD LE1/YR: CPT | Mod: HCNC,CPTII,S$GLB, | Performed by: INTERNAL MEDICINE

## 2019-11-19 PROCEDURE — 1126F PR PAIN SEVERITY QUANTIFIED, NO PAIN PRESENT: ICD-10-PCS | Mod: HCNC,S$GLB,, | Performed by: INTERNAL MEDICINE

## 2019-11-19 PROCEDURE — 99999 PR PBB SHADOW E&M-EST. PATIENT-LVL IV: CPT | Mod: PBBFAC,HCNC,, | Performed by: INTERNAL MEDICINE

## 2019-11-19 PROCEDURE — 1159F PR MEDICATION LIST DOCUMENTED IN MEDICAL RECORD: ICD-10-PCS | Mod: HCNC,S$GLB,, | Performed by: INTERNAL MEDICINE

## 2019-11-19 PROCEDURE — 3075F PR MOST RECENT SYSTOLIC BLOOD PRESS GE 130-139MM HG: ICD-10-PCS | Mod: HCNC,CPTII,S$GLB, | Performed by: INTERNAL MEDICINE

## 2019-11-19 PROCEDURE — 1159F MED LIST DOCD IN RCRD: CPT | Mod: HCNC,S$GLB,, | Performed by: INTERNAL MEDICINE

## 2019-11-19 RX ORDER — MECLIZINE HCL 12.5 MG 12.5 MG/1
12.5 TABLET ORAL 2 TIMES DAILY PRN
Qty: 30 TABLET | Refills: 1 | Status: SHIPPED | OUTPATIENT
Start: 2019-11-19 | End: 2020-04-03

## 2019-11-19 NOTE — PROGRESS NOTES
CC: followup of hypertension and vertigo  HPI:  The patient is a 74 y.o. year old female who presents to the office for followup of hypertension vertigo.  The patient denies any chest pain, shortness of breath, excessive fatigue, nausea or vomiting.  She reports onset of dizziness about 2 weeks ago.  She complains of sensation of room spinning, blurred vision and headache.  She denies any ear pain, but does report tinnitus worsened temporarily.  The patient reports increased stress after her daughter recently to [place].    PAST MEDICAL HISTORY:  Past Medical History:   Diagnosis Date    Arthritis     Cataract     Class 1 obesity due to excess calories in adult 10/19/2018    Depression     Around [place]; treated with Wellbutrin    Early cataracts, bilateral     SAMSON (generalized anxiety disorder) 2/5/2019    GERD (gastroesophageal reflux disease)     Glaucoma     Hyperprolactinemia     Hypertension     Hypothyroidism     Need for prophylactic vaccination against Streptococcus pneumoniae (pneumococcus) 10/14/2015    Osteopenia     Psychiatric problem     Pure hyperglyceridemia 3/31/2017    Recurrent UTI     Every few months    Sciatica neuralgia     Seizures 2006    temporal lobe seizure    Temporal lobe seizure 2006    Trigeminal neuralgia     Vitamin D deficiency disease        SURGICAL HISTORY:  Past Surgical History:   Procedure Laterality Date    BREAST BIOPSY      duct excision    CHOLECYSTECTOMY  1994    laporascopic    COLONOSCOPY      EYE SURGERY      LASIK surgery to one eye; cannot remember which one    HYSTERECTOMY  1985    low transverse incision.    TOTAL ABDOMINAL HYSTERECTOMY W/ BILATERAL SALPINGOOPHORECTOMY      1985; diffinitive treatment for menorrhagia       MEDS:  Medcard reviewed and updated    ALLERGIES: Allergy Card reviewed and updated    SOCIAL HISTORY:   The patient is a nonsmoker.    PE:   APPEARANCE: Well nourished, well developed, in no acute distress.     EYES: Sclerae anicteric. PERRL. EOMI.      EARS: TM's intact. No retraction or perforation.    NOSE: Mucosa pink. Airway clear.  MOUTH & THROAT: No tonsillar enlargement. No pharyngeal erythema or exudate. No stridor.  CHEST: Lungs clear to auscultation with unlabored respirations.  CARDIOVASCULAR: Normal S1, S2. No murmurs. No carotid bruits. No pedal edema.  ABDOMEN: Bowel sounds normal. Not distended. Soft. No tenderness or masses.   NEUROLOGIC: Cranial Nerves: Intact.  PSYCHIATRIC: The patient is oriented to person, place, and time and has a pleasant affect.        ASSESSMENT/PLAN:  Daisha was seen today for dizziness.    Diagnoses and all orders for this visit:    Vertigo  -      prescribed meclizine    Subclinical hypothyroidism  -     Ambulatory referral/consult to Endocrinology; Future    Other orders  -     meclizine (ANTIVERT) 12.5 mg tablet; Take 1 tablet (12.5 mg total) by mouth 2 (two) times daily as needed for Dizziness.        Answers for HPI/ROS submitted by the patient on 11/18/2019   activity change: Yes  unexpected weight change: No  neck pain: No  hearing loss: No  rhinorrhea: No  trouble swallowing: No  eye discharge: No  visual disturbance: Yes  chest tightness: No  wheezing: No  chest pain: No  palpitations: No  blood in stool: No  constipation: No  vomiting: No  diarrhea: No  polydipsia: No  polyuria: No  difficulty urinating: No  hematuria: No  menstrual problem: No  dysuria: No  joint swelling: No  arthralgias: No  headaches: Yes  weakness: No  confusion: No  dysphoric mood: No

## 2019-11-26 DIAGNOSIS — E03.9 HYPOTHYROIDISM, UNSPECIFIED TYPE: Primary | ICD-10-CM

## 2019-11-26 DIAGNOSIS — E55.9 VITAMIN D DEFICIENCY: ICD-10-CM

## 2019-11-26 DIAGNOSIS — E22.1 HYPERPROLACTINEMIA: ICD-10-CM

## 2019-11-26 NOTE — PROGRESS NOTES
Subjective:      Patient ID: Daihsa Mayo is a 74 y.o. female.    Chief Complaint:  Hypothyroidism    History of Present Illness  Daisha Maoy is here for follow up of hyperprolactinemia and hypothyroidism.  Previously seen by Dr. Mohr.  Last seen 12/15/16.  This is their first visit with me.      With regards to hyperprolactinemia:  Diagnosed 10/2012.    MRI:   7/9/12  Normal examination without evidence etiology identified to cause patient's increased prolactin levels as the history states.    TFTs: unremarkable on LT4     Ref. Range 11/27/2019 07:56   Prolactin Latest Ref Range: 5.2 - 26.5 ng/mL 46.8 (H)       Headaches: Denies  Loss of peripheral vision: only with dizziness  Galactorrhea: Denies    The patient is not currently using any medication known to cause hyperprolactinemia such as: antipsychotics (risperidone, phenothiazines, haloperidol and butyrophenones),gastric motility drugs (metoclopramide and domperidone), or antihypertensives (methyldopa, reserpine, and verapamil).  First noted on labs.    With regards to hypothyroidism:  Diagnosed 2013.    Lab Results   Component Value Date    TSH 2.806 11/27/2019    FREET4 1.07 03/07/2017     Thyroid US 9/26/17  Normal sized thyroid containing 2 nodules measuring less than 5 mm. No dedicated followup is required per TI rads criteria.    Current Medication:  LT4 25mcg daily     Takes thyroid medication properly without food first thing in the morning.    Current Symptoms:   - Weight gain  - Fatigue   - Constipation   + Hair loss  - Brittle nails  - Mental fog   - cp, palpations or sob  - Heat intolerance  - Cold intolerance    Last BMD dated: 11/29/16  Low bone mass without significant change in BMD compared with the prior study. The estimated 10 year probability of hip fracture is 1.5% and of a major osteoporotic fracture 9.8% respectively, using FRAX.  RECOMMENDATIONS:  1. Adequate calcium and Vitamin D, 1200 mg/day and 800 units/day,  "respectively.  2. Repeat BMD in 2-5 years.    Review of Systems   Constitutional: Negative for fatigue.   Eyes: Negative for visual disturbance.   Respiratory: Negative for shortness of breath.    Cardiovascular: Negative for chest pain.   Gastrointestinal: Negative for abdominal pain.   Musculoskeletal: Negative for arthralgias.   Skin: Negative for wound.   Neurological: Negative for headaches.   Hematological: Does not bruise/bleed easily.   Psychiatric/Behavioral: Negative for sleep disturbance.     Objective:   Physical Exam   Neck: No thyromegaly present.   Cardiovascular: Normal rate.   No edema present   Pulmonary/Chest: Effort normal.   Abdominal: Soft.   Vitals reviewed.    Visit Vitals  /80 (BP Location: Right arm, Patient Position: Sitting, BP Method: Medium (Manual))   Resp 16   Ht 5' 1" (1.549 m)   Wt 70.5 kg (155 lb 6.8 oz)   BMI 29.37 kg/m²       Body mass index is 29.37 kg/m².    Lab Review:   Lab Results   Component Value Date    HGBA1C 5.1 11/26/2006       Lab Results   Component Value Date    CHOL 139 03/28/2019    HDL 53 03/28/2019    LDLCALC 75.6 03/28/2019    TRIG 52 03/28/2019    CHOLHDL 38.1 03/28/2019     Lab Results   Component Value Date     11/27/2019    K 4.2 11/27/2019     11/27/2019    CO2 30 (H) 11/27/2019    GLU 89 11/27/2019    BUN 12 11/27/2019    CREATININE 0.7 11/27/2019    CALCIUM 9.1 11/27/2019    PROT 6.5 11/27/2019    ALBUMIN 3.6 11/27/2019    BILITOT 0.9 11/27/2019    ALKPHOS 100 11/27/2019    AST 19 11/27/2019    ALT 17 11/27/2019    ANIONGAP 4 (L) 11/27/2019    ESTGFRAFRICA >60.0 11/27/2019    EGFRNONAA >60.0 11/27/2019    TSH 2.806 11/27/2019     Vit D, 25-Hydroxy   Date Value Ref Range Status   11/27/2019 71 30 - 96 ng/mL Final     Comment:     Vitamin D deficiency.........<10 ng/mL                              Vitamin D insufficiency......10-29 ng/mL       Vitamin D sufficiency........> or equal to 30 ng/mL  Vitamin D toxicity............>100 ng/mL   "     Assessment and Plan     1. Hyperprolactinemia  Prolactin    Macroprolactin, Serum    Follicle stimulating hormone    Comprehensive metabolic panel   2. Subclinical hypothyroidism  TSH   3. Vitamin D deficiency disease     4. Senile osteopenia     5. Essential hypertension     6. Pure hypercholesterolemia       Hyperprolactinemia  -- Elevated prolactin first noted in 2012.  MRI at that time was unremarkable.\  -- Galactorreha was unilateral and resolved with breast duct removal.    -- Resolved over time with no treatment.  -- Will repeat labs with prolactin, macroprolactin, and FSH.  -- If experiencing chronic headaches and/or vision changes OR prolactin rises will get repeat imaging.     Subclinical hypothyroidism  -- Trial off of LT4 per patient request.  Okay with this as during chart review, TFTs have always been unremarkable and seems to have been a trial to normalize prolactin level.   -- Labs in 6 weeks.  -- Clinically and biochemically euthyroid  -- Goal is a normal TSH  -- Avoid exogenous hyperthyroidism as this can accelerate bone loss and increase risk of CV complications.  -- Advised to take LT4 on an empty stomach with water and to wait 30-45 minutes before eating or taking other medications   -- Reviewed usual times of thyroid hormone changes  -- Reviewed that symptoms of hypothyroidism may not correlate with tsh, and a normal TSH is the goal of therapy. Symptoms are not a justification for over treatment.    Vitamin D deficiency disease  CONTINUE: ergo 50,000 weekly.    Senile osteopenia  -- Reassuring she is not fracturing.  -- BMD due 2021.    Essential hypertension  -- Controlled.    Pure hypercholesterolemia  -- Stable on statin.    Follow up in about 1 year (around 11/29/2020).    Case discussed with Dr. Prasad.  Recommendations were discussed with the patient in detail  The patient verbalized understanding and agrees with the plan outlined as above.

## 2019-11-27 ENCOUNTER — LAB VISIT (OUTPATIENT)
Dept: LAB | Facility: HOSPITAL | Age: 74
End: 2019-11-27
Attending: NURSE PRACTITIONER
Payer: MEDICARE

## 2019-11-27 DIAGNOSIS — E55.9 VITAMIN D DEFICIENCY: ICD-10-CM

## 2019-11-27 DIAGNOSIS — E03.9 HYPOTHYROIDISM, UNSPECIFIED TYPE: ICD-10-CM

## 2019-11-27 DIAGNOSIS — E22.1 HYPERPROLACTINEMIA: ICD-10-CM

## 2019-11-27 LAB
25(OH)D3+25(OH)D2 SERPL-MCNC: 71 NG/ML (ref 30–96)
ALBUMIN SERPL BCP-MCNC: 3.6 G/DL (ref 3.5–5.2)
ALP SERPL-CCNC: 100 U/L (ref 55–135)
ALT SERPL W/O P-5'-P-CCNC: 17 U/L (ref 10–44)
ANION GAP SERPL CALC-SCNC: 4 MMOL/L (ref 8–16)
AST SERPL-CCNC: 19 U/L (ref 10–40)
BILIRUB SERPL-MCNC: 0.9 MG/DL (ref 0.1–1)
BUN SERPL-MCNC: 12 MG/DL (ref 8–23)
CALCIUM SERPL-MCNC: 9.1 MG/DL (ref 8.7–10.5)
CHLORIDE SERPL-SCNC: 108 MMOL/L (ref 95–110)
CO2 SERPL-SCNC: 30 MMOL/L (ref 23–29)
CREAT SERPL-MCNC: 0.7 MG/DL (ref 0.5–1.4)
EST. GFR  (AFRICAN AMERICAN): >60 ML/MIN/1.73 M^2
EST. GFR  (NON AFRICAN AMERICAN): >60 ML/MIN/1.73 M^2
GLUCOSE SERPL-MCNC: 89 MG/DL (ref 70–110)
POTASSIUM SERPL-SCNC: 4.2 MMOL/L (ref 3.5–5.1)
PROLACTIN SERPL IA-MCNC: 46.8 NG/ML (ref 5.2–26.5)
PROT SERPL-MCNC: 6.5 G/DL (ref 6–8.4)
SODIUM SERPL-SCNC: 142 MMOL/L (ref 136–145)
TSH SERPL DL<=0.005 MIU/L-ACNC: 2.81 UIU/ML (ref 0.4–4)

## 2019-11-27 PROCEDURE — 84443 ASSAY THYROID STIM HORMONE: CPT | Mod: HCNC

## 2019-11-27 PROCEDURE — 80053 COMPREHEN METABOLIC PANEL: CPT | Mod: HCNC

## 2019-11-27 PROCEDURE — 82306 VITAMIN D 25 HYDROXY: CPT | Mod: HCNC

## 2019-11-27 PROCEDURE — 84146 ASSAY OF PROLACTIN: CPT | Mod: HCNC

## 2019-11-27 PROCEDURE — 36415 COLL VENOUS BLD VENIPUNCTURE: CPT | Mod: HCNC,PN

## 2019-11-29 ENCOUNTER — OFFICE VISIT (OUTPATIENT)
Dept: ENDOCRINOLOGY | Facility: CLINIC | Age: 74
End: 2019-11-29
Payer: MEDICARE

## 2019-11-29 VITALS
BODY MASS INDEX: 29.35 KG/M2 | WEIGHT: 155.44 LBS | HEIGHT: 61 IN | SYSTOLIC BLOOD PRESSURE: 132 MMHG | RESPIRATION RATE: 16 BRPM | DIASTOLIC BLOOD PRESSURE: 80 MMHG

## 2019-11-29 DIAGNOSIS — E22.1 HYPERPROLACTINEMIA: Primary | ICD-10-CM

## 2019-11-29 DIAGNOSIS — E55.9 VITAMIN D DEFICIENCY DISEASE: ICD-10-CM

## 2019-11-29 DIAGNOSIS — E78.00 PURE HYPERCHOLESTEROLEMIA: ICD-10-CM

## 2019-11-29 DIAGNOSIS — M85.80 SENILE OSTEOPENIA: ICD-10-CM

## 2019-11-29 DIAGNOSIS — E03.8 SUBCLINICAL HYPOTHYROIDISM: ICD-10-CM

## 2019-11-29 DIAGNOSIS — I10 ESSENTIAL HYPERTENSION: ICD-10-CM

## 2019-11-29 PROCEDURE — 3079F DIAST BP 80-89 MM HG: CPT | Mod: HCNC,CPTII,S$GLB, | Performed by: NURSE PRACTITIONER

## 2019-11-29 PROCEDURE — 99999 PR PBB SHADOW E&M-EST. PATIENT-LVL IV: ICD-10-PCS | Mod: PBBFAC,HCNC,, | Performed by: NURSE PRACTITIONER

## 2019-11-29 PROCEDURE — 99999 PR PBB SHADOW E&M-EST. PATIENT-LVL IV: CPT | Mod: PBBFAC,HCNC,, | Performed by: NURSE PRACTITIONER

## 2019-11-29 PROCEDURE — 3079F PR MOST RECENT DIASTOLIC BLOOD PRESSURE 80-89 MM HG: ICD-10-PCS | Mod: HCNC,CPTII,S$GLB, | Performed by: NURSE PRACTITIONER

## 2019-11-29 PROCEDURE — 1159F MED LIST DOCD IN RCRD: CPT | Mod: HCNC,S$GLB,, | Performed by: NURSE PRACTITIONER

## 2019-11-29 PROCEDURE — 3075F PR MOST RECENT SYSTOLIC BLOOD PRESS GE 130-139MM HG: ICD-10-PCS | Mod: HCNC,CPTII,S$GLB, | Performed by: NURSE PRACTITIONER

## 2019-11-29 PROCEDURE — 1101F PT FALLS ASSESS-DOCD LE1/YR: CPT | Mod: HCNC,CPTII,S$GLB, | Performed by: NURSE PRACTITIONER

## 2019-11-29 PROCEDURE — 3075F SYST BP GE 130 - 139MM HG: CPT | Mod: HCNC,CPTII,S$GLB, | Performed by: NURSE PRACTITIONER

## 2019-11-29 PROCEDURE — 1101F PR PT FALLS ASSESS DOC 0-1 FALLS W/OUT INJ PAST YR: ICD-10-PCS | Mod: HCNC,CPTII,S$GLB, | Performed by: NURSE PRACTITIONER

## 2019-11-29 PROCEDURE — 99214 OFFICE O/P EST MOD 30 MIN: CPT | Mod: HCNC,S$GLB,, | Performed by: NURSE PRACTITIONER

## 2019-11-29 PROCEDURE — 1159F PR MEDICATION LIST DOCUMENTED IN MEDICAL RECORD: ICD-10-PCS | Mod: HCNC,S$GLB,, | Performed by: NURSE PRACTITIONER

## 2019-11-29 PROCEDURE — 1126F PR PAIN SEVERITY QUANTIFIED, NO PAIN PRESENT: ICD-10-PCS | Mod: HCNC,S$GLB,, | Performed by: NURSE PRACTITIONER

## 2019-11-29 PROCEDURE — 1126F AMNT PAIN NOTED NONE PRSNT: CPT | Mod: HCNC,S$GLB,, | Performed by: NURSE PRACTITIONER

## 2019-11-29 PROCEDURE — 99214 PR OFFICE/OUTPT VISIT, EST, LEVL IV, 30-39 MIN: ICD-10-PCS | Mod: HCNC,S$GLB,, | Performed by: NURSE PRACTITIONER

## 2019-11-29 NOTE — ASSESSMENT & PLAN NOTE
-- Elevated prolactin first noted in 2012.  MRI at that time was unremarkable.\  -- Galactorreha was unilateral and resolved with breast duct removal.    -- Resolved over time with no treatment.  -- Will repeat labs with prolactin, macroprolactin, and FSH.  -- If experiencing chronic headaches and/or vision changes OR prolactin rises will get repeat imaging.

## 2019-11-29 NOTE — ASSESSMENT & PLAN NOTE
-- Trial off of LT4 per patient request.  Okay with this as during chart review, TFTs have always been unremarkable and seems to have been a trial to normalize prolactin level.   -- Labs in 6 weeks.  -- Clinically and biochemically euthyroid  -- Goal is a normal TSH  -- Avoid exogenous hyperthyroidism as this can accelerate bone loss and increase risk of CV complications.  -- Advised to take LT4 on an empty stomach with water and to wait 30-45 minutes before eating or taking other medications   -- Reviewed usual times of thyroid hormone changes  -- Reviewed that symptoms of hypothyroidism may not correlate with tsh, and a normal TSH is the goal of therapy. Symptoms are not a justification for over treatment.

## 2019-11-30 ENCOUNTER — PATIENT MESSAGE (OUTPATIENT)
Dept: ENDOCRINOLOGY | Facility: CLINIC | Age: 74
End: 2019-11-30

## 2020-01-10 ENCOUNTER — LAB VISIT (OUTPATIENT)
Dept: LAB | Facility: HOSPITAL | Age: 75
End: 2020-01-10
Payer: MEDICARE

## 2020-01-10 DIAGNOSIS — E22.1 HYPERPROLACTINEMIA: ICD-10-CM

## 2020-01-10 DIAGNOSIS — E03.8 SUBCLINICAL HYPOTHYROIDISM: ICD-10-CM

## 2020-01-10 LAB
ALBUMIN SERPL BCP-MCNC: 3.7 G/DL (ref 3.5–5.2)
ALP SERPL-CCNC: 122 U/L (ref 55–135)
ALT SERPL W/O P-5'-P-CCNC: 15 U/L (ref 10–44)
ANION GAP SERPL CALC-SCNC: 8 MMOL/L (ref 8–16)
AST SERPL-CCNC: 18 U/L (ref 10–40)
BILIRUB SERPL-MCNC: 0.6 MG/DL (ref 0.1–1)
BUN SERPL-MCNC: 11 MG/DL (ref 8–23)
CALCIUM SERPL-MCNC: 9.4 MG/DL (ref 8.7–10.5)
CHLORIDE SERPL-SCNC: 106 MMOL/L (ref 95–110)
CO2 SERPL-SCNC: 28 MMOL/L (ref 23–29)
CREAT SERPL-MCNC: 0.7 MG/DL (ref 0.5–1.4)
EST. GFR  (AFRICAN AMERICAN): >60 ML/MIN/1.73 M^2
EST. GFR  (NON AFRICAN AMERICAN): >60 ML/MIN/1.73 M^2
FSH SERPL-ACNC: 67.4 MIU/ML
GLUCOSE SERPL-MCNC: 57 MG/DL (ref 70–110)
POTASSIUM SERPL-SCNC: 4.3 MMOL/L (ref 3.5–5.1)
PROLACTIN SERPL IA-MCNC: 26.4 NG/ML (ref 5.2–26.5)
PROT SERPL-MCNC: 7 G/DL (ref 6–8.4)
SODIUM SERPL-SCNC: 142 MMOL/L (ref 136–145)
TSH SERPL DL<=0.005 MIU/L-ACNC: 2.45 UIU/ML (ref 0.4–4)

## 2020-01-10 PROCEDURE — 80053 COMPREHEN METABOLIC PANEL: CPT | Mod: HCNC

## 2020-01-10 PROCEDURE — 84146 ASSAY OF PROLACTIN: CPT | Mod: HCNC

## 2020-01-10 PROCEDURE — 83001 ASSAY OF GONADOTROPIN (FSH): CPT | Mod: HCNC

## 2020-01-10 PROCEDURE — 84146 ASSAY OF PROLACTIN: CPT | Mod: 91,HCNC

## 2020-01-10 PROCEDURE — 84443 ASSAY THYROID STIM HORMONE: CPT | Mod: HCNC

## 2020-01-13 LAB
ANNOTATION COMMENT IMP: NORMAL
MACROPROLACTIN SERPL-MCNC: 12 NG/ML (ref 3.4–18.5)
MACROPROLACTIN/PROLACTIN MFR SERPL: 35 %
PROLACTIN SERPL IA-MCNC: 18.5 NG/ML (ref 4.8–23.3)

## 2020-03-11 ENCOUNTER — PES CALL (OUTPATIENT)
Dept: ADMINISTRATIVE | Facility: CLINIC | Age: 75
End: 2020-03-11

## 2020-03-17 ENCOUNTER — OFFICE VISIT (OUTPATIENT)
Dept: INTERNAL MEDICINE | Facility: CLINIC | Age: 75
End: 2020-03-17
Payer: MEDICARE

## 2020-03-17 ENCOUNTER — NURSE TRIAGE (OUTPATIENT)
Dept: ADMINISTRATIVE | Facility: CLINIC | Age: 75
End: 2020-03-17

## 2020-03-17 ENCOUNTER — PATIENT MESSAGE (OUTPATIENT)
Dept: INTERNAL MEDICINE | Facility: CLINIC | Age: 75
End: 2020-03-17

## 2020-03-17 ENCOUNTER — TELEPHONE (OUTPATIENT)
Dept: INTERNAL MEDICINE | Facility: CLINIC | Age: 75
End: 2020-03-17

## 2020-03-17 VITALS
HEIGHT: 61 IN | HEART RATE: 80 BPM | TEMPERATURE: 100 F | BODY MASS INDEX: 29.1 KG/M2 | SYSTOLIC BLOOD PRESSURE: 160 MMHG | DIASTOLIC BLOOD PRESSURE: 84 MMHG | RESPIRATION RATE: 18 BRPM | WEIGHT: 154.13 LBS

## 2020-03-17 DIAGNOSIS — J18.9 PNEUMONIA OF RIGHT LOWER LOBE DUE TO INFECTIOUS ORGANISM: ICD-10-CM

## 2020-03-17 DIAGNOSIS — R05.9 COUGH: ICD-10-CM

## 2020-03-17 DIAGNOSIS — J06.9 UPPER RESPIRATORY TRACT INFECTION, UNSPECIFIED TYPE: Primary | ICD-10-CM

## 2020-03-17 LAB
INFLUENZA A, MOLECULAR: NEGATIVE
INFLUENZA B, MOLECULAR: NEGATIVE
SPECIMEN SOURCE: NORMAL

## 2020-03-17 PROCEDURE — 3077F PR MOST RECENT SYSTOLIC BLOOD PRESSURE >= 140 MM HG: ICD-10-PCS | Mod: HCNC,CPTII,S$GLB, | Performed by: INTERNAL MEDICINE

## 2020-03-17 PROCEDURE — 99999 PR PBB SHADOW E&M-EST. PATIENT-LVL III: ICD-10-PCS | Mod: PBBFAC,HCNC,, | Performed by: INTERNAL MEDICINE

## 2020-03-17 PROCEDURE — 3079F DIAST BP 80-89 MM HG: CPT | Mod: HCNC,CPTII,S$GLB, | Performed by: INTERNAL MEDICINE

## 2020-03-17 PROCEDURE — 1159F PR MEDICATION LIST DOCUMENTED IN MEDICAL RECORD: ICD-10-PCS | Mod: HCNC,S$GLB,, | Performed by: INTERNAL MEDICINE

## 2020-03-17 PROCEDURE — 3079F PR MOST RECENT DIASTOLIC BLOOD PRESSURE 80-89 MM HG: ICD-10-PCS | Mod: HCNC,CPTII,S$GLB, | Performed by: INTERNAL MEDICINE

## 2020-03-17 PROCEDURE — 1126F PR PAIN SEVERITY QUANTIFIED, NO PAIN PRESENT: ICD-10-PCS | Mod: HCNC,S$GLB,, | Performed by: INTERNAL MEDICINE

## 2020-03-17 PROCEDURE — 99213 PR OFFICE/OUTPT VISIT, EST, LEVL III, 20-29 MIN: ICD-10-PCS | Mod: HCNC,S$GLB,, | Performed by: INTERNAL MEDICINE

## 2020-03-17 PROCEDURE — 87502 INFLUENZA DNA AMP PROBE: CPT | Mod: HCNC,PO

## 2020-03-17 PROCEDURE — 3077F SYST BP >= 140 MM HG: CPT | Mod: HCNC,CPTII,S$GLB, | Performed by: INTERNAL MEDICINE

## 2020-03-17 PROCEDURE — 1159F MED LIST DOCD IN RCRD: CPT | Mod: HCNC,S$GLB,, | Performed by: INTERNAL MEDICINE

## 2020-03-17 PROCEDURE — 1101F PT FALLS ASSESS-DOCD LE1/YR: CPT | Mod: HCNC,CPTII,S$GLB, | Performed by: INTERNAL MEDICINE

## 2020-03-17 PROCEDURE — 1126F AMNT PAIN NOTED NONE PRSNT: CPT | Mod: HCNC,S$GLB,, | Performed by: INTERNAL MEDICINE

## 2020-03-17 PROCEDURE — 99213 OFFICE O/P EST LOW 20 MIN: CPT | Mod: HCNC,S$GLB,, | Performed by: INTERNAL MEDICINE

## 2020-03-17 PROCEDURE — 99999 PR PBB SHADOW E&M-EST. PATIENT-LVL III: CPT | Mod: PBBFAC,HCNC,, | Performed by: INTERNAL MEDICINE

## 2020-03-17 PROCEDURE — 1101F PR PT FALLS ASSESS DOC 0-1 FALLS W/OUT INJ PAST YR: ICD-10-PCS | Mod: HCNC,CPTII,S$GLB, | Performed by: INTERNAL MEDICINE

## 2020-03-17 RX ORDER — DOXYCYCLINE 100 MG/1
100 CAPSULE ORAL 2 TIMES DAILY
Qty: 14 CAPSULE | Refills: 0 | Status: SHIPPED | OUTPATIENT
Start: 2020-03-17 | End: 2020-03-27

## 2020-03-17 NOTE — TELEPHONE ENCOUNTER
Please inform patient that prescription for doxycycline has been sent to [facility] on [facility] [address]

## 2020-03-17 NOTE — PROGRESS NOTES
CC:  Flu-like symptoms  HPI:  The patient is a 74 y.o. year old female who presents to the office for flu-like symptoms.  She complains of cough, fatigue, headache, fever, sore throat and chills.  She also reports runny nose.  Her symptoms started Thursday.  She reports temperature of 100.7 for which she has taken tylenol.  She has also taken an antihistamine and ibuprofen.  She has not travelled out of the country recently.  She denies any known sick contacts, but has had some out of town guests.    PAST MEDICAL HISTORY:  Past Medical History:   Diagnosis Date    Arthritis     Cataract     Class 1 obesity due to excess calories in adult 10/19/2018    Depression     Around [place]; treated with Wellbutrin    Early cataracts, bilateral     SAMSON (generalized anxiety disorder) 2/5/2019    GERD (gastroesophageal reflux disease)     Glaucoma     Hyperprolactinemia     Hypertension     Hypothyroidism     Need for prophylactic vaccination against Streptococcus pneumoniae (pneumococcus) 10/14/2015    Osteopenia     Psychiatric problem     Pure hyperglyceridemia 3/31/2017    Recurrent UTI     Every few months    Sciatica neuralgia     Seizures 2006    temporal lobe seizure    Temporal lobe seizure 2006    Trigeminal neuralgia     Vitamin D deficiency disease        SURGICAL HISTORY:  Past Surgical History:   Procedure Laterality Date    BREAST BIOPSY      duct excision    CHOLECYSTECTOMY  1994    laporascopic    COLONOSCOPY      EYE SURGERY      LASIK surgery to one eye; cannot remember which one    HYSTERECTOMY  1985    low transverse incision.    TOTAL ABDOMINAL HYSTERECTOMY W/ BILATERAL SALPINGOOPHORECTOMY      1985; diffinitive treatment for menorrhagia       MEDS:  Medcard reviewed and updated    ALLERGIES: Allergy Card reviewed and updated    SOCIAL HISTORY:   The patient is a nonsmoker.    PE:   APPEARANCE: Well nourished, well developed, in no acute distress.    EARS: TM's intact. No  retraction or perforation.    NOSE: Mucosa pink. Airway clear.  MOUTH & THROAT: No tonsillar enlargement. No pharyngeal erythema or exudate. No stridor.  CHEST: Lungs clear to auscultation with unlabored respirations.  CARDIOVASCULAR: Normal S1, S2. No murmurs. No carotid bruits. No pedal edema.  ABDOMEN: Bowel sounds normal. Not distended. Soft. No tenderness or masses.   PSYCHIATRIC: The patient is oriented to person, place, and time and has a pleasant affect.        ASSESSMENT/PLAN:  Daisha was seen today for cough, fatigue, headache, fever, sore throat and chills.    Diagnoses and all orders for this visit:    Upper respiratory tract infection, unspecified type  -     Influenza A & B by Molecular

## 2020-03-17 NOTE — TELEPHONE ENCOUNTER
Temp 100.7    Reason for Disposition   [1] Fever or feeling feverish AND [2] within 14 Days of CORONAVIRUS EXPOSURE    Additional Information   Negative: Severe difficulty breathing (e.g., struggling for each breath, speak in single words, bluish lips)   Negative: Sounds like a life-threatening emergency to the triager   Negative: [1] Difficulty breathing (shortness of breath) occurs AND [2] onset > 14 days after CORONAVIRUS EXPOSURE (Close Contact)   Negative: [1] Dry cough occurs AND [2] onset > 14 days after CORONAVIRUS EXPOSURE   Negative: [1] Wet cough (i.e., white-yellow, yellow, green, or veto colored sputum) AND [2] onset > 14 days after CORONAVIRUS EXPOSURE   Negative: [1] Common cold symptoms AND [2] onset > 14 days after CORONAVIRUS EXPOSURE   Negative: [1] Difficulty breathing occurs AND [2] within 14 days of CORONAVIRUS EXPOSURE   Negative: Patient sounds very sick or weak to the triager    Protocols used: CORONAVIRUS (2019-NCOV) EXPOSURE-A-

## 2020-03-18 ENCOUNTER — PATIENT MESSAGE (OUTPATIENT)
Dept: INTERNAL MEDICINE | Facility: CLINIC | Age: 75
End: 2020-03-18

## 2020-03-18 ENCOUNTER — TELEPHONE (OUTPATIENT)
Dept: INTERNAL MEDICINE | Facility: CLINIC | Age: 75
End: 2020-03-18

## 2020-03-18 ENCOUNTER — CLINICAL SUPPORT (OUTPATIENT)
Dept: INTERNAL MEDICINE | Facility: CLINIC | Age: 75
End: 2020-03-18
Payer: MEDICARE

## 2020-03-18 DIAGNOSIS — J06.9 UPPER RESPIRATORY TRACT INFECTION, UNSPECIFIED TYPE: Primary | ICD-10-CM

## 2020-03-18 DIAGNOSIS — J06.9 UPPER RESPIRATORY TRACT INFECTION, UNSPECIFIED TYPE: ICD-10-CM

## 2020-03-18 PROCEDURE — U0002 COVID-19 LAB TEST NON-CDC: HCPCS | Mod: HCNC

## 2020-03-18 NOTE — TELEPHONE ENCOUNTER
Patient was seen in the office yesterday for upper respiratory tract symptoms.  Nasal swab was negative for the flu.  She now has a temperature of 100.7.  Order for COVID- 19 has been placed.

## 2020-03-20 ENCOUNTER — PATIENT MESSAGE (OUTPATIENT)
Dept: INTERNAL MEDICINE | Facility: CLINIC | Age: 75
End: 2020-03-20

## 2020-03-21 LAB — SARS-COV-2 RNA RESP QL NAA+PROBE: DETECTED

## 2020-03-23 ENCOUNTER — TELEPHONE (OUTPATIENT)
Dept: INTERNAL MEDICINE | Facility: CLINIC | Age: 75
End: 2020-03-23

## 2020-03-23 NOTE — TELEPHONE ENCOUNTER
Patient advised and that screening test for COVID 19 is positive.  Recommend that she continue quarantine precautions and monitor temperature.  She reported continued fever and cough, but denies any shortness of breath.

## 2020-03-25 ENCOUNTER — TELEPHONE (OUTPATIENT)
Dept: INTERNAL MEDICINE | Facility: CLINIC | Age: 75
End: 2020-03-25

## 2020-03-25 DIAGNOSIS — U07.1 COVID-19 VIRUS INFECTION: Primary | ICD-10-CM

## 2020-03-25 NOTE — TELEPHONE ENCOUNTER
Patient reports onset of chest tightness that started yesterday, but reports she is feeling better overall.  She has tried stopping Tylenol because her temperature was thing normal, but noted that her temperature went up to 99.7.

## 2020-03-26 ENCOUNTER — PATIENT MESSAGE (OUTPATIENT)
Dept: INTERNAL MEDICINE | Facility: CLINIC | Age: 75
End: 2020-03-26

## 2020-03-27 ENCOUNTER — TELEPHONE (OUTPATIENT)
Dept: INTERNAL MEDICINE | Facility: CLINIC | Age: 75
End: 2020-03-27

## 2020-03-27 RX ORDER — FLUCONAZOLE 150 MG/1
150 TABLET ORAL DAILY
Qty: 1 TABLET | Refills: 0 | Status: SHIPPED | OUTPATIENT
Start: 2020-03-27 | End: 2020-03-28

## 2020-03-27 NOTE — TELEPHONE ENCOUNTER
----- Message from Juanpablo Dunaway sent at 3/27/2020  7:40 AM CDT -----  Contact: Patient [phone]  [employer]    Patient would like to get medical advice.    Comments: Patient stating does have COVID19 and the appt that is set today for 9 am was suppose to be canceled, call to inform.    Please call an advise  Thank you

## 2020-03-29 ENCOUNTER — NURSE TRIAGE (OUTPATIENT)
Dept: ADMINISTRATIVE | Facility: CLINIC | Age: 75
End: 2020-03-29

## 2020-03-29 NOTE — TELEPHONE ENCOUNTER
Covid home monitoring follow-up: Pateint reports feeling jittery and shaky with nausea and vomiting x one episode today. Treating N/V with Zofran given to her by a family member. Denies fever: last temp check at 0515 hrs with reading of 98.0. Denies SOB/difficulty breathing. No cough.  Encouraged relaxation techniques such as deep breathing, stretching exercises, and  cool compresses.  Practicing self-isolation; lives with . Has supportive family/friends for assistance.  Encouraged fluids as much as possible. Discussed ER precautions; verbalized understanding.    Reason for Disposition   MILD or MODERATE vomiting (e.g., 1 - 5 times / day)    Additional Information   Negative: Severe difficulty breathing (e.g., struggling for each breath, speak in single words, bluish lips)   Negative: Sounds like a life-threatening emergency to the triager   Negative: Patient sounds very sick or weak to the triager   Negative: Shock suspected (e.g., cold/pale/clammy skin, too weak to stand, low BP, rapid pulse)   Negative: Difficult to awaken or acting confused (e.g., disoriented, slurred speech)   Negative: [1] SEVERE vomiting (e.g., 6 or more times/day) AND [2] present > 8 hours    Protocols used: VOMITING-A-AH, CORONAVIRUS (COVID-19) EXPOSURE-A-AH

## 2020-03-30 ENCOUNTER — NURSE TRIAGE (OUTPATIENT)
Dept: ADMINISTRATIVE | Facility: CLINIC | Age: 75
End: 2020-03-30

## 2020-03-30 NOTE — TELEPHONE ENCOUNTER
" Patient reports chills without fevers. Reports last temperature of 98.4 taken at 07:25. Reports chills as feeling "shaky and jittery". Patient reports nausea to be improved and last vomited on yesterday. Denies any cough. Denies any sob or difficulty breathing. Patient reports she's keeping herself hydrated. Patient educated on s/s that would warrant an ER visit. Patient verbalizes understanding.    Additional Information   Negative: Shock suspected (e.g., cold/pale/clammy skin, too weak to stand, low BP, rapid pulse)   Negative: Sounds like a life-threatening emergency to the triager   Negative: Nausea or vomiting and pregnancy < 20 weeks   Negative: Menstrual Period - Missed or Late (i.e., pregnancy suspected)   Negative: Heat exhaustion suspected (i.e., dehydration from heat exposure)   Negative: Anxiety or stress suspected (i.e., nausea with anxiety attacks or stressful situations)   Negative: Traumatic Brain Injury (TBI) suspected   Negative: Vomiting occurs   Negative: Other symptom is present, see that guideline.  (e.g., chest pain, headache, dizziness, abdominal pain, colds, sore throat, etc.).   Negative: Unable to walk, or can only walk with assistance (e.g., requires support)   Negative: Difficulty breathing   Negative: Insulin-dependent diabetes (Type I) and glucose > 400 mg/dl (22 mmol/l)   Negative: Drinking very little and dehydration suspected (e.g., no urine > 12 hours, very dry mouth, very lightheaded)   Negative: Patient sounds very sick or weak to the triager   Negative: Fever > 104 F (40 C)   Negative: Fever > 101 F (38.3 C) and age > 60   Negative: Fever > 100.0 F (37.8 C) and bedridden (e.g., nursing home patient, CVA, chronic illness, recovering from surgery)   Negative: Fever > 100.0 F (37.8 C) and diabetes mellitus or weak immune system (e.g., HIV positive, cancer chemo, splenectomy, chronic steroids)   Negative: Taking any of the following medications: digoxin " (Lanoxin), lithium, theophylline, phenytoin (Dilantin)   Negative: Yellowish color of the skin or white of the eye (i.e., jaundice)   Negative: Fever present > 3 days (72 hours)   Negative: Receiving cancer chemotherapy medication   Negative: Taking prescription medication that could cause nausea (e.g., narcotics/opiates, antibiotics, OCPs, many others)   Negative: Nausea lasts > 1 week   Negative: Alcohol or drug abuse, known or suspected   Negative: Nausea is a chronic symptom (recurrent or ongoing AND present > 4 weeks)   Negative: Unexplained nausea   Negative: Severe difficulty breathing (e.g., struggling for each breath, speak in single words, bluish lips)   Negative: Sounds like a life-threatening emergency to the triager   Negative: Difficulty breathing (shortness of breath) occurs and onset > 14 days after COVID-19 EXPOSURE (Close Contact)   Negative: Cough occurs and onset > 14 days after COVID-19 EXPOSURE   Negative: Common cold symptoms and onset > 14 days after COVID-19 EXPOSURE   Negative: Difficulty breathing occurs within 14 days of COVID-19 EXPOSURE   Negative: Patient sounds very sick or weak to the triager   Negative: Fever or feeling feverish within 14 Days of COVID-19 EXPOSURE   Negative: Cough occurs within 14 days of COVID-19 EXPOSURE   Negative: Mild body aches, chills, diarrhea, headache, runny nose, or sore throat occur within 14 days of COVID-19 EXPOSURE   Negative: COVID-19 EXPOSURE within last 14 days AND NO cough, fever, or breathing difficulty AND exposed person is a healthcare worker who was NOT using all recommended personal protective equipment (i.e., a respirator-N95 mask, eye protection, gloves, and gown)   Negative: Fever (or feeling feverish) or symptoms of lower respiratory illness (e.g., cough, difficulty breathing) AND TRAVEL FROM [place] (or other CDC identified high risk travel area) within last 14 days   Negative: COVID-19 EXPOSURE within last 14 days  AND NO cough, fever, or breathing difficulty   Negative: TRAVEL FROM [place] (or other CDC identified high risk travel area) within last 14 days AND NO cough or fever or breathing difficulty   Negative: COVID-19 EXPOSURE 15 or more days ago AND NO cough or fever or breathing difficulty   Negative: No COVID-19 EXPOSURE, but has questions about    Protocols used: NAUSEA-A-OH, CORONAVIRUS (COVID-19) EXPOSURE-A-OH

## 2020-03-31 ENCOUNTER — TELEPHONE (OUTPATIENT)
Dept: INTERNAL MEDICINE | Facility: CLINIC | Age: 75
End: 2020-03-31

## 2020-03-31 ENCOUNTER — HOSPITAL ENCOUNTER (EMERGENCY)
Facility: HOSPITAL | Age: 75
Discharge: HOME | End: 2020-03-31
Attending: EMERGENCY MEDICINE
Payer: MEDICARE

## 2020-03-31 ENCOUNTER — NURSE TRIAGE (OUTPATIENT)
Dept: ADMINISTRATIVE | Facility: CLINIC | Age: 75
End: 2020-03-31

## 2020-03-31 VITALS
DIASTOLIC BLOOD PRESSURE: 66 MMHG | SYSTOLIC BLOOD PRESSURE: 130 MMHG | TEMPERATURE: 98 F | OXYGEN SATURATION: 98 % | RESPIRATION RATE: 20 BRPM | HEART RATE: 80 BPM

## 2020-03-31 DIAGNOSIS — J06.9 ACUTE RESPIRATORY DISEASE DUE TO COVID-19 VIRUS: Primary | ICD-10-CM

## 2020-03-31 DIAGNOSIS — R06.02 SOB (SHORTNESS OF BREATH): ICD-10-CM

## 2020-03-31 DIAGNOSIS — U07.1 ACUTE RESPIRATORY DISEASE DUE TO COVID-19 VIRUS: Primary | ICD-10-CM

## 2020-03-31 LAB
BACTERIA #/AREA URNS AUTO: NORMAL /HPF
BILIRUB UR QL STRIP: NEGATIVE
BUN SERPL-MCNC: 8 MG/DL (ref 6–30)
CHLORIDE SERPL-SCNC: 96 MMOL/L (ref 95–110)
CLARITY UR REFRACT.AUTO: CLEAR
COLOR UR AUTO: YELLOW
CREAT SERPL-MCNC: 0.5 MG/DL (ref 0.5–1.4)
GLUCOSE SERPL-MCNC: 93 MG/DL (ref 70–110)
GLUCOSE UR QL STRIP: NEGATIVE
HCT VFR BLD CALC: 41 %PCV (ref 36–54)
HGB UR QL STRIP: NEGATIVE
KETONES UR QL STRIP: NEGATIVE
LEUKOCYTE ESTERASE UR QL STRIP: NEGATIVE
MICROSCOPIC COMMENT: NORMAL
NITRITE UR QL STRIP: NEGATIVE
PH UR STRIP: >8 [PH] (ref 5–8)
POC IONIZED CALCIUM: 1.19 MMOL/L (ref 1.06–1.42)
POC TCO2 (MEASURED): 27 MMOL/L (ref 23–29)
POTASSIUM BLD-SCNC: 3.8 MMOL/L (ref 3.5–5.1)
PROT UR QL STRIP: NEGATIVE
RBC #/AREA URNS AUTO: 2 /HPF (ref 0–4)
SAMPLE: NORMAL
SODIUM BLD-SCNC: 136 MMOL/L (ref 136–145)
SP GR UR STRIP: 1 (ref 1–1.03)
SQUAMOUS #/AREA URNS AUTO: 1 /HPF
URN SPEC COLLECT METH UR: ABNORMAL
WBC #/AREA URNS AUTO: 0 /HPF (ref 0–5)

## 2020-03-31 PROCEDURE — 93005 ELECTROCARDIOGRAM TRACING: CPT | Mod: HCNC

## 2020-03-31 PROCEDURE — 81001 URINALYSIS AUTO W/SCOPE: CPT | Mod: HCNC

## 2020-03-31 PROCEDURE — 99284 EMERGENCY DEPT VISIT MOD MDM: CPT | Mod: 25,HCNC

## 2020-03-31 PROCEDURE — 93010 EKG 12-LEAD: ICD-10-PCS | Mod: HCNC,,, | Performed by: INTERNAL MEDICINE

## 2020-03-31 PROCEDURE — 93010 ELECTROCARDIOGRAM REPORT: CPT | Mod: HCNC,,, | Performed by: INTERNAL MEDICINE

## 2020-03-31 PROCEDURE — 80047 BASIC METABLC PNL IONIZED CA: CPT | Mod: HCNC

## 2020-03-31 PROCEDURE — 99285 EMERGENCY DEPT VISIT HI MDM: CPT | Mod: HCNC,,, | Performed by: EMERGENCY MEDICINE

## 2020-03-31 PROCEDURE — 99285 PR EMERGENCY DEPT VISIT,LEVEL V: ICD-10-PCS | Mod: HCNC,,, | Performed by: EMERGENCY MEDICINE

## 2020-03-31 RX ORDER — FLUCONAZOLE 150 MG/1
150 TABLET ORAL DAILY
Qty: 2 TABLET | Refills: 0 | Status: SHIPPED | OUTPATIENT
Start: 2020-03-31 | End: 2020-04-01

## 2020-03-31 RX ORDER — AZITHROMYCIN 250 MG/1
250 TABLET, FILM COATED ORAL DAILY
Qty: 6 TABLET | Refills: 0 | Status: ON HOLD | OUTPATIENT
Start: 2020-03-31 | End: 2020-04-07 | Stop reason: HOSPADM

## 2020-03-31 RX ORDER — NYSTATIN 100000 [USP'U]/ML
5 SUSPENSION ORAL
Qty: 200 ML | Refills: 0 | Status: ON HOLD | OUTPATIENT
Start: 2020-03-31 | End: 2020-04-07 | Stop reason: HOSPADM

## 2020-03-31 NOTE — ED PROVIDER NOTES
Encounter Date: 3/31/2020    SCRIBE #1 NOTE: I, Ira Holley, am scribing for, and in the presence of,  Dr. Patricio. I have scribed the entire note.       History     Chief Complaint   Patient presents with    Shortness of Breath     SOB, jittery, fever, COVID19 positive, weakness, nausea, loss of taste and smell     HPI   74-year-old female with a history of arthritis,, hypertension, hypothyroidism, history of trigeminal neuralgia, and seizures presenting with recent upper respiratory symptoms including shortness of breath, jitteriness, fever, weakness, nausea, and loss of taste.  Patient was recently diagnosed on March 18 as being COVID-19 positive. The patient also notes of diarrhea wit episodes of yellow, watery loose stools. Denies any blood in stool. Denies vomiting, abdominal pain, chest pain, cough.     Review of patient's allergies indicates:   Allergen Reactions    Latex      Other reaction(s): Rash  Other reaction(s): Rash     Past Medical History:   Diagnosis Date    Arthritis     Cataract     Class 1 obesity due to excess calories in adult 10/19/2018    Depression     Around [place]; treated with Wellbutrin    Early cataracts, bilateral     SAMSON (generalized anxiety disorder) 2/5/2019    GERD (gastroesophageal reflux disease)     Glaucoma     Hyperprolactinemia     Hypertension     Hypothyroidism     Need for prophylactic vaccination against Streptococcus pneumoniae (pneumococcus) 10/14/2015    Osteopenia     Psychiatric problem     Pure hyperglyceridemia 3/31/2017    Recurrent UTI     Every few months    Sciatica neuralgia     Seizures 2006    temporal lobe seizure    Temporal lobe seizure 2006    Trigeminal neuralgia     Vitamin D deficiency disease      Past Surgical History:   Procedure Laterality Date    BREAST BIOPSY      duct excision    CHOLECYSTECTOMY  1994    laporascopic    COLONOSCOPY      EYE SURGERY      LASIK surgery to one eye; cannot remember which one     HYSTERECTOMY  1985    low transverse incision.    TOTAL ABDOMINAL HYSTERECTOMY W/ BILATERAL SALPINGOOPHORECTOMY      ; diffinitive treatment for menorrhagia     Family History   Problem Relation Age of Onset    Heart disease Mother     Hypertension Mother     Thyroid disease Mother     Colon cancer Mother 90    Breast cancer Mother 104    Cancer Mother 103        colon and breast    Kidney disease Father     Heart disease Sister         pacemaker    Hypertension Sister     Thyroid disease Sister     Glaucoma Sister     Fibromyalgia Brother     Kidney disease Brother     Sleep apnea Brother     Hypertension Brother     Glaucoma Brother     Heart disease Brother         sudden death at age 67    Cancer Cousin 31         from breast cancer at age 31    Breast cancer Cousin     Glaucoma Paternal Aunt     Glaucoma Paternal Uncle     Glaucoma Paternal Grandfather         Went blind from Glaucoma    Breast cancer Maternal Aunt     Celiac disease Neg Hx     Cirrhosis Neg Hx     Colon polyps Neg Hx     Crohn's disease Neg Hx     Cystic fibrosis Neg Hx     Esophageal cancer Neg Hx     Hemochromatosis Neg Hx     Inflammatory bowel disease Neg Hx     Irritable bowel syndrome Neg Hx     Liver cancer Neg Hx     Liver disease Neg Hx     Rectal cancer Neg Hx     Stomach cancer Neg Hx     Ulcerative colitis Neg Hx     Moses's disease Neg Hx      Social History     Tobacco Use    Smoking status: Never Smoker    Smokeless tobacco: Never Used   Substance Use Topics    Alcohol use: No     Frequency: Monthly or less     Drinks per session: 1 or 2     Binge frequency: Never    Drug use: No     Review of Systems   Constitutional: Positive for fever.   HENT: Negative for sore throat.    Respiratory: Positive for shortness of breath.    Cardiovascular: Negative for chest pain.   Gastrointestinal: Positive for diarrhea and nausea. Negative for abdominal pain, blood in stool and vomiting.    Genitourinary: Negative for dysuria.   Musculoskeletal: Negative for back pain.   Skin: Negative for rash.   Neurological: Positive for weakness.   Hematological: Does not bruise/bleed easily.       Physical Exam     Initial Vitals   BP Pulse Resp Temp SpO2   03/31/20 1158 03/31/20 1148 03/31/20 1148 03/31/20 1148 03/31/20 1148   139/66 78 20 98.1 °F (36.7 °C) 98 %      MAP       --                Physical Exam    Nursing note and vitals reviewed.    Constitutional: Well-developed. Well-nourished. Moderate emotional distress.  HENT: NCAT. OP moist. Uvula midline. No OP masses. Posterior pharynx with no erythema. No tonsillar edema. No exudate. Floor of the mouth is soft, tongue is not elevated. No rhinorrhea. TMs clear bilaterally. Mastoid process nontender bilaterally.  EYES: No conjunctival injection or discharge.  NECK: Full ROM. Supple. No rigidity. No cervical LAD. No stridor. Brudzinskis test negative.  CARDIAC: RRR. No murmurs or rubs. Strong distal pulses.  PULM: Normal effort. Breath sounds clear bilaterally. No wheezes. No crackles.  ABD: Soft, non-tender, non-distended, normal BS. No guarding. No rebound.  : No CVA tenderness.  MS: Warm and well perfused. No edema..  NEURO: Alert. Normal gate.  SKIN: Dry. No rashes.  PSYCH: Normal judgment. Normal affect.      ED Course   Procedures  Labs Reviewed   URINALYSIS, REFLEX TO URINE CULTURE - Abnormal; Notable for the following components:       Result Value    pH, UA >8.0 (*)     All other components within normal limits    Narrative:     Preferred Collection Type->Urine, Clean Catch   URINALYSIS MICROSCOPIC    Narrative:     Preferred Collection Type->Urine, Clean Catch   ISTAT PROCEDURE     EKG Readings: (Independently Interpreted)   Initial Reading: No STEMI. Previous EKG: Compared with most recent EKG Rhythm: Normal Sinus Rhythm. Heart Rate: 72. ST Segments: Non-Specific ST Segment Depression.     ECG Results          EKG 12-lead (Final result)   Result time 03/31/20 15:50:46    Final result by Interface, Lab In [facility] (03/31/20 15:50:46)                 Narrative:    Test Reason : R06.02,    Vent. Rate : 072 BPM     Atrial Rate : 072 BPM     P-R Int : 158 ms          QRS Dur : 112 ms      QT Int : 416 ms       P-R-T Axes : 058 046 048 degrees     QTc Int : 455 ms    Normal sinus rhythm  Incomplete right bundle branch block  Nonspecific T wave abnormality  Abnormal ECG  When compared with ECG of 06-NOV-2017 12:02,  T wave inversion no longer evident in Inferior leads  Nonspecific T wave abnormality has replaced inverted T waves in Anterior  leads  Confirmed by ANGELICA PANTOJA MD ([id number]) on 3/31/2020 3:50:38 PM    Referred By: [email or web address]   SELF           Confirmed By:ANGELICA PANTOJA MD                            Imaging Results          X-Ray Chest AP Portable (Final result)  Result time 03/31/20 14:18:20    Final result by Leyla Alcocer MD (03/31/20 14:18:20)                 Impression:      Abnormal chest radiograph with multifocal patchy subsegmental opacities in both lungs compatible with viral pneumonia.    I recommend repeat PA and lateral chest radiographs after interval of 6-12 weeks to confirm radiographic return to baseline.      Electronically signed by: Leyla Alcocer MD  Date:    03/31/2020  Time:    14:18             Narrative:    EXAMINATION:  XR CHEST AP PORTABLE    CLINICAL HISTORY:  sob;    TECHNIQUE:  Single frontal view of the chest was performed.    COMPARISON:  01/06/2017.  01/25/2017.  11/01/2016.    FINDINGS:  Additional history: 74-year-old woman with hypertension and seizures presents with recent upper respiratory symptoms including shortness of breath, fever, nausea, diarrhea and loss of taste.  Patient was diagnosed on 03/18/2020 as COVID-19 positive.  Heart rate 78, respiratory rate 20, temperature 98.1° F, SpO2 98%.    The patient has S shaped thoracolumbar scoliosis and tortuous thoracic aorta; double aortic arch, a congenital variant,  is not excluded.    Mediastinal structures are midline.  Pulmonary vascular distribution is normal arguing for normal left heart pressures.    There are faint patchy heterogeneous subsegmental opacities in both lungs with relative peripheral predominance.  Findings are concerning for viral pneumonia in this patient with history of positive COVID-19 test.    I detect no pleural fluid, pneumothorax, pneumomediastinum, pneumoperitoneum or significant osseous abnormality.                              X-Rays:   Independently Interpreted Readings:   Chest X-Ray: Patchy opacities bilateral lobes, no evidence of pneumothorax or free air     Medical Decision Making:   History:   Old Medical Records: I decided to obtain old medical records.  Old Records Summarized: records from clinic visits.  Initial Assessment:   74-year-old female with a history of arthritis,, hypertension, hypothyroidism, history of trigeminal neuralgia, and seizures presenting with recent upper respiratory symptoms including shortness of breath, jitteriness, fever, weakness, nausea, and nausea and loss of taste.  Differential Diagnosis:   Differential diagnosis includes but not limited to.:  COVID-19 symptoms, pneumonia, upper respiratory infection, viral syndrome, ACS  Independently Interpreted Test(s):   I have ordered and independently interpreted X-rays - see prior notes.  Clinical Tests:   Lab Tests: Ordered and Reviewed  Radiological Study: Ordered and Reviewed  Medical Tests: Ordered and Reviewed    Urgent evaluation of patient presenting with shortness of breath, weakness and nausea.  She has symptoms of COVID-19 but without hypoxemia.  Vital signs are stable, she is afebrile, without hypoxemia, hypotension or without tachycardia.  Physical exam findings remarkable for diminished breath sounds at the bases with occasional rhonchi.  ECG obtained with no signs of ischemia or STEMI on my read.  Chest x-ray obtained which shows bilateral patchy  infiltrates consistent with pneumonia.  Will begin treatment with Azithromycin as an outpatient.  No evidence of hypoxemia at rest or with movement.  Offered patient observation however patient states she would be better off at home.  I feel this is reasonable as patient is without hypoxemia.  Discharged home with close follow-up, COVID-19 symptom information, self-monitoring, self management and self-quarantine with PCP follow-up. Patient agreeable to discharge plan. Strict ED precautions and return instructions discussed at length and patient verbalized understanding. All questions were answered and ample time was given for questions.      Complexity:  High risk          Scribe Attestation:   Scribe #1: I performed the above scribed service and the documentation accurately describes the services I performed. I attest to the accuracy of the note.    I, Dr. Solis Patricio, personally performed the services described in this documentation. All medical record entries made by the scribe were at my direction and in my presence.  I have reviewed the chart and agree that the record reflects my personal performance and is accurate and complete.                         Clinical Impression:       ICD-10-CM ICD-9-CM   1. Acute respiratory disease due to COVID-19 virus J06.9     B97.29    2. SOB (shortness of breath) R06.02 786.05         Disposition:   Disposition: Discharged  Condition: Stable     ED Disposition Condition    Discharge Stable        ED Prescriptions     Medication Sig Dispense Start Date End Date Auth. Provider    azithromycin (Z-DEYANIRA) 250 MG tablet Take 1 tablet (250 mg total) by mouth once daily. Take first 2 tablets together, then 1 every day until finished. 6 tablet 3/31/2020  Solis Patricio, DO        Follow-up Information     Follow up With Specialties Details Why Contact Info    Odilia Eckert MD Internal Medicine Schedule an appointment as soon as possible for a visit in 1 week  [address] [facility]  [facility] [address]  [phone]                          Solis Patricio DO, FAAEM  Emergency Staff Physician   Dept of Emergency Medicine   [facility]  Spectralink: [phone]               Solis Patricio DO  04/03/20 [id number]

## 2020-03-31 NOTE — TELEPHONE ENCOUNTER
----- Message from Anusha Barr MA sent at 3/31/2020 10:33 AM CDT -----  Spoke with patient she is c/o of SOB, weakness. Patient wanted to schedule virtual visit but does not know how to log on to [facility]. I have scheduled patient a 1pm appointment today with Dr. Eckert, can patient be called to be advised prior to appointment.

## 2020-03-31 NOTE — DISCHARGE INSTRUCTIONS
Today, your evaluation did not show any laboratory abnormalities.  However your chest x-ray did show early pneumonia.  This is likely secondary to your COVID-19 infection.  We will place you on antibiotics for 5 days.  Please follow-up with your primary care this week.

## 2020-03-31 NOTE — ED TRIAGE NOTES
Daisha Mayo, a 74 y.o. female presents to the ED via   PMV with CC Shortness of Breath (SOB, jittery, fever, COVID19 positive 3/18/2020. Pt stated she has been jittery since Saturday. Pt denies SOB. Pt stated her PCP states she should come in to the ER.      Patient identifiers verified verbally with armband and correct for Daisha Mayo.    LOC/ APPEARANCE: The patient is AAOx4. Pt is speaking appropriately, no slurred speech. Pt changed into hospital gown. Continuous cardiac monitor, cont pulse ox, and auto BP cuff applied to patient. Pt is clean and well groomed. No JVD visible. Pt reports pain level of 0. Pt updated on POC. Bed low and locked with side rails up x2, call bell in pt reach.  SKIN: Skin is warm dry and intact, and color is consistent with ethnicity. Capillary refill <3 seconds. No breakdown or brusing visible. Mucus membranes moist, acyanotic.  RESPIRATORY: Airway is open and patent. Respirations-spontaneous, unlabored, regular rate, equal bilaterally on inspiration and expiration. No accessory muscle use noted. Lungs clear to auscultation in all fields bilaterally anterior and posterior.   CARDIAC: Patient has regular heart rate.  No peripheral edema noted, and patient has no c/o chest pain. Peripheral pulses present equal and strong throughout.  ABDOMEN: Soft and non-tender to palpation with no distention noted. Normoactive bowel sounds x4 quadrants. Pt has no complaints of abnormal bowel movements, denies blood in stool. Pt reports normal appetite.   NEUROLOGIC: Eyes open spontaneously and facial expression symmetrical. Pt behavior appropriate to situation, and pt follows commands. Pt reports sensation present in all extremities when touched with a finger, denies any numbness or tingling. PERRLA  MUSCULOSKELETAL: Spontaneous movement noted to all extremities. Hand  equal and leg strength strong +5 bilaterally.   : No complaints of frequency, burning, urgency or blood in the  urine. No complaints of incontinence.

## 2020-03-31 NOTE — TELEPHONE ENCOUNTER
Pt states she started with the shakes and tremors and she is SOB call was transferred to PCP advised per pcp pt needs to be seen in the ED called the ED and they advised of proper protocol for positive pt

## 2020-03-31 NOTE — TELEPHONE ENCOUNTER
Pt with continued feelings of weakness,oral candidiasis. Denies any SOB. Afebrile. Offered and accepted visit with PCP.    Reason for Disposition   Mild body aches, chills, diarrhea, headache, runny nose, or sore throat occur within 14 days of COVID-19 EXPOSURE    Additional Information   Negative: Severe difficulty breathing (e.g., struggling for each breath, speak in single words, bluish lips)   Negative: Sounds like a life-threatening emergency to the triager   Negative: Difficulty breathing (shortness of breath) occurs and onset > 14 days after COVID-19 EXPOSURE (Close Contact)   Negative: Cough occurs and onset > 14 days after COVID-19 EXPOSURE   Negative: Common cold symptoms and onset > 14 days after COVID-19 EXPOSURE   Negative: Difficulty breathing occurs within 14 days of COVID-19 EXPOSURE   Negative: Patient sounds very sick or weak to the triager   Negative: Fever or feeling feverish within 14 Days of COVID-19 EXPOSURE   Negative: Cough occurs within 14 days of COVID-19 EXPOSURE    Protocols used: CORONAVIRUS (COVID-19) EXPOSURE-A-OH

## 2020-04-01 ENCOUNTER — PES CALL (OUTPATIENT)
Dept: ADMINISTRATIVE | Facility: CLINIC | Age: 75
End: 2020-04-01

## 2020-04-01 ENCOUNTER — NURSE TRIAGE (OUTPATIENT)
Dept: ADMINISTRATIVE | Facility: CLINIC | Age: 75
End: 2020-04-01

## 2020-04-01 DIAGNOSIS — U07.1 COVID-19 VIRUS DETECTED: Primary | ICD-10-CM

## 2020-04-01 NOTE — TELEPHONE ENCOUNTER
Called patient who reports thrush and vaginitis symptoms.  Recommend patient continue yogurt consumption.  Prescription for nystatin has been sent to the pharmacy electronically.  Advised patient to complete antibiotics before taking additional Diflucan.

## 2020-04-01 NOTE — TELEPHONE ENCOUNTER
Day 14   Went to ER for SOB yesterday   Spoke to . - he states she is doing well.     Reason for Disposition   Cough occurs and onset > 14 days after COVID-19 EXPOSURE    Additional Information   Negative: Severe difficulty breathing (e.g., struggling for each breath, speak in single words, bluish lips)   Negative: Sounds like a life-threatening emergency to the triager    Protocols used: CORONAVIRUS (COVID-19) EXPOSURE-A-OH

## 2020-04-02 ENCOUNTER — OUTPATIENT CASE MANAGEMENT (OUTPATIENT)
Dept: ADMINISTRATIVE | Facility: OTHER | Age: 75
End: 2020-04-02

## 2020-04-02 NOTE — PROGRESS NOTES
" received referral because of COVID-19 SMS program.  Pt and  are both COVID-19 positive.  They reported that they have a daughter who is a nurse and is providing care for them at this time.  They reported that they are "doing well" and have all needed resources including food at this time.  They both denied any social work needs at this time.  Please consult social work if additional needs arise.    "

## 2020-04-03 ENCOUNTER — TELEPHONE (OUTPATIENT)
Dept: INTERNAL MEDICINE | Facility: CLINIC | Age: 75
End: 2020-04-03

## 2020-04-03 ENCOUNTER — VITALS (OUTPATIENT)
Dept: INTERNAL MEDICINE | Facility: CLINIC | Age: 75
End: 2020-04-03
Payer: MEDICARE

## 2020-04-03 ENCOUNTER — HOSPITAL ENCOUNTER (OUTPATIENT)
Facility: HOSPITAL | Age: 75
Discharge: HOME | End: 2020-04-07
Attending: EMERGENCY MEDICINE | Admitting: EMERGENCY MEDICINE
Payer: MEDICARE

## 2020-04-03 ENCOUNTER — NURSE TRIAGE (OUTPATIENT)
Dept: ADMINISTRATIVE | Facility: CLINIC | Age: 75
End: 2020-04-03

## 2020-04-03 ENCOUNTER — PATIENT MESSAGE (OUTPATIENT)
Dept: INTERNAL MEDICINE | Facility: CLINIC | Age: 75
End: 2020-04-03

## 2020-04-03 ENCOUNTER — OFFICE VISIT (OUTPATIENT)
Dept: INTERNAL MEDICINE | Facility: CLINIC | Age: 75
End: 2020-04-03
Payer: MEDICARE

## 2020-04-03 VITALS — HEART RATE: 76 BPM | OXYGEN SATURATION: 98 % | RESPIRATION RATE: 20 BRPM

## 2020-04-03 DIAGNOSIS — R53.1 WEAKNESS: Primary | ICD-10-CM

## 2020-04-03 DIAGNOSIS — U07.1 COVID-19 VIRUS INFECTION: Primary | ICD-10-CM

## 2020-04-03 DIAGNOSIS — U07.1 COVID-19 VIRUS INFECTION: ICD-10-CM

## 2020-04-03 PROBLEM — G93.40 ENCEPHALOPATHY: Status: ACTIVE | Noted: 2020-04-03

## 2020-04-03 PROBLEM — Z20.822 SUSPECTED COVID-19 VIRUS INFECTION: Status: ACTIVE | Noted: 2020-04-03

## 2020-04-03 PROBLEM — R62.7 ADULT FAILURE TO THRIVE: Status: ACTIVE | Noted: 2020-04-03

## 2020-04-03 LAB
ALBUMIN SERPL BCP-MCNC: 3.1 G/DL (ref 3.5–5.2)
ALP SERPL-CCNC: 96 U/L (ref 55–135)
ALT SERPL W/O P-5'-P-CCNC: 20 U/L (ref 10–44)
AMORPH CRY UR QL COMP ASSIST: NORMAL
ANION GAP SERPL CALC-SCNC: 10 MMOL/L (ref 8–16)
AST SERPL-CCNC: 14 U/L (ref 10–40)
BASOPHILS # BLD AUTO: 0.04 K/UL (ref 0–0.2)
BASOPHILS NFR BLD: 0.6 % (ref 0–1.9)
BILIRUB SERPL-MCNC: 0.5 MG/DL (ref 0.1–1)
BILIRUB UR QL STRIP: NEGATIVE
BNP SERPL-MCNC: 42 PG/ML (ref 0–99)
BUN SERPL-MCNC: 8 MG/DL (ref 8–23)
CALCIUM SERPL-MCNC: 8.5 MG/DL (ref 8.7–10.5)
CHLORIDE SERPL-SCNC: 107 MMOL/L (ref 95–110)
CK SERPL-CCNC: 42 U/L (ref 20–180)
CLARITY UR REFRACT.AUTO: ABNORMAL
CO2 SERPL-SCNC: 24 MMOL/L (ref 23–29)
COLOR UR AUTO: YELLOW
CREAT SERPL-MCNC: 0.7 MG/DL (ref 0.5–1.4)
CRP SERPL-MCNC: 4.2 MG/L (ref 0–8.2)
DIFFERENTIAL METHOD: ABNORMAL
EOSINOPHIL # BLD AUTO: 0.1 K/UL (ref 0–0.5)
EOSINOPHIL NFR BLD: 0.9 % (ref 0–8)
ERYTHROCYTE [DISTWIDTH] IN BLOOD BY AUTOMATED COUNT: 12.3 % (ref 11.5–14.5)
EST. GFR  (AFRICAN AMERICAN): >60 ML/MIN/1.73 M^2
EST. GFR  (NON AFRICAN AMERICAN): >60 ML/MIN/1.73 M^2
FERRITIN SERPL-MCNC: 499 NG/ML (ref 20–300)
GLUCOSE SERPL-MCNC: 92 MG/DL (ref 70–110)
GLUCOSE UR QL STRIP: NEGATIVE
HCT VFR BLD AUTO: 40.1 % (ref 37–48.5)
HGB BLD-MCNC: 13.2 G/DL (ref 12–16)
HGB UR QL STRIP: NEGATIVE
IMM GRANULOCYTES # BLD AUTO: 0.02 K/UL (ref 0–0.04)
IMM GRANULOCYTES NFR BLD AUTO: 0.3 % (ref 0–0.5)
KETONES UR QL STRIP: NEGATIVE
LACTATE SERPL-SCNC: 0.8 MMOL/L (ref 0.5–2.2)
LDH SERPL L TO P-CCNC: 244 U/L (ref 110–260)
LEUKOCYTE ESTERASE UR QL STRIP: ABNORMAL
LYMPHOCYTES # BLD AUTO: 1.9 K/UL (ref 1–4.8)
LYMPHOCYTES NFR BLD: 28.5 % (ref 18–48)
MCH RBC QN AUTO: 32.4 PG (ref 27–31)
MCHC RBC AUTO-ENTMCNC: 32.9 G/DL (ref 32–36)
MCV RBC AUTO: 98 FL (ref 82–98)
MICROSCOPIC COMMENT: NORMAL
MONOCYTES # BLD AUTO: 0.9 K/UL (ref 0.3–1)
MONOCYTES NFR BLD: 13.9 % (ref 4–15)
NEUTROPHILS # BLD AUTO: 3.7 K/UL (ref 1.8–7.7)
NEUTROPHILS NFR BLD: 55.8 % (ref 38–73)
NITRITE UR QL STRIP: NEGATIVE
NRBC BLD-RTO: 0 /100 WBC
PH UR STRIP: 8 [PH] (ref 5–8)
PLATELET # BLD AUTO: 399 K/UL (ref 150–350)
PMV BLD AUTO: 9 FL (ref 9.2–12.9)
POTASSIUM SERPL-SCNC: 4 MMOL/L (ref 3.5–5.1)
PROCALCITONIN SERPL IA-MCNC: 0.03 NG/ML
PROT SERPL-MCNC: 6.9 G/DL (ref 6–8.4)
PROT UR QL STRIP: NEGATIVE
RBC # BLD AUTO: 4.08 M/UL (ref 4–5.4)
RBC #/AREA URNS AUTO: 3 /HPF (ref 0–4)
SODIUM SERPL-SCNC: 141 MMOL/L (ref 136–145)
SP GR UR STRIP: 1.01 (ref 1–1.03)
SQUAMOUS #/AREA URNS AUTO: 0 /HPF
TROPONIN I SERPL DL<=0.01 NG/ML-MCNC: <0.006 NG/ML (ref 0–0.03)
URN SPEC COLLECT METH UR: ABNORMAL
WBC # BLD AUTO: 6.56 K/UL (ref 3.9–12.7)
WBC #/AREA URNS AUTO: 0 /HPF (ref 0–5)

## 2020-04-03 PROCEDURE — 83615 LACTATE (LD) (LDH) ENZYME: CPT | Mod: HCNC

## 2020-04-03 PROCEDURE — 25000003 PHARM REV CODE 250: Mod: HCNC | Performed by: EMERGENCY MEDICINE

## 2020-04-03 PROCEDURE — 93005 ELECTROCARDIOGRAM TRACING: CPT | Mod: HCNC

## 2020-04-03 PROCEDURE — 99285 EMERGENCY DEPT VISIT HI MDM: CPT | Mod: 25,HCNC

## 2020-04-03 PROCEDURE — 84145 PROCALCITONIN (PCT): CPT | Mod: HCNC

## 2020-04-03 PROCEDURE — 99285 EMERGENCY DEPT VISIT HI MDM: CPT | Mod: HCNC,,, | Performed by: EMERGENCY MEDICINE

## 2020-04-03 PROCEDURE — 1101F PT FALLS ASSESS-DOCD LE1/YR: CPT | Mod: HCNC,CPTII,95, | Performed by: INTERNAL MEDICINE

## 2020-04-03 PROCEDURE — 36000 PLACE NEEDLE IN VEIN: CPT | Mod: HCNC

## 2020-04-03 PROCEDURE — 99223 PR INITIAL HOSPITAL CARE,LEVL III: ICD-10-PCS | Mod: HCNC,AI,ICN, | Performed by: FAMILY MEDICINE

## 2020-04-03 PROCEDURE — 99285 PR EMERGENCY DEPT VISIT,LEVEL V: ICD-10-PCS | Mod: HCNC,,, | Performed by: EMERGENCY MEDICINE

## 2020-04-03 PROCEDURE — 25000003 PHARM REV CODE 250: Mod: HCNC | Performed by: PHYSICIAN ASSISTANT

## 2020-04-03 PROCEDURE — 93010 EKG 12-LEAD: ICD-10-PCS | Mod: HCNC,,, | Performed by: INTERNAL MEDICINE

## 2020-04-03 PROCEDURE — 85025 COMPLETE CBC W/AUTO DIFF WBC: CPT | Mod: HCNC

## 2020-04-03 PROCEDURE — 1101F PR PT FALLS ASSESS DOC 0-1 FALLS W/OUT INJ PAST YR: ICD-10-PCS | Mod: HCNC,CPTII,95, | Performed by: INTERNAL MEDICINE

## 2020-04-03 PROCEDURE — 83880 ASSAY OF NATRIURETIC PEPTIDE: CPT | Mod: HCNC

## 2020-04-03 PROCEDURE — 1159F MED LIST DOCD IN RCRD: CPT | Mod: HCNC,95,, | Performed by: INTERNAL MEDICINE

## 2020-04-03 PROCEDURE — 99442 PR PHYSICIAN TELEPHONE EVALUATION 11-20 MIN: ICD-10-PCS | Mod: HCNC,95,, | Performed by: INTERNAL MEDICINE

## 2020-04-03 PROCEDURE — 93010 ELECTROCARDIOGRAM REPORT: CPT | Mod: HCNC,,, | Performed by: INTERNAL MEDICINE

## 2020-04-03 PROCEDURE — 3074F SYST BP LT 130 MM HG: CPT | Mod: HCNC,CPTII,95, | Performed by: INTERNAL MEDICINE

## 2020-04-03 PROCEDURE — 3078F DIAST BP <80 MM HG: CPT | Mod: HCNC,CPTII,95, | Performed by: INTERNAL MEDICINE

## 2020-04-03 PROCEDURE — 3074F PR MOST RECENT SYSTOLIC BLOOD PRESSURE < 130 MM HG: ICD-10-PCS | Mod: HCNC,CPTII,95, | Performed by: INTERNAL MEDICINE

## 2020-04-03 PROCEDURE — 99442 PR PHYSICIAN TELEPHONE EVALUATION 11-20 MIN: CPT | Mod: HCNC,95,, | Performed by: INTERNAL MEDICINE

## 2020-04-03 PROCEDURE — G0378 HOSPITAL OBSERVATION PER HR: HCPCS | Mod: HCNC

## 2020-04-03 PROCEDURE — 25000003 PHARM REV CODE 250: Mod: HCNC | Performed by: FAMILY MEDICINE

## 2020-04-03 PROCEDURE — 3078F PR MOST RECENT DIASTOLIC BLOOD PRESSURE < 80 MM HG: ICD-10-PCS | Mod: HCNC,CPTII,95, | Performed by: INTERNAL MEDICINE

## 2020-04-03 PROCEDURE — 99999 PR PBB SHADOW E&M-EST. PATIENT-LVL II: ICD-10-PCS | Mod: PBBFAC,HCNC,,

## 2020-04-03 PROCEDURE — 99999 PR PBB SHADOW E&M-EST. PATIENT-LVL II: CPT | Mod: PBBFAC,HCNC,,

## 2020-04-03 PROCEDURE — 84484 ASSAY OF TROPONIN QUANT: CPT | Mod: HCNC

## 2020-04-03 PROCEDURE — 1159F PR MEDICATION LIST DOCUMENTED IN MEDICAL RECORD: ICD-10-PCS | Mod: HCNC,95,, | Performed by: INTERNAL MEDICINE

## 2020-04-03 PROCEDURE — 83605 ASSAY OF LACTIC ACID: CPT | Mod: HCNC

## 2020-04-03 PROCEDURE — 86140 C-REACTIVE PROTEIN: CPT | Mod: HCNC

## 2020-04-03 PROCEDURE — 81001 URINALYSIS AUTO W/SCOPE: CPT | Mod: HCNC

## 2020-04-03 PROCEDURE — 80053 COMPREHEN METABOLIC PANEL: CPT | Mod: HCNC

## 2020-04-03 PROCEDURE — 82550 ASSAY OF CK (CPK): CPT | Mod: HCNC

## 2020-04-03 PROCEDURE — 99223 1ST HOSP IP/OBS HIGH 75: CPT | Mod: HCNC,AI,ICN, | Performed by: FAMILY MEDICINE

## 2020-04-03 PROCEDURE — 82728 ASSAY OF FERRITIN: CPT | Mod: HCNC

## 2020-04-03 RX ORDER — ATORVASTATIN CALCIUM 10 MG/1
10 TABLET, FILM COATED ORAL DAILY
Status: DISCONTINUED | OUTPATIENT
Start: 2020-04-04 | End: 2020-04-07 | Stop reason: HOSPADM

## 2020-04-03 RX ORDER — ONDANSETRON 8 MG/1
8 TABLET, ORALLY DISINTEGRATING ORAL EVERY 8 HOURS PRN
Status: DISCONTINUED | OUTPATIENT
Start: 2020-04-03 | End: 2020-04-07 | Stop reason: HOSPADM

## 2020-04-03 RX ORDER — AZITHROMYCIN 250 MG/1
250 TABLET, FILM COATED ORAL DAILY
Status: COMPLETED | OUTPATIENT
Start: 2020-04-04 | End: 2020-04-05

## 2020-04-03 RX ORDER — LOPERAMIDE HYDROCHLORIDE 2 MG/1
2 CAPSULE ORAL EVERY 6 HOURS PRN
Status: DISCONTINUED | OUTPATIENT
Start: 2020-04-03 | End: 2020-04-07 | Stop reason: HOSPADM

## 2020-04-03 RX ORDER — LATANOPROST 50 UG/ML
1 SOLUTION/ DROPS OPHTHALMIC NIGHTLY
Status: DISCONTINUED | OUTPATIENT
Start: 2020-04-03 | End: 2020-04-07 | Stop reason: HOSPADM

## 2020-04-03 RX ORDER — ONDANSETRON 2 MG/ML
4 INJECTION INTRAMUSCULAR; INTRAVENOUS EVERY 8 HOURS PRN
Status: DISCONTINUED | OUTPATIENT
Start: 2020-04-03 | End: 2020-04-07 | Stop reason: HOSPADM

## 2020-04-03 RX ORDER — HYDRALAZINE HYDROCHLORIDE 25 MG/1
25 TABLET, FILM COATED ORAL EVERY 8 HOURS PRN
Status: DISCONTINUED | OUTPATIENT
Start: 2020-04-03 | End: 2020-04-07 | Stop reason: HOSPADM

## 2020-04-03 RX ORDER — ALBUTEROL SULFATE 90 UG/1
2 AEROSOL, METERED RESPIRATORY (INHALATION) EVERY 4 HOURS PRN
Status: DISCONTINUED | OUTPATIENT
Start: 2020-04-03 | End: 2020-04-07 | Stop reason: HOSPADM

## 2020-04-03 RX ORDER — GLUCAGON 1 MG
1 KIT INJECTION
Status: DISCONTINUED | OUTPATIENT
Start: 2020-04-03 | End: 2020-04-07 | Stop reason: HOSPADM

## 2020-04-03 RX ORDER — GLYCERIN 1 G/1
1 SUPPOSITORY RECTAL
Status: ON HOLD | COMMUNITY
End: 2020-04-07 | Stop reason: HOSPADM

## 2020-04-03 RX ORDER — ONDANSETRON 4 MG/1
4 TABLET, ORALLY DISINTEGRATING ORAL EVERY 6 HOURS PRN
Status: ON HOLD | COMMUNITY
End: 2020-04-07 | Stop reason: HOSPADM

## 2020-04-03 RX ORDER — ACETAMINOPHEN 325 MG/1
650 TABLET ORAL
Status: COMPLETED | OUTPATIENT
Start: 2020-04-03 | End: 2020-04-03

## 2020-04-03 RX ORDER — TALC
6 POWDER (GRAM) TOPICAL NIGHTLY PRN
Status: DISCONTINUED | OUTPATIENT
Start: 2020-04-03 | End: 2020-04-07 | Stop reason: HOSPADM

## 2020-04-03 RX ORDER — ALPRAZOLAM 0.25 MG/1
0.25 TABLET ORAL 3 TIMES DAILY PRN
Status: DISCONTINUED | OUTPATIENT
Start: 2020-04-03 | End: 2020-04-07 | Stop reason: HOSPADM

## 2020-04-03 RX ORDER — BENZONATATE 100 MG/1
100 CAPSULE ORAL 3 TIMES DAILY PRN
Status: DISCONTINUED | OUTPATIENT
Start: 2020-04-03 | End: 2020-04-07 | Stop reason: HOSPADM

## 2020-04-03 RX ORDER — ENOXAPARIN SODIUM 100 MG/ML
40 INJECTION SUBCUTANEOUS EVERY 24 HOURS
Status: DISCONTINUED | OUTPATIENT
Start: 2020-04-04 | End: 2020-04-07 | Stop reason: HOSPADM

## 2020-04-03 RX ORDER — HYDROXYCHLOROQUINE SULFATE 200 MG/1
200 TABLET, FILM COATED ORAL 2 TIMES DAILY
Status: DISCONTINUED | OUTPATIENT
Start: 2020-04-04 | End: 2020-04-03

## 2020-04-03 RX ORDER — HYDROXYCHLOROQUINE SULFATE 200 MG/1
200 TABLET, FILM COATED ORAL DAILY
Status: DISCONTINUED | OUTPATIENT
Start: 2020-04-04 | End: 2020-04-07 | Stop reason: HOSPADM

## 2020-04-03 RX ORDER — ACETAMINOPHEN 325 MG/1
650 TABLET ORAL EVERY 4 HOURS PRN
Status: DISCONTINUED | OUTPATIENT
Start: 2020-04-03 | End: 2020-04-07 | Stop reason: HOSPADM

## 2020-04-03 RX ORDER — IBUPROFEN 200 MG
24 TABLET ORAL
Status: DISCONTINUED | OUTPATIENT
Start: 2020-04-03 | End: 2020-04-07 | Stop reason: HOSPADM

## 2020-04-03 RX ORDER — IBUPROFEN 200 MG
16 TABLET ORAL
Status: DISCONTINUED | OUTPATIENT
Start: 2020-04-03 | End: 2020-04-07 | Stop reason: HOSPADM

## 2020-04-03 RX ORDER — LEVOTHYROXINE SODIUM 25 UG/1
25 TABLET ORAL DAILY
Status: DISCONTINUED | OUTPATIENT
Start: 2020-04-04 | End: 2020-04-07 | Stop reason: HOSPADM

## 2020-04-03 RX ADMIN — ACETAMINOPHEN 650 MG: 325 TABLET ORAL at 06:04

## 2020-04-03 RX ADMIN — LATANOPROST 1 DROP: 50 SOLUTION OPHTHALMIC at 08:04

## 2020-04-03 RX ADMIN — ALPRAZOLAM 0.25 MG: 0.25 TABLET ORAL at 09:04

## 2020-04-03 NOTE — PROGRESS NOTES
[facility] INTERNAL MEDICINE  Drive Thru Respiratory Check    Date: 04/03/2020  Referred by: Dr. Odilia Eckert    Narrative of symptoms: Patient is positive for COVID.  Daughter states she has developed busing on her leg leg and severe pain.  She has also had a change in mentation and become combative.     Vitals:    Pulse: 76    Pulse oximetry (on room air): 98 %.    Respirations: 20    Patient Assessment: uncontrolled, not appropriate for drive through.  Sent to ER

## 2020-04-03 NOTE — H&P
"Hospital Medicine  History and Physical  [facility]      Patient Name: Daisha Mayo  MRN:  [id number]  Hospital Medicine Team: [facility]   Date of Admission:  4/3/2020     Length of Stay:  LOS: 0 days     Principal Problem: COVID +    Chief complaint Altered Mental Status    HPI    Daisha Mayo is a 74 y.o. F with HTN, depression, arthritis, seizures, hypothyroidism, trigeminal neuralgia who presents ED for acute onset SOB x1 day and altered mental status with associated fevers, fatigue, chills, and myalgias x14 days.  She is also c/o left leg pain and bruising.       Apropros the confusion, she describes a occational "hyperventilating" and anxiety.  She reports that she and her  have been tested posittive for COVID. No exacerbating or alleviating factors.  She states that she no longer is having diarrhea. Of note, daughter is a nurse who as been caring for her parents.  Patient was diagnosed 3/18 with COVID and presented 3/31 with respiratory distress.  She was not hypoxic but had evidence of bilateral pneumonia on xray.  Patient was deemed safe to discharge home and was started on azithromycin outpatient.     In the ED, AF, HDS, on room air. CXR "Minimal amount of patchy opacity in the periphery of the left middle lung zone which may be seen with pneumonia.  Previously identified bilateral opacities are less conspicuous on today's exam." US LLE DVT negative    Review of Systems    Constitutional: Positive for fever, chills, fatigue, poor appetite   HENT:negative for trouble swallowing, sore throat  Eyes: Negative for photophobia, visual disturbance.   Respiratory: Positive for cough, shortness of breath  Cardiovascular: Negative for chest pain, palpitations, leg swelling.   Gastrointestinal: Negative for abdominal pain, constipation, nausea, vomiting.   Endocrine: Negative for cold intolerance, heat intolerance.   Genitourinary: Negative for dysuria, frequency. "   Musculoskeletal: Negative for arthralgias, myalgias.   Skin: Negative for rash  Neurological: Negative for dizziness, syncope, light-headedness.   Psychiatric/Behavioral: Negative for confusion, hallucinations, anxiety    Past Medical History:   Diagnosis Date    Arthritis     Cataract     Class 1 obesity due to excess calories in adult 10/19/2018    Depression     Around [place]; treated with Wellbutrin    Early cataracts, bilateral     SAMSON (generalized anxiety disorder) 2/5/2019    GERD (gastroesophageal reflux disease)     Glaucoma     Hyperprolactinemia     Hypertension     Hypothyroidism     Need for prophylactic vaccination against Streptococcus pneumoniae (pneumococcus) 10/14/2015    Osteopenia     Psychiatric problem     Pure hyperglyceridemia 3/31/2017    Recurrent UTI     Every few months    Sciatica neuralgia     Seizures 2006    temporal lobe seizure    Temporal lobe seizure 2006    Trigeminal neuralgia     Vitamin D deficiency disease      Past Surgical History:   Procedure Laterality Date    BREAST BIOPSY      duct excision    CHOLECYSTECTOMY  1994    laporascopic    COLONOSCOPY      EYE SURGERY      LASIK surgery to one eye; cannot remember which one    HYSTERECTOMY  1985    low transverse incision.    TOTAL ABDOMINAL HYSTERECTOMY W/ BILATERAL SALPINGOOPHORECTOMY      1985; diffinitive treatment for menorrhagia     Family History   Problem Relation Age of Onset    Heart disease Mother     Hypertension Mother     Thyroid disease Mother     Colon cancer Mother 90    Breast cancer Mother 104    Cancer Mother 103        colon and breast    Kidney disease Father     Heart disease Sister         pacemaker    Hypertension Sister     Thyroid disease Sister     Glaucoma Sister     Fibromyalgia Brother     Kidney disease Brother     Sleep apnea Brother     Hypertension Brother     Glaucoma Brother     Heart disease Brother         sudden death at age 67    Cancer  Cousin 31         from breast cancer at age 31    Breast cancer Cousin     Glaucoma Paternal Aunt     Glaucoma Paternal Uncle     Glaucoma Paternal Grandfather         Went blind from Glaucoma    Breast cancer Maternal Aunt     Celiac disease Neg Hx     Cirrhosis Neg Hx     Colon polyps Neg Hx     Crohn's disease Neg Hx     Cystic fibrosis Neg Hx     Esophageal cancer Neg Hx     Hemochromatosis Neg Hx     Inflammatory bowel disease Neg Hx     Irritable bowel syndrome Neg Hx     Liver cancer Neg Hx     Liver disease Neg Hx     Rectal cancer Neg Hx     Stomach cancer Neg Hx     Ulcerative colitis Neg Hx     Moses's disease Neg Hx      Social History     Socioeconomic History    Marital status:      Spouse name: Not on file    Number of children: Not on file    Years of education: Not on file    Highest education level: Not on file   Occupational History    Not on file   Social Needs    Financial resource strain: Not very hard    Food insecurity:     Worry: Never true     Inability: Never true    Transportation needs:     Medical: No     Non-medical: No   Tobacco Use    Smoking status: Never Smoker    Smokeless tobacco: Never Used   Substance and Sexual Activity    Alcohol use: No     Frequency: Monthly or less     Drinks per session: 1 or 2     Binge frequency: Never    Drug use: No    Sexual activity: Never   Lifestyle    Physical activity:     Days per week: 1 day     Minutes per session: 30 min    Stress: To some extent   Relationships    Social connections:     Talks on phone: More than three times a week     Gets together: More than three times a week     Attends Worship service: Not on file     Active member of club or organization: Yes     Attends meetings of clubs or organizations: More than 4 times per year     Relationship status:    Other Topics Concern    Patient feels they ought to cut down on drinking/drug use Not Asked    Patient annoyed by  others criticizing their drinking/drug use Not Asked    Patient has felt bad or guilty about drinking/drug use Not Asked    Patient has had a drink/used drugs as an eye opener in the AM Not Asked   Social History Narrative    Not on file       Medications  No current facility-administered medications on file prior to encounter.      Current Outpatient Medications on File Prior to Encounter   Medication Sig Dispense Refill    ASCORBATE CALCIUM (VITAMIN C ORAL) Take 1,000 mg by mouth once daily.       azithromycin (Z-DEYANIRA) 250 MG tablet Take 1 tablet (250 mg total) by mouth once daily. Take first 2 tablets together, then 1 every day until finished. 6 tablet 0    cholecalciferol, vitamin D3, 50,000 unit Tab Take 1 tablet by mouth every 7 days. 12 tablet 4    cyanocobalamin (VITAMIN B-12) 250 MCG tablet Take 1,000 mcg by mouth daily as needed. 1 Tablet Oral Every day      dorzolamide-timolol (COSOPT) 2-0.5 % ophthalmic solution Place 1 drop into both eyes 2 (two) times daily.        glycerin adult suppository Place 1 suppository rectally as needed for Constipation.      hydroxychloroquine (PLAQUENIL) 100 mg tablet Take by mouth 2 (two) times daily.      latanoprost 0.005 % ophthalmic solution Place 1 drop into both eyes every evening.      levothyroxine (SYNTHROID) 25 MCG tablet TAKE 1 TABLET ONE TIME DAILY 90 tablet 3    multivitamin with minerals (HAIR,SKIN AND NAILS ORAL) Take 1 tablet by mouth once daily.      multivitamin-minerals-lutein (CENTRUM SILVER) Tab Take 1 tablet by mouth Daily. 1 Tablet Oral Every day      nystatin (MYCOSTATIN) 100,000 unit/mL suspension Take 5 mLs (500,000 Units total) by mouth 4 (four) times daily with meals and nightly. for 10 days 200 mL 0    omega-3 fatty acids-vitamin E (FISH OIL) 1,000 mg Cap       ondansetron (ZOFRAN-ODT) 4 MG TbDL Take 4 mg by mouth every 6 (six) hours as needed.      VITAMIN D3 5,000 unit Tab       atorvastatin (LIPITOR) 10 MG tablet TAKE 1  TABLET (10 MG TOTAL) BY MOUTH ONCE DAILY. 90 tablet 3    [DISCONTINUED] meclizine (ANTIVERT) 12.5 mg tablet Take 1 tablet (12.5 mg total) by mouth 2 (two) times daily as needed for Dizziness. 30 tablet 1       Allergies  Latex    Physical Examination  Temp:  [97.1 °F (36.2 °C)-98.7 °F (37.1 °C)]   Pulse:  [72-79]   Resp:  [16-20]   BP: (124-156)/(58-66)   SpO2:  [96 %-99 %]     Gen: grossly anxous, conversant  Head: NC, AT  Eyes: PERRLA, EOMI  Throat: MMM, OP clear  CV: RRR, no M/R/G, no peripheral edema, no JVD  Resp: coarse breath sounds bilaterally, no increased work of breathing on room air  GI: Soft, NT, ND, +BS  Ext: MAEW, no c/c/e  Neuro: AAOx3, CN grossly intact, no focal neurologic deficits  Psychiatry: Normal mood, normal affect    Laboratory:  Recent Labs   Lab 03/31/20  1440 04/03/20  1350   WBC  --  6.56   LYMPH  --  28.5  1.9   HGB  --  13.2   HCT 41 40.1   PLT  --  399*     Recent Labs   Lab 04/03/20  1632      K 4.0      CO2 24   BUN 8   CREATININE 0.7   GLU 92   CALCIUM 8.5*     Recent Labs   Lab 04/03/20  1632   ALKPHOS 96   ALT 20   AST 14   ALBUMIN 3.1*   PROT 6.9   BILITOT 0.5      Recent Labs     04/03/20  1350 04/03/20  1629 04/03/20  1632   FERRITIN 499*  --   --    CRP  --   --  4.2     --   --    TROPONINI <0.006  --   --    LACTATE  --  0.8  --        All labs within the last 24 hours were reviewed.     Microbiology:  Lab Results   Component Value Date    [id number] Detected (A) 03/18/2020       Microbiology Results (last 7 days)     ** No results found for the last 168 hours. **            Imaging  ECG Results          EKG 12-lead (In process)  Result time 04/03/20 16:05:14    In process by Interface, Lab In [facility] (04/03/20 16:05:14)                 Narrative:    Test Reason : R68.89,    Vent. Rate : 063 BPM     Atrial Rate : 063 BPM     P-R Int : 172 ms          QRS Dur : 118 ms      QT Int : 466 ms       P-R-T Axes : 047 035 023 degrees     QTc Int : 476  ms    Normal sinus rhythm  Incomplete right bundle branch block  Nonspecific T wave abnormality  Prolonged QT  Abnormal ECG  When compared with ECG of 31-MAR-2020 13:40,  No significant change was found    Referred By: [email or web address]   SELF           Confirmed By:                               No results found for this or any previous visit.    X-Ray Chest AP Portable  Narrative: EXAMINATION:  XR CHEST AP PORTABLE    CLINICAL HISTORY:  Suspected Covid-19 Virus Infection;    TECHNIQUE:  Single frontal view of the chest was performed.    COMPARISON:  Chest radiograph from 03/31/2020    FINDINGS:  Mild cardiomegaly.  Normal pulmonary vasculature.  Previously identified patchy subsegmental opacities in the lungs are less conspicuous on today's exam.  Minimal amount of opacity in the periphery of the left middle lung zone which may be seen with pneumonia.  No pleural effusion.  No pneumothorax.  Degenerative changes of the spine.  Impression: Minimal amount of patchy opacity in the periphery of the left middle lung zone which may be seen with pneumonia.  Previously identified bilateral opacities are less conspicuous on today's exam.    Electronically signed by: Vito Castro MD  Date:    04/03/2020  Time:    14:03      All imaging within the last 24 hours was reviewed.       Assessment and Plan:    Active Hospital Problems    Diagnosis  POA    *COVID-19 virus infection [U07.1]  Yes     Priority: 1 - High    Adult failure to thrive [R62.7]  Unknown    Encephalopathy [G93.40]  Unknown    Mild episode of recurrent major depressive disorder [F33.0]  Yes    Essential hypertension [I10]  Yes    Pure hyperglyceridemia [E78.1]  Yes    History of seizure [Z87.898]  Not Applicable    Subclinical hypothyroidism [E03.9]  Yes    GERD (gastroesophageal reflux disease) [K21.9]  Yes      Resolved Hospital Problems   No resolved problems to display.       Failure to Thrive  - patient COVID+  since 3/18 presents with billateral leg  pain with associated weakness and worsening fatigue/cough.  CXR concerning for R middle new opacity.Patient unable to amublate on own in ED.    - Worsening physical decompensation likely secondary to recent COVID infection  - PT/OT consulted    Hypothyroidism  - c/w synthroid     HLD  - c/w statin    HTN  - not currently on any meds.   - hydralazine PRN for now    MDD  SAMSON  - Patient takes xanax prn at home and reports using very infrequently less than once monthly  - start Xanax 0.25mg po prn anxiety    COVID +  Person Under Investigation (PUI) for COVID-19  - COVID-19 testing: Collection Date: 3/18/2020 Collection Time:  11:31 AM  - Infection Control notified    - Isolation:   - Airborne and Droplet Precautions  - Surgical mask on patient   - N95 masks must be fit tested, wear eye protection  - 20 second hand hygiene   - Limit visitors per hospital policy   - Consolidate lab draws, nursing care, and interventions    - Diagnostics: (Lymphopenia, hyponatremia, hyperferritinemia, elevated troponin, elevated d-dimer, age, and comorbidities are significant predictors of poor clinical outcome)   - CBC:   trend Q48hrs  - CMP:        trend Q48hrs  - Procalcitonin:  - D-dimer:   repeat prior to discharge  - Ferritin: 499  repeat prior to discharge   - CRP: 4.2        trend Q48hrs  - LDH: 244  repeat prior to discharge  - BNP: pending  - Troponin: negative    - ECG:processing    - rapid Flu: negativ e3/17   - RIP only if BMT/solid transplant:   - Legionella antigen: not collected   - Blood culture x2:    - Sputum culture: pending   - CXR: concerning for pneumonia   - UA and culture: pending   - CPK:  42    - Management:   Bundle care as able to maintain isolation & minimize in/out of room   - Supplemental O2 to maintain SpO2 92%-96%   if requiring 6L NC or higher, place on nonrebreather and discuss case with MICU   - Telemetry & continuous pulse oximetry    - If wheezing   - albuterol inhaler 2-4 puff Q6hr PRN    -  "ipratropium daily    - acetaminophen 650mg PO Q6hr PRN fever   - loperamide PRN for viral diarrhea  - Recently treated with azithromycin for pneumonia.  Possible new opacity concerning for refractory/new pneumonia     - Will likely treat for CAP once UA collected (for sensitivities)    Ceftriaxone 1g IV Q24hrS     If azithromycin is not available, start doxycycline                 If MRSA risk factors, add Vancomycin IV (PharmD consult)   - Investigational Treatment Protocol: (if patient meets criteria)   [email or web address]     - c/w statin    - c/w HCQ 100mg PO BID x1 day for arthritis   (check glucose 6 phosphate dehydrogenase (NOT G6PD Quant), ECG at start & 48hrs, and start Qshift POCT glucose)    Safety notes:   - Avoid NIPPV (CPAP/BiPAP) to prevent aerosolization, use on a case-by-case basis if in neg pressure room   - Cautious use of NSAIDS for fever per WHO recommendations (3/16/2020)   - No new ACEi/ARB start or discontinuation of chronic med unless hypotensive (Esler et al. Journal of Hypertension 2020, 38:000-000)   - Careful use of steroids in the absence of other indications (shock, ARDS)   - Fluid sparing resuscitation, avoid maintenance fluids         Advance Care Planning  Goals of care, counseling/discussion FULL CODE    If patient transitions to Comfort-Focused Care, please place "Nurse Communication: End of Life Care, family members allowed to visit, including spouse/partner and adult children [please list names]. Please ask family to visit as a group and leave as a group.         VTE High Risk Prophylaxis: enoxaparin 40mg sq QHS @ 2100 (bundled care) if GFR >30    Patient's chronic/stable medical conditions noted in the assessment above will be managed with the patient's home medications as tolerated.     "

## 2020-04-03 NOTE — TELEPHONE ENCOUNTER
This is Zayra Robertssatish I am in town now and they started my mom on Hydroxychloroquine. She has the 1st days dose so far (4 pills). I have a few questions that she couldn't answer.     1) Should she still be taking her Thyroid medicine? Because she is not.     2) If her symptoms decline do I call you with them or the hospital? 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 I've been here for a day and had a call from a .  Is that the call or is there something else that I have to set-up?     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     BE SAFE! BE STRONG! AND PROTECT YOURSELF!     And most importantly THANK YOU and your staff for all that you do!!!!!

## 2020-04-03 NOTE — TELEPHONE ENCOUNTER
Covid home monitoring follow-up: Patient's daughter who is an RN reports that patient was seen in ER on 3/31 for SOB, weakness, fever,and nausea. PRescribed Z-Colten at visit. Daughter states patient is resting this morning but is becoming combative with hallucinations. Daughter has questions about hydroxychloroquine- which was started by an outside doctor. Daughter is unsure if medication is exacaberating symptoms or if this is a symptom of Covid. Daughter states mother has a history of anxiety and depression.  Daughter denies fever/SOB/difficulty breathing. Home pulse ox ranging 95-97%. No diarrhea. Daughter reports mother's symptoms are scary. She is an ER nurse and not sure if patient should go back to ER since she is not in respiratory distress. She is awaiting guidance from patient's PCP as to whether to continue hydroxychloroquine as well concerns about thyroid medication; patient stopped levothyroxine. Patient c/o bilateral leg pain but is ambulatory per daughter.  Encouraged fluids.   Discussed with daughter to await Dr. Eckert's call, but definitely go to the ER if symptoms worsen with confusion or if difficulty breathing or SOB develops.  Daughter will reassess when patient wakes up.        Reason for Disposition   Very strange or paranoid behavior    Additional Information   Negative: Severe difficulty breathing (e.g., struggling for each breath, speak in single words, bluish lips)   Negative: Sounds like a life-threatening emergency to the triager   Negative: Difficulty breathing occurs within 14 days of COVID-19 EXPOSURE   Negative: Patient sounds very sick or weak to the triager   Negative: Fever or feeling feverish within 14 Days of COVID-19 EXPOSURE   Negative: Difficult to awaken or acting confused (disoriented, slurred speech) and has diabetes   Negative: Difficult to awaken or acting confused (disoriented, slurred speech) and new onset   Negative: Loss of speech or garbled speech and new  onset   Negative: Difficulty breathing and bluish (or gray) lips or face   Negative: Fever > 100.5 F (38.1 C)    Protocols used: CONFUSION - DELIRIUM-A-OH, CORONAVIRUS (COVID-19) EXPOSURE-A-OH

## 2020-04-03 NOTE — PROVIDER PROGRESS NOTES - EMERGENCY DEPT.
Encounter Date: 4/3/2020    ED Physician Progress Notes        Physician Note:   Procedure note  Indication:  Left lower extremity pain  Visualized structures:        - confluence of left greater saphenous and left femoral vein       - left popliteal vein  Findings: visualized veins were easily compressible  Impression: negative 2-point compression test for deep venous thrombosis of left lower extremity  Pt informed that should symptoms persist in 1 week, she will need a repeat ultrasound    Procedure note  Indication:  Left knee pain  Visualized structures:        -Left femur, left patella, left patellofemoral recess  Findings: - no effusion, no significant tendonopathy  Impression: No left knee effusion

## 2020-04-03 NOTE — ED PROVIDER NOTES
"Date of service: 04/03/2020  Time: 5:39 PM    CC:  Chief Complaint   Patient presents with    Shortness of Breath     Patient with worsening shortness of breath since last night, altered mental status, BLE pain and swelling.        HPI:    74 y.o. female presents with acute onset shortness of breath, fever, chills, and myalgias for 14 days, though worsening over the past 2 days, and associated with some confusion and anxiety, which she describes as occational "hyperventilating." They have been tested for COVID and is positive.   Known Exposures include: her   Associated symptoms: mylagias, generalized weakness, left leg pain  Exacerbating factors: none  Relieving factors: none.  Relevant comorbidities: depression, arthritis, seizures, hypothyroidism, HTN      ROS:  Review of Systems   Constitutional: Positive for chills, fever and malaise/fatigue.   HENT: Positive for congestion and sore throat.    Eyes: Positive for blurred vision.   Respiratory: Positive for cough and shortness of breath.    Cardiovascular: Positive for chest pain.   Gastrointestinal: Positive for diarrhea. Negative for abdominal pain, heartburn, melena, nausea and vomiting.   Musculoskeletal: Positive for myalgias.   Neurological: Positive for dizziness and headaches. Negative for seizures, loss of consciousness and weakness.       PMH/PSH:  Past Medical History:   Diagnosis Date    Arthritis     Cataract     Class 1 obesity due to excess calories in adult 10/19/2018    Depression     Around [place]; treated with Wellbutrin    Early cataracts, bilateral     SAMSON (generalized anxiety disorder) 2/5/2019    GERD (gastroesophageal reflux disease)     Glaucoma     Hyperprolactinemia     Hypertension     Hypothyroidism     Need for prophylactic vaccination against Streptococcus pneumoniae (pneumococcus) 10/14/2015    Osteopenia     Psychiatric problem     Pure hyperglyceridemia 3/31/2017    Recurrent UTI     Every few months    " Sciatica neuralgia     Seizures 2006    temporal lobe seizure    Temporal lobe seizure 2006    Trigeminal neuralgia     Vitamin D deficiency disease      Past Surgical History:   Procedure Laterality Date    BREAST BIOPSY      duct excision    CHOLECYSTECTOMY  1994    laporascopic    COLONOSCOPY      EYE SURGERY      LASIK surgery to one eye; cannot remember which one    HYSTERECTOMY  1985    low transverse incision.    TOTAL ABDOMINAL HYSTERECTOMY W/ BILATERAL SALPINGOOPHORECTOMY      1985; diffinitive treatment for menorrhagia       MEDS    Current Facility-Administered Medications:     acetaminophen tablet 650 mg, 650 mg, Oral, Q4H PRN, Kb Lees PA-C    albuterol inhaler 2 puff, 2 puff, Inhalation, Q4H PRN **AND** MDI Q4H PRN, , , Q4H PRN, Kb Lees PA-C    ALPRAZolam tablet 0.25 mg, 0.25 mg, Oral, TID PRN, Romeo Avalos MD    [START ON 4/4/2020] atorvastatin tablet 10 mg, 10 mg, Oral, Daily, Kb Lees PA-C    [START ON 4/4/2020] azithromycin tablet 250 mg, 250 mg, Oral, Daily, Romeo Avalos MD    benzonatate capsule 100 mg, 100 mg, Oral, TID PRN, Kb Lees PA-C    dextrose 50% injection 25 g, 25 g, Intravenous, PRN, Kb Lees PA-C    [START ON 4/4/2020] enoxaparin injection 40 mg, 40 mg, Subcutaneous, Daily, Kb Lees PA-C    glucagon (human recombinant) injection 1 mg, 1 mg, Intramuscular, PRN, Kb Lees PA-C    glucose chewable tablet 16 g, 16 g, Oral, PRN, Kb Lees PA-C    glucose chewable tablet 24 g, 24 g, Oral, PRN, Kb Lees PA-C    hydrALAZINE tablet 25 mg, 25 mg, Oral, Q8H PRN, Kb Lees PA-C    [START ON 4/4/2020] hydroxychloroquine tablet 200 mg, 200 mg, Oral, BID, Kb Lees PA-C    latanoprost 0.005 % ophthalmic solution 1 drop, 1 drop, Both Eyes, QHS, Kb Lees PA-C    [START ON 4/4/2020] levothyroxine tablet 25 mcg, 25 mcg, Oral, Daily, Kb Lees PA-C     loperamide capsule 2 mg, 2 mg, Oral, Q6H PRN, Kb Lees PA-C    melatonin tablet 6 mg, 6 mg, Oral, Nightly PRN, Kb Lees PA-C    ondansetron disintegrating tablet 8 mg, 8 mg, Oral, Q8H PRN, Kb Lees PA-C    ondansetron injection 4 mg, 4 mg, Intravenous, Q8H PRN, Kb Lees PA-C    Current Outpatient Medications:     ASCORBATE CALCIUM (VITAMIN C ORAL), Take 1,000 mg by mouth once daily. , Disp: , Rfl:     azithromycin (Z-DEYANIRA) 250 MG tablet, Take 1 tablet (250 mg total) by mouth once daily. Take first 2 tablets together, then 1 every day until finished., Disp: 6 tablet, Rfl: 0    cholecalciferol, vitamin D3, 50,000 unit Tab, Take 1 tablet by mouth every 7 days., Disp: 12 tablet, Rfl: 4    cyanocobalamin (VITAMIN B-12) 250 MCG tablet, Take 1,000 mcg by mouth daily as needed. 1 Tablet Oral Every day, Disp: , Rfl:     dorzolamide-timolol (COSOPT) 2-0.5 % ophthalmic solution, Place 1 drop into both eyes 2 (two) times daily.  , Disp: , Rfl:     glycerin adult suppository, Place 1 suppository rectally as needed for Constipation., Disp: , Rfl:     hydroxychloroquine (PLAQUENIL) 100 mg tablet, Take by mouth 2 (two) times daily., Disp: , Rfl:     latanoprost 0.005 % ophthalmic solution, Place 1 drop into both eyes every evening., Disp: , Rfl:     levothyroxine (SYNTHROID) 25 MCG tablet, TAKE 1 TABLET ONE TIME DAILY, Disp: 90 tablet, Rfl: 3    multivitamin with minerals (HAIR,SKIN AND NAILS ORAL), Take 1 tablet by mouth once daily., Disp: , Rfl:     multivitamin-minerals-lutein (CENTRUM SILVER) Tab, Take 1 tablet by mouth Daily. 1 Tablet Oral Every day, Disp: , Rfl:     nystatin (MYCOSTATIN) 100,000 unit/mL suspension, Take 5 mLs (500,000 Units total) by mouth 4 (four) times daily with meals and nightly. for 10 days, Disp: 200 mL, Rfl: 0    omega-3 fatty acids-vitamin E (FISH OIL) 1,000 mg Cap, , Disp: , Rfl:     ondansetron (ZOFRAN-ODT) 4 MG TbDL, Take 4 mg by mouth every 6  (six) hours as needed., Disp: , Rfl:     VITAMIN D3 5,000 unit Tab, , Disp: , Rfl:     atorvastatin (LIPITOR) 10 MG tablet, TAKE 1 TABLET (10 MG TOTAL) BY MOUTH ONCE DAILY., Disp: 90 tablet, Rfl: 3    Allergies:  Review of patient's allergies indicates:   Allergen Reactions    Latex      Other reaction(s): Rash  Other reaction(s): Rash       PHYSICAL EXAM:  BP (!) 152/66 (BP Location: Right arm, Patient Position: Lying)   Pulse 75   Temp 98.6 °F (37 °C) (Oral)   Resp 18   SpO2 98%   Breastfeeding? No   Gen: AxOx4, NAD, appears stated age, appears fatigued  Eye: EOMI, no scleral icterus, no periorbital edema or ecchymosis  Head: NCAT, no lesions  ENT: neck supple, no stridor, no masses  CVS: warm and well perfused  PULM:  Unable to auscultate lungs secondary to PPE and poor quality of contact precautions stethoscope, normal work of breathing and normal effort, speaking in full sentences, no stridor  ABD: scaphoid, nondistended  Ext: small bruise over the lateral aspect of the left knee, otherwise no edema, no calf tenderness, and distal pulses intact, otherwise no rash, no deformities  Neuro: BOOKER, needs two person assist to transition from wheelchair to bed    WORKUP:  Labs Reviewed   CBC W/ AUTO DIFFERENTIAL - Abnormal; Notable for the following components:       Result Value    Mean Corpuscular Hemoglobin 32.4 (*)     Platelets 399 (*)     MPV 9.0 (*)     All other components within normal limits   FERRITIN - Abnormal; Notable for the following components:    Ferritin 499 (*)     All other components within normal limits   COMPREHENSIVE METABOLIC PANEL - Abnormal; Notable for the following components:    Calcium 8.5 (*)     Albumin 3.1 (*)     All other components within normal limits   CULTURE, RESPIRATORY   LACTATE DEHYDROGENASE   TROPONIN I   LACTIC ACID, PLASMA   C-REACTIVE PROTEIN   CK   PROCALCITONIN   B-TYPE NATRIURETIC PEPTIDE   URINALYSIS, REFLEX TO URINE CULTURE     X-Ray Chest AP Portable   Final  Result      Minimal amount of patchy opacity in the periphery of the left middle lung zone which may be seen with pneumonia.  Previously identified bilateral opacities are less conspicuous on today's exam.         Electronically signed by: Vito Castro MD   Date:    04/03/2020   Time:    14:03            MDM:   I decided to obtain medical records from prior clinic visits and hospitalizations.     Patient's complaint concerning for coronavirus infection complicated by weakness, leg pain and confusion. Differential includes encephalopathy 2/2 viral infection, metabolic abnormality, worsening pneumonia. Patient evaluated during the coronavirus pandemic.      They are well appearing, hemodynamically stable, without increased work of breathing.  No severe hypoxia to necessitate hospitalization. However, she is unable to walk 2/2 severe weakness and myopathy of illness. Plan for observation for PT    DVT US of the LLE: neg for dvt or other anatomic explanation for her pain    CXR by my independent interpretation notable for: improvement in her prior multifocal pneumonia. I do not think additional antibiotics are indicated at this time    Despite a reassuring workup here, the patient is still quite weak, generally, and is unable to stand without two person assistance. I suspect some of it is pain related, as she has significant myalgias and arthralgias. I discussed options with her and her daughter at bedside, who is an RN, and her daughter reported that she did not feel she could safely care for her mother at home due to her weakness. Given this, we will observe the patient in the hosptial for PT, pain control, and monitoring of her respiratory status. Pt is agreeable to plan.     Final diagnoses:  [R53.1] Weakness (Primary)  [U07.1] COVID-19 virus infection       ED Disposition Condition    Observation                Dianna Sosa MD  04/03/20 1958

## 2020-04-03 NOTE — TELEPHONE ENCOUNTER
Patient already phoned and spoke with another triager.    Reason for Disposition   Caller has already spoken with another triager and has no further questions    Protocols used: NO CONTACT OR DUPLICATE CONTACT CALL-A-[place]

## 2020-04-03 NOTE — ED TRIAGE NOTES
Pt with positive COVID diagnosis. Daughter reports pain is experiencing increased confusion as well as increased SOB and left knee and ankle pain; no injury.

## 2020-04-04 LAB
ALBUMIN SERPL BCP-MCNC: 3 G/DL (ref 3.5–5.2)
ALP SERPL-CCNC: 96 U/L (ref 55–135)
ALT SERPL W/O P-5'-P-CCNC: 19 U/L (ref 10–44)
ANION GAP SERPL CALC-SCNC: 9 MMOL/L (ref 8–16)
AST SERPL-CCNC: 15 U/L (ref 10–40)
BASOPHILS # BLD AUTO: 0.05 K/UL (ref 0–0.2)
BASOPHILS NFR BLD: 1 % (ref 0–1.9)
BILIRUB SERPL-MCNC: 1.1 MG/DL (ref 0.1–1)
BUN SERPL-MCNC: 8 MG/DL (ref 8–23)
CALCIUM SERPL-MCNC: 8.9 MG/DL (ref 8.7–10.5)
CHLORIDE SERPL-SCNC: 103 MMOL/L (ref 95–110)
CO2 SERPL-SCNC: 26 MMOL/L (ref 23–29)
CREAT SERPL-MCNC: 0.7 MG/DL (ref 0.5–1.4)
DIFFERENTIAL METHOD: ABNORMAL
EOSINOPHIL # BLD AUTO: 0.1 K/UL (ref 0–0.5)
EOSINOPHIL NFR BLD: 1 % (ref 0–8)
ERYTHROCYTE [DISTWIDTH] IN BLOOD BY AUTOMATED COUNT: 12.2 % (ref 11.5–14.5)
EST. GFR  (AFRICAN AMERICAN): >60 ML/MIN/1.73 M^2
EST. GFR  (NON AFRICAN AMERICAN): >60 ML/MIN/1.73 M^2
GLUCOSE SERPL-MCNC: 94 MG/DL (ref 70–110)
HCT VFR BLD AUTO: 39.3 % (ref 37–48.5)
HGB BLD-MCNC: 12.8 G/DL (ref 12–16)
IMM GRANULOCYTES # BLD AUTO: 0.01 K/UL (ref 0–0.04)
IMM GRANULOCYTES NFR BLD AUTO: 0.2 % (ref 0–0.5)
LYMPHOCYTES # BLD AUTO: 2 K/UL (ref 1–4.8)
LYMPHOCYTES NFR BLD: 39.6 % (ref 18–48)
MAGNESIUM SERPL-MCNC: 2 MG/DL (ref 1.6–2.6)
MCH RBC QN AUTO: 32.3 PG (ref 27–31)
MCHC RBC AUTO-ENTMCNC: 32.6 G/DL (ref 32–36)
MCV RBC AUTO: 99 FL (ref 82–98)
MONOCYTES # BLD AUTO: 0.7 K/UL (ref 0.3–1)
MONOCYTES NFR BLD: 13.1 % (ref 4–15)
NEUTROPHILS # BLD AUTO: 2.2 K/UL (ref 1.8–7.7)
NEUTROPHILS NFR BLD: 45.1 % (ref 38–73)
NRBC BLD-RTO: 0 /100 WBC
PHOSPHATE SERPL-MCNC: 2.8 MG/DL (ref 2.7–4.5)
PLATELET # BLD AUTO: 379 K/UL (ref 150–350)
PMV BLD AUTO: 8.7 FL (ref 9.2–12.9)
POTASSIUM SERPL-SCNC: 4 MMOL/L (ref 3.5–5.1)
PROT SERPL-MCNC: 6.7 G/DL (ref 6–8.4)
RBC # BLD AUTO: 3.96 M/UL (ref 4–5.4)
SODIUM SERPL-SCNC: 138 MMOL/L (ref 136–145)
WBC # BLD AUTO: 4.97 K/UL (ref 3.9–12.7)

## 2020-04-04 PROCEDURE — 83735 ASSAY OF MAGNESIUM: CPT | Mod: HCNC

## 2020-04-04 PROCEDURE — 84100 ASSAY OF PHOSPHORUS: CPT | Mod: HCNC

## 2020-04-04 PROCEDURE — G0378 HOSPITAL OBSERVATION PER HR: HCPCS | Mod: HCNC

## 2020-04-04 PROCEDURE — 94799 UNLISTED PULMONARY SVC/PX: CPT | Mod: HCNC

## 2020-04-04 PROCEDURE — 99900035 HC TECH TIME PER 15 MIN (STAT): Mod: HCNC

## 2020-04-04 PROCEDURE — 63700000 PHARM REV CODE 250 ALT 637 W/O HCPCS: Mod: HCNC | Performed by: FAMILY MEDICINE

## 2020-04-04 PROCEDURE — 80053 COMPREHEN METABOLIC PANEL: CPT | Mod: HCNC

## 2020-04-04 PROCEDURE — 85025 COMPLETE CBC W/AUTO DIFF WBC: CPT | Mod: HCNC

## 2020-04-04 PROCEDURE — 63600175 PHARM REV CODE 636 W HCPCS: Mod: HCNC | Performed by: PHYSICIAN ASSISTANT

## 2020-04-04 PROCEDURE — 96372 THER/PROPH/DIAG INJ SC/IM: CPT

## 2020-04-04 PROCEDURE — 36415 COLL VENOUS BLD VENIPUNCTURE: CPT | Mod: HCNC

## 2020-04-04 PROCEDURE — 99225 PR SUBSEQUENT OBSERVATION CARE,LEVEL II: ICD-10-PCS | Mod: HCNC,,, | Performed by: HOSPITALIST

## 2020-04-04 PROCEDURE — 25000003 PHARM REV CODE 250: Mod: HCNC | Performed by: HOSPITALIST

## 2020-04-04 PROCEDURE — 25000003 PHARM REV CODE 250: Mod: HCNC | Performed by: FAMILY MEDICINE

## 2020-04-04 PROCEDURE — 25000003 PHARM REV CODE 250: Mod: HCNC | Performed by: PHYSICIAN ASSISTANT

## 2020-04-04 PROCEDURE — 99225 PR SUBSEQUENT OBSERVATION CARE,LEVEL II: CPT | Mod: HCNC,,, | Performed by: HOSPITALIST

## 2020-04-04 RX ORDER — DORZOLAMIDE HCL 20 MG/ML
1 SOLUTION/ DROPS OPHTHALMIC 2 TIMES DAILY
Status: DISCONTINUED | OUTPATIENT
Start: 2020-04-04 | End: 2020-04-07 | Stop reason: HOSPADM

## 2020-04-04 RX ORDER — TIMOLOL MALEATE 5 MG/ML
1 SOLUTION/ DROPS OPHTHALMIC 2 TIMES DAILY
Status: DISCONTINUED | OUTPATIENT
Start: 2020-04-04 | End: 2020-04-07 | Stop reason: HOSPADM

## 2020-04-04 RX ADMIN — AZITHROMYCIN MONOHYDRATE 250 MG: 250 TABLET ORAL at 09:04

## 2020-04-04 RX ADMIN — ALPRAZOLAM 0.25 MG: 0.25 TABLET ORAL at 01:04

## 2020-04-04 RX ADMIN — ALPRAZOLAM 0.25 MG: 0.25 TABLET ORAL at 05:04

## 2020-04-04 RX ADMIN — HYDROXYCHLOROQUINE SULFATE 200 MG: 200 TABLET, FILM COATED ORAL at 09:04

## 2020-04-04 RX ADMIN — ALPRAZOLAM 0.25 MG: 0.25 TABLET ORAL at 03:04

## 2020-04-04 RX ADMIN — ENOXAPARIN SODIUM 40 MG: 100 INJECTION SUBCUTANEOUS at 09:04

## 2020-04-04 RX ADMIN — ATORVASTATIN CALCIUM 10 MG: 10 TABLET, FILM COATED ORAL at 09:04

## 2020-04-04 NOTE — PLAN OF CARE
Problem: Infection  Goal: Infection Symptom Resolution  Outcome: Ongoing, Progressing     Problem: Fall Injury Risk  Goal: Absence of Fall and Fall-Related Injury  Outcome: Ongoing, Progressing   Patient instructed on fall safety risk and s/s of infection to report.  Patient verbalized will cont Poc.

## 2020-04-04 NOTE — MEDICAL/APP STUDENT
I called Mrs. Mayo's  (Castillo Mayo) as part of the patient liaison program medical students are volunteering with.     I spoke with Zayra Mayo (Mrs. Mayo's daughter) at around 2:20pm. I called to give her an update about her mom's status. Currently, Mrs. Mayo is not requiring oxygen. She is receiving an ultrasound of her legs to rule of DVT. Also, she is getting xrays of her knees to assess for osteoarthritis. PT/OT were consulted in regards to patient's debility. She may require skilled nursing placement.     Ms. Mayo expressed her understanding. She is a nurse and had several questions/concerns that she wanted addressed. To name a few, she asked about patient's confusion that presented quite acutely, her plaquenil dose, and her z pack. I stated that I would relay questions to Dr. Ruiz who would call her back shortly.

## 2020-04-04 NOTE — NURSING
Patient trying to get OOB with out assistance today.  Patient was OOB with bed alarm sounding.    Patient was redirected and assisted back to chair.  Instructed patient on fall risk and tele sitter ordered.   Xanax 0.25mg given.   Patient nereida well  Will cont POC and treatment

## 2020-04-04 NOTE — PLAN OF CARE
"CM OBTAINED D/C PLANNING ASSESSMENT FROM PATIENT DAUGHTER (VINNIE MONROE) VIA TELEPHONE. PLANNED D/C IS SNF - PLAN A, OR HOME W/HOME HEALTH - PLAN B.     PCP:  Odilia Eckert MD     PAYOR:  Payor: "[employer] MEDICARE / Plan: [employer] MEDICARE HMO / Product Type: Capitation /      PHARMACY:    [facility] - [place], [place] - [address]  [address] [address]  Phone: [phone] Fax: [phone]    [id number] [address] [address]  Phone: [phone] Fax: [phone]    [id number] [address]  [address] [facility] [address]  [address] [facility] [address]  Phone: [phone] Fax: [phone]       04/04/20 1003   Discharge Assessment   Assessment Type Discharge Planning Assessment   Confirmed/corrected address and phone number on facesheet? Yes   Assessment information obtained from? Caregiver   Prior to hospitilization cognitive status: Alert/Oriented   Prior to hospitalization functional status: Independent   Current cognitive status:   (Per Nursing/MD notes pt is "AAOx4 at this time with periods of confusion")   Current Functional Status: Needs Assistance  (Per nursing/MD notes: pt exhibiting bilateral lower leg weakness)   Lives With spouse;other (see comments)  (Daughter is an RN that moved to motor home on family property to care for parents. Father is COVID+)   Is patient able to care for self after discharge? Unable to determine at this time (comments)   Readmission Within the Last 30 Days previous discharge plan unsuccessful   Patient currently being followed by outpatient case management? No   Patient currently receives any other outside agency services? No   Equipment Currently Used at Home none   Do you have any problems affording any of your prescribed medications? TBD   Does the patient have " transportation home? Yes   Transportation Anticipated family or friend will provide   Discharge Plan A Skilled Nursing Facility   Discharge Plan B Home;Home Health   DME Needed Upon Discharge  other (see comments)  (TBD)   Patient/Family in Agreement with Plan yes

## 2020-04-04 NOTE — PLAN OF CARE
POC reviewed with pt.  Pt verbalized understanding.  Questions and concerns addressed.  No acute events overnight.  Pt AAOx4 at this time with periods of confusion.  Pt does exhibit weakness to her lower extremities.  Pt on room air with sats between 95-98%.  Pt was anxious during initial assessment on floor, and had asked for prn xanax.  Given per MAR order.  Fall/safety precautions maintained.  Bed locked and in lowest position; call light within reach.  Airborne/contact precautions maintained.  See flowsheet for full assessment and vital sign information.  WCTM      Problem: Fall Injury Risk  Goal: Absence of Fall and Fall-Related Injury  Outcome: Ongoing, Progressing     Problem: Adult Inpatient Plan of Care  Goal: Plan of Care Review  Outcome: Ongoing, Progressing  Goal: Optimal Comfort and Wellbeing  Outcome: Ongoing, Progressing

## 2020-04-04 NOTE — PROGRESS NOTES
Hospital Medicine  Progress Note  [facility]      Patient Name: Daisha Mayo  MRN:  [id number]  Hospital Medicine Team: [facility] Jalil Ruiz MD  Date of Admission:  4/3/2020     Length of Stay:  LOS: 0 days       Principal Problem:  COVID-19 virus infection      Hospital Course:  72W with HTN, Depression/Anxiety, Arthritis, Hypothyroidism, and HLD admitted to Hospital Medicine for reported acute encephalopathy at home as well as progressively worsening weakness and associated myalgias.  Patient recently tested positive for COVID[place]19 [place] [facility] [place] and presented 3/31 with respiratory distress.  At that time she had evidence of bilateral pneumonia on CXR and completed a course of ABX for CAP.  Repeat CXR with this admission showed improvement in bilateral airspace disease.  CBC and CMP unremarkable.  UA unremarkable.  Additional negative labs include:  BNP, CPK, LDH, procalcitonin, and troponin.  X-rays of bilateral knees show evidence of mild osteoarthritis but no significant fracture or dislocation.  CT head ordered to further evaluate acute encephalopathy (which daughter reports).    Interval History:     No acute events overnight.  Patient denies worsening shortness of breath and dry cough.  Expresses desire to leave hospital as soon as possible home with home health PT/OT.  Denies nausea, vomiting, diarrhea    Review of Systems:  Respiratory:  Negative for SOB, dry and productive cough  Cardiovascular:  Negative for chest pain, palpitations, and orthopnea  GI:  Negative for nausea, vomiting, and diarrhea    Inpatient Medications:    Current Facility-Administered Medications:     acetaminophen tablet 650 mg, 650 mg, Oral, Q4H PRN, Kb Lees PA-C    albuterol inhaler 2 puff, 2 puff, Inhalation, Q4H PRN **AND** MDI Q4H PRN, , , Q4H PRN, Kb Lees PA-C    ALPRAZolam tablet 0.25 mg, 0.25 mg, Oral, TID PRN, Romeo Avalos MD, 0.25 mg at 04/04/20 1350     "atorvastatin tablet 10 mg, 10 mg, Oral, Daily, Kb Lees PA-C, 10 mg at 04/04/20 0906    azithromycin tablet 250 mg, 250 mg, Oral, Daily, Romeo Avalos MD, 250 mg at 04/04/20 0906    benzonatate capsule 100 mg, 100 mg, Oral, TID PRN, Kb Lees PA-C    dextrose 50% injection 25 g, 25 g, Intravenous, PRN, Kb Lese PA-C    dorzolamide 2 % ophthalmic solution 1 drop, 1 drop, Both Eyes, BID, Jalil Ruiz MD    enoxaparin injection 40 mg, 40 mg, Subcutaneous, Daily, Kb Lees PA-C    glucagon (human recombinant) injection 1 mg, 1 mg, Intramuscular, PRN, Kb Lees PA-C    glucose chewable tablet 16 g, 16 g, Oral, PRN, Kb Lees PA-C    glucose chewable tablet 24 g, 24 g, Oral, PRN, Kb Lees PA-C    hydrALAZINE tablet 25 mg, 25 mg, Oral, Q8H PRN, Kb Lees PA-C    hydroxychloroquine tablet 200 mg, 200 mg, Oral, Daily, Kb Lees PA-C, 200 mg at 04/04/20 0905    latanoprost 0.005 % ophthalmic solution 1 drop, 1 drop, Both Eyes, QHS, Kb Lees PA-C, 1 drop at 04/03/20 2056    levothyroxine tablet 25 mcg, 25 mcg, Oral, Daily, Kb Lees PA-C    loperamide capsule 2 mg, 2 mg, Oral, Q6H PRN, Kb Lees PA-C    melatonin tablet 6 mg, 6 mg, Oral, Nightly PRN, Kb Lees PA-C    multivitamin tablet, 1 tablet, Oral, Daily, Jalil Ruiz MD    ondansetron disintegrating tablet 8 mg, 8 mg, Oral, Q8H PRN, Kb Lees PA-C    ondansetron injection 4 mg, 4 mg, Intravenous, Q8H PRN, Kb Lees PA-C    timolol maleate 0.5% ophthalmic solution 1 drop, 1 drop, Both Eyes, BID, Jalil Ruiz MD      Physical Exam:    No intake or output data in the 24 hours ending 04/04/20 1445  Wt Readings from Last 3 Encounters:   04/04/20 67.7 kg (149 lb 4.8 oz)   03/17/20 69.9 kg (154 lb 1.6 oz)   11/29/19 70.5 kg (155 lb 6.8 oz)       BP (!) 146/60   Pulse 66   Temp 97.5 °F (36.4 °C)   Resp 18   Ht 5' 1" " (1.549 m)   Wt 67.7 kg (149 lb 4.8 oz)   LMP  (LMP Unknown)   SpO2 96%   Breastfeeding? No   BMI 28.21 kg/m²     GEN: NAD, conversant  Resp: coarse bilateral breath sounds, no wheezes or rales, normal work of breathing   CV: RRR, no m/r/g, no edema  GI: soft, NTND  Skin: no rash    Laboratory:  Lab Results   Component Value Date    [id number] Detected (A) 03/18/2020       Recent Labs   Lab 03/31/20  1440 04/03/20  1350 04/04/20  0414   WBC  --  6.56 4.97   LYMPH  --  28.5  1.9 39.6  2.0   HGB  --  13.2 12.8   HCT 41 40.1 39.3   PLT  --  399* 379*     Recent Labs   Lab 04/03/20  1632 04/04/20  0413    138   K 4.0 4.0    103   CO2 24 26   BUN 8 8   CREATININE 0.7 0.7   GLU 92 94   CALCIUM 8.5* 8.9   MG  --  2.0   PHOS  --  2.8     Recent Labs   Lab 04/03/20  1632 04/04/20  0413   ALKPHOS 96 96   ALT 20 19   AST 14 15   ALBUMIN 3.1* 3.0*   PROT 6.9 6.7   BILITOT 0.5 1.1*        Recent Labs     04/03/20  1350 04/03/20  1632   FERRITIN 499*  --    CRP  --  4.2     --    BNP 42  --    TROPONINI <0.006  --    CPK  --  42       All labs within the last 24 hours were reviewed.     Microbiology:  Microbiology Results (last 7 days)     Procedure Component Value Units Date/Time    Culture, Respiratory with Gram Stain [[id number]]     Order Status:  No result Specimen:  Sputum, Expectorated             Imaging  ECG Results          EKG 12-lead (In process)  Result time 04/03/20 16:05:14    In process by Interface, Lab In [facility] (04/03/20 16:05:14)                 Narrative:    Test Reason : R68.89,    Vent. Rate : 063 BPM     Atrial Rate : 063 BPM     P-R Int : 172 ms          QRS Dur : 118 ms      QT Int : 466 ms       P-R-T Axes : 047 035 023 degrees     QTc Int : 476 ms    Normal sinus rhythm  Incomplete right bundle branch block  Nonspecific T wave abnormality  Prolonged QT  Abnormal ECG  When compared with ECG of 31-MAR-2020 13:40,  No significant change was found    Referred By: JON   SELF            Confirmed By:                               No results found for this or any previous visit.    X-Ray Knee 1 or 2 View Bilateral  Narrative: EXAMINATION:  XR KNEE 1 OR 2 VIEW BILATERAL    CLINICAL HISTORY:  Evaluate for osteoarthritis of bilateral knees;    TECHNIQUE:  Four views bilateral knee    FINDINGS:  There is no fracture, dislocation, or bony erosion.  There is mild to moderate medial compartment joint space loss bilaterally.  Impression: As above.    Electronically signed by: Tavo Escobar MD  Date:    04/04/2020  Time:    13:37      All imaging within the last 24 hours was reviewed.     Assessment and Plan:    Active Hospital Problems    Diagnosis  POA    *COVID-19 virus infection [U07.1]  Yes    Adult failure to thrive [R62.7]  Unknown    Encephalopathy [G93.40]  Unknown    Mild episode of recurrent major depressive disorder [F33.0]  Yes    Essential hypertension [I10]  Yes    Pure hyperglyceridemia [E78.1]  Yes    History of seizure [Z87.898]  Not Applicable    Subclinical hypothyroidism [E03.9]  Yes      Resolved Hospital Problems   No resolved problems to display.       COVID +  Person Under Investigation (PUI) for COVID-19  - COVID-19 testing: Collection Date: 3/18/2020 Collection Time:  11:31 AM  - Infection Control notified     - Isolation:   - Airborne and Droplet Precautions  - Surgical mask on patient   - N95 masks must be fit tested, wear eye protection  - 20 second hand hygiene              - Limit visitors per hospital policy              - Consolidate lab draws, nursing care, and interventions     - Diagnostics: (Lymphopenia, hyponatremia, hyperferritinemia, elevated troponin, elevated d-dimer, age, and comorbidities are significant predictors of poor clinical outcome)              - CBC:                         trend Q48hrs  - CMP:                         trend Q48hrs  - Procalcitonin: 0.03   - D-dimer:                    repeat prior to discharge  - Ferritin: 499                repeat prior to discharge   - CRP: 4.2                   trend Q48hrs  - LDH:  244                  repeat prior to discharge  - BNP:  42  - Troponin: negative                  - ECG:  NSR, no ischemic changes              - rapid Flu: negative 3/17              - CXR:  Improving bilateral airspace disease              - UA and culture:  Unremarkable              - CPK:  42     - Management:   Bundle care as able to maintain isolation & minimize in/out of room   - Supplemental O2 to maintain SpO2 92%-96%   if requiring 6L NC or higher, place on nonrebreather and discuss case with MICU              - Telemetry & continuous pulse oximetry               - If wheezing   - albuterol inhaler 2-4 puff Q6hr PRN    - ipratropium daily               - acetaminophen 650mg PO Q6hr PRN fever              - loperamide PRN for viral diarrhea  - Recently treated with azithromycin for pneumonia.  Possible new opacity concerning for refractory/new pneumonia               - continue outpatient course of azithromycin while inpatient                                      If azithromycin is not available, start doxycycline                                      If MRSA risk factors, add Vancomycin IV (PharmD consult)              - Investigational Treatment Protocol: (if patient meets criteria)                           - c/w statin                          - c/w HCQ 200mg PO daily x1 day for arthritis       Safety notes:              - Avoid NIPPV (CPAP/BiPAP) to prevent aerosolization, use on a case-by-case basis if in neg pressure room              - Cautious use of NSAIDS for fever per WHO recommendations (3/16/2020)              - No new ACEi/ARB start or discontinuation of chronic med unless hypotensive (Esler et al. Journal of Hypertension 2020, 38:000-000)              - Careful use of steroids in the absence of other indications (shock, ARDS)              - Fluid sparing resuscitation, avoid maintenance fluids                 Advance Care Planning  Goals of care, counseling/discussion FULL CODE    Generalized weakness  - patient COVID+  since 3/18 presents with billateral leg pain with associated weakness and worsening fatigue/cough. CXR shows improving bilateral airspace disease.Patient unable to amublate on own in ED.    - X-rays of bilateral knees with mild osteoarthritis.  No fracture dislocation  - LE U/S pending to rule out DVT  - Worsening physical decompensation likely secondary to recent COVID infection  - PT/OT consulted    Hypothyroidism  - c/w synthroid      HLD  - c/w statin     HTN  - not currently on any meds.   - hydralazine PRN for now     MDD  SAMSON  - Patient takes xanax prn at home and reports using very infrequently less than once monthly  - start Xanax 0.25mg po prn anxiety    Glaucoma  - Continue latanoprost, timolol, and dorzolamide eye drops at home does      VTE High Risk Prophylaxis: enoxaparin 40mg sq QHS @ 2100 (bundled care) if GFR >30    Patient's chronic/stable medical conditions noted in the problem list above will be managed with the patient's home medications as tolerated.         Subsequent Inpatient Hospital Care  Level 2 [id number] Total visit time was 25 minutes or greater with greater than 50% of time spent in counseling and coordination of care.       Jalil Ruiz M.D.  Hospital Medicine Staff  [facility]   Pager: [phone]

## 2020-04-05 PROCEDURE — 97535 SELF CARE MNGMENT TRAINING: CPT | Mod: HCNC

## 2020-04-05 PROCEDURE — 97161 PT EVAL LOW COMPLEX 20 MIN: CPT | Mod: HCNC

## 2020-04-05 PROCEDURE — 63600175 PHARM REV CODE 636 W HCPCS: Mod: HCNC | Performed by: PHYSICIAN ASSISTANT

## 2020-04-05 PROCEDURE — 96372 THER/PROPH/DIAG INJ SC/IM: CPT

## 2020-04-05 PROCEDURE — 25000003 PHARM REV CODE 250: Mod: HCNC | Performed by: HOSPITALIST

## 2020-04-05 PROCEDURE — G0378 HOSPITAL OBSERVATION PER HR: HCPCS | Mod: HCNC

## 2020-04-05 PROCEDURE — 99225 PR SUBSEQUENT OBSERVATION CARE,LEVEL II: ICD-10-PCS | Mod: HCNC,,, | Performed by: INTERNAL MEDICINE

## 2020-04-05 PROCEDURE — 97165 OT EVAL LOW COMPLEX 30 MIN: CPT | Mod: HCNC

## 2020-04-05 PROCEDURE — 97116 GAIT TRAINING THERAPY: CPT | Mod: HCNC

## 2020-04-05 PROCEDURE — 25000003 PHARM REV CODE 250: Mod: HCNC | Performed by: FAMILY MEDICINE

## 2020-04-05 PROCEDURE — 99225 PR SUBSEQUENT OBSERVATION CARE,LEVEL II: CPT | Mod: HCNC,,, | Performed by: INTERNAL MEDICINE

## 2020-04-05 PROCEDURE — 25000003 PHARM REV CODE 250: Mod: HCNC | Performed by: PHYSICIAN ASSISTANT

## 2020-04-05 PROCEDURE — 63700000 PHARM REV CODE 250 ALT 637 W/O HCPCS: Mod: HCNC | Performed by: FAMILY MEDICINE

## 2020-04-05 RX ADMIN — TIMOLOL MALEATE 1 DROP: 5 SOLUTION/ DROPS OPHTHALMIC at 09:04

## 2020-04-05 RX ADMIN — DORZOLAMIDE HYDROCHLORIDE 1 DROP: 20 SOLUTION/ DROPS OPHTHALMIC at 08:04

## 2020-04-05 RX ADMIN — ENOXAPARIN SODIUM 40 MG: 100 INJECTION SUBCUTANEOUS at 10:04

## 2020-04-05 RX ADMIN — HYDROXYCHLOROQUINE SULFATE 200 MG: 200 TABLET, FILM COATED ORAL at 09:04

## 2020-04-05 RX ADMIN — ALPRAZOLAM 0.25 MG: 0.25 TABLET ORAL at 09:04

## 2020-04-05 RX ADMIN — LATANOPROST 1 DROP: 50 SOLUTION OPHTHALMIC at 10:04

## 2020-04-05 RX ADMIN — ALPRAZOLAM 0.25 MG: 0.25 TABLET ORAL at 10:04

## 2020-04-05 RX ADMIN — AZITHROMYCIN MONOHYDRATE 250 MG: 250 TABLET ORAL at 09:04

## 2020-04-05 RX ADMIN — ATORVASTATIN CALCIUM 10 MG: 10 TABLET, FILM COATED ORAL at 09:04

## 2020-04-05 RX ADMIN — THERA TABS 1 TABLET: TAB at 09:04

## 2020-04-05 RX ADMIN — TIMOLOL MALEATE 1 DROP: 5 SOLUTION/ DROPS OPHTHALMIC at 08:04

## 2020-04-05 RX ADMIN — DORZOLAMIDE HYDROCHLORIDE 1 DROP: 20 SOLUTION/ DROPS OPHTHALMIC at 09:04

## 2020-04-05 NOTE — PROGRESS NOTES
The patient location is:  Home  The chief complaint leading to consultation is:  Left leg pain and agitation  Visit type: Virtual visit with synchronous audio and video-  audio only  Total time spent with patient:  13 min  Each patient to whom he or she provides medical services by telemedicine is:  (1) informed of the relationship between the physician and patient and the respective role of any other health care provider with respect to management of the patient; and (2) notified that he or she may decline to receive medical services by telemedicine and may withdraw from such care at any time.    Notes:     CC:  Left leg pain and agitation  HPI:  The patient is a 74 y.o. year old female who presents to the office for left leg pain and agitation.  The patient's daughter reports that last night her mother was unable to sleep due to leg pain.  She also reports that she has been confused.  The patient reports she is unable to bear weight and has pain with movement of her leg.  Her daughter noted a bruise and swelling.  Daughter reports her mother is afebrile with a blood pressure 108/75.  She does appear to be short of breath.    PAST MEDICAL HISTORY:  Past Medical History:   Diagnosis Date    Arthritis     Cataract     Class 1 obesity due to excess calories in adult 10/19/2018    Depression     Around [place]; treated with Wellbutrin    Early cataracts, bilateral     SAMSON (generalized anxiety disorder) 2/5/2019    GERD (gastroesophageal reflux disease)     Glaucoma     Hyperprolactinemia     Hypertension     Hypothyroidism     Need for prophylactic vaccination against Streptococcus pneumoniae (pneumococcus) 10/14/2015    Osteopenia     Psychiatric problem     Pure hyperglyceridemia 3/31/2017    Recurrent UTI     Every few months    Sciatica neuralgia     Seizures 2006    temporal lobe seizure    Temporal lobe seizure 2006    Trigeminal neuralgia     Vitamin D deficiency disease        SURGICAL  HISTORY:  Past Surgical History:   Procedure Laterality Date    BREAST BIOPSY      duct excision    CHOLECYSTECTOMY  1994    laporascopic    COLONOSCOPY      EYE SURGERY      LASIK surgery to one eye; cannot remember which one    HYSTERECTOMY  1985    low transverse incision.    TOTAL ABDOMINAL HYSTERECTOMY W/ BILATERAL SALPINGOOPHORECTOMY      1985; diffinitive treatment for menorrhagia       MEDS:  Medcard reviewed and updated    ALLERGIES: Allergy Card reviewed and updated    SOCIAL HISTORY:   The patient is a nonsmoker.    PE:   APPEARANCE:  Sounds to be in mild distress.    Audio only visit  PSYCHIATRIC: The patient is oriented to person, place, and time and has a pleasant affect.        ASSESSMENT/PLAN:  Diagnoses and all orders for this visit:    COVID-19 virus infection  -     respiratory evaluation

## 2020-04-05 NOTE — PT/OT/SLP EVAL
Physical Therapy Evaluation and Treatment     Patient Name:  Daisha aMyo   MRN:  [id number]    *co-treatment with OT   Recommendations:     Discharge Recommendations:  nursing facility, skilled   Discharge Equipment Recommendations: (TBD pending progress )   Barriers to discharge: Inaccessible home and Decreased caregiver support at current level of function     Assessment:     Daisha Mayo is a 74 y.o. female admitted with a medical diagnosis of COVID-19 virus infection.  She presents with the following impairments/functional limitations:  weakness, impaired endurance, impaired self care skills, impaired functional mobilty, gait instability, impaired cardiopulmonary response to activity, pain. Pt tolerated activity with significant weakness in B LE requiring assistance for all mobility. Pt demo'd significant anxiety with current situation as well as with any movement. Pt reporting pain in L knee, however, PT unable to reproduce pain with palpation, ROM, resistance, and weight bearing. Pt demo'd tremulous movement in B UE and LE throughout session. Pt is not at PLOF and not safe to return home at this time. Upon discharge, pt would benefit from continued skilled therapy intervention at skilled nursing facility to progress toward more independent mobility. At this time, pt would continue to benefit from acute skilled therapy intervention to address deficits and progress toward prior level of function.       Rehab Prognosis: Good; patient would benefit from acute skilled PT services to address these deficits and reach maximum level of function.    Recent Surgery: * No surgery found *      Plan:     During this hospitalization, patient to be seen 5 x/week to address the identified rehab impairments via gait training, therapeutic activities, therapeutic exercises, neuromuscular re-education and progress toward the following goals:    · Plan of Care Expires:  05/05/20    Subjective     Chief Complaint: Pt c/o  fear of falling, reports when she is cold she gets very anxious   Patient/Family Comments/goals: to get better and return home   Pain/Comfort:  · Pain Rating 1: 0/10  · Pain Rating Post-Intervention 1: 0/10    Patients cultural, spiritual, Sabianist conflicts given the current situation: no    Living Environment:  Pt lives with her  in a H with 4 JOSE with R HR on ascent.   Prior to admission, patients level of function was independent with mobility and ADLs with no AD. Pt reports week prior to admission, required use of a RW and eventually was too weak to walk.  Equipment used at home: none.  DME owned (not currently used): rolling walker, bedside commode and shower chair.  Upon discharge, patient will have assistance from spouse-also COVID +.    Objective:     Communicated with RN prior to session.  Patient found HOB elevated with telemetry, pulse ox (continuous), PureWick  upon PT entry to room.    General Precautions: Standard, fall, airborne, contact, droplet   Orthopedic Precautions:N/A   Braces: N/A     Exams:  · Cognitive Exam:  Patient is AAOx4, followed all commands, communicates clearly and fluently  · Gross Motor Coordination:  intact  · RLE ROM: WFL  · RLE Strength: grossly 4/5   · LLE ROM: WFL  · LLE Strength: grossly 4/5     Functional Mobility:  · Bed Mobility:  Not assessed 2/2 pt up in chair upon arrival   · Transfers:     · Sit to Stand: 2x from chair, 1x from BSC with  contact guard assistance with no AD with use of bilateral arm rests and increased time required with cuing for motor sequencing and hand placement   · Bed to Chair:2x from chair <> BSC minimum assistance , 1x with hand-held assist, 1x with RW using  Stand Pivot  · Gait: Pt ambulated 4 steps from chair to BSC with minimum assistance and B HHA. PT provided facilitation to promote lateral weight shift for step initiation. Pt then performed 4 steps from BSC back to chair with minimum assistance and use of a RW. Both times, pt  demo'd small step size, decreased foot clearance, flexed posture, tremulous activity in B LE. Facilitation provided for navigation of RW, direction of transfer.       Therapeutic Activities and Exercises:   Pt stood in front of chair with RW and performed static standing with contact guard assistance and cuing for upright posture. Pt performed 15x bilateral weight shift. Pt then performed lateral weight shift with clearance of each LE 1x with minimum assistance and significant encouragement.   Pt educated on role of PT/POC. Pt verbalized understanding.   Pt encouraged to only perform OOB mobility with assistance from nursing/therapy. Pt agreeable.   Education provided regarding effects of prolonged bed rest, natural body mechanics for walking, importance of HEP to increase strength and endurance.   Pt educated on seated exercises to perform 20x, 3x/day. Exercises included bilateral LAQ, marching, ankle DF/PF      AM-PAC 6 CLICK MOBILITY  Total Score:14     Patient left up in chair with all lines intact, call button in reach and RN notified.    GOALS:   Multidisciplinary Problems     Physical Therapy Goals        Problem: Physical Therapy Goal    Goal Priority Disciplines Outcome Goal Variances Interventions   Physical Therapy Goal     PT, PT/OT Ongoing, Progressing     Description:  Goals to be met by: 2020     Patient will increase functional independence with mobility by performin. Supine to sit with stand by Assistance  2. Sit to stand transfer with Supervision  3. Bed to chair transfer with Supervision using Rolling Walker  3. Gait  x 50 feet with Contact Guard Assistance using Rolling Walker.   4. Ascend/descend 4 stair with right Handrails Minimal Assistance using LRAD  5. Lower extremity exercise program x10 reps per handout, with assistance as needed                      History:     Past Medical History:   Diagnosis Date    Arthritis     Cataract     Class 1 obesity due to excess calories in  adult 10/19/2018    Depression     Around [place]; treated with Wellbutrin    Early cataracts, bilateral     SAMSON (generalized anxiety disorder) 2/5/2019    GERD (gastroesophageal reflux disease)     Glaucoma     Hyperprolactinemia     Hypertension     Hypothyroidism     Need for prophylactic vaccination against Streptococcus pneumoniae (pneumococcus) 10/14/2015    Osteopenia     Psychiatric problem     Pure hyperglyceridemia 3/31/2017    Recurrent UTI     Every few months    Sciatica neuralgia     Seizures 2006    temporal lobe seizure    Temporal lobe seizure 2006    Trigeminal neuralgia     Vitamin D deficiency disease        Past Surgical History:   Procedure Laterality Date    BREAST BIOPSY      duct excision    CHOLECYSTECTOMY  1994    laporascopic    COLONOSCOPY      EYE SURGERY      LASIK surgery to one eye; cannot remember which one    HYSTERECTOMY  1985    low transverse incision.    TOTAL ABDOMINAL HYSTERECTOMY W/ BILATERAL SALPINGOOPHORECTOMY      1985; diffinitive treatment for menorrhagia       Time Tracking:     PT Received On: 04/05/20  PT Start Time: 1012     PT Stop Time: 1054  PT Total Time (min): 42 min     Billable Minutes: Evaluation 10 mins  and Gait Training 32 mins       Dianna Ralph, PT  04/05/2020

## 2020-04-05 NOTE — PT/OT/SLP EVAL
"Occupational Therapy   Evaluation and Treatment    Name: Daisha Mayo  MRN: [id number]  Admitting Diagnosis:  COVID-19 virus infection      Recommendations:     Discharge Recommendations: nursing facility, skilled  Discharge Equipment Recommendations:  (TBD)  Barriers to discharge:  Inaccessible home environment(4 JOSE)    Assessment:     Daisha Mayo is a 74 y.o. female with a medical diagnosis of COVID-19 virus infection.  She presents with anxiety with current situation and movement. Pt c/o of L knee pain during treatment. Pt demo tremulous movement in BUE and BLE throughout session. Pt required increased time to complete tasks. Performance deficits affecting function: weakness, impaired endurance, impaired self care skills, impaired functional mobilty, gait instability, impaired cardiopulmonary response to activity.  Pt would benefit from skilled OT services in order to maximize independence with ADLs and facilitate safe discharge. Pt would benefit from SNF upon discharge to return to [facility] and decrease burden of care.      Rehab Prognosis: Good; patient would benefit from acute skilled OT services to address these deficits and reach maximum level of function.       Plan:     Patient to be seen 4 x/week to address the above listed problems via self-care/home management, therapeutic activities, therapeutic exercises  · Plan of Care Expires: 05/05/20  · Plan of Care Reviewed with: patient    Subjective     Chief Complaint: pt c/o of being cold and shaky when she is anxious. Pt also c/o of hospital food stating she was "refusing" to eat  Patient/Family Comments/goals: to get better and return home    Occupational Profile:  Living Environment: Pt lives with her  in a [facility] with 4 JOSE and R HR  Previous level of function: PTA, pt was independent with self-care and functional mobility  Roles and Routines: Pt attends [employer] daily  Equipment Used at Home:  none  Assistance upon Discharge: Upon discharge, pt " will have assistance from spouse who is also COVID+    Pain/Comfort:  · Pain Rating 1: 0/10  · Pain Rating Post-Intervention 1: 0/10    Patients cultural, spiritual, Shinto conflicts given the current situation: no    Objective:     Communicated with: RN and PT prior to session.  Patient found up in chair with telemetry, pulse ox (continuous), PureWick upon OT entry to room.    General Precautions: Standard, airborne, contact, droplet, fall   Orthopedic Precautions:N/A   Braces: N/A     Occupational Performance:    Bed Mobility:    · NT, pt found and returned to bedside chair    Functional Mobility/Transfers:  · Patient completed Sit <> Stand Transfer with contact guard assistance  with  no assistive device x2 trials from chair and x1 trial BSC. Pt required VCs for safety and technique  · Patient completed chair <> BSCToilet Transfer Step Transfer technique with contact guard assistance with  hand-held assist  · Functional Mobility: Pt ambulated 4 steps chair <> BSC    Activities of Daily Living:  · Toileting: minimum assistance giovanna-care in standing    Cognitive/Visual Perceptual:  Cognitive/Psychosocial Skills:     -       Oriented to: Person, Place, Time and Situation   -       Follows Commands/attention:Follows one-step commands  -       Communication: clear/fluent  -       Memory: No Deficits noted  -       Safety awareness/insight to disability: intact   -       Mood/Affect/Coping skills/emotional control: Anxious    Physical Exam:  Upper Extremity Range of Motion:     -       Right Upper Extremity: WFL  -       Left Upper Extremity: WFL  Upper Extremity Strength:    -       Right Upper Extremity: WFL  -       Left Upper Extremity: WFL   Strength:    -       Right Upper Extremity: WFL  -       Left Upper Extremity: WFL  Fine Motor Coordination:    -       Intact    AMPAC 6 Click ADL:  AMPAC Total Score: 14    Treatment & Education:  -Therapist provided facilitation and instruction of proper body  mechanics, energy conservation, and fall prevention strategies during tasks listed above.  -Pt educated on role of OT, POC and goals for therapy  -Pt educated on importance of OOB activities with staff member assistance and sitting OOB majority of the day.   -Pt verbalized understanding. Pt expressed no further concerns/questions  -Whiteboard updated    Education:    Patient left up in chair with all lines intact and call button in reach    GOALS:   Multidisciplinary Problems     Occupational Therapy Goals        Problem: Occupational Therapy Goal    Goal Priority Disciplines Outcome Interventions   Occupational Therapy Goal     OT, PT/OT Ongoing, Progressing    Description:  Goals to be met by: 4/15/2020     Patient will increase functional independence with ADLs by performing:    UE Dressing with Supervision.  LE Dressing with Stand-by Assistance.  Grooming while standing with Stand-by Assistance.  Toileting from toilet with Stand-by Assistance for hygiene and clothing management.   Toilet transfer to toilet with Supervision.                      History:     Past Medical History:   Diagnosis Date    Arthritis     Cataract     Class 1 obesity due to excess calories in adult 10/19/2018    Depression     Around Geovanna; treated with Wellbutrin    Early cataracts, bilateral     SAMSON (generalized anxiety disorder) 2/5/2019    GERD (gastroesophageal reflux disease)     Glaucoma     Hyperprolactinemia     Hypertension     Hypothyroidism     Need for prophylactic vaccination against Streptococcus pneumoniae (pneumococcus) 10/14/2015    Osteopenia     Psychiatric problem     Pure hyperglyceridemia 3/31/2017    Recurrent UTI     Every few months    Sciatica neuralgia     Seizures 2006    temporal lobe seizure    Temporal lobe seizure 2006    Trigeminal neuralgia     Vitamin D deficiency disease        Past Surgical History:   Procedure Laterality Date    BREAST BIOPSY      duct excision     CHOLECYSTECTOMY  1994    laporascopic    COLONOSCOPY      EYE SURGERY      LASIK surgery to one eye; cannot remember which one    HYSTERECTOMY  1985    low transverse incision.    TOTAL ABDOMINAL HYSTERECTOMY W/ BILATERAL SALPINGOOPHORECTOMY      1985; diffinitive treatment for menorrhagia       Time Tracking:     OT Date of Treatment: 04/05/20  OT Start Time: 1012  OT Stop Time: 1057  OT Total Time (min): 45 min    Billable Minutes:Evaluation 15  Self Care/Home Management 23    Jennifer Alvares OT  4/5/2020

## 2020-04-05 NOTE — PLAN OF CARE
Problem: Fall Injury Risk  Goal: Absence of Fall and Fall-Related Injury  Outcome: Ongoing, Progressing     Problem: Infection  Goal: Infection Symptom Resolution  Outcome: Ongoing, Progressing     Problem: Skin Injury Risk Increased  Goal: Skin Health and Integrity  Outcome: Ongoing, Progressing    Patient and family educated on fall safety risk, s/s of infection to report, and risk for skin breakdown.   Both patient and family verbalized

## 2020-04-05 NOTE — PROGRESS NOTES
"Hospital Medicine  Progress Note  [facility]      Patient Name: Daisha Mayo  MRN:  [id number]  Hospital Medicine Team: VIRTUAL HOSPITAL MEDICINE TEAM 4 Gamaliel Jane MD  Date of Admission:  4/3/2020     Length of Stay:  LOS: 0 days      This service was provided through telemedicine.  Visit type: Virtual visit with audio  Chief complaint: COVID-19 virus infection   The patient location is: [address] [place] [address] A  Start time:09:45  End time: 10:30  Total time spent with patient: 20 min  Present with the patient at the time of the telemedicine/virtual assessment: 30 min      Principal Problem:  COVID-19 virus infection      Hospital Course:  72W with HTN, Depression/Anxiety, Arthritis, Hypothyroidism, and HLD admitted to Hospital Medicine for reported acute encephalopathy at home as well as progressively worsening weakness and associated myalgias.  Patient recently [place] for [place] [facility] [place] and presented 3/31 with respiratory distress.  At that time she had evidence of bilateral pneumonia on CXR and completed a course of ABX for CAP.  Repeat CXR with this admission showed improvement in bilateral airspace disease.  CBC and CMP unremarkable.  UA unremarkable.  Additional negative labs include:  BNP, CPK, LDH, procalcitonin, and troponin.  X-rays of bilateral knees show evidence of mild osteoarthritis but no significant fracture or dislocation.  CT head ordered to further evaluate acute encephalopathy (which daughter reports).    Interval History:     Patient states she remains weak. She thinks her breathing is stable. Complains of feeling " nervous and jittery". Better since took Xanax. States she has been trying to spend more time out of bed. Her nurse echoes her anxiety complaints. Says she has not taken her synthroid and refuses to do so.     Inpatient Medications:    Current Facility-Administered Medications:     acetaminophen tablet 650 mg, 650 mg, Oral, Q4H PRN, Kb PEÑALOZA " ISABEL Lees    albuterol inhaler 2 puff, 2 puff, Inhalation, Q4H PRN **AND** MDI Q4H PRN, , , Q4H PRN, Kb Lees PA-C    ALPRAZolam tablet 0.25 mg, 0.25 mg, Oral, TID PRN, Romeo Avalos MD, 0.25 mg at 04/05/20 0929    atorvastatin tablet 10 mg, 10 mg, Oral, Daily, Kb Lees PA-C, 10 mg at 04/05/20 0904    benzonatate capsule 100 mg, 100 mg, Oral, TID PRN, Kb Lees PA-C    dextrose 50% injection 25 g, 25 g, Intravenous, PRN, Kb Lees PA-C    dorzolamide 2 % ophthalmic solution 1 drop, 1 drop, Both Eyes, BID, Jalil Ruiz MD, 1 drop at 04/05/20 0905    enoxaparin injection 40 mg, 40 mg, Subcutaneous, Daily, Kb Lees PA-C, 40 mg at 04/04/20 2100    glucagon (human recombinant) injection 1 mg, 1 mg, Intramuscular, PRN, Kb Lees PA-C    glucose chewable tablet 16 g, 16 g, Oral, PRN, Kb Lees PA-C    glucose chewable tablet 24 g, 24 g, Oral, PRN, Kb Lees PA-C    hydrALAZINE tablet 25 mg, 25 mg, Oral, Q8H PRN, Kb Lees PA-C    hydroxychloroquine tablet 200 mg, 200 mg, Oral, Daily, Kb Lees PA-C, 200 mg at 04/05/20 0903    latanoprost 0.005 % ophthalmic solution 1 drop, 1 drop, Both Eyes, QHS, Kb Lees PA-C, 1 drop at 04/03/20 2056    levothyroxine tablet 25 mcg, 25 mcg, Oral, Daily, Kb Lees PA-C    loperamide capsule 2 mg, 2 mg, Oral, Q6H PRN, Kb Lees PA-C    melatonin tablet 6 mg, 6 mg, Oral, Nightly PRN, Kb Lees PA-C    multivitamin tablet, 1 tablet, Oral, Daily, Jalil Ruiz MD, 1 tablet at 04/05/20 0904    ondansetron disintegrating tablet 8 mg, 8 mg, Oral, Q8H PRN, Kb Lees PA-C    ondansetron injection 4 mg, 4 mg, Intravenous, Q8H PRN, Kb Lees PA-C    timolol maleate 0.5% ophthalmic solution 1 drop, 1 drop, Both Eyes, BID, Jalil Ruiz MD, 1 drop at 04/05/20 0904        Laboratory:  Lab Results   Component Value Date    COV19QUALPCR  Detected (A) 03/18/2020       Recent Labs   Lab 03/31/20  1440 04/03/20  1350 04/04/20  0414   WBC  --  6.56 4.97   LYMPH  --  28.5  1.9 39.6  2.0   HGB  --  13.2 12.8   HCT 41 40.1 39.3   PLT  --  399* 379*     Recent Labs   Lab 04/03/20  1632 04/04/20  0413    138   K 4.0 4.0    103   CO2 24 26   BUN 8 8   CREATININE 0.7 0.7   GLU 92 94   CALCIUM 8.5* 8.9   MG  --  2.0   PHOS  --  2.8     Recent Labs   Lab 04/03/20  1632 04/04/20  0413   ALKPHOS 96 96   ALT 20 19   AST 14 15   ALBUMIN 3.1* 3.0*   PROT 6.9 6.7   BILITOT 0.5 1.1*        Recent Labs     04/03/20  1350 04/03/20  1632   FERRITIN 499*  --    CRP  --  4.2     --    BNP 42  --    TROPONINI <0.006  --    CPK  --  42       All labs within the last 24 hours were reviewed.     Microbiology:  Microbiology Results (last 7 days)     Procedure Component Value Units Date/Time    Culture, Respiratory with Gram Stain [[id number]]     Order Status:  No result Specimen:  Sputum, Expectorated             Imaging  ECG Results          EKG 12-lead (In process)  Result time 04/03/20 16:05:14    In process by Interface, Lab In [facility] (04/03/20 16:05:14)                 Narrative:    Test Reason : R68.89,    Vent. Rate : 063 BPM     Atrial Rate : 063 BPM     P-R Int : 172 ms          QRS Dur : 118 ms      QT Int : 466 ms       P-R-T Axes : 047 035 023 degrees     QTc Int : 476 ms    Normal sinus rhythm  Incomplete right bundle branch block  Nonspecific T wave abnormality  Prolonged QT  Abnormal ECG  When compared with ECG of 31-MAR-2020 13:40,  No significant change was found    Referred By: [email or web address]   SELF           Confirmed By:                               No results found for this or any previous visit.    CT Head Without Contrast  Narrative: EXAMINATION:  CT HEAD WITHOUT CONTRAST    CLINICAL HISTORY:  Confusion/delirium, altered LOC, unexplained;    TECHNIQUE:  Low dose axial CT images obtained throughout the head without the use of intravenous  contrast.  Axial, sagittal and coronal reconstructions were performed.    COMPARISON:  MRI brain 11/06/2017    FINDINGS:  Intracranial compartment:    Prominence of the ventricles and sulci compatible with mild generalized cerebral volume loss.  No hydrocephalus.    No parenchymal mass, hemorrhage, edema or recent or remote major vascular distribution infarct.    No extra-axial blood or fluid collections.    Skull/extracranial contents (limited evaluation):    No fracture. Mastoid air cells and paranasal sinuses are essentially clear.  Impression: No evidence of acute intracranial pathology.    Mild generalized cerebral volume loss.    Electronically signed by: bK Wheeler MD  Date:    04/04/2020  Time:    15:52  X-Ray Knee 1 or 2 View Bilateral  Narrative: EXAMINATION:  XR KNEE 1 OR 2 VIEW BILATERAL    CLINICAL HISTORY:  Evaluate for osteoarthritis of bilateral knees;    TECHNIQUE:  Four views bilateral knee    FINDINGS:  There is no fracture, dislocation, or bony erosion.  There is mild to moderate medial compartment joint space loss bilaterally.  Impression: As above.    Electronically signed by: Tavo Escobar MD  Date:    04/04/2020  Time:    13:37      All imaging within the last 24 hours was reviewed.     Assessment and Plan:    Active Hospital Problems    Diagnosis  POA    *COVID-19 virus infection [U07.1]  Yes    Adult failure to thrive [R62.7]  Unknown    Encephalopathy [G93.40]  Unknown    Mild episode of recurrent major depressive disorder [F33.0]  Yes    Essential hypertension [I10]  Yes    Pure hyperglyceridemia [E78.1]  Yes    History of seizure [Z87.898]  Not Applicable    Subclinical hypothyroidism [E03.9]  Yes      Resolved Hospital Problems   No resolved problems to display.       COVID +  Person Under Investigation (PUI) for COVID-19  - COVID-19 testing: Collection Date: 3/18/2020 Collection Time:  11:31 AM  - Infection Control notified     - Isolation:   - Airborne and Droplet  Precautions  - Surgical mask on patient   - N95 masks must be fit tested, wear eye protection  - 20 second hand hygiene              - Limit visitors per hospital policy              - Consolidate lab draws, nursing care, and interventions     - Diagnostics: (Lymphopenia, hyponatremia, hyperferritinemia, elevated troponin, elevated d-dimer, age, and comorbidities are significant predictors of poor clinical outcome)              - CBC:                         trend Q48hrs  - CMP:                         trend Q48hrs  - Procalcitonin: 0.03   - D-dimer:                    repeat prior to discharge  - Ferritin: 499               repeat prior to discharge   - CRP: 4.2                   trend Q48hrs  - LDH:  244                  repeat prior to discharge  - BNP:  42  - Troponin: negative                  - ECG:  NSR, no ischemic changes              - rapid Flu: negative 3/17              - CXR:  Improving bilateral airspace disease              - UA and culture:  Unremarkable              - CPK:  42     - Management:   Bundle care as able to maintain isolation & minimize in/out of room   - Supplemental O2 to maintain SpO2 92%-96%   if requiring 6L NC or higher, place on nonrebreather and discuss case with MICU              - Telemetry & continuous pulse oximetry               - If wheezing   - albuterol inhaler 2-4 puff Q6hr PRN    - ipratropium daily               - acetaminophen 650mg PO Q6hr PRN fever              - loperamide PRN for viral diarrhea  - Recently treated with azithromycin for pneumonia.  Possible new opacity concerning for refractory/new pneumonia               - continue outpatient course of azithromycin while inpatient                                      If azithromycin is not available, start doxycycline                                      If MRSA risk factors, add Vancomycin IV (PharmD consult)              - Investigational Treatment Protocol: (if patient meets criteria)                            - c/w statin                          - c/w HCQ 200mg PO daily x1 day for arthritis       Safety notes:              - Avoid NIPPV (CPAP/BiPAP) to prevent aerosolization, use on a case-by-case basis if in neg pressure room              - Cautious use of NSAIDS for fever per WHO recommendations (3/16/2020)              - No new ACEi/ARB start or discontinuation of chronic med unless hypotensive (Esler et al. Journal of Hypertension 2020, 38:000-000)              - Careful use of steroids in the absence of other indications (shock, ARDS)              - Fluid sparing resuscitation, avoid maintenance fluids                Advance Care Planning  Goals of care, counseling/discussion FULL CODE    Generalized weakness  - patient COVID+  since 3/18 presents with billateral leg pain with associated weakness and worsening fatigue/cough. CXR shows improving bilateral airspace disease.Patient unable to amublate on own in ED.    - X-rays of bilateral knees with mild osteoarthritis.  No fracture dislocation  - LE U/S pending to rule out DVT  - Worsening physical decompensation likely secondary to recent COVID infection  - PT/OT consulted    Hypothyroidism  - c/w synthroid      HLD  - c/w statin     HTN  - not currently on any meds.   - hydralazine PRN for now     MDD  SAMSON  - Patient takes xanax prn at home and reports using very infrequently less than once monthly  - start Xanax 0.25mg po prn anxiety    Glaucoma  - Continue latanoprost, timolol, and dorzolamide eye drops at home does      VTE High Risk Prophylaxis: enoxaparin 40mg sq QHS @ 2100 (bundled care) if GFR >30    Patient's chronic/stable medical conditions noted in the problem list above will be managed with the patient's home medications as tolerated.         Subsequent Inpatient Hospital Care  Continue supportive care and anxiolytic therapy.

## 2020-04-05 NOTE — PLAN OF CARE
Problem: Physical Therapy Goal  Goal: Physical Therapy Goal  Description  Goals to be met by: 2020     Patient will increase functional independence with mobility by performin. Supine to sit with stand by Assistance  2. Sit to stand transfer with Supervision  3. Bed to chair transfer with Supervision using Rolling Walker  3. Gait  x 50 feet with Contact Guard Assistance using Rolling Walker.   4. Ascend/descend 4 stair with right Handrails Minimal Assistance using LRAD  5. Lower extremity exercise program x10 reps per handout, with assistance as needed     Outcome: Ongoing, Progressing     Pt evaluated and appropriate goals established.     Dianna Ralph, PT, DPT  2020  [phone]

## 2020-04-05 NOTE — CONSULTS
Daisha Mayo  has been accepted for transfer to [facility] and will be followed through telemedicine services beginning 04/05/20  at 7am.    Meri Lindsay MD

## 2020-04-05 NOTE — PLAN OF CARE
Problem: Occupational Therapy Goal  Goal: Occupational Therapy Goal  Description  Goals to be met by: 4/15/2020     Patient will increase functional independence with ADLs by performing:    UE Dressing with Supervision.  LE Dressing with Stand-by Assistance.  Grooming while standing with Stand-by Assistance.  Toileting from toilet with Stand-by Assistance for hygiene and clothing management.   Toilet transfer to toilet with Supervision.     Outcome: Ongoing, Progressing     Evaluation complete-see note for details. Goals and POC established.    ALONSO Del Valle/L  4/5/2020   Pager #: [phone]

## 2020-04-06 ENCOUNTER — PATIENT MESSAGE (OUTPATIENT)
Dept: INTERNAL MEDICINE | Facility: CLINIC | Age: 75
End: 2020-04-06

## 2020-04-06 LAB
ALBUMIN SERPL BCP-MCNC: 3.1 G/DL (ref 3.5–5.2)
ALP SERPL-CCNC: 95 U/L (ref 55–135)
ALT SERPL W/O P-5'-P-CCNC: 13 U/L (ref 10–44)
ANION GAP SERPL CALC-SCNC: 8 MMOL/L (ref 8–16)
AST SERPL-CCNC: 13 U/L (ref 10–40)
BASOPHILS # BLD AUTO: 0.04 K/UL (ref 0–0.2)
BASOPHILS NFR BLD: 0.7 % (ref 0–1.9)
BILIRUB SERPL-MCNC: 0.7 MG/DL (ref 0.1–1)
BUN SERPL-MCNC: 10 MG/DL (ref 8–23)
CALCIUM SERPL-MCNC: 8.8 MG/DL (ref 8.7–10.5)
CHLORIDE SERPL-SCNC: 106 MMOL/L (ref 95–110)
CO2 SERPL-SCNC: 24 MMOL/L (ref 23–29)
CREAT SERPL-MCNC: 0.6 MG/DL (ref 0.5–1.4)
CRP SERPL-MCNC: 3.5 MG/L (ref 0–8.2)
DIFFERENTIAL METHOD: ABNORMAL
EOSINOPHIL # BLD AUTO: 0.1 K/UL (ref 0–0.5)
EOSINOPHIL NFR BLD: 1.5 % (ref 0–8)
ERYTHROCYTE [DISTWIDTH] IN BLOOD BY AUTOMATED COUNT: 12.3 % (ref 11.5–14.5)
EST. GFR  (AFRICAN AMERICAN): >60 ML/MIN/1.73 M^2
EST. GFR  (NON AFRICAN AMERICAN): >60 ML/MIN/1.73 M^2
GLUCOSE SERPL-MCNC: 88 MG/DL (ref 70–110)
HCT VFR BLD AUTO: 40.2 % (ref 37–48.5)
HGB BLD-MCNC: 12.9 G/DL (ref 12–16)
IMM GRANULOCYTES # BLD AUTO: 0.01 K/UL (ref 0–0.04)
IMM GRANULOCYTES NFR BLD AUTO: 0.2 % (ref 0–0.5)
LYMPHOCYTES # BLD AUTO: 2.2 K/UL (ref 1–4.8)
LYMPHOCYTES NFR BLD: 37 % (ref 18–48)
MAGNESIUM SERPL-MCNC: 1.9 MG/DL (ref 1.6–2.6)
MCH RBC QN AUTO: 31.9 PG (ref 27–31)
MCHC RBC AUTO-ENTMCNC: 32.1 G/DL (ref 32–36)
MCV RBC AUTO: 99 FL (ref 82–98)
MONOCYTES # BLD AUTO: 0.9 K/UL (ref 0.3–1)
MONOCYTES NFR BLD: 14.6 % (ref 4–15)
NEUTROPHILS # BLD AUTO: 2.7 K/UL (ref 1.8–7.7)
NEUTROPHILS NFR BLD: 46 % (ref 38–73)
NRBC BLD-RTO: 0 /100 WBC
PHOSPHATE SERPL-MCNC: 3.3 MG/DL (ref 2.7–4.5)
PLATELET # BLD AUTO: 342 K/UL (ref 150–350)
PMV BLD AUTO: 9 FL (ref 9.2–12.9)
POTASSIUM SERPL-SCNC: 4.2 MMOL/L (ref 3.5–5.1)
PROT SERPL-MCNC: 6.6 G/DL (ref 6–8.4)
RBC # BLD AUTO: 4.05 M/UL (ref 4–5.4)
SODIUM SERPL-SCNC: 138 MMOL/L (ref 136–145)
WBC # BLD AUTO: 5.89 K/UL (ref 3.9–12.7)

## 2020-04-06 PROCEDURE — 84100 ASSAY OF PHOSPHORUS: CPT | Mod: HCNC

## 2020-04-06 PROCEDURE — 83735 ASSAY OF MAGNESIUM: CPT | Mod: HCNC

## 2020-04-06 PROCEDURE — 99225 PR SUBSEQUENT OBSERVATION CARE,LEVEL II: ICD-10-PCS | Mod: HCNC,,, | Performed by: INTERNAL MEDICINE

## 2020-04-06 PROCEDURE — 97530 THERAPEUTIC ACTIVITIES: CPT | Mod: HCNC

## 2020-04-06 PROCEDURE — 36415 COLL VENOUS BLD VENIPUNCTURE: CPT | Mod: HCNC

## 2020-04-06 PROCEDURE — 96372 THER/PROPH/DIAG INJ SC/IM: CPT

## 2020-04-06 PROCEDURE — 99225 PR SUBSEQUENT OBSERVATION CARE,LEVEL II: CPT | Mod: HCNC,,, | Performed by: INTERNAL MEDICINE

## 2020-04-06 PROCEDURE — 25000003 PHARM REV CODE 250: Mod: HCNC | Performed by: FAMILY MEDICINE

## 2020-04-06 PROCEDURE — 25000003 PHARM REV CODE 250: Mod: HCNC | Performed by: PHYSICIAN ASSISTANT

## 2020-04-06 PROCEDURE — 63600175 PHARM REV CODE 636 W HCPCS: Mod: HCNC | Performed by: PHYSICIAN ASSISTANT

## 2020-04-06 PROCEDURE — 25000003 PHARM REV CODE 250: Mod: HCNC | Performed by: HOSPITALIST

## 2020-04-06 PROCEDURE — 86140 C-REACTIVE PROTEIN: CPT | Mod: HCNC

## 2020-04-06 PROCEDURE — G0378 HOSPITAL OBSERVATION PER HR: HCPCS | Mod: HCNC

## 2020-04-06 PROCEDURE — 80053 COMPREHEN METABOLIC PANEL: CPT | Mod: HCNC

## 2020-04-06 PROCEDURE — 85025 COMPLETE CBC W/AUTO DIFF WBC: CPT | Mod: HCNC

## 2020-04-06 RX ADMIN — TIMOLOL MALEATE 1 DROP: 5 SOLUTION/ DROPS OPHTHALMIC at 09:04

## 2020-04-06 RX ADMIN — THERA TABS 1 TABLET: TAB at 09:04

## 2020-04-06 RX ADMIN — LATANOPROST 1 DROP: 50 SOLUTION OPHTHALMIC at 10:04

## 2020-04-06 RX ADMIN — DORZOLAMIDE HYDROCHLORIDE 1 DROP: 20 SOLUTION/ DROPS OPHTHALMIC at 08:04

## 2020-04-06 RX ADMIN — LEVOTHYROXINE SODIUM 25 MCG: 25 TABLET ORAL at 09:04

## 2020-04-06 RX ADMIN — ATORVASTATIN CALCIUM 10 MG: 10 TABLET, FILM COATED ORAL at 09:04

## 2020-04-06 RX ADMIN — DORZOLAMIDE HYDROCHLORIDE 1 DROP: 20 SOLUTION/ DROPS OPHTHALMIC at 09:04

## 2020-04-06 RX ADMIN — HYDROXYCHLOROQUINE SULFATE 200 MG: 200 TABLET, FILM COATED ORAL at 09:04

## 2020-04-06 RX ADMIN — ALPRAZOLAM 0.25 MG: 0.25 TABLET ORAL at 08:04

## 2020-04-06 RX ADMIN — TIMOLOL MALEATE 1 DROP: 5 SOLUTION/ DROPS OPHTHALMIC at 08:04

## 2020-04-06 RX ADMIN — ALPRAZOLAM 0.25 MG: 0.25 TABLET ORAL at 09:04

## 2020-04-06 RX ADMIN — ENOXAPARIN SODIUM 40 MG: 100 INJECTION SUBCUTANEOUS at 10:04

## 2020-04-06 NOTE — PLAN OF CARE
04/06/20 1106   Discharge Reassessment   Assessment Type Discharge Planning Assessment   Provided patient/caregiver education on the expected discharge date and the discharge plan No   Do you have any problems affording any of your prescribed medications? TBD   Discharge Plan A Skilled Nursing Facility   Discharge Plan B Home;Home Health   DME Needed Upon Discharge  other (see comments)  (tbd)   Patient choice form signed by patient/caregiver N/A   Anticipated Discharge Disposition SNF   Can the patient/caregiver answer the patient profile reliably? Yes, cognitively intact   How does the patient rate their overall health at the present time? Fair   Describe the patient's ability to walk at the present time. Minor restrictions or changes   How often would a person be available to care for the patient? Occasionally   Post-Acute Status   Post-Acute Authorization Placement   Post-Acute Placement Status Referrals Sent   Discharge Delays (!) Procedure Scheduling (IR, OR, Labs, Echo, Cath, Echo, EEG)  (pending consults)     Per Obs call/UR request referral sent to [facility]. Psych consult for meds pending.    Sonya Chapman RN  Case Management  Ext: [phone]  04/06/2020  11:11 AM

## 2020-04-06 NOTE — PT/OT/SLP PROGRESS
Physical Therapy Treatment    Patient Name:  Daisha Mayo   MRN:  [id number]  Admit Date: 4/3/2020  Admitting Diagnosis:  COVID-19 virus infection   Length of Stay: 0 days  Recent Surgery: * No surgery found *      Recommendations:     Discharge Recommendations:  nursing facility, skilled   Discharge Equipment Recommendations: walker, rolling   Barriers to discharge: Decreased caregiver support    Plan:     During this hospitalization, patient to be seen 5 x/week to address the listed problems via gait training, therapeutic activities, therapeutic exercises, neuromuscular re-education  · Plan of Care Expires:  05/05/20   Plan of Care Reviewed with: patient    Assessment:     Daisha Mayo is a 74 y.o. female admitted with a medical diagnosis of COVID-19 virus infection.   Patient is making progress towards goals, participating well in this session. Pt continues to require significant assist with transfers and functional mobility due to significantly reduced cardiorespiratory endurance. Pt with high anxiety, requires constant cues for deep breathing and focusing on task. Pt with gait instability improved with use of RW. Education was provided to patient regarding importance of continued participation in therapy, patient's progress and discharge disposition. Pt continues to benefit from a collaborative PT/OT/SLP program to improve quality of life and focus on recovery of impairments.     Problem List: weakness, gait instability, impaired endurance, impaired balance, impaired self care skills, impaired functional mobilty, impaired cardiopulmonary response to activity.  Rehab Prognosis: Good     GOALS:   Multidisciplinary Problems     Physical Therapy Goals        Problem: Physical Therapy Goal    Goal Priority Disciplines Outcome Goal Variances Interventions   Physical Therapy Goal     PT, PT/OT Ongoing, Progressing     Description:  Goals to be met by: 4/19/2020     Patient will increase functional  "independence with mobility by performin. Supine to sit with stand by Assistance  2. Sit to stand transfer with Supervision  3. Bed to chair transfer with Supervision using Rolling Walker  3. Gait  x 50 feet with Contact Guard Assistance using Rolling Walker.   4. Ascend/descend 4 stair with right Handrails Minimal Assistance using LRAD  5. Lower extremity exercise program x10 reps per handout, with assistance as needed                      Subjective   Communicated with RN prior to session.  Patient found supine upon PT entry to room, agreeable to evaluation. Daisha Mayo's alone present during session.    Patient/Family Comments/goals: "I haven't eaten much since I've gotten here." "I tried everything to not have to come to the hospital. I've been home since March 10."  Pain/Comfort:  · Pain Rating 1: 0/10  · Pain Rating Post-Intervention 1: 0/10    Objective:   Patient found with: telemetry, pulse ox (continuous), PureWick   General Precautions: Standard, Cardiac airborne, droplet, contact, fall   Orthopedic Precautions:N/A   Braces: N/A   Oxygen Device: Nasal Cannula 1L  Vitals: /62   Pulse 82   Temp 97.5 °F (36.4 °C)   Resp 16   Ht 5' 1" (1.549 m)   Wt 67.7 kg (149 lb 4 oz)   LMP  (LMP Unknown)   SpO2 97%   Breastfeeding? No   BMI 28.20 kg/m²     Outcome Measures:  AM-PAC 6 CLICK MOBILITY  Turning over in bed (including adjusting bedclothes, sheets and blankets)?: 3  Sitting down on and standing up from a chair with arms (e.g., wheelchair, bedside commode, etc.): 3  Moving from lying on back to sitting on the side of the bed?: 2  Moving to and from a bed to a chair (including a wheelchair)?: 3  Need to walk in hospital room?: 2  Climbing 3-5 steps with a railing?: 1  Basic Mobility Total Score: 14     Cognition:   · Alert and Cooperative  · AxOx4  · Command following: Follows multistep  commands  · Fluency: clear/fluent    Functional Mobility:  Additional staff present: N/A  Bed " Mobility:    Supine to Sit: minimum assistance; HOB flat and from right side of bed   Scooting anteriorly to EOB to have both feet planted on floor: minimum assistance    Transfers:    Sit <> Stand Transfer: minimum assistance with rolling walker    Stand <> Sit Transfer: minimum assistance with rolling walker   o [id number] from EOB, [id number] from bedside chair and [id number] from bedside commode   Bed <> Chair Transfer: Stand Pivot technique with minimum assistance with rolling walker  o Chair on patient's right      Gait:  · Patient ambulated: ~8steps to bedside commode, to bedside chair   · Patient required: minimal assist  · Patient used: rolling walker  · Gait Pattern observed: 3-point gait  · Gait Deviation(s): unsteady gait, decreased step length, narrow base of support, shuffle gait, flexed posture and decreased geoffrey  · Impairments due to: impaired balance, decreased strength and decreased endurance  · Comments: pt required vc's for RW management and step progression.     Therapeutic Activities & Exercises:   *Pt requesting to use beside commode prior to ambulation trial. Pt became emotional and anxious while sitting, required time and encouragement to continue to participate in session.   *PT helped pt to bedside chair with above assist. Pt required assist with set-up for lunch.  *PT returned to help pt with placement of PureWick for safety.    Education:  Patient provided with daily orientation and goals of this PT session. Patient agreed to participate in session. Patient aware of patient's deficits and therapy progression. They were educated to transfer to bedside chair/bedside commode/bathroom with RN/PCT present. Patient educated on Fall risk, home safety and Home exercise program by explanation. Patient was receptive to education and needs further education. Encouraged patient to perform daily exercises & mobility to increase endurance and decrease effects of bedrest.Time provided for therapeutic  counseling and discussion of health disposition. All questions answered to patient's satisfaction, within scope of PT practice; voiced no other concerns. White board updated in patient's room, RN notified of session.    Patient left up in chair, with head in midline, neutral pelvis & heels floated for skin protection with all lines intact, call button in reach and RN notified  Time Tracking:     PT Received On: 04/06/20  PT Start Time: 1108     PT Stop Time: 1217  PT Total Time (min): 69 min     Billable Minutes:   · Therapeutic Activity 60    Treatment Type: Treatment  PT/PTA: PT       Zaria Coley PT, DPT  04/06/2020  Pager: [phone]

## 2020-04-06 NOTE — PLAN OF CARE
Problem: Fall Injury Risk  Goal: Absence of Fall and Fall-Related Injury  Outcome: Ongoing, Progressing     Problem: Adult Inpatient Plan of Care  Goal: Plan of Care Review  Outcome: Ongoing, Progressing  Goal: Patient-Specific Goal (Individualization)  Outcome: Ongoing, Progressing  Goal: Absence of Hospital-Acquired Illness or Injury  Outcome: Ongoing, Progressing  Goal: Optimal Comfort and Wellbeing  Outcome: Ongoing, Progressing  Goal: Readiness for Transition of Care  Outcome: Ongoing, Progressing  Goal: Rounds/Family Conference  Outcome: Ongoing, Progressing     Problem: Infection  Goal: Infection Symptom Resolution  Outcome: Ongoing, Progressing     Problem: Skin Injury Risk Increased  Goal: Skin Health and Integrity  Outcome: Ongoing, Progressing    Pt a&ox4, forgetful, anxious, requires occasional reorientation. VSS on 1LO2 NC, discussed weaning O2 today with pt. POC discussed with pt and pt daughter. Pt daughter wants an update from MD. Charge RN aware, day RN aware. Denies pain. Voiding via purewick. LBM 4/3. Will monitor.

## 2020-04-06 NOTE — PROGRESS NOTES
Hospital Medicine  Progress Note  [facility]      Patient Name: Daisha Mayo  MRN:  [id number]  Hospital Medicine Team: VIRTUAL HOSPITAL MEDICINE TEAM 4 Gamaliel Jane MD  Date of Admission:  4/3/2020     Length of Stay: 3 days    This service was provided through telemedicine.  Visit type: Virtual visit with audio  Chief complaint: COVID-19 virus infection   The patient location is: [address] [place] [address] A  Start time:10:00  End time: 10:50  Total time spent with patient: 15min  Present with the patient at the time of the telemedicine/virtual assessment: 50 min      Principal Problem:  COVID-19 virus infection      Hospital Course:  72W with HTN, Depression/Anxiety, Arthritis, Hypothyroidism, and HLD admitted to Hospital Medicine for reported acute encephalopathy at home as well as progressively worsening weakness and associated myalgias.  Patient recently tested positive for COVID-19 at [facility] on [place]18 and presented 3/31 with respiratory distress.  At that time she had evidence of bilateral pneumonia on CXR and completed a course of ABX for CAP.  Repeat CXR with this admission showed improvement in bilateral airspace disease.  CBC and CMP unremarkable.  UA unremarkable.  Additional negative labs include:  BNP, CPK, LDH, procalcitonin, and troponin.  X-rays of bilateral knees show evidence of mild osteoarthritis but no significant fracture or dislocation.  CT head ordered to further evaluate acute encephalopathy (which daughter reports).    Interval History:     Patient states she remains weak. She feels better than yesterday. Was up in chair most of day yesterday. Breathing is good according to her and her nurse. Will attempt to speak to her daughter. May benefit from psychiatry visit.  Inpatient Medications:    Current Facility-Administered Medications:     acetaminophen tablet 650 mg, 650 mg, Oral, Q4H PRN, Kb Lees PA-C    albuterol inhaler 2 puff, 2 puff, Inhalation, Q4H PRN **AND**  MDI Q4H PRN, , , Q4H PRN, Kb Lees PA-C    ALPRAZolam tablet 0.25 mg, 0.25 mg, Oral, TID PRN, Romeo Avalos MD, 0.25 mg at 04/06/20 0911    atorvastatin tablet 10 mg, 10 mg, Oral, Daily, Kb Lees PA-C, 10 mg at 04/06/20 0909    benzonatate capsule 100 mg, 100 mg, Oral, TID PRN, Kb Lees PA-C    dextrose 50% injection 25 g, 25 g, Intravenous, PRN, Kb Lees PA-C    dorzolamide 2 % ophthalmic solution 1 drop, 1 drop, Both Eyes, BID, Jalil Ruiz MD, 1 drop at 04/06/20 0918    enoxaparin injection 40 mg, 40 mg, Subcutaneous, Daily, Kb Lees PA-C, 40 mg at 04/05/20 2213    glucagon (human recombinant) injection 1 mg, 1 mg, Intramuscular, PRN, Kb Lees PA-C    glucose chewable tablet 16 g, 16 g, Oral, PRN, Kb Lees PA-C    glucose chewable tablet 24 g, 24 g, Oral, PRN, Kb Lees PA-C    hydrALAZINE tablet 25 mg, 25 mg, Oral, Q8H PRN, Kb Lees PA-C    hydroxychloroquine tablet 200 mg, 200 mg, Oral, Daily, Kb Lees PA-C, 200 mg at 04/06/20 0909    latanoprost 0.005 % ophthalmic solution 1 drop, 1 drop, Both Eyes, QHS, Kb Lees PA-C, 1 drop at 04/05/20 2213    levothyroxine tablet 25 mcg, 25 mcg, Oral, Daily, Kb Lees PA-C, 25 mcg at 04/06/20 0909    loperamide capsule 2 mg, 2 mg, Oral, Q6H PRN, Kb Lees PA-C    melatonin tablet 6 mg, 6 mg, Oral, Nightly PRN, Kb J. Worrel, PA-C    multivitamin tablet, 1 tablet, Oral, Daily, Jalil Ruiz MD, 1 tablet at 04/06/20 0909    ondansetron disintegrating tablet 8 mg, 8 mg, Oral, Q8H PRN, Kb Lees PA-C    ondansetron injection 4 mg, 4 mg, Intravenous, Q8H PRN, Kb Lees, ISABEL    timolol maleate 0.5% ophthalmic solution 1 drop, 1 drop, Both Eyes, BID, Jalil Ruiz MD, 1 drop at 04/06/20 0918        Laboratory:  Lab Results   Component Value Date    [id number] Detected (A) 03/18/2020       Recent Labs   Lab  04/03/20  1350 04/04/20  0414 04/06/20  0552   WBC 6.56 4.97 5.89   LYMPH 28.5  1.9 39.6  2.0 37.0  2.2   HGB 13.2 12.8 12.9   HCT 40.1 39.3 40.2   * 379* 342     Recent Labs   Lab 04/03/20  1632 04/04/20  0413 04/06/20  0552    138 138   K 4.0 4.0 4.2    103 106   CO2 24 26 24   BUN 8 8 10   CREATININE 0.7 0.7 0.6   GLU 92 94 88   CALCIUM 8.5* 8.9 8.8   MG  --  2.0 1.9   PHOS  --  2.8 3.3     Recent Labs   Lab 04/03/20  1632 04/04/20  0413 04/06/20  0552   ALKPHOS 96 96 95   ALT 20 19 13   AST 14 15 13   ALBUMIN 3.1* 3.0* 3.1*   PROT 6.9 6.7 6.6   BILITOT 0.5 1.1* 0.7        Recent Labs     04/03/20  1350 04/03/20  1632 04/06/20  0043   FERRITIN 499*  --   --    CRP  --  4.2 3.5     --   --    BNP 42  --   --    TROPONINI <0.006  --   --    CPK  --  42  --        All labs within the last 24 hours were reviewed.     Microbiology:  Microbiology Results (last 7 days)     Procedure Component Value Units Date/Time    Culture, Respiratory with Gram Stain [[id number]]     Order Status:  No result Specimen:  Sputum, Expectorated             Imaging  ECG Results          EKG 12-lead (Final result)  Result time 04/05/20 22:48:21    Final result by Interface, Lab In [facility] (04/05/20 22:48:21)                 Narrative:    Test Reason : R68.89,    Vent. Rate : 063 BPM     Atrial Rate : 063 BPM     P-R Int : 172 ms          QRS Dur : 118 ms      QT Int : 466 ms       P-R-T Axes : 047 035 023 degrees     QTc Int : 476 ms    Normal sinus rhythm  Incomplete right bundle branch block  Nonspecific T wave abnormality  Prolonged QT  Abnormal ECG  When compared with ECG of 31-MAR-2020 13:40,  T wave voltage lower  Confirmed by ANGELICA PANTOJA MD ([id number]) on 4/5/2020 10:48:06 PM    Referred By: [email or web address]   SELF           Confirmed By:ANGELICA PANTOJA MD                              No results found for this or any previous visit.    CT Head Without Contrast  Narrative: EXAMINATION:  CT HEAD WITHOUT CONTRAST    CLINICAL  HISTORY:  Confusion/delirium, altered LOC, unexplained;    TECHNIQUE:  Low dose axial CT images obtained throughout the head without the use of intravenous contrast.  Axial, sagittal and coronal reconstructions were performed.    COMPARISON:  MRI brain 11/06/2017    FINDINGS:  Intracranial compartment:    Prominence of the ventricles and sulci compatible with mild generalized cerebral volume loss.  No hydrocephalus.    No parenchymal mass, hemorrhage, edema or recent or remote major vascular distribution infarct.    No extra-axial blood or fluid collections.    Skull/extracranial contents (limited evaluation):    No fracture. Mastoid air cells and paranasal sinuses are essentially clear.  Impression: No evidence of acute intracranial pathology.    Mild generalized cerebral volume loss.    Electronically signed by: Kb Wheeler MD  Date:    04/04/2020  Time:    15:52  X-Ray Knee 1 or 2 View Bilateral  Narrative: EXAMINATION:  XR KNEE 1 OR 2 VIEW BILATERAL    CLINICAL HISTORY:  Evaluate for osteoarthritis of bilateral knees;    TECHNIQUE:  Four views bilateral knee    FINDINGS:  There is no fracture, dislocation, or bony erosion.  There is mild to moderate medial compartment joint space loss bilaterally.  Impression: As above.    Electronically signed by: Tavo Escobar MD  Date:    04/04/2020  Time:    13:37      All imaging within the last 24 hours was reviewed.     Assessment and Plan:    Active Hospital Problems    Diagnosis  POA    *COVID-19 virus infection [U07.1]  Yes    Adult failure to thrive [R62.7]  Unknown    Encephalopathy [G93.40]  Unknown    Mild episode of recurrent major depressive disorder [F33.0]  Yes    Essential hypertension [I10]  Yes    Pure hyperglyceridemia [E78.1]  Yes    History of seizure [Z87.898]  Not Applicable    Subclinical hypothyroidism [E03.9]  Yes      Resolved Hospital Problems   No resolved problems to display.       COVID +  Person Under Investigation (PUI) for  COVID-19  - COVID-19 testing: Collection Date: 3/18/2020 Collection Time:  11:31 AM  - Infection Control notified     - Isolation:   - Airborne and Droplet Precautions  - Surgical mask on patient   - N95 masks must be fit tested, wear eye protection  - 20 second hand hygiene              - Limit visitors per hospital policy              - Consolidate lab draws, nursing care, and interventions     - Diagnostics: (Lymphopenia, hyponatremia, hyperferritinemia, elevated troponin, elevated d-dimer, age, and comorbidities are significant predictors of poor clinical outcome)              - CBC:                         trend Q48hrs  - CMP:                         trend Q48hrs  - Procalcitonin: 0.03   - D-dimer:                    repeat prior to discharge  - Ferritin: 499               repeat prior to discharge   - CRP: 4.2                   trend Q48hrs  - LDH:  244                  repeat prior to discharge  - BNP:  42  - Troponin: negative                  - ECG:  NSR, no ischemic changes              - rapid Flu: negative 3/17              - CXR:  Improving bilateral airspace disease              - UA and culture:  Unremarkable              - CPK:  42     - Management:   Bundle care as able to maintain isolation & minimize in/out of room   - Supplemental O2 to maintain SpO2 92%-96%   if requiring 6L NC or higher, place on nonrebreather and discuss case with MICU              - Telemetry & continuous pulse oximetry               - If wheezing   - albuterol inhaler 2-4 puff Q6hr PRN    - ipratropium daily               - acetaminophen 650mg PO Q6hr PRN fever              - loperamide PRN for viral diarrhea  - Recently treated with azithromycin for pneumonia.  Possible new opacity concerning for refractory/new pneumonia               - continue outpatient course of azithromycin while inpatient                                      If azithromycin is not available, start doxycycline                                      If  MRSA risk factors, add Vancomycin IV (PharmD consult)              - Investigational Treatment Protocol: (if patient meets criteria)                           - c/w statin                          - c/w HCQ 200mg PO daily x1 day for arthritis       Safety notes:              - Avoid NIPPV (CPAP/BiPAP) to prevent aerosolization, use on a case-by-case basis if in neg pressure room              - Cautious use of NSAIDS for fever per WHO recommendations (3/16/2020)              - No new ACEi/ARB start or discontinuation of chronic med unless hypotensive (Esler et al. Journal of Hypertension 2020, 38:000-000)              - Careful use of steroids in the absence of other indications (shock, ARDS)              - Fluid sparing resuscitation, avoid maintenance fluids                Advance Care Planning  Goals of care, counseling/discussion FULL CODE    Generalized weakness  - patient COVID+  since 3/18 presents with billateral leg pain with associated weakness and worsening fatigue/cough. CXR shows improving bilateral airspace disease.Patient unable to amublate on own in ED.    - X-rays of bilateral knees with mild osteoarthritis.  No fracture dislocation  - LE U/S pending to rule out DVT  - Worsening physical decompensation likely secondary to recent COVID infection  - PT/OT consulted    Hypothyroidism  - c/w synthroid      HLD  - c/w statin     HTN  - not currently on any meds.   - hydralazine PRN for now     MDD  SAMSON  - Patient takes xanax prn at home and reports using very infrequently less than once monthly  - start Xanax 0.25mg po prn anxiety    Glaucoma  - Continue latanoprost, timolol, and dorzolamide eye drops at home does      VTE High Risk Prophylaxis: enoxaparin 40mg sq QHS @ 2100 (bundled care) if GFR >30    Patient's chronic/stable medical conditions noted in the problem list above will be managed with the patient's home medications as tolerated.         Subsequent Inpatient Hospital Care  Continue supportive  care. Will speak with her daughter.           oriented to person, place, time and situation

## 2020-04-06 NOTE — NURSING
With pt permission, spoke at length with pt daughter Zayra. The daughter is frustrated because of lack of communication with her by med team and nursing. Describes pt baseline as a&ox4, active and independent, pt currently oriented but forgetful, very anxious, weak and fearful to move. Daughter requesting to speak to MD tomorrow during the day, wants neuro consult. Discussed results of CT head (WNL) from 4/3. Charge nurse aware, given daughter's phone number. Will call MD in AM and pass on to day RN.

## 2020-04-06 NOTE — NURSING
Med C paged regarding a call to pt daughter re: plan of care. MD stated that this should be passed on to day team. Day RN made aware. Will monitor.

## 2020-04-06 NOTE — PLAN OF CARE
04/06/20 1206   Post-Acute Status   Post-Acute Authorization Home Health   Home Health Status Awaiting Internal Medical Clearance  (family wants [facility])   Discharge Delays (!) Consult Recommendations Completed  (MD consulting neuro)   Discharge Plan   Discharge Plan A Home Health   Discharge Plan B Skilled Nursing Facility     After speaking with attending and patients daughter Zayra, family wants patient to go home with home health from [facility] when ready. Attending is considering consulting Neuro to assess seizures. CM will follow.    Sonya Chapman RN  Case Management  Ext: [phone]  04/06/2020  12:08 PM    UPDATE:    Called daughter back as patient is COVID +, [facility] cannot accept patient, gave choice of [facility] or [facility].    Daughter chose [facility].    Sonya Chapman RN  Case Management  Ext: [phone]  04/06/2020  1:23 PM    UPDATE:   Daughter called back, if patient is medically ok wants to go ahead and discharge home and follow up opt/telemedicine w/ neuro.    Sonya Chapman RN  Case Management  Ext: [phone]  04/06/2020  4:38 PM

## 2020-04-06 NOTE — PLAN OF CARE
Plan of Care:  Discharge Recommendation: SNF.  Please continue Progressive Mobility Protocol as appropriate.  Appropriate transfer level with nursing staff: Bed <> Chair:  Stand Pivot with minimum assistance with Rolling Walker.    Zaria Coley PT, DPT  2020  Pager: [phone]    Physical Therapy Treatment Goals:  Multidisciplinary Problems       Physical Therapy Goals          Problem: Physical Therapy Goal    Goal Priority Disciplines Outcome Goal Variances Interventions   Physical Therapy Goal     PT, PT/OT Ongoing, Progressing     Description:  Goals to be met by: 2020     Patient will increase functional independence with mobility by performin. Supine to sit with stand by Assistance  2. Sit to stand transfer with Supervision  3. Bed to chair transfer with Supervision using Rolling Walker  3. Gait  x 50 feet with Contact Guard Assistance using Rolling Walker.   4. Ascend/descend 4 stair with right Handrails Minimal Assistance using LRAD  5. Lower extremity exercise program x10 reps per handout, with assistance as needed

## 2020-04-06 NOTE — PLAN OF CARE
04/06/20 1118   Post-Acute Status   Post-Acute Authorization Placement   Post-Acute Placement Status Referrals Sent       CM sent referral to [facility].  SW in contact with CM and Medical staff. Will continue to follow and offer support as needed.     Jose Angel Gordillo LMSW  [facility]   Ext. [phone]

## 2020-04-06 NOTE — CONSULTS
04/06/2020 4:45 PM   Daisha Mayo   [id number]  1945    Psychiatry Plan of Care Note:    Consult received from primary team for anxiety and depression. Spoke to primary team MD Jane. Dr. Jane states that full psychiatric assessment over the phone not necessary at this time, but rather requesting psychiatric assistance with current state of anxiety. Called pt via telephone to discuss. Pt reports significant anxiety which has been exacerbated by her COVID+ status and the current landscape of the COVID outbreak. Pt also reporting symptoms of depression. She denies any SI/HI/AVH currently. She has seen psychotherapists previously and voices desire to be seen by psychiatry for full assessment.      Spoke with daughter, who states that her mother's anxiety is markedly higher than baseline. Daughter believes that hospital setting may be worsening pts anxiety and mental status, and hopeful that taking her home may alleviate some stress. Daughter will be on-site at mothers home and will be monitoring her via baby-monitor for the foreseeable future to ensure pt's safety. Daughter given return precautions for worsening mental status and/or belief that her mother poses danger to self or others.     Per chart review, pt likely to be medically cleared by primary team for discharge with home health tomorrow 4/7/20. Given these circumstances, believe it would be best for pt to be seen for urgent f/u in geriatric psychiatry clinic. Arranged telemedicine appointment for her in [facility] on Wednesday 4/8/20 at 10am. Informed patient and daughter of this appointment and to await telemed instructions from MD on 4/8/20 AM prior to the appointment, to which they voiced understanding.       Ashok Ha MD  [facility] PGY-4  04/06/2020 4:54 PM

## 2020-04-07 ENCOUNTER — PATIENT MESSAGE (OUTPATIENT)
Dept: INTERNAL MEDICINE | Facility: CLINIC | Age: 75
End: 2020-04-07

## 2020-04-07 VITALS
HEIGHT: 61 IN | OXYGEN SATURATION: 97 % | HEART RATE: 78 BPM | BODY MASS INDEX: 28.18 KG/M2 | RESPIRATION RATE: 16 BRPM | DIASTOLIC BLOOD PRESSURE: 56 MMHG | TEMPERATURE: 99 F | SYSTOLIC BLOOD PRESSURE: 115 MMHG | WEIGHT: 149.25 LBS

## 2020-04-07 DIAGNOSIS — U07.1 COVID-19 VIRUS INFECTION: Primary | ICD-10-CM

## 2020-04-07 DIAGNOSIS — G93.40 ENCEPHALOPATHY: ICD-10-CM

## 2020-04-07 PROCEDURE — 25000003 PHARM REV CODE 250: Mod: HCNC | Performed by: FAMILY MEDICINE

## 2020-04-07 PROCEDURE — 97116 GAIT TRAINING THERAPY: CPT | Mod: HCNC,59

## 2020-04-07 PROCEDURE — 25000003 PHARM REV CODE 250: Mod: HCNC | Performed by: PHYSICIAN ASSISTANT

## 2020-04-07 PROCEDURE — G0378 HOSPITAL OBSERVATION PER HR: HCPCS | Mod: HCNC

## 2020-04-07 PROCEDURE — 97535 SELF CARE MNGMENT TRAINING: CPT | Mod: HCNC

## 2020-04-07 PROCEDURE — 99217 PR OBSERVATION CARE DISCHARGE: CPT | Mod: HCNC,,, | Performed by: INTERNAL MEDICINE

## 2020-04-07 PROCEDURE — 25000003 PHARM REV CODE 250: Mod: HCNC | Performed by: HOSPITALIST

## 2020-04-07 PROCEDURE — 25000003 PHARM REV CODE 250: Mod: HCNC | Performed by: STUDENT IN AN ORGANIZED HEALTH CARE EDUCATION/TRAINING PROGRAM

## 2020-04-07 PROCEDURE — 99217 PR OBSERVATION CARE DISCHARGE: ICD-10-PCS | Mod: HCNC,,, | Performed by: INTERNAL MEDICINE

## 2020-04-07 PROCEDURE — 97530 THERAPEUTIC ACTIVITIES: CPT | Mod: HCNC

## 2020-04-07 RX ORDER — ALPRAZOLAM 0.25 MG/1
0.25 TABLET ORAL 3 TIMES DAILY PRN
Qty: 20 TABLET | Refills: 0 | Status: SHIPPED | OUTPATIENT
Start: 2020-04-07 | End: 2020-04-07

## 2020-04-07 RX ORDER — CALCIUM CARBONATE 200(500)MG
500 TABLET,CHEWABLE ORAL DAILY PRN
Status: DISCONTINUED | OUTPATIENT
Start: 2020-04-07 | End: 2020-04-07

## 2020-04-07 RX ORDER — CALCIUM CARBONATE 200(500)MG
500 TABLET,CHEWABLE ORAL ONCE
Status: COMPLETED | OUTPATIENT
Start: 2020-04-07 | End: 2020-04-07

## 2020-04-07 RX ORDER — ALPRAZOLAM 0.25 MG/1
0.25 TABLET ORAL 3 TIMES DAILY PRN
Qty: 20 TABLET | Refills: 0 | Status: SHIPPED | OUTPATIENT
Start: 2020-04-07 | End: 2020-06-06 | Stop reason: ALTCHOICE

## 2020-04-07 RX ORDER — ALBUTEROL SULFATE 90 UG/1
2 AEROSOL, METERED RESPIRATORY (INHALATION) EVERY 4 HOURS PRN
Qty: 8 G | Refills: 1 | Status: SHIPPED | OUTPATIENT
Start: 2020-04-07 | End: 2020-10-29

## 2020-04-07 RX ORDER — ACETAMINOPHEN 325 MG/1
650 TABLET ORAL EVERY 4 HOURS PRN
Refills: 0
Start: 2020-04-07 | End: 2020-10-29

## 2020-04-07 RX ORDER — ALUMINUM HYDROXIDE, MAGNESIUM HYDROXIDE, AND SIMETHICONE 2400; 240; 2400 MG/30ML; MG/30ML; MG/30ML
30 SUSPENSION ORAL EVERY 6 HOURS PRN
Status: DISCONTINUED | OUTPATIENT
Start: 2020-04-07 | End: 2020-04-07 | Stop reason: HOSPADM

## 2020-04-07 RX ADMIN — ATORVASTATIN CALCIUM 10 MG: 10 TABLET, FILM COATED ORAL at 09:04

## 2020-04-07 RX ADMIN — HYDROXYCHLOROQUINE SULFATE 200 MG: 200 TABLET, FILM COATED ORAL at 09:04

## 2020-04-07 RX ADMIN — CALCIUM CARBONATE (ANTACID) CHEW TAB 500 MG 500 MG: 500 CHEW TAB at 05:04

## 2020-04-07 RX ADMIN — DORZOLAMIDE HYDROCHLORIDE 1 DROP: 20 SOLUTION/ DROPS OPHTHALMIC at 09:04

## 2020-04-07 RX ADMIN — THERA TABS 1 TABLET: TAB at 09:04

## 2020-04-07 RX ADMIN — LEVOTHYROXINE SODIUM 25 MCG: 25 TABLET ORAL at 09:04

## 2020-04-07 RX ADMIN — ALPRAZOLAM 0.25 MG: 0.25 TABLET ORAL at 09:04

## 2020-04-07 RX ADMIN — TIMOLOL MALEATE 1 DROP: 5 SOLUTION/ DROPS OPHTHALMIC at 09:04

## 2020-04-07 NOTE — DISCHARGE SUMMARY
"Hospital Medicine  Progress Note  [facility]      Patient Name: Daisha Mayo  MRN:  [id number]  Hospital Medicine Team: VIRTUAL HOSPITAL MEDICINE TEAM 9 Gamaliel Jane MD  Date of Admission:  4/3/2020     Length of Stay: 3 days    This service was provided through telemedicine.  Visit type: Virtual visit with audio  Chief complaint: COVID-19 virus infection   The patient location is: [address] [place] [address] A  Start time:09:00  End time: 10:00  Total time spent with patient: 15min  Present with the patient at the time of the telemedicine/virtual assessment: 50 min      Principal Problem:  COVID-19 virus infection      Hospital Course:  72W with HTN, Depression/Anxiety, Arthritis, Hypothyroidism, and HLD admitted to Hospital Medicine for reported acute encephalopathy at home as well as progressively worsening weakness and associated myalgias.  [place] [facility] [place] and presented 3/31 with respiratory distress.  At that time she had evidence of bilateral pneumonia on CXR and completed a course of ABX for CAP.  Repeat CXR with this admission showed improvement in bilateral airspace disease.  CBC and CMP unremarkable.  UA unremarkable.  Additional negative labs include:  BNP, CPK, LDH, procalcitonin, and troponin.  X-rays of bilateral knees show evidence of mild osteoarthritis but no significant fracture or dislocation.  CT head ordered to further evaluate acute encephalopathy (which daughter reports).    Interval History:     Breathing without supplemental O2. She remains "nervous". Wants to go home and will need home health.   Inpatient Medications:    Current Facility-Administered Medications:     acetaminophen tablet 650 mg, 650 mg, Oral, Q4H PRN, Kb Lees PA-C    albuterol inhaler 2 puff, 2 puff, Inhalation, Q4H PRN **AND** MDI Q4H PRN, , , Q4H PRN, Kb Lees PA-C    ALPRAZolam tablet 0.25 mg, 0.25 mg, Oral, TID PRN, Romeo Avalos MD, " 0.25 mg at 04/07/20 0921    aluminum & magnesium hydroxide-simethicone 400-400-40 mg/5 mL suspension 30 mL, 30 mL, Oral, Q6H PRN, Nehal Melgar PA-C    atorvastatin tablet 10 mg, 10 mg, Oral, Daily, Kb Lees PA-C, 10 mg at 04/07/20 0922    benzonatate capsule 100 mg, 100 mg, Oral, TID PRN, Kb Lees PA-C    dextrose 50% injection 25 g, 25 g, Intravenous, PRN, Kb Lees PA-C    dorzolamide 2 % ophthalmic solution 1 drop, 1 drop, Both Eyes, BID, Jalil Ruiz MD, 1 drop at 04/07/20 0958    enoxaparin injection 40 mg, 40 mg, Subcutaneous, Daily, Kb Lees PA-C, 40 mg at 04/06/20 2243    glucagon (human recombinant) injection 1 mg, 1 mg, Intramuscular, PRN, Kb Lees PA-C    glucose chewable tablet 16 g, 16 g, Oral, PRN, Kb Lees PA-C    glucose chewable tablet 24 g, 24 g, Oral, PRN, Kb Lees PA-C    hydrALAZINE tablet 25 mg, 25 mg, Oral, Q8H PRN, Kb Lees PA-C    hydroxychloroquine tablet 200 mg, 200 mg, Oral, Daily, Kb Lees PA-C, 200 mg at 04/07/20 0922    latanoprost 0.005 % ophthalmic solution 1 drop, 1 drop, Both Eyes, QHS, Kb Lees PA-C, 1 drop at 04/06/20 2244    levothyroxine tablet 25 mcg, 25 mcg, Oral, Daily, Kb Lees PA-C, 25 mcg at 04/07/20 0922    loperamide capsule 2 mg, 2 mg, Oral, Q6H PRN, Kb Lees PA-C    melatonin tablet 6 mg, 6 mg, Oral, Nightly PRN, Kb Lees PA-C    multivitamin tablet, 1 tablet, Oral, Daily, Jalil Ruiz MD, 1 tablet at 04/07/20 0922    ondansetron disintegrating tablet 8 mg, 8 mg, Oral, Q8H PRN, Kb Lees PA-C    ondansetron injection 4 mg, 4 mg, Intravenous, Q8H PRN, Kb Lees PA-C    timolol maleate 0.5% ophthalmic solution 1 drop, 1 drop, Both Eyes, BID, Jalil Ruiz MD, 1 drop at 04/07/20 0958        Laboratory:  Lab Results   Component Value Date    [id number] Detected (A) 03/18/2020       Recent Labs   Lab  04/03/20  1350 04/04/20  0414 04/06/20  0552   WBC 6.56 4.97 5.89   LYMPH 28.5  1.9 39.6  2.0 37.0  2.2   HGB 13.2 12.8 12.9   HCT 40.1 39.3 40.2   * 379* 342     Recent Labs   Lab 04/03/20  1632 04/04/20  0413 04/06/20  0552    138 138   K 4.0 4.0 4.2    103 106   CO2 24 26 24   BUN 8 8 10   CREATININE 0.7 0.7 0.6   GLU 92 94 88   CALCIUM 8.5* 8.9 8.8   MG  --  2.0 1.9   PHOS  --  2.8 3.3     Recent Labs   Lab 04/03/20  1632 04/04/20  0413 04/06/20  0552   ALKPHOS 96 96 95   ALT 20 19 13   AST 14 15 13   ALBUMIN 3.1* 3.0* 3.1*   PROT 6.9 6.7 6.6   BILITOT 0.5 1.1* 0.7        Recent Labs     04/06/20  0043   CRP 3.5       All labs within the last 24 hours were reviewed.     Microbiology:  Microbiology Results (last 7 days)     Procedure Component Value Units Date/Time    Culture, Respiratory with Gram Stain [[id number]]     Order Status:  No result Specimen:  Sputum, Expectorated             Imaging  ECG Results          EKG 12-lead (Final result)  Result time 04/05/20 22:48:21    Final result by Interface, Lab In [facility] (04/05/20 22:48:21)                 Narrative:    Test Reason : R68.89,    Vent. Rate : 063 BPM     Atrial Rate : 063 BPM     P-R Int : 172 ms          QRS Dur : 118 ms      QT Int : 466 ms       P-R-T Axes : 047 035 023 degrees     QTc Int : 476 ms    Normal sinus rhythm  Incomplete right bundle branch block  Nonspecific T wave abnormality  Prolonged QT  Abnormal ECG  When compared with ECG of 31-MAR-2020 13:40,  T wave voltage lower  Confirmed by ANGELICA PANTOJA MD ([id number]) on 4/5/2020 10:48:06 PM    Referred By: [email or web address]   SELF           Confirmed By:ANGELICA PANTOJA MD                              No results found for this or any previous visit.    CT Head Without Contrast  Narrative: EXAMINATION:  CT HEAD WITHOUT CONTRAST    CLINICAL HISTORY:  Confusion/delirium, altered LOC, unexplained;    TECHNIQUE:  Low dose axial CT images obtained throughout the head without the use of intravenous  contrast.  Axial, sagittal and coronal reconstructions were performed.    COMPARISON:  MRI brain 11/06/2017    FINDINGS:  Intracranial compartment:    Prominence of the ventricles and sulci compatible with mild generalized cerebral volume loss.  No hydrocephalus.    No parenchymal mass, hemorrhage, edema or recent or remote major vascular distribution infarct.    No extra-axial blood or fluid collections.    Skull/extracranial contents (limited evaluation):    No fracture. Mastoid air cells and paranasal sinuses are essentially clear.  Impression: No evidence of acute intracranial pathology.    Mild generalized cerebral volume loss.    Electronically signed by: Kb Wheeler MD  Date:    04/04/2020  Time:    15:52  X-Ray Knee 1 or 2 View Bilateral  Narrative: EXAMINATION:  XR KNEE 1 OR 2 VIEW BILATERAL    CLINICAL HISTORY:  Evaluate for osteoarthritis of bilateral knees;    TECHNIQUE:  Four views bilateral knee    FINDINGS:  There is no fracture, dislocation, or bony erosion.  There is mild to moderate medial compartment joint space loss bilaterally.  Impression: As above.    Electronically signed by: Tavo Escobar MD  Date:    04/04/2020  Time:    13:37      All imaging within the last 24 hours was reviewed.     Assessment and Plan:    Active Hospital Problems    Diagnosis  POA    *COVID-19 virus infection [U07.1]  Yes    Adult failure to thrive [R62.7]  Unknown    Encephalopathy [G93.40]  Unknown    Mild episode of recurrent major depressive disorder [F33.0]  Yes    Essential hypertension [I10]  Yes    Pure hyperglyceridemia [E78.1]  Yes    History of seizure [Z87.898]  Not Applicable    Subclinical hypothyroidism [E03.9]  Yes      Resolved Hospital Problems   No resolved problems to display.       COVID +  Person Under Investigation (PUI) for COVID-19  - COVID-19 testing: Collection Date: 3/18/2020 Collection Time:  11:31 AM  - Infection Control notified     - Isolation:   - Airborne and Droplet  Precautions  - Surgical mask on patient   - N95 masks must be fit tested, wear eye protection  - 20 second hand hygiene              - Limit visitors per hospital policy              - Consolidate lab draws, nursing care, and interventions     - Diagnostics: (Lymphopenia, hyponatremia, hyperferritinemia, elevated troponin, elevated d-dimer, age, and comorbidities are significant predictors of poor clinical outcome)              - CBC:                         trend Q48hrs  - CMP:                         trend Q48hrs  - Procalcitonin: 0.03   - D-dimer:                    repeat prior to discharge  - Ferritin: 499               repeat prior to discharge   - CRP: 4.2                   trend Q48hrs  - LDH:  244                  repeat prior to discharge  - BNP:  42  - Troponin: negative                  - ECG:  NSR, no ischemic changes              - rapid Flu: negative 3/17              - CXR:  Improving bilateral airspace disease              - UA and culture:  Unremarkable              - CPK:  42     - Management:   Bundle care as able to maintain isolation & minimize in/out of room   - Supplemental O2 to maintain SpO2 92%-96%   if requiring 6L NC or higher, place on nonrebreather and discuss case with MICU              - Telemetry & continuous pulse oximetry               - If wheezing   - albuterol inhaler 2-4 puff Q6hr PRN    - ipratropium daily               - acetaminophen 650mg PO Q6hr PRN fever              - loperamide PRN for viral diarrhea  - Recently treated with azithromycin for pneumonia.  Possible new opacity concerning for refractory/new pneumonia               - continue outpatient course of azithromycin while inpatient                                      If azithromycin is not available, start doxycycline                                      If MRSA risk factors, add Vancomycin IV (PharmD consult)              - Investigational Treatment Protocol: (if patient meets criteria)                            - c/w statin                          - c/w HCQ 200mg PO daily x1 day for arthritis       Safety notes:              - Avoid NIPPV (CPAP/BiPAP) to prevent aerosolization, use on a case-by-case basis if in neg pressure room              - Cautious use of NSAIDS for fever per WHO recommendations (3/16/2020)              - No new ACEi/ARB start or discontinuation of chronic med unless hypotensive (Esler et al. Journal of Hypertension 2020, 38:000-000)              - Careful use of steroids in the absence of other indications (shock, ARDS)              - Fluid sparing resuscitation, avoid maintenance fluids                Advance Care Planning  Goals of care, counseling/discussion FULL CODE    Generalized weakness  - patient COVID+  since 3/18 presents with billateral leg pain with associated weakness and worsening fatigue/cough. CXR shows improving bilateral airspace disease.Patient unable to amublate on own in ED.    - X-rays of bilateral knees with mild osteoarthritis.  No fracture dislocation  - LE U/S pending to rule out DVT  - Worsening physical decompensation likely secondary to recent COVID infection  - PT/OT consulted    Hypothyroidism  - c/w synthroid      HLD  - c/w statin     HTN  - not currently on any meds.   - hydralazine PRN for now     MDD  SAMSON  - Patient takes xanax prn at home and reports using very infrequently less than once monthly  - start Xanax 0.25mg po prn anxiety    Glaucoma  - Continue latanoprost, timolol, and dorzolamide eye drops at home does      VTE High Risk Prophylaxis: enoxaparin 40mg sq QHS @ 2100 (bundled care) if GFR >30    Patient's chronic/stable medical conditions noted in the problem list above will be managed with the patient's home medications as tolerated.         Subsequent Inpatient Hospital Care  I spoke to neurology.  Plan D/C today with evaluation as inpatient or outpatient.

## 2020-04-07 NOTE — NURSING
Pt D/C'd home with home health as ordered; Tele and pure wick both D/C'd; PIV to left hand D/C'd without issue (cath intact, with site benign of redness, swelling, and pain); AVS copy provided with teaching, Pt denied concerns/questions; Pt's daughter Andrzej was contacted with D/C details and for transport home; Pt left unit via W/C, wearing mask, with personal belongings; no changes noted from am assessment.

## 2020-04-07 NOTE — PLAN OF CARE
Continue OT plan of care.    Problem: Occupational Therapy Goal  Goal: Occupational Therapy Goal  Description  Goals to be met by: 4/15/2020     Patient will increase functional independence with ADLs by performing:    UE Dressing with Supervision.  LE Dressing with Stand-by Assistance.  Grooming while standing with Stand-by Assistance.  Toileting from toilet with Stand-by Assistance for hygiene and clothing management.   Toilet transfer to toilet with Supervision.     Outcome: Ongoing, Progressing

## 2020-04-07 NOTE — NURSING
Pt daughter Zayra called and pt POC discussed at length. Daughter is frustrated that neuro consult has not been ordered, states pt appears encephalopathic, is not pleased with psych consult. Daughter wants neuro consult and d/c 4/7 to prevent further delirium. This RN left sticky note for day MD team on chart, will monitor.

## 2020-04-07 NOTE — PLAN OF CARE
Problem: Physical Therapy Goal  Goal: Physical Therapy Goal  Description  Goals to be met by: 2020     Patient will increase functional independence with mobility by performin. Supine to sit with stand by Assistance  2. Sit to stand transfer with Supervision  3. Bed to chair transfer with Supervision using Rolling Walker  3. Gait  x 50 feet with Contact Guard Assistance using Rolling Walker.   4. Ascend/descend 4 stair with right Handrails Minimal Assistance using LRAD  5. Lower extremity exercise program x10 reps per handout, with assistance as needed     Outcome: Ongoing, Progressing   Andrew Coleman, PT,DPT  2020

## 2020-04-07 NOTE — PLAN OF CARE
"74 y.o. Female with HTN, HLD, hypothyroidism, anxiety and depression presented to ED 4/3/20 with AMS after recent COVID diagnosis on 3/18. This admission, CXR showed improving bilateral airspace disease. CT Head WO contrast with mild generalized volume loss, no acute intracranial pathology. Neurology consulted 4/7 for "AMS, prior hx of seizures." History obtained from daughter Zayra via telephone ([phone]). Daughter says patient has remote hx of seizures described as "confusion, blank stare, wave sensation and change in respirations." Thinks seizures were either right after Geovanna, 2010 or 2012. Was previously seen by a Neurologist, but unclear whether at [facility] or [facility]. Daughter thinks patient was briefly on an AED, but unsure of name and why it was ultimately discontinued. No record of previous EEGs in [facility] EMR. Daughter says pt was previously on Wellbutrin short term after Geovanna, but recently not on any anxiolytics or antidepressants. [facility] Of note, psych was also consulted this admission for anxiety related to COVID diagnosis. She was started on alprazolam 0.25 mg TID prn anxiety. Planned to f/u with [facility] on 4/8. Based on exam taken by psych team, patient able to recall details of conversation from yesterday and was oriented x 4. No concerns for focal weakness or ongoing subclinical seizures per primary team. EEG was offered to patient, but she declines further diagnostic workup this admission and would like to go home. She is planned for d/c with home health later today. CT Head reviewed and no further intracranial imaging recommended at this time. Neurology f/u via virtual visit planned for 4/13 at 10:30 am with Dr. Lazo. Suspect AMS this admission is related to recent infectious/metabolic derangements worsened by heightened anxiety. Will pursue obtaining outside records regarding seizure history on outpatient basis.     Due to COVID-19, the " Neurology team is limiting interaction with consult patients unless absolutely necessary in order to limit exposure to the virus. As of now, the Neurology team will be chart checking this patient and discussing with staff. If the patient has an acute neurological change, please page Neurology on call to discuss the patient with a team member.     Jennifer Flores PA-C  General Neurology Consult  [id number]

## 2020-04-07 NOTE — PT/OT/SLP PROGRESS
"Occupational Therapy   Treatment    Name: Daisha Myao  MRN: [id number]  Admitting Diagnosis:  COVID-19 virus infection       Recommendations:     Discharge Recommendations: nursing facility, skilled  Discharge Equipment Recommendations:  walker, rolling, bedside commode  Barriers to discharge:  Inaccessible home environment(increased assistance needed)    Assessment:     Daisha Mayo is a 74 y.o. female with a medical diagnosis of COVID-19 virus infection. She presents with performance deficits including weakness, impaired endurance, impaired self care skills, impaired functional mobilty, impaired balance, decreased upper extremity function, decreased lower extremity function, decreased safety awareness, impaired cardiopulmonary response to activity. Pt progressing toward goals and would continue to benefit from OT to increase functional independence and safety. Recommend SNF upon D/C.    Rehab Prognosis:  Good; patient would benefit from acute skilled OT services to address these deficits and reach maximum level of function.       Plan:     Patient to be seen 4 x/week to address the above listed problems via self-care/home management, therapeutic activities, therapeutic exercises  · Plan of Care Expires: 05/05/20  · Plan of Care Reviewed with: patient    Subjective     Pain/Comfort:  · Pain Rating 1: 0/10  · Pain Rating Post-Intervention 1: 0/10    Objective:     Communicated with: RN prior to session. Patient found with HOB elevated with telemetry, pulse ox (continuous), peripheral IV, PureWick upon OT entry to room.  Pt reports feeling anxious.  "I'm weak. I can't pick that up."  "I'm right-handed. I need everything on the right side."    General Precautions: Standard, fall, droplet, airborne, contact   Orthopedic Precautions:N/A   Braces: N/A     Occupational Performance:     Bed Mobility:    · Patient completed Supine to Sit with minimum assistance with HOB elevated and increased time    Functional " Mobility/Transfers:  · Patient completed Sit <> Stand Transfer with CGA-Min A from EOB and BSC with RW and cues for technique and controlled descent  · Functional Mobility: Few steps from EOB to BSC and to bedside chair with CGA-Min A using RW-- increased time and cues needed     Activities of Daily Living:  · Feeding: set-up assistance-- pt completes task bi-manually due to reported UE weakness  · Grooming: set-up assistance while seated  · Lower Body Dressing: Max A to adjust socks and don/doff brief  · Toileting: Max A for standing hygiene following use of BSC-- 2nd person present for balance using RW      [facility] 6 Click ADL: 14    Treatment & Education:  Pt requires increased time for ADL and mobility tasks due to reported anxiety and pt very particular about room arrangement; assisted to BSC and then bedside chair as described above--requires increased cues and time to complete; once in bedside chair, attempted seated ADLs and pt reports needing assistance due to reported UE weakness; increased time spent assisting pt with getting comfortable and making sure necessary items nearby-- RN notified L hand IV dislodged; discussed POC and D/C recs and to call for assistance    Patient left up in chair with all lines intact, call button in reach and RN notifiedEducation:      GOALS:   Multidisciplinary Problems     Occupational Therapy Goals        Problem: Occupational Therapy Goal    Goal Priority Disciplines Outcome Interventions   Occupational Therapy Goal     OT, PT/OT Ongoing, Progressing    Description:  Goals to be met by: 4/15/2020     Patient will increase functional independence with ADLs by performing:    UE Dressing with Supervision.  LE Dressing with Stand-by Assistance.  Grooming while standing with Stand-by Assistance.  Toileting from toilet with Stand-by Assistance for hygiene and clothing management.   Toilet transfer to toilet with Supervision.                      Time Tracking:     OT Date of  Treatment: 04/07/20  OT Start Time: 1045  OT Stop Time: 1110  OT Total Time (min): 25 min (co-treat with PT)    Billable Minutes:Self Care/Home Management 25 minutes    TIMI Montano  4/7/2020

## 2020-04-07 NOTE — PT/OT/SLP PROGRESS
"Physical Therapy Treatment    Patient Name:  Daisha Mayo   MRN:  [id number]    Recommendations:     Discharge Recommendations:  nursing facility, skilled   Discharge Equipment Recommendations: bedside commode, bath bench, walker, rolling   Barriers to discharge: decreased functional mobility    Assessment:     Daisha Mayo is a 74 y.o. female admitted with a medical diagnosis of COVID-19 virus infection.  She presents with the following impairments/functional limitations:  impaired functional mobilty, gait instability, impaired endurance, weakness, impaired balance, impaired self care skills, decreased coordination, decreased safety awareness, decreased upper extremity function, decreased lower extremity function.  Tolerated session c c/o fatigue, weakness and tremors.  Performed mobility c CGA-min A.  Pt able to take few steps c RW and demo decreased gait speed, narrow ROSANGELA, flat foot contact, FFP, inconsistent foot placement and requiring cues for proper RW management.  Pt safe to amb c assistance of 1x person. Pt demo mild self-limiting characteristics and requiring encouragement for improved participation.  Pt would benefit from continued skilled acute PT 5x/wk to improve functional mobility.      Rehab Prognosis: Good; patient would benefit from acute skilled PT services to address these deficits and reach maximum level of function.    Recent Surgery: * No surgery found *      Plan:     During this hospitalization, patient to be seen 5 x/week to address the identified rehab impairments via gait training, therapeutic activities, therapeutic exercises and progress toward the following goals:    · Plan of Care Expires:  05/05/20    Subjective     Chief Complaint: fatigue, weakness  Patient/Family Comments/goals: "I can't use my arms because they're weak." - when asked to pour herself water from pitcher  Pain/Comfort:  · Pain Rating 1: 0/10      Objective:     Communicated with RN and OT prior to session.  " Patient found HOB elevated with peripheral IV, telemetry, pulse ox (continuous), PureWick upon PT entry to room.     General Precautions: Standard, airborne, contact, droplet, fall   Orthopedic Precautions:N/A   Braces: N/A     Functional Mobility:  · Bed Mobility:     · Rolling Left:  contact guard assistance  · Scooting: contact guard assistance  · Supine to Sit: contact guard assistance  · Transfers:     · Sit to Stand:  contact guard assistance with rolling walker  · Bed to Chair: contact guard assistance with  rolling walker  using  Step Transfer  · Gait: 10 steps x2 c RW CGA-min A    · demo decreased gait speed, narrow ROSANGELA, flat foot contact, FFP, inconsistent foot placement and requiring cues for proper RW management and gait sequencing  · Demo mild unsteadiness requiring min A for correction  · Balance: sitting (S); standing (CGA-min A)      AM-PAC 6 CLICK MOBILITY  Turning over in bed (including adjusting bedclothes, sheets and blankets)?: 3  Sitting down on and standing up from a chair with arms (e.g., wheelchair, bedside commode, etc.): 3  Moving from lying on back to sitting on the side of the bed?: 3  Moving to and from a bed to a chair (including a wheelchair)?: 3  Need to walk in hospital room?: 2  Climbing 3-5 steps with a railing?: 1  Basic Mobility Total Score: 15       Therapeutic Activities and Exercises:  Pt educated on: PT role/POC; safety c mobility; benefits of OOB activities; performing therex; d/c recs - v/u  -sat EOB x4mins  -sit<>stand from bed 2x, BSC 1x, bedside chair 2x  -standing x3mins for self-care  -therex (LAQ, hip flex, AP)  -repositioned in chair for comfort    Patient left up in chair with all lines intact, call button in reach and RN notified..    GOALS:   Multidisciplinary Problems     Physical Therapy Goals        Problem: Physical Therapy Goal    Goal Priority Disciplines Outcome Goal Variances Interventions   Physical Therapy Goal     PT, PT/OT Ongoing, Progressing      Description:  Goals to be met by: 2020     Patient will increase functional independence with mobility by performin. Supine to sit with stand by Assistance  2. Sit to stand transfer with Supervision  3. Bed to chair transfer with Supervision using Rolling Walker  3. Gait  x 50 feet with Contact Guard Assistance using Rolling Walker.   4. Ascend/descend 4 stair with right Handrails Minimal Assistance using LRAD  5. Lower extremity exercise program x10 reps per handout, with assistance as needed                      Time Tracking:     PT Received On: 20  PT Start Time: 1038     PT Stop Time: 1108  PT Total Time (min): 30 min     Billable Minutes: Gait Training 10 min and Therapeutic Activity 15 minn    Treatment Type: Treatment  PT/PTA: PT     PTA Visit Number: 0     Andrew Coleman, PT  2020

## 2020-04-07 NOTE — PLAN OF CARE
04/07/20 1408   Final Note   Assessment Type Final Discharge Note   Anticipated Discharge Disposition Home-Health   Hospital Follow Up  Appt(s) scheduled? No  (message sent to clinic)   Right Care Referral Info   Post Acute Recommendation Home-care   Facility Name [facility]   Post-Acute Status   Post-Acute Authorization Home Health   Home Health Status Set-up Complete   Discharge Delays None known at this time     Patient medically ready for discharge to home w/ daughter and HH w/ [facility].This CM scheduled or requested necessary follow-up appointments. Daughter stated to CM she will pick her mother up.    Future Appointments   Date Time Provider Department Center   4/8/2020 10:00 AM [facility] PSYCHIATRY, GERIATRIC CLINIC 1 [facility] PSYCH [place] [facility]   4/13/2020 10:30 AM Kasey Lazo MD [facility] NEURO [place] [facility]   6/5/2020  8:00 AM Susana Osman NP [address]   6/15/2020 10:00 AM Odilia Eckert MD [facility] Lynette Chapman RN  Case Management  Ext: [phone]  04/07/2020  2:09 PM

## 2020-04-07 NOTE — PLAN OF CARE
Problem: Fall Injury Risk  Goal: Absence of Fall and Fall-Related Injury  Outcome: Ongoing, Progressing     Problem: Adult Inpatient Plan of Care  Goal: Plan of Care Review  Outcome: Ongoing, Progressing  Goal: Patient-Specific Goal (Individualization)  Outcome: Ongoing, Progressing  Goal: Absence of Hospital-Acquired Illness or Injury  Outcome: Ongoing, Progressing  Goal: Optimal Comfort and Wellbeing  Outcome: Ongoing, Progressing  Goal: Readiness for Transition of Care  Outcome: Ongoing, Progressing     Problem: Infection  Goal: Infection Symptom Resolution  Outcome: Ongoing, Progressing     Problem: Skin Injury Risk Increased  Goal: Skin Health and Integrity  Outcome: Ongoing, Progressing    A&Ox4, forgetful, anxious. VSS on RA. Xanax prn. POC discussed with pt and daughter via phone. Voiding via purewick, LBM 4/3. Up with FWW x1 assist. Possible dc 4/7. Will monitor.

## 2020-04-07 NOTE — PLAN OF CARE
"2020 1:17 PM   Daisha Mayo   MRN: [id number]  : 1945     Psychiatry Plan of Care:    Daughter's concern for delirium noted in chart. Spoke to pt via telephone (voice only) again today to follow up. Pt recalls all details of our conversation yesterday accurately and without hesitation or prompting. Recalls that she has outpt f/u in the geriatric psychiatry clinic, that the appointment is a "skype" visit, and that it is scheduled for tomorrow. She also recalls various other topics that we discussed in detial. Brief mental status exam performed - pt is alert and oriented to person, place, city, state, month, date, year, current president. She attends to questions well and provides linear responses. She spells HOUSE forwards, has minor difficulty with spelling HOUSE backwards ("ESOUH"). Per primary team, pt being discharged today with home health services.     Plan:  - No significant delirium appreciated during brief telephone interview and MSE  - Pt will f/u with [facility] on 20 at 10am for mgmt of her anxiety    Ashok Ha MD  [facility] PGY-4  2020 1:40 PM  "

## 2020-04-08 ENCOUNTER — OFFICE VISIT (OUTPATIENT)
Dept: PSYCHIATRY | Facility: CLINIC | Age: 75
End: 2020-04-08
Payer: MEDICARE

## 2020-04-08 DIAGNOSIS — E53.8 DEFICIENCY OF OTHER SPECIFIED B GROUP VITAMINS: ICD-10-CM

## 2020-04-08 DIAGNOSIS — F41.1 GENERALIZED ANXIETY DISORDER: Primary | ICD-10-CM

## 2020-04-08 PROCEDURE — 1159F PR MEDICATION LIST DOCUMENTED IN MEDICAL RECORD: ICD-10-PCS | Mod: HCNC,S$GLB,, | Performed by: PSYCHIATRY & NEUROLOGY

## 2020-04-08 PROCEDURE — 99999 PR PBB SHADOW E&M-EST. PATIENT-LVL I: CPT | Mod: PBBFAC,HCNC,,

## 2020-04-08 PROCEDURE — 1101F PR PT FALLS ASSESS DOC 0-1 FALLS W/OUT INJ PAST YR: ICD-10-PCS | Mod: HCNC,S$GLB,, | Performed by: PSYCHIATRY & NEUROLOGY

## 2020-04-08 PROCEDURE — 99212 PR OFFICE/OUTPT VISIT, EST, LEVL II, 10-19 MIN: ICD-10-PCS | Mod: HCNC,GT,S$GLB, | Performed by: PSYCHIATRY & NEUROLOGY

## 2020-04-08 PROCEDURE — 1159F MED LIST DOCD IN RCRD: CPT | Mod: HCNC,S$GLB,, | Performed by: PSYCHIATRY & NEUROLOGY

## 2020-04-08 PROCEDURE — 99212 OFFICE O/P EST SF 10 MIN: CPT | Mod: HCNC,GT,S$GLB, | Performed by: PSYCHIATRY & NEUROLOGY

## 2020-04-08 PROCEDURE — 1101F PT FALLS ASSESS-DOCD LE1/YR: CPT | Mod: HCNC,S$GLB,, | Performed by: PSYCHIATRY & NEUROLOGY

## 2020-04-08 PROCEDURE — 99999 PR PBB SHADOW E&M-EST. PATIENT-LVL I: ICD-10-PCS | Mod: PBBFAC,HCNC,,
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
## 2020-04-13 ENCOUNTER — PATIENT MESSAGE (OUTPATIENT)
Dept: PSYCHIATRY | Facility: CLINIC | Age: 75
End: 2020-04-13

## 2020-04-13 ENCOUNTER — TELEPHONE (OUTPATIENT)
Dept: PSYCHIATRY | Facility: CLINIC | Age: 75
End: 2020-04-13

## 2020-04-13 ENCOUNTER — OFFICE VISIT (OUTPATIENT)
Dept: NEUROLOGY | Facility: CLINIC | Age: 75
End: 2020-04-13
Payer: MEDICARE

## 2020-04-13 DIAGNOSIS — R41.0 CONFUSION: Primary | ICD-10-CM

## 2020-04-13 DIAGNOSIS — U07.1 COVID-19 VIRUS INFECTION: ICD-10-CM

## 2020-04-13 DIAGNOSIS — R40.4 TRANSIENT ALTERATION OF AWARENESS: ICD-10-CM

## 2020-04-13 DIAGNOSIS — G93.40 ENCEPHALOPATHY: ICD-10-CM

## 2020-04-13 DIAGNOSIS — Z87.898 HISTORY OF SEIZURE: ICD-10-CM

## 2020-04-13 PROCEDURE — 99204 PR OFFICE/OUTPT VISIT, NEW, LEVL IV, 45-59 MIN: ICD-10-PCS | Mod: HCNC,95,GC, | Performed by: STUDENT IN AN ORGANIZED HEALTH CARE EDUCATION/TRAINING PROGRAM

## 2020-04-13 PROCEDURE — 99204 OFFICE O/P NEW MOD 45 MIN: CPT | Mod: HCNC,95,GC, | Performed by: STUDENT IN AN ORGANIZED HEALTH CARE EDUCATION/TRAINING PROGRAM

## 2020-04-13 PROCEDURE — 1159F PR MEDICATION LIST DOCUMENTED IN MEDICAL RECORD: ICD-10-PCS | Mod: HCNC,95,GC, | Performed by: STUDENT IN AN ORGANIZED HEALTH CARE EDUCATION/TRAINING PROGRAM

## 2020-04-13 PROCEDURE — 1159F MED LIST DOCD IN RCRD: CPT | Mod: HCNC,95,GC, | Performed by: STUDENT IN AN ORGANIZED HEALTH CARE EDUCATION/TRAINING PROGRAM

## 2020-04-13 PROCEDURE — 1101F PT FALLS ASSESS-DOCD LE1/YR: CPT | Mod: HCNC,CPTII,95,GC | Performed by: STUDENT IN AN ORGANIZED HEALTH CARE EDUCATION/TRAINING PROGRAM

## 2020-04-13 PROCEDURE — 1101F PR PT FALLS ASSESS DOC 0-1 FALLS W/OUT INJ PAST YR: ICD-10-PCS | Mod: HCNC,CPTII,95,GC | Performed by: STUDENT IN AN ORGANIZED HEALTH CARE EDUCATION/TRAINING PROGRAM

## 2020-04-13 NOTE — PROGRESS NOTES
"Patient Name: Daisha Mayo  MRN: [id number]    CC: staring spells    HPI: Daisha Mayo is a 74 y.o. female with PMHx of HTN,depression/anxiety who was admitted to hospital two times for COVID-19 (3/18-3/31) and CAP (4/3-4/7). During her admission he experienced some visual hallucination as seeing demon, confusion, sparing spells, which neurology and psychiatry jose consulted for that raeson. It seem sthat this symptoms were attributed to anxiety and hospital delirium, patient was prescribed with Xanax as prn and was discharged with HH>  Patient and daughter on video visit today give me the history. Per both, she has been doing much better after discharge.  She denies any visual hallucinations at home. She still feels weak in lower extremities and shaky when walks. She was using a WC after discharge and since yesterday she is able to walk with a walker. She is able to move BLE against gravity. No weakness in upper extremities. No dizziness, no sensory loss,  No dysarthria, aphasia, or vision change.  Staring spells happen few times every day, last for few seconds while patient is awake and aware of surronding. She states "it takes longer for me to get words out". She denies any convulsion or neurological deficit associated with the spells. No Urinary/bowel incontinence. Patient admits correlation of the spells with her anxiety and tachypnea which makes her feel light-headed.  History of seizure is described as confusion, blank stare, wave sensation and change in respirations. Per daughter, patient was diagnosed with seizure sometime in 2010 or 2012, was on some unknown AED, which was stopped for unknown reason.   Patient underwent CT head in the recent admission which was unremarkable for acute changes. MRI was not feasible at the time due to COVID-19 infection. Per chart, EEG was offered but patient declined. Although patient's family denies such offer.  She also has been having daily mild occipital " headaches, associated with nausea intermittently. She is taking tylenol 650 mg daily for myalgia which helps with HA per patient.    ROS:   Review of Systems   Constitutional: Negative for malaise/fatigue. Negative for weight loss.   HENT: Negative for hearing loss.   Eyes: Negative for blurred vision and double vision.   Respiratory: Negative for shortness of breath and stridor.   Cardiovascular: Negative for chest pain and palpitations.   Gastrointestinal: Negative for nausea, vomiting and constipation.   Genitourinary: Negative for frequency. Negative for urgency.   Musculoskeletal: Negative for joint pain. Positive for myalgias.   Skin: Negative for rash.   Neurological: Negative for dizziness and tremors. Negative for focal weakness.Endo/Heme/Allergies: Does not bruise/bleed easily.   Psychiatric/Behavioral: Negative for memory loss. Negative for hallucinations. The patient is nervous/anxious.      Past Medical History  Past Medical History:   Diagnosis Date    Arthritis     Cataract     Class 1 obesity due to excess calories in adult 10/19/2018    Depression     Around [place]; treated with Wellbutrin    Early cataracts, bilateral     SAMSON (generalized anxiety disorder) 2/5/2019    GERD (gastroesophageal reflux disease)     Glaucoma     Hyperprolactinemia     Hypertension     Hypothyroidism     Need for prophylactic vaccination against Streptococcus pneumoniae (pneumococcus) 10/14/2015    Osteopenia     Psychiatric problem     Pure hyperglyceridemia 3/31/2017    Recurrent UTI     Every few months    Sciatica neuralgia     Seizures 2006    temporal lobe seizure    Temporal lobe seizure 2006    Trigeminal neuralgia     Vitamin D deficiency disease        Medications    Current Outpatient Medications:     acetaminophen (TYLENOL) 325 MG tablet, Take 2 tablets (650 mg total) by mouth every 4 (four) hours as needed., Disp: , Rfl: 0    albuterol (PROVENTIL/VENTOLIN HFA) 90 mcg/actuation inhaler,  Inhale 2 puffs into the lungs every 4 (four) hours as needed for Wheezing or Shortness of Breath. Rescue, Disp: 8 g, Rfl: 1    ALPRAZolam (XANAX) 0.25 MG tablet, Take 1 tablet (0.25 mg total) by mouth 3 (three) times daily as needed for Anxiety., Disp: 20 tablet, Rfl: 0    atorvastatin (LIPITOR) 10 MG tablet, TAKE 1 TABLET (10 MG TOTAL) BY MOUTH ONCE DAILY., Disp: 90 tablet, Rfl: 3    cholecalciferol, vitamin D3, 50,000 unit Tab, Take 1 tablet by mouth every 7 days., Disp: 12 tablet, Rfl: 4    cyanocobalamin (VITAMIN B-12) 250 MCG tablet, Take 1,000 mcg by mouth daily as needed. 1 Tablet Oral Every day, Disp: , Rfl:     dorzolamide-timolol (COSOPT) 2-0.5 % ophthalmic solution, Place 1 drop into both eyes 2 (two) times daily.  , Disp: , Rfl:     hydroxychloroquine (PLAQUENIL) 100 mg tablet, Take by mouth 2 (two) times daily., Disp: , Rfl:     latanoprost 0.005 % ophthalmic solution, Place 1 drop into both eyes every evening., Disp: , Rfl:     levothyroxine (SYNTHROID) 25 MCG tablet, TAKE 1 TABLET ONE TIME DAILY, Disp: 90 tablet, Rfl: 3    multivitamin with minerals (HAIR,SKIN AND NAILS ORAL), Take 1 tablet by mouth once daily., Disp: , Rfl:     multivitamin-minerals-lutein (CENTRUM SILVER) Tab, Take 1 tablet by mouth Daily. 1 Tablet Oral Every day, Disp: , Rfl:     omega-3 fatty acids-vitamin E (FISH OIL) 1,000 mg Cap, , Disp: , Rfl:     VITAMIN D3 5,000 unit Tab, , Disp: , Rfl:     Allergies  Review of patient's allergies indicates:   Allergen Reactions    Latex      Other reaction(s): Rash  Other reaction(s): Rash       Social History  Social History     Socioeconomic History    Marital status:      Spouse name: Not on file    Number of children: Not on file    Years of education: Not on file    Highest education level: Not on file   Occupational History    Not on file   Social Needs    Financial resource strain: Not very hard    Food insecurity:     Worry: Never true     Inability: Never  true    Transportation needs:     Medical: No     Non-medical: No   Tobacco Use    Smoking status: Never Smoker    Smokeless tobacco: Never Used   Substance and Sexual Activity    Alcohol use: No     Frequency: Monthly or less     Drinks per session: 1 or 2     Binge frequency: Never    Drug use: No    Sexual activity: Never   Lifestyle    Physical activity:     Days per week: 1 day     Minutes per session: 30 min    Stress: To some extent   Relationships    Social connections:     Talks on phone: More than three times a week     Gets together: More than three times a week     Attends Confucianist service: Not on file     Active member of club or organization: Yes     Attends meetings of clubs or organizations: More than 4 times per year     Relationship status:    Other Topics Concern    Patient feels they ought to cut down on drinking/drug use Not Asked    Patient annoyed by others criticizing their drinking/drug use Not Asked    Patient has felt bad or guilty about drinking/drug use Not Asked    Patient has had a drink/used drugs as an eye opener in the AM Not Asked   Social History Narrative    Not on file       Family History  Family History   Problem Relation Age of Onset    Heart disease Mother     Hypertension Mother     Thyroid disease Mother     Colon cancer Mother 90    Breast cancer Mother 104    Cancer Mother 103        colon and breast    Kidney disease Father     Heart disease Sister         pacemaker    Hypertension Sister     Thyroid disease Sister     Glaucoma Sister     Fibromyalgia Brother     Kidney disease Brother     Sleep apnea Brother     Hypertension Brother     Glaucoma Brother     Heart disease Brother         sudden death at age 67    Cancer Cousin 31         from breast cancer at age 31    Breast cancer Cousin     Glaucoma Paternal Aunt     Glaucoma Paternal Uncle     Glaucoma Paternal Grandfather         Went blind from Glaucoma    Breast  cancer Maternal Aunt     Celiac disease Neg Hx     Cirrhosis Neg Hx     Colon polyps Neg Hx     Crohn's disease Neg Hx     Cystic fibrosis Neg Hx     Esophageal cancer Neg Hx     Hemochromatosis Neg Hx     Inflammatory bowel disease Neg Hx     Irritable bowel syndrome Neg Hx     Liver cancer Neg Hx     Liver disease Neg Hx     Rectal cancer Neg Hx     Stomach cancer Neg Hx     Ulcerative colitis Neg Hx     Moses's disease Neg Hx        Physical Exam  LMP  (LMP Unknown)       Constitutional  Well-developed, well-nourished, appears stated age   Ophthalmoscopic     Cardiovascular    Neurological    * Mental status      - Orientation  Oriented to person, place, time, and situation     - Memory   Intact recent and remote     - Attention/concentration  Attentive, vigilant during exam     - Language  Naming & repetition intact, +2-step commands     - Fund of knowledge  Aware of current events     - Executive  low concentration     - Other     * Cranial nerves       - CN II       - CN III, IV, VI  Extraocular movements full, normal pursuits and saccades     - CN V      - CN VII       - CN VIII       - CN IX, X      - CN XI       - CN XII     * Motor  moves all 4 extremities against gravity   * Sensory   Intact to temperature and vibration throughout   * Coordination     * Gait     * Deep tendon reflexes            Lab and Test Results    WBC   Date Value Ref Range Status   04/06/2020 5.89 3.90 - 12.70 K/uL Final     Hemoglobin   Date Value Ref Range Status   04/06/2020 12.9 12.0 - 16.0 g/dL Final     POC Hematocrit   Date Value Ref Range Status   03/31/2020 41 36 - 54 %PCV Final     Hematocrit   Date Value Ref Range Status   04/06/2020 40.2 37.0 - 48.5 % Final     Platelets   Date Value Ref Range Status   04/06/2020 342 150 - 350 K/uL Final     Glucose   Date Value Ref Range Status   04/06/2020 88 70 - 110 mg/dL Final     Sodium   Date Value Ref Range Status   04/06/2020 138 136 - 145 mmol/L Final      Potassium   Date Value Ref Range Status   04/06/2020 4.2 3.5 - 5.1 mmol/L Final     Chloride   Date Value Ref Range Status   04/06/2020 106 95 - 110 mmol/L Final     CO2   Date Value Ref Range Status   04/06/2020 24 23 - 29 mmol/L Final     BUN, Bld   Date Value Ref Range Status   04/06/2020 10 8 - 23 mg/dL Final     Creatinine   Date Value Ref Range Status   04/06/2020 0.6 0.5 - 1.4 mg/dL Final     Calcium   Date Value Ref Range Status   04/06/2020 8.8 8.7 - 10.5 mg/dL Final     Magnesium   Date Value Ref Range Status   04/06/2020 1.9 1.6 - 2.6 mg/dL Final     Phosphorus   Date Value Ref Range Status   04/06/2020 3.3 2.7 - 4.5 mg/dL Final     Alkaline Phosphatase   Date Value Ref Range Status   04/06/2020 95 55 - 135 U/L Final     ALT   Date Value Ref Range Status   04/06/2020 13 10 - 44 U/L Final     AST   Date Value Ref Range Status   04/06/2020 13 10 - 40 U/L Final         Images: None  The patient location is: Home  The chief complaint leading to consultation is: staring spells  Visit type: Virtual visit with synchronous audio and video  Total time spent with patient: 25 min  Each patient to whom he or she provides medical services by telemedicine is:  (1) informed of the relationship between the physician and patient and the respective role of any other health care provider with respect to management of the patient; and (2) notified that he or she may decline to receive medical services by telemedicine and may withdraw from such care at any time.      Assessment and Plan    Daisha Mayo is a 74 y.o. female with recent admissions for COVID-19 and CAP which were complicated with delirium episodes and sparing spells. Patient is referred to neurology clinic for evaluation of these symptoms.    -- In my limited exam during video call, I only appreciate some instability at walking which patient describes as weakness in LE and shakiness. Her staring spells are more pointing to anxiety attachs to me,  although given old age, risk factors, recent COVID-19 infection, and questionable history of seizure does indicate a work up to rule out intracranial abnormalities such as stroke, mass, infection, or seizure.  I discussed with patient and family that we will plan to do MRI and  EEG but these can be delayed given current regulation for postponing none urgent tests. We discussed that they will need to go to ER if she starts have generalized tonic clonic seizures, falls, head injury, loss of consciousness. Family showed understanding and agrees with this plan.  -- we discussed about ordering blood work for encephalopathy work up, I see B1, B12, folate TFT are already been ordered by other provider. I will follow with those  Results as well.  -- I also recommended to avoid taking daily pain killer, as this can cause rebound headaches.    Plan:  - MRI brain without contrast  - EEG, sleep and awake >1hrs  - f/u encephalopathy labs  - RTC in 3 months if the tests are done by that time    Problem List Items Addressed This Visit        Neuro    History of seizure    Relevant Orders    EEG,w/awake & drowsy record    MRI Brain Without Contrast    Encephalopathy    Transient alteration of awareness    Relevant Orders    EEG,w/awake & drowsy record    MRI Brain Without Contrast       Other    COVID-19 virus infection      Other Visit Diagnoses     Confusion    -  Primary    Relevant Orders    EEG,w/awake & drowsy record    MRI Brain Without Contrast                 Kasey Lazo MD  Neurology Resident   [facility]  [address] [place] [address]

## 2020-04-13 NOTE — TELEPHONE ENCOUNTER
Called left voicemail to return call for scheduling on [facility]. ----- Message from Berkley Ryan DO sent at 4/13/2020 12:25 PM CDT -----  I forgot to message you about this patient last week (I think).    Needs follow up appt in about a week from today (so two weeks from her appt) so that we can see results from neurology appt which is happening today.    Would also like to restart her appointments with psychology if possible.    Thank you  Berkley

## 2020-04-15 ENCOUNTER — LAB VISIT (OUTPATIENT)
Dept: LAB | Facility: HOSPITAL | Age: 75
End: 2020-04-15
Attending: INTERNAL MEDICINE
Payer: MEDICARE

## 2020-04-15 ENCOUNTER — DOCUMENT SCAN (OUTPATIENT)
Dept: HOME HEALTH SERVICES | Facility: HOSPITAL | Age: 75
End: 2020-04-15
Payer: MEDICARE

## 2020-04-15 DIAGNOSIS — F41.1 GENERALIZED ANXIETY DISORDER: ICD-10-CM

## 2020-04-15 DIAGNOSIS — E53.8 DEFICIENCY OF OTHER SPECIFIED B GROUP VITAMINS: ICD-10-CM

## 2020-04-15 LAB
FOLATE SERPL-MCNC: 12.6 NG/ML (ref 4–24)
T3 SERPL-MCNC: 87 NG/DL (ref 60–180)
T4 FREE SERPL-MCNC: 1.06 NG/DL (ref 0.71–1.51)
TSH SERPL DL<=0.005 MIU/L-ACNC: 3.46 UIU/ML (ref 0.4–4)
VIT B12 SERPL-MCNC: 1534 PG/ML (ref 210–950)

## 2020-04-15 PROCEDURE — 84480 ASSAY TRIIODOTHYRONINE (T3): CPT | Mod: HCNC

## 2020-04-15 PROCEDURE — 84439 ASSAY OF FREE THYROXINE: CPT | Mod: HCNC

## 2020-04-15 PROCEDURE — 84425 ASSAY OF VITAMIN B-1: CPT | Mod: HCNC

## 2020-04-15 PROCEDURE — 82607 VITAMIN B-12: CPT | Mod: HCNC

## 2020-04-15 PROCEDURE — 84443 ASSAY THYROID STIM HORMONE: CPT | Mod: HCNC

## 2020-04-15 PROCEDURE — 82746 ASSAY OF FOLIC ACID SERUM: CPT | Mod: HCNC

## 2020-04-17 ENCOUNTER — PATIENT MESSAGE (OUTPATIENT)
Dept: NEUROLOGY | Facility: CLINIC | Age: 75
End: 2020-04-17

## 2020-04-17 ENCOUNTER — EXTERNAL HOME HEALTH (OUTPATIENT)
Dept: HOME HEALTH SERVICES | Facility: HOSPITAL | Age: 75
End: 2020-04-17
Payer: MEDICARE

## 2020-04-20 ENCOUNTER — OFFICE VISIT (OUTPATIENT)
Dept: PSYCHIATRY | Facility: CLINIC | Age: 75
End: 2020-04-20
Payer: MEDICARE

## 2020-04-20 DIAGNOSIS — F41.1 GAD (GENERALIZED ANXIETY DISORDER): ICD-10-CM

## 2020-04-20 DIAGNOSIS — U07.1 ENCEPHALOPATHY DUE TO COVID-19 VIRUS: Primary | ICD-10-CM

## 2020-04-20 DIAGNOSIS — G93.49 ENCEPHALOPATHY DUE TO COVID-19 VIRUS: Primary | ICD-10-CM

## 2020-04-20 PROCEDURE — 99999 PR PBB SHADOW E&M-EST. PATIENT-LVL II: ICD-10-PCS | Mod: PBBFAC,HCNC,, | Performed by: PSYCHIATRY & NEUROLOGY

## 2020-04-20 PROCEDURE — 99999 PR PBB SHADOW E&M-EST. PATIENT-LVL II: CPT | Mod: PBBFAC,HCNC,, | Performed by: PSYCHIATRY & NEUROLOGY

## 2020-04-20 PROCEDURE — 1159F PR MEDICATION LIST DOCUMENTED IN MEDICAL RECORD: ICD-10-PCS | Mod: HCNC,,, | Performed by: PSYCHIATRY & NEUROLOGY

## 2020-04-20 PROCEDURE — 99214 OFFICE O/P EST MOD 30 MIN: CPT | Mod: HCNC,95,, | Performed by: PSYCHIATRY & NEUROLOGY

## 2020-04-20 PROCEDURE — 90833 PR PSYCHOTHERAPY W/PATIENT W/E&M, 30 MIN (ADD ON): ICD-10-PCS | Mod: HCNC,95,, | Performed by: PSYCHIATRY & NEUROLOGY

## 2020-04-20 PROCEDURE — 1101F PT FALLS ASSESS-DOCD LE1/YR: CPT | Mod: HCNC,,, | Performed by: PSYCHIATRY & NEUROLOGY

## 2020-04-20 PROCEDURE — 1159F MED LIST DOCD IN RCRD: CPT | Mod: HCNC,,, | Performed by: PSYCHIATRY & NEUROLOGY

## 2020-04-20 PROCEDURE — 90833 PSYTX W PT W E/M 30 MIN: CPT | Mod: HCNC,95,, | Performed by: PSYCHIATRY & NEUROLOGY

## 2020-04-20 PROCEDURE — 99214 PR OFFICE/OUTPT VISIT, EST, LEVL IV, 30-39 MIN: ICD-10-PCS | Mod: HCNC,95,, | Performed by: PSYCHIATRY & NEUROLOGY

## 2020-04-20 PROCEDURE — 1101F PR PT FALLS ASSESS DOC 0-1 FALLS W/OUT INJ PAST YR: ICD-10-PCS | Mod: HCNC,,, | Performed by: PSYCHIATRY & NEUROLOGY

## 2020-04-21 ENCOUNTER — TELEPHONE (OUTPATIENT)
Dept: PSYCHIATRY | Facility: CLINIC | Age: 75
End: 2020-04-21

## 2020-04-21 DIAGNOSIS — E51.9 THIAMINE DEFICIENCY: Primary | ICD-10-CM

## 2020-04-21 LAB — VIT B1 BLD-MCNC: 37 UG/L (ref 38–122)

## 2020-04-21 RX ORDER — LANOLIN ALCOHOL/MO/W.PET/CERES
100 CREAM (GRAM) TOPICAL DAILY
Qty: 90 TABLET | Refills: 0 | Status: SHIPPED | OUTPATIENT
Start: 2020-04-21 | End: 2020-06-22 | Stop reason: SDUPTHER

## 2020-04-21 NOTE — PROGRESS NOTES
Telepsychiatry Visit.    The patient location is: Home  The chief complaint leading to consultation is: Anxiety and mental confusion  Visit type: audiovisual  Total time spent with patient: 25 min  Each patient to whom he or she provides medical services by telemedicine is:  (1) informed of the relationship between the physician and patient and the respective role of any other health care provider with respect to management of the patient; and (2) notified that he or she may decline to receive medical services by telemedicine and may withdraw from such care at any time.    Notes:     4/20/2020    Clinical Status of Patient:  Outpatient (Ambulatory)    Chief Complaint:  Daisha Mayo is a 74 y.o. female who presents today for follow-up of anxiety, memory problem and attention problems.  Met with patient, daughter and spouse    Interval History and Content of Current Session:  Interim Events/Subjective Report/Content of Current Session:  Ms. Mayo was originally seen by me in a telepsychiatry visit along with a Resident 2 weeks ago.  She was complaining of anxiety, confusion, and decreased concentration following inpatient care for COVID-19.  She had just been discharged at the time of her initial exam.  This time she was seen again along with her daughter (a nurse) and her .  Zoom was used due to technical problems with [facility].      On exam, Pt was alert, with decreased anxiety, adequate sleep, and improved appetite following some return of her sense of smell.  She reported that she had stopped nearly all medications except for Lipitor and very rare Xanax.  Her stated goal was to minimize use of medications as much as possible.  Labs ordered at her first visit were discussed.  Thiamine level was not returned by this contact, but Pt's daughter was called the next day when it was returned at 37 and a thiamine 100 mg daily Rx was sent to Pt's pharmacy.  Past lab was also checked.  Pt's lipid panel has been  good for 2 years.  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  Lipids can be rechecked at her f/u visit in 1 month.  She and her daughter were asked to call if any new problems arise.  Pt was also encouraged to continue weaning off of Xanax.      Psychotherapy:  · Target symptoms: anxiety , confusion  · Why chosen therapy is appropriate versus another modality: relevant to diagnosis, evidence based practice  · Outcome monitoring methods: self-report, observation, feedback from family  · Therapeutic intervention type: supportive psychotherapy  · Topics discussed/themes: stress related to medical comorbidities, building skills sets for symptom management, symptom recognition  · The patient's response to the intervention is motivated. The patient's progress toward treatment goals is good.   · Duration of intervention: 25 minutes.    Review of Systems   · PSYCHIATRIC: Pertinant items are noted in the narrative.  · NEUROLOGIC: encephalitis  · RESPIRATORY: Recent COVID-19 infection    Past Medical, Family and Social History: The patient's past medical, family and social history have been reviewed and updated as appropriate within the electronic medical record - see encounter notes.    Compliance: Pt stopped multiple medications.    Side effects: None    Risk Parameters:  Patient reports no suicidal ideation  Patient reports no homicidal ideation  Patient reports no self-injurious behavior  Patient reports no violent behavior    Exam (detailed: at least 9 elements; comprehensive: all 15 elements)   Constitutional  Vitals:  Most recent vital signs, dated less than 90 days prior to this appointment, were reviewed.   There were no vitals filed for this visit.     General:  age appropriate, casually dressed     Musculoskeletal  Muscle Strength/Tone:  not examined   Gait & Station:  Telemed visit      Psychiatric  Speech:  spontaneous   Mood & Affect:  euthymic  increased in intensity, mood-congruent   Thought Process:  goal-directed, logical   Associations:  intact   Thought Content:  normal, no suicidality, no homicidality, delusions, or paranoia   Insight:  has awareness of illness   Judgement: behavior is adequate to circumstances   Orientation:  person, place, situation, time/date, day of week, month of year, year   Memory: intact for content of interview   Language: grossly intact   Attention Span & Concentration:  able to focus   Fund of Knowledge:  intact and appropriate to age and level of education     Assessment and Diagnosis   Status/Progress: Based on the examination today, the patient's problem(s) is/are improved.  New problems have not been presented today.   Co-morbidities are not complicating management of the primary condition.  There are no active rule-out diagnoses for this patient at this time.     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      ICD-10-CM ICD-9-CM   1. Encephalopathy due to COVID-19 virus U07.1     G93.49    2. SAMSON (generalized anxiety disorder) F41.1 300.02       Intervention/Counseling/Treatment Plan   · Medication Management: Stop Lipitor.  Continue taper off of Xanax.  Thiamine 100 mg daily was started.   · Lipid panel can be rechecked at next visit.      Return to Clinic: 1 month

## 2020-04-21 NOTE — TELEPHONE ENCOUNTER
Low thiamine of 37 reviewed.  Pt's daughter was called and agreed with thiamine supplement of 100 mg daily.  Rx was sent to [facility].

## 2020-04-24 ENCOUNTER — PATIENT MESSAGE (OUTPATIENT)
Dept: INTERNAL MEDICINE | Facility: CLINIC | Age: 75
End: 2020-04-24

## 2020-04-24 ENCOUNTER — TELEPHONE (OUTPATIENT)
Dept: INTERNAL MEDICINE | Facility: CLINIC | Age: 75
End: 2020-04-24

## 2020-04-27 ENCOUNTER — PATIENT MESSAGE (OUTPATIENT)
Dept: PSYCHIATRY | Facility: CLINIC | Age: 75
End: 2020-04-27

## 2020-04-28 ENCOUNTER — PATIENT MESSAGE (OUTPATIENT)
Dept: NEUROLOGY | Facility: CLINIC | Age: 75
End: 2020-04-28

## 2020-04-28 DIAGNOSIS — G93.40 ENCEPHALOPATHY: ICD-10-CM

## 2020-04-28 DIAGNOSIS — G93.40 ENCEPHALOPATHY: Primary | ICD-10-CM

## 2020-04-28 RX ORDER — LORAZEPAM 0.5 MG/1
TABLET ORAL
Qty: 2 TABLET | Refills: 0 | Status: SHIPPED | OUTPATIENT
Start: 2020-04-28 | End: 2020-06-06 | Stop reason: ALTCHOICE

## 2020-04-29 ENCOUNTER — TELEPHONE (OUTPATIENT)
Dept: INTERNAL MEDICINE | Facility: CLINIC | Age: 75
End: 2020-04-29

## 2020-05-04 ENCOUNTER — OFFICE VISIT (OUTPATIENT)
Dept: PSYCHIATRY | Facility: CLINIC | Age: 75
End: 2020-05-04
Payer: MEDICARE

## 2020-05-04 DIAGNOSIS — U07.1 ENCEPHALOPATHY DUE TO COVID-19 VIRUS: Primary | ICD-10-CM

## 2020-05-04 DIAGNOSIS — E51.9 THIAMINE DEFICIENCY: ICD-10-CM

## 2020-05-04 DIAGNOSIS — F41.1 GAD (GENERALIZED ANXIETY DISORDER): ICD-10-CM

## 2020-05-04 DIAGNOSIS — G93.49 ENCEPHALOPATHY DUE TO COVID-19 VIRUS: Primary | ICD-10-CM

## 2020-05-04 PROCEDURE — 1101F PT FALLS ASSESS-DOCD LE1/YR: CPT | Mod: HCNC,CPTII,95, | Performed by: PSYCHIATRY & NEUROLOGY

## 2020-05-04 PROCEDURE — 99214 PR OFFICE/OUTPT VISIT, EST, LEVL IV, 30-39 MIN: ICD-10-PCS | Mod: HCNC,95,, | Performed by: PSYCHIATRY & NEUROLOGY

## 2020-05-04 PROCEDURE — 1159F PR MEDICATION LIST DOCUMENTED IN MEDICAL RECORD: ICD-10-PCS | Mod: HCNC,95,, | Performed by: PSYCHIATRY & NEUROLOGY

## 2020-05-04 PROCEDURE — 99214 OFFICE O/P EST MOD 30 MIN: CPT | Mod: HCNC,95,, | Performed by: PSYCHIATRY & NEUROLOGY

## 2020-05-04 PROCEDURE — 1101F PR PT FALLS ASSESS DOC 0-1 FALLS W/OUT INJ PAST YR: ICD-10-PCS | Mod: HCNC,CPTII,95, | Performed by: PSYCHIATRY & NEUROLOGY

## 2020-05-04 PROCEDURE — 1159F MED LIST DOCD IN RCRD: CPT | Mod: HCNC,95,, | Performed by: PSYCHIATRY & NEUROLOGY

## 2020-05-05 ENCOUNTER — PATIENT MESSAGE (OUTPATIENT)
Dept: PSYCHIATRY | Facility: CLINIC | Age: 75
End: 2020-05-05

## 2020-05-05 ENCOUNTER — PATIENT MESSAGE (OUTPATIENT)
Dept: NEUROLOGY | Facility: CLINIC | Age: 75
End: 2020-05-05

## 2020-05-05 ENCOUNTER — LAB VISIT (OUTPATIENT)
Dept: PRIMARY CARE CLINIC | Facility: CLINIC | Age: 75
End: 2020-05-05
Payer: MEDICARE

## 2020-05-05 ENCOUNTER — HOSPITAL ENCOUNTER (OUTPATIENT)
Dept: RADIOLOGY | Facility: HOSPITAL | Age: 75
Discharge: HOME | End: 2020-05-05
Attending: STUDENT IN AN ORGANIZED HEALTH CARE EDUCATION/TRAINING PROGRAM
Payer: MEDICARE

## 2020-05-05 DIAGNOSIS — R40.4 TRANSIENT ALTERATION OF AWARENESS: ICD-10-CM

## 2020-05-05 DIAGNOSIS — Z87.898 HISTORY OF SEIZURE: ICD-10-CM

## 2020-05-05 DIAGNOSIS — R41.0 CONFUSION: ICD-10-CM

## 2020-05-05 DIAGNOSIS — R50.9 FEVER, UNSPECIFIED FEVER CAUSE: Primary | ICD-10-CM

## 2020-05-05 PROCEDURE — U0002 COVID-19 LAB TEST NON-CDC: HCPCS | Mod: HCNC

## 2020-05-05 PROCEDURE — 70551 MRI BRAIN STEM W/O DYE: CPT | Mod: 26,HCNC,, | Performed by: RADIOLOGY

## 2020-05-05 PROCEDURE — 70551 MRI BRAIN WITHOUT CONTRAST: ICD-10-PCS | Mod: 26,HCNC,, | Performed by: RADIOLOGY

## 2020-05-05 PROCEDURE — 70551 MRI BRAIN STEM W/O DYE: CPT | Mod: TC,HCNC

## 2020-05-05 NOTE — PATIENT INSTRUCTIONS
Stop benzodiazepines.    Lexapro was offered to target anxiety and irritability.  Pt will consider.    Call if problems arise.  Go to ER if in crisis.

## 2020-05-05 NOTE — PROGRESS NOTES
Telepsychiatry Visit.    The patient location is: Home  The chief complaint leading to consultation is: Anxiety and mental confusion  Visit type: audiovisual  Total time spent with patient: 24 min  Each patient to whom he or she provides medical services by telemedicine is:  (1) informed of the relationship between the physician and patient and the respective role of any other health care provider with respect to management of the patient; and (2) notified that he or she may decline to receive medical services by telemedicine and may withdraw from such care at any time.    Notes:     5/4/2020    Clinical Status of Patient:  Outpatient (Ambulatory)    Chief Complaint:  Daisha Mayo is a 74 y.o. female who presents today for follow-up of anxiety, memory problem and attention problems.  Met with patient, daughter and spouse    Interval History and Content of Current Session:  Interim Events/Subjective Report/Content of Current Session:  Ms. Mayo was seen again today via Epic Javier, along with her daughter and .  She presented casually dressed, neatly groomed, and smiling.  Since her last telemed visit, she is markedly improved in cognition, affect, and level of function.  She acknowledges that she still feels slowed mentally compared to pre-viral function, and she still needs her daughter's help with medications, diet, and schedule.  She is optimistic however that she will make a full recovery at some point over the next few months.  Her primary complaint at this time are attacks of free-floating anxiety and irritability.  She responded to one this weekend by throwing a light item at her .  She still takes occasional benzodiazepines for these episodes.    Pt was encouraged again to avoid benzodiazepines due to risks of additional cognitive clouding, habituation, and falls.  Lexapro 10 mg daily was offered as a substitute, but Pt and her daughter are reluctant to add medicines at this time.  Pt  also worries about weight gain.  They will consider and will call or email if they decide to give it a try.  Pt is on thiamine daily.  No refills were required.  She will f/u via telemed again in 1 months.  Her daughter will remain with her for now.    Psychotherapy:  · Target symptoms: anxiety , confusion  · Why chosen therapy is appropriate versus another modality: relevant to diagnosis, evidence based practice  · Outcome monitoring methods: self-report, observation, feedback from family  · Therapeutic intervention type: supportive psychotherapy  · Topics discussed/themes: stress related to medical comorbidities, building skills sets for symptom management, symptom recognition  · The patient's response to the intervention is motivated. The patient's progress toward treatment goals is good.   · Duration of intervention: 24 minutes.    Review of Systems   · PSYCHIATRIC: Pertinant items are noted in the narrative.  · NEUROLOGIC: encephalitis  · RESPIRATORY: Recent COVID-19 infection    Past Medical, Family and Social History: The patient's past medical, family and social history have been reviewed and updated as appropriate within the electronic medical record - see encounter notes.    Compliance: Pt stopped multiple medications.    Side effects: None    Risk Parameters:  Patient reports no suicidal ideation  Patient reports no homicidal ideation  Patient reports no self-injurious behavior  Patient reports no violent behavior    Exam (detailed: at least 9 elements; comprehensive: all 15 elements)   Constitutional  Vitals:  Most recent vital signs, dated less than 90 days prior to this appointment, were reviewed.   There were no vitals filed for this visit.     General:  age appropriate, casually dressed     Musculoskeletal  Muscle Strength/Tone:  not examined   Gait & Station:  Telemed visit     Psychiatric  Speech:  spontaneous   Mood & Affect:  euthymic  increased in intensity, mood-congruent   Thought Process:   goal-directed, logical   Associations:  intact   Thought Content:  normal, no suicidality, no homicidality, delusions, or paranoia   Insight:  has awareness of illness   Judgement: behavior is adequate to circumstances   Orientation:  person, place, situation, time/date, day of week, month of year, year   Memory: intact for content of interview   Language: grossly intact   Attention Span & Concentration:  able to focus   Fund of Knowledge:  intact and appropriate to age and level of education     Assessment and Diagnosis   Status/Progress: Based on the examination today, the patient's problem(s) is/are improved.  New problems have not been presented today.   Co-morbidities are not complicating management of the primary condition.  There are no active rule-out diagnoses for this patient at this time.     General Impression: Pt shows marked additional improvement in cognition, mood, and function, but she is still bothered by episodic anxiety and irritability.      ICD-10-CM ICD-9-CM   1. Encephalopathy due to COVID-19 virus U07.1     G93.49    2. Thiamine deficiency E51.9 265.1   3. SAMSON (generalized anxiety disorder) F41.1 300.02       Intervention/Counseling/Treatment Plan   · Medication Management: Stop benzodiazepines.  Lexapro was offered but not yet accepted.  Continue Thiamine.   · Lipid panel can be rechecked at subsequent visit after isolation period is over.      Return to Clinic: 1 month

## 2020-05-06 ENCOUNTER — TELEPHONE (OUTPATIENT)
Dept: INTERNAL MEDICINE | Facility: CLINIC | Age: 75
End: 2020-05-06

## 2020-05-06 ENCOUNTER — DOCUMENT SCAN (OUTPATIENT)
Dept: HOME HEALTH SERVICES | Facility: HOSPITAL | Age: 75
End: 2020-05-06
Payer: MEDICARE

## 2020-05-06 ENCOUNTER — PATIENT MESSAGE (OUTPATIENT)
Dept: INTERNAL MEDICINE | Facility: CLINIC | Age: 75
End: 2020-05-06

## 2020-05-06 DIAGNOSIS — U07.1 COVID-19 VIRUS INFECTION: Primary | ICD-10-CM

## 2020-05-06 LAB — SARS-COV-2 RNA RESP QL NAA+PROBE: NOT DETECTED

## 2020-05-07 ENCOUNTER — LAB VISIT (OUTPATIENT)
Dept: LAB | Facility: HOSPITAL | Age: 75
End: 2020-05-07
Attending: INTERNAL MEDICINE
Payer: MEDICARE

## 2020-05-07 DIAGNOSIS — U07.1 COVID-19 VIRUS INFECTION: ICD-10-CM

## 2020-05-07 LAB — SARS-COV-2 IGG SERPLBLD QL IA.RAPID: POSITIVE

## 2020-05-07 PROCEDURE — 36415 COLL VENOUS BLD VENIPUNCTURE: CPT | Mod: HCNC,PN

## 2020-05-07 PROCEDURE — 86769 SARS-COV-2 COVID-19 ANTIBODY: CPT | Mod: HCNC

## 2020-05-07 RX ORDER — ESCITALOPRAM OXALATE 10 MG/1
10 TABLET ORAL DAILY
Qty: 30 TABLET | Refills: 1 | Status: SHIPPED | OUTPATIENT
Start: 2020-05-07 | End: 2020-06-22 | Stop reason: SDUPTHER

## 2020-05-13 ENCOUNTER — HOSPITAL ENCOUNTER (OUTPATIENT)
Dept: NEUROLOGY | Facility: CLINIC | Age: 75
Discharge: HOME | End: 2020-05-13
Payer: MEDICARE

## 2020-05-13 DIAGNOSIS — R41.0 CONFUSION: ICD-10-CM

## 2020-05-13 DIAGNOSIS — R40.4 TRANSIENT ALTERATION OF AWARENESS: ICD-10-CM

## 2020-05-13 DIAGNOSIS — Z87.898 HISTORY OF SEIZURE: ICD-10-CM

## 2020-05-13 PROCEDURE — 95816 PR EEG,W/AWAKE & DROWSY RECORD: ICD-10-PCS | Mod: HCNC,S$GLB,, | Performed by: PSYCHIATRY & NEUROLOGY

## 2020-05-13 PROCEDURE — 95816 EEG AWAKE AND DROWSY: CPT | Mod: HCNC,S$GLB,, | Performed by: PSYCHIATRY & NEUROLOGY

## 2020-05-15 NOTE — PROCEDURES
Routine EEG Report      Daisha Mayo  [id number]  1945    DATE OF SERVICE:  05/13/2020  REASON FOR CONSULT:  74-year-old woman with staring spells and hallucinations.  Evaluate for evidence of epileptiform activity.    METHODOLOGY   Electroencephalographic (EEG) recording is with electrodes placed according to the International 10-20 placement system.  Thirty two (32) channels of digital signal (sampling rate of 512/sec) including T1 and T2 was simultaneously recorded from the scalp and may include  EKG, EMG, and/or eye monitors.  Recording band pass was 0.1 to 512 hz.  Digital video recording of the patient is simultaneously recorded with the EEG.  The patient is instructed report clinical symptoms which may occur during the recording session.  EEG and video recording is stored and archived in digital format. Activation procedures which include photic stimulation, hyperventilation and instructing patients to perform simple task are done in selected patients.    The EEG is displayed on a monitor screen and can be reviewed using different montages.  Computer assisted analysis is employed to detect spike and electrographic seizure activity.   The entire record is submitted for computer analysis.  The entire recording is visually reviewed and the times identified by computer analysis as being spikes or seizures are reviewed again.  Compresses spectral analysis (CSA) is also performed on the activity recorded from each individual channel.  This is displayed as a power display of frequencies from 0 to 30 Hz over time.   The CSA is reviewed looking for asymmetries in power between homologous areas of the scalp and then compared with the original EEG recording.     FiberSensing software is also utilized in the review of this study.  This software suite analyzes the EEG recording in multiple domains.  Coherence and rhythmicity is computed to identify EEG sections which may contain organized seizures.  Each channel  undergoes analysis to detect presence of spike and sharp waves which have special and morphological characteristic of epileptic activity.  The routine EEG recording is converted from spacial into frequency domain.  This is then displayed comparing homologous areas to identify areas of significant asymmetry.  Algorithm to identify non-cortically generated artifact is used to separate eye movement, EMG and other artifact from the EEG.      EEG FINDINGS  Background activity:   The waking background is continuous and symmetric with a well-formed 9 hz posterior dominant rhythm.    Sleep:  During drowsiness, the alpha rhythm attenuates and 5-7 hz slow waves appear diffusely followed by symmetric vertex waves.  Stage 2 sleep is not recorded.    Activation procedures:   The patient is able to follow simple commands and answers orientation questions correctly. Photic stimulation is performed with no activation of the record.  Hyperventilation is not performed.     Cardiac Monitor:   Heart rate appears generally regular on a single lead EKG.    Impression:   This is a normal awake and drowsy routine EEG.  There are no focal findings, no epileptiform discharges, and no electrographic seizures.   Of note, a normal EEG does not rule out the diagnosis of epilepsy.  Clinical correlation is advised.    Vania Dong MD PhD  Neurology-Epilepsy  [facility].  [facility]

## 2020-05-19 ENCOUNTER — PATIENT MESSAGE (OUTPATIENT)
Dept: INTERNAL MEDICINE | Facility: CLINIC | Age: 75
End: 2020-05-19

## 2020-05-19 NOTE — TELEPHONE ENCOUNTER
Continue synthroid, check TSH level   Please notify pt I changed the ear drops to acetic acid ear drops.  Copay should be $15 for this one.

## 2020-05-20 ENCOUNTER — TELEPHONE (OUTPATIENT)
Dept: INTERNAL MEDICINE | Facility: CLINIC | Age: 75
End: 2020-05-20

## 2020-05-20 DIAGNOSIS — Z12.31 ENCOUNTER FOR SCREENING MAMMOGRAM FOR BREAST CANCER: Primary | ICD-10-CM

## 2020-05-20 DIAGNOSIS — M79.606 PAIN OF LOWER EXTREMITY, UNSPECIFIED LATERALITY: ICD-10-CM

## 2020-05-20 DIAGNOSIS — R53.81 DEBILITY: ICD-10-CM

## 2020-05-21 ENCOUNTER — PATIENT MESSAGE (OUTPATIENT)
Dept: OPTOMETRY | Facility: CLINIC | Age: 75
End: 2020-05-21

## 2020-05-22 ENCOUNTER — OFFICE VISIT (OUTPATIENT)
Dept: OPTOMETRY | Facility: CLINIC | Age: 75
End: 2020-05-22
Payer: COMMERCIAL

## 2020-05-22 DIAGNOSIS — H52.4 MYOPIA OF BOTH EYES WITH REGULAR ASTIGMATISM AND PRESBYOPIA: ICD-10-CM

## 2020-05-22 DIAGNOSIS — Z96.1 PSEUDOPHAKIA OF BOTH EYES: ICD-10-CM

## 2020-05-22 DIAGNOSIS — H52.223 MYOPIA OF BOTH EYES WITH REGULAR ASTIGMATISM AND PRESBYOPIA: ICD-10-CM

## 2020-05-22 DIAGNOSIS — H52.13 MYOPIA OF BOTH EYES WITH REGULAR ASTIGMATISM AND PRESBYOPIA: ICD-10-CM

## 2020-05-22 DIAGNOSIS — H40.1132 PRIMARY OPEN ANGLE GLAUCOMA (POAG) OF BOTH EYES, MODERATE STAGE: Primary | ICD-10-CM

## 2020-05-22 PROCEDURE — 92015 DETERMINE REFRACTIVE STATE: CPT | Mod: HCNC,S$GLB,, | Performed by: OPTOMETRIST

## 2020-05-22 PROCEDURE — 92004 COMPRE OPH EXAM NEW PT 1/>: CPT | Mod: HCNC,S$GLB,, | Performed by: OPTOMETRIST

## 2020-05-22 PROCEDURE — 99999 PR PBB SHADOW E&M-EST. PATIENT-LVL II: CPT | Mod: PBBFAC,HCNC,, | Performed by: OPTOMETRIST

## 2020-05-22 PROCEDURE — 99999 PR PBB SHADOW E&M-EST. PATIENT-LVL II: ICD-10-PCS | Mod: PBBFAC,HCNC,, | Performed by: OPTOMETRIST

## 2020-05-22 PROCEDURE — 92004 PR EYE EXAM, NEW PATIENT,COMPREHESV: ICD-10-PCS | Mod: HCNC,S$GLB,, | Performed by: OPTOMETRIST

## 2020-05-22 PROCEDURE — 92015 PR REFRACTION: ICD-10-PCS | Mod: HCNC,S$GLB,, | Performed by: OPTOMETRIST

## 2020-05-22 NOTE — PROGRESS NOTES
HPI     Transfer care to [facility]  Glaucoma Dr. Castro   Dorzolamide-Timolol BID, Latanoprost QHS - might need refills  Cataract - PCIOL in 2016  Left increased blur since COVID diagnosis.  (+)HTN   No OTC Gtts     Last edited by Jona Mendez, OD on 5/22/2020 10:07 AM. (History)            Assessment /Plan     For exam results, see Encounter Report.    Primary open angle glaucoma (POAG) of both eyes, moderate stage  -Cosopt BID, Latanoprost QHS OU  -Target low/mid teens  -HVF, OCT at 3 mo    Pseudophakia of both eyes  -clear, centered    Myopia of both eyes with regular astigmatism and presbyopia  Eyeglass Final Rx     Eyeglass Final Rx       Sphere Cylinder Axis Dist VA Add    Right -1.00 +0.75 015 20/25 +2.50    Left -1.50 +0.75 180 20/25 +2.50    Type:  PAL    Expiration Date:  5/23/2021                  RTC 3 mo

## 2020-05-28 ENCOUNTER — DOCUMENT SCAN (OUTPATIENT)
Dept: HOME HEALTH SERVICES | Facility: HOSPITAL | Age: 75
End: 2020-05-28
Payer: MEDICARE

## 2020-06-01 ENCOUNTER — OFFICE VISIT (OUTPATIENT)
Dept: PSYCHIATRY | Facility: CLINIC | Age: 75
End: 2020-06-01
Payer: MEDICARE

## 2020-06-01 DIAGNOSIS — E51.9 THIAMINE DEFICIENCY: ICD-10-CM

## 2020-06-01 DIAGNOSIS — U07.1 ENCEPHALOPATHY DUE TO COVID-19 VIRUS: Primary | ICD-10-CM

## 2020-06-01 DIAGNOSIS — F41.1 GAD (GENERALIZED ANXIETY DISORDER): ICD-10-CM

## 2020-06-01 DIAGNOSIS — G93.49 ENCEPHALOPATHY DUE TO COVID-19 VIRUS: Primary | ICD-10-CM

## 2020-06-01 PROCEDURE — 1159F MED LIST DOCD IN RCRD: CPT | Mod: HCNC,95,, | Performed by: PSYCHIATRY & NEUROLOGY

## 2020-06-01 PROCEDURE — 1101F PT FALLS ASSESS-DOCD LE1/YR: CPT | Mod: HCNC,CPTII,95, | Performed by: PSYCHIATRY & NEUROLOGY

## 2020-06-01 PROCEDURE — 99213 PR OFFICE/OUTPT VISIT, EST, LEVL III, 20-29 MIN: ICD-10-PCS | Mod: HCNC,95,, | Performed by: PSYCHIATRY & NEUROLOGY

## 2020-06-01 PROCEDURE — 1159F PR MEDICATION LIST DOCUMENTED IN MEDICAL RECORD: ICD-10-PCS | Mod: HCNC,95,, | Performed by: PSYCHIATRY & NEUROLOGY

## 2020-06-01 PROCEDURE — 90833 PR PSYCHOTHERAPY W/PATIENT W/E&M, 30 MIN (ADD ON): ICD-10-PCS | Mod: HCNC,95,, | Performed by: PSYCHIATRY & NEUROLOGY

## 2020-06-01 PROCEDURE — 90833 PSYTX W PT W E/M 30 MIN: CPT | Mod: HCNC,95,, | Performed by: PSYCHIATRY & NEUROLOGY

## 2020-06-01 PROCEDURE — 99213 OFFICE O/P EST LOW 20 MIN: CPT | Mod: HCNC,95,, | Performed by: PSYCHIATRY & NEUROLOGY

## 2020-06-01 PROCEDURE — 1101F PR PT FALLS ASSESS DOC 0-1 FALLS W/OUT INJ PAST YR: ICD-10-PCS | Mod: HCNC,CPTII,95, | Performed by: PSYCHIATRY & NEUROLOGY

## 2020-06-03 ENCOUNTER — OFFICE VISIT (OUTPATIENT)
Dept: INTERNAL MEDICINE | Facility: CLINIC | Age: 75
End: 2020-06-03
Payer: MEDICARE

## 2020-06-03 DIAGNOSIS — G93.40 ENCEPHALOPATHY: ICD-10-CM

## 2020-06-03 DIAGNOSIS — E78.5 HYPERLIPIDEMIA, UNSPECIFIED HYPERLIPIDEMIA TYPE: Primary | ICD-10-CM

## 2020-06-03 PROCEDURE — 1159F MED LIST DOCD IN RCRD: CPT | Mod: HCNC,95,, | Performed by: INTERNAL MEDICINE

## 2020-06-03 PROCEDURE — 99213 OFFICE O/P EST LOW 20 MIN: CPT | Mod: HCNC,95,, | Performed by: INTERNAL MEDICINE

## 2020-06-03 PROCEDURE — 99213 PR OFFICE/OUTPT VISIT, EST, LEVL III, 20-29 MIN: ICD-10-PCS | Mod: HCNC,95,, | Performed by: INTERNAL MEDICINE

## 2020-06-03 PROCEDURE — 1159F PR MEDICATION LIST DOCUMENTED IN MEDICAL RECORD: ICD-10-PCS | Mod: HCNC,95,, | Performed by: INTERNAL MEDICINE

## 2020-06-03 PROCEDURE — 1101F PT FALLS ASSESS-DOCD LE1/YR: CPT | Mod: HCNC,CPTII,95, | Performed by: INTERNAL MEDICINE

## 2020-06-03 PROCEDURE — 1101F PR PT FALLS ASSESS DOC 0-1 FALLS W/OUT INJ PAST YR: ICD-10-PCS | Mod: HCNC,CPTII,95, | Performed by: INTERNAL MEDICINE

## 2020-06-03 NOTE — PROGRESS NOTES
The patient location is:  home   The chief complaint leading to consultation is:  Follow-up of COVID-19 infection    Visit type: audiovisual    Face to Face time with patient:  15   minutes of total time spent on the encounter, which includes face to face time and non-face to face time preparing to see the patient (eg, review of tests), Obtaining and/or reviewing separately obtained history, Documenting clinical information in the electronic or other health record, Independently interpreting results (not separately reported) and communicating results to the patient/family/caregiver, or Care coordination (not separately reported).         Each patient to whom he or she provides medical services by telemedicine is:  (1) informed of the relationship between the physician and patient and the respective role of any other health care provider with respect to management of the patient; and (2) notified that he or she may decline to receive medical services by telemedicine and may withdraw from such care at any time.    Notes:   CC: followup of COVID-19 infection  HPI:  The patient is a 74 y.o. year old female who presents for follow-up of COVID-19 infection.  She denies any shortness of breath, cough or wheezing.  Her appetite has returned and she is sleeping better.  She is tolerating Lexapro.  She complains of bilateral knee pain, but improving.  She now reports right knee pain due to sciatica.  She has started outpatient physical therapy, and strength is returning to her hands.      PAST MEDICAL HISTORY:  Past Medical History:   Diagnosis Date    Arthritis     Cataract     Class 1 obesity due to excess calories in adult 10/19/2018    Depression     Around [place]; treated with Wellbutrin    Early cataracts, bilateral     SAMSON (generalized anxiety disorder) 2/5/2019    GERD (gastroesophageal reflux disease)     Glaucoma     Hyperprolactinemia     Hypertension     Hypothyroidism     Need for prophylactic  vaccination against Streptococcus pneumoniae (pneumococcus) 10/14/2015    Osteopenia     Psychiatric problem     Pure hyperglyceridemia 3/31/2017    Recurrent UTI     Every few months    Sciatica neuralgia     Seizures 2006    temporal lobe seizure    Temporal lobe seizure 2006    Trigeminal neuralgia     Vitamin D deficiency disease        SURGICAL HISTORY:  Past Surgical History:   Procedure Laterality Date    BREAST BIOPSY      duct excision    CHOLECYSTECTOMY  1994    laporascopic    COLONOSCOPY      EYE SURGERY      LASIK surgery to one eye; cannot remember which one    HYSTERECTOMY  1985    low transverse incision.    TOTAL ABDOMINAL HYSTERECTOMY W/ BILATERAL SALPINGOOPHORECTOMY      1985; diffinitive treatment for menorrhagia       MEDS:  Medcard reviewed and updated    ALLERGIES: Allergy Card reviewed and updated    SOCIAL HISTORY:   The patient is a nonsmoker.    PE:   APPEARANCE: Well nourished, well developed, in no acute distress.    Video visit  PSYCHIATRIC: The patient is oriented to person, place, and time and has a pleasant affect.        ASSESSMENT/PLAN:  Diagnoses and all orders for this visit:    Hyperlipidemia, unspecified hyperlipidemia type  -     Comprehensive metabolic panel; Future  -     Lipid Panel; Future  -     CBC auto differential; Future    Encephalopathy  -     VITAMIN B1; Future

## 2020-06-04 ENCOUNTER — LAB VISIT (OUTPATIENT)
Dept: LAB | Facility: HOSPITAL | Age: 75
End: 2020-06-04
Attending: INTERNAL MEDICINE
Payer: MEDICARE

## 2020-06-04 DIAGNOSIS — G93.40 ENCEPHALOPATHY: ICD-10-CM

## 2020-06-04 DIAGNOSIS — E78.5 HYPERLIPIDEMIA, UNSPECIFIED HYPERLIPIDEMIA TYPE: ICD-10-CM

## 2020-06-04 LAB
ALBUMIN SERPL BCP-MCNC: 3.8 G/DL (ref 3.5–5.2)
ALP SERPL-CCNC: 100 U/L (ref 55–135)
ALT SERPL W/O P-5'-P-CCNC: 8 U/L (ref 10–44)
ANION GAP SERPL CALC-SCNC: 5 MMOL/L (ref 8–16)
AST SERPL-CCNC: 12 U/L (ref 10–40)
BASOPHILS # BLD AUTO: 0.03 K/UL (ref 0–0.2)
BASOPHILS NFR BLD: 0.6 % (ref 0–1.9)
BILIRUB SERPL-MCNC: 1 MG/DL (ref 0.1–1)
BUN SERPL-MCNC: 12 MG/DL (ref 8–23)
CALCIUM SERPL-MCNC: 9 MG/DL (ref 8.7–10.5)
CHLORIDE SERPL-SCNC: 104 MMOL/L (ref 95–110)
CHOLEST SERPL-MCNC: 235 MG/DL (ref 120–199)
CHOLEST/HDLC SERPL: 3.4 {RATIO} (ref 2–5)
CO2 SERPL-SCNC: 29 MMOL/L (ref 23–29)
CREAT SERPL-MCNC: 0.7 MG/DL (ref 0.5–1.4)
DIFFERENTIAL METHOD: ABNORMAL
EOSINOPHIL # BLD AUTO: 0.1 K/UL (ref 0–0.5)
EOSINOPHIL NFR BLD: 1.6 % (ref 0–8)
ERYTHROCYTE [DISTWIDTH] IN BLOOD BY AUTOMATED COUNT: 13 % (ref 11.5–14.5)
EST. GFR  (AFRICAN AMERICAN): >60 ML/MIN/1.73 M^2
EST. GFR  (NON AFRICAN AMERICAN): >60 ML/MIN/1.73 M^2
GLUCOSE SERPL-MCNC: 75 MG/DL (ref 70–110)
HCT VFR BLD AUTO: 42.6 % (ref 37–48.5)
HDLC SERPL-MCNC: 69 MG/DL (ref 40–75)
HDLC SERPL: 29.4 % (ref 20–50)
HGB BLD-MCNC: 13.4 G/DL (ref 12–16)
IMM GRANULOCYTES # BLD AUTO: 0.01 K/UL (ref 0–0.04)
IMM GRANULOCYTES NFR BLD AUTO: 0.2 % (ref 0–0.5)
LDLC SERPL CALC-MCNC: 151.2 MG/DL (ref 63–159)
LYMPHOCYTES # BLD AUTO: 1.6 K/UL (ref 1–4.8)
LYMPHOCYTES NFR BLD: 32.2 % (ref 18–48)
MCH RBC QN AUTO: 32.5 PG (ref 27–31)
MCHC RBC AUTO-ENTMCNC: 31.5 G/DL (ref 32–36)
MCV RBC AUTO: 103 FL (ref 82–98)
MONOCYTES # BLD AUTO: 0.5 K/UL (ref 0.3–1)
MONOCYTES NFR BLD: 9.2 % (ref 4–15)
NEUTROPHILS # BLD AUTO: 2.8 K/UL (ref 1.8–7.7)
NEUTROPHILS NFR BLD: 56.2 % (ref 38–73)
NONHDLC SERPL-MCNC: 166 MG/DL
NRBC BLD-RTO: 0 /100 WBC
PLATELET # BLD AUTO: 225 K/UL (ref 150–350)
PMV BLD AUTO: 10.1 FL (ref 9.2–12.9)
POTASSIUM SERPL-SCNC: 3.7 MMOL/L (ref 3.5–5.1)
PROT SERPL-MCNC: 7 G/DL (ref 6–8.4)
RBC # BLD AUTO: 4.12 M/UL (ref 4–5.4)
SODIUM SERPL-SCNC: 138 MMOL/L (ref 136–145)
TRIGL SERPL-MCNC: 74 MG/DL (ref 30–150)
WBC # BLD AUTO: 5 K/UL (ref 3.9–12.7)

## 2020-06-04 PROCEDURE — 85025 COMPLETE CBC W/AUTO DIFF WBC: CPT | Mod: HCNC

## 2020-06-04 PROCEDURE — 36415 COLL VENOUS BLD VENIPUNCTURE: CPT | Mod: HCNC,PN

## 2020-06-04 PROCEDURE — 80061 LIPID PANEL: CPT | Mod: HCNC

## 2020-06-04 PROCEDURE — 84425 ASSAY OF VITAMIN B-1: CPT | Mod: HCNC

## 2020-06-04 PROCEDURE — 80053 COMPREHEN METABOLIC PANEL: CPT | Mod: HCNC

## 2020-06-05 ENCOUNTER — OFFICE VISIT (OUTPATIENT)
Dept: HOME HEALTH SERVICES | Facility: CLINIC | Age: 75
End: 2020-06-05
Payer: MEDICARE

## 2020-06-05 VITALS
BODY MASS INDEX: 29.45 KG/M2 | SYSTOLIC BLOOD PRESSURE: 139 MMHG | HEART RATE: 58 BPM | WEIGHT: 156 LBS | HEIGHT: 61 IN | DIASTOLIC BLOOD PRESSURE: 82 MMHG

## 2020-06-05 DIAGNOSIS — E03.8 SUBCLINICAL HYPOTHYROIDISM: ICD-10-CM

## 2020-06-05 DIAGNOSIS — H40.1132 PRIMARY OPEN ANGLE GLAUCOMA (POAG) OF BOTH EYES, MODERATE STAGE: ICD-10-CM

## 2020-06-05 DIAGNOSIS — F33.0 MILD EPISODE OF RECURRENT MAJOR DEPRESSIVE DISORDER: ICD-10-CM

## 2020-06-05 DIAGNOSIS — K21.9 GASTROESOPHAGEAL REFLUX DISEASE, ESOPHAGITIS PRESENCE NOT SPECIFIED: ICD-10-CM

## 2020-06-05 DIAGNOSIS — M47.812 SPONDYLOSIS OF CERVICAL JOINT WITHOUT MYELOPATHY: ICD-10-CM

## 2020-06-05 DIAGNOSIS — Z00.00 ENCOUNTER FOR PREVENTIVE HEALTH EXAMINATION: Primary | ICD-10-CM

## 2020-06-05 DIAGNOSIS — E78.1 PURE HYPERGLYCERIDEMIA: ICD-10-CM

## 2020-06-05 DIAGNOSIS — I77.1 TORTUOUS AORTA: ICD-10-CM

## 2020-06-05 DIAGNOSIS — D69.2 SENILE PURPURA: ICD-10-CM

## 2020-06-05 DIAGNOSIS — I65.21 STENOSIS OF RIGHT CAROTID ARTERY: ICD-10-CM

## 2020-06-05 DIAGNOSIS — F41.1 GAD (GENERALIZED ANXIETY DISORDER): ICD-10-CM

## 2020-06-05 DIAGNOSIS — M54.12 CERVICAL RADICULOPATHY: ICD-10-CM

## 2020-06-05 DIAGNOSIS — Z87.898 HISTORY OF SEIZURE: ICD-10-CM

## 2020-06-05 DIAGNOSIS — E78.00 PURE HYPERCHOLESTEROLEMIA: ICD-10-CM

## 2020-06-05 DIAGNOSIS — I65.21 ASYMPTOMATIC STENOSIS OF RIGHT CAROTID ARTERY: ICD-10-CM

## 2020-06-05 DIAGNOSIS — Z78.0 POSTMENOPAUSAL STATE: ICD-10-CM

## 2020-06-05 DIAGNOSIS — G93.40 ENCEPHALOPATHY: ICD-10-CM

## 2020-06-05 DIAGNOSIS — I10 ESSENTIAL HYPERTENSION: ICD-10-CM

## 2020-06-05 PROCEDURE — G0439 PR MEDICARE ANNUAL WELLNESS SUBSEQUENT VISIT: ICD-10-PCS | Mod: S$GLB,,, | Performed by: NURSE PRACTITIONER

## 2020-06-05 PROCEDURE — 3075F PR MOST RECENT SYSTOLIC BLOOD PRESS GE 130-139MM HG: ICD-10-PCS | Mod: CPTII,S$GLB,, | Performed by: NURSE PRACTITIONER

## 2020-06-05 PROCEDURE — 3079F DIAST BP 80-89 MM HG: CPT | Mod: CPTII,S$GLB,, | Performed by: NURSE PRACTITIONER

## 2020-06-05 PROCEDURE — 99499 RISK ADDL DX/OHS AUDIT: ICD-10-PCS | Mod: S$GLB,,, | Performed by: NURSE PRACTITIONER

## 2020-06-05 PROCEDURE — 3079F PR MOST RECENT DIASTOLIC BLOOD PRESSURE 80-89 MM HG: ICD-10-PCS | Mod: CPTII,S$GLB,, | Performed by: NURSE PRACTITIONER

## 2020-06-05 PROCEDURE — 3075F SYST BP GE 130 - 139MM HG: CPT | Mod: CPTII,S$GLB,, | Performed by: NURSE PRACTITIONER

## 2020-06-05 PROCEDURE — G0439 PPPS, SUBSEQ VISIT: HCPCS | Mod: S$GLB,,, | Performed by: NURSE PRACTITIONER

## 2020-06-05 PROCEDURE — 99499 UNLISTED E&M SERVICE: CPT | Mod: S$GLB,,, | Performed by: NURSE PRACTITIONER

## 2020-06-06 PROBLEM — I77.1 TORTUOUS AORTA: Status: ACTIVE | Noted: 2020-06-06

## 2020-06-06 PROBLEM — H40.1132 PRIMARY OPEN ANGLE GLAUCOMA (POAG) OF BOTH EYES, MODERATE STAGE: Status: ACTIVE | Noted: 2020-06-06

## 2020-06-06 NOTE — PROGRESS NOTES
Telepsychiatry Visit.    The patient location is: Home  The chief complaint leading to consultation is: Anxiety and mental confusion  Visit type: audiovisual  Total time spent with patient: 25 min  Each patient to whom he or she provides medical services by telemedicine is:  (1) informed of the relationship between the physician and patient and the respective role of any other health care provider with respect to management of the patient; and (2) notified that he or she may decline to receive medical services by telemedicine and may withdraw from such care at any time.    Notes:     6/1/2020    Clinical Status of Patient:  Outpatient (Ambulatory)    Chief Complaint:  Daisha Mayo is a 74 y.o. female who presents today for follow-up of anxiety, memory problem and attention problems.  Met with patient, daughter and spouse    Interval History and Content of Current Session:  Interim Events/Subjective Report/Content of Current Session:  Ms. Mayo and her daughter were interviewed again via Epic Javier.  Pt was casually dressed and neatly groomed, mostly relaxed, and smiling.  She reported that her cognitive skills are slowly returning following COVID-19 encephalopathy.  She is less anxious now that she has reason to expect full recovery.  At this time she still has difficulty with complicated skills such as balancing a checkbook or managing her medicines.  By description, her  is not up to these tasks either.  Pt's daughter has chosen to remain with them until they can again function safely on their own.      Medications were reviewed.  Pt is off of all benzodiazepines, and her only psychotropics are thiamine and Lexapro.  An increase of Lexapro was discussed but not ordered.  Pt's daughter was asked to schedule Pt's next f/u in 6 weeks.    Psychotherapy:  · Target symptoms: anxiety , confusion  · Why chosen therapy is appropriate versus another modality: relevant to diagnosis, evidence based  "practice  · Outcome monitoring methods: self-report, observation, feedback from family  · Therapeutic intervention type: supportive psychotherapy, psychoeducation  · Topics discussed/themes: stress related to medical comorbidities, building skills sets for symptom management, symptom recognition  · The patient's response to the intervention is motivated. The patient's progress toward treatment goals is good.   · Duration of intervention: 25 minutes.    Review of Systems   · PSYCHIATRIC: Pertinant items are noted in the narrative.  · NEUROLOGIC: encephalitis  · RESPIRATORY: Recent COVID-19 infection    Past Medical, Family and Social History: The patient's past medical, family and social history have been reviewed and updated as appropriate within the electronic medical record - see encounter notes.    Compliance: Pt stopped multiple medications.    Side effects: None    Risk Parameters:  Patient reports no suicidal ideation  Patient reports no homicidal ideation  Patient reports no self-injurious behavior  Patient reports no violent behavior    Exam (detailed: at least 9 elements; comprehensive: all 15 elements)   Constitutional  Vitals:  Most recent vital signs, dated less than 90 days prior to this appointment, were reviewed.   Vitals - 1 value per visit 6/5/2020   SYSTOLIC 139   DIASTOLIC 82   PULSE 58   TEMPERATURE    RESPIRATIONS    SPO2    Weight (lb) 156   Weight (kg) 70.761   HEIGHT 5' 1"   BODY MASS INDEX 29.48   VISIT REPORT    Pain Score  0     There were no vitals filed for this visit.     General:  age appropriate, casually dressed     Musculoskeletal  Muscle Strength/Tone:  not examined   Gait & Station:  Telemed visit     Psychiatric  Speech:  spontaneous   Mood & Affect:  euthymic  increased in intensity, mood-congruent   Thought Process:  goal-directed, logical   Associations:  intact   Thought Content:  normal, no suicidality, no homicidality, delusions, or paranoia   Insight:  has awareness of " illness   Judgement: behavior is adequate to circumstances   Orientation:  person, place, situation, time/date, day of week, month of year, year   Memory: intact for content of interview   Language: grossly intact   Attention Span & Concentration:  able to focus   Fund of Knowledge:  intact and appropriate to age and level of education     Assessment and Diagnosis   Status/Progress: Based on the examination today, the patient's problem(s) is/are improved.  New problems have not been presented today.   Co-morbidities are not complicating management of the primary condition.  There are no active rule-out diagnoses for this patient at this time.     General Impression: Pt shows slow additional improvement in cognition, mood, and function, with no current need for benzodiazepines.      ICD-10-CM ICD-9-CM   1. Encephalopathy due to COVID-19 virus U07.1     G93.49    2. SMASON (generalized anxiety disorder) F41.1 300.02   3. Thiamine deficiency E51.9 265.1       Intervention/Counseling/Treatment Plan   · Medication Management: Continue current medications.   · Lipid panel can be rechecked at subsequent visit after isolation period is over.      Return to Clinic: 6 weeks

## 2020-06-06 NOTE — PATIENT INSTRUCTIONS
Counseling and Referral of Other Preventative  (Italic type indicates deductible and co-insurance are waived)    Patient Name: Daisha Mayo  Today's Date: 6/6/2020    Health Maintenance       Date Due Completion Date    DEXA SCAN 11/29/2019 11/29/2016    Mammogram 06/20/2020 6/20/2019    High Dose Statin 06/26/2020 (Originally 10/4/1966) ---    Shingles Vaccine (2 of 3) 06/26/2020 (Originally 1/27/2016) 12/2/2015    Colonoscopy 06/26/2020 (Originally 9/18/2017) 9/18/2014    Lipid Panel 06/04/2021 6/4/2020    TETANUS VACCINE 06/03/2029 6/3/2019        Orders Placed This Encounter   Procedures    DXA Bone Density Spine And Hip     The following information is provided to all patients.  This information is to help you find resources for any of the problems found today that may be affecting your health:                Living healthy guide: www.[facility].[place].[facility]      Understanding Diabetes: www.diabetes.org      Eating healthy: www.cdc.gov/healthyweight      CDC home safety checklist: www.cdc.gov/steadi/patient.html      Agency on Aging: www.goea.[place].[facility]      Alcoholics anonymous (AA): www.aa.org      Physical Activity: www.raymundo.nih.gov/qr7nzco      Tobacco use: www.quitwithusla.org

## 2020-06-06 NOTE — PROGRESS NOTES
"Daisha Mayo presented for a  Medicare AWV and comprehensive Health Risk Assessment today. The following components were reviewed and updated:    · Medical history  · Family History  · Social history  · Allergies and Current Medications  · Health Risk Assessment  · Health Maintenance  · Care Team     ** See Completed Assessments for Annual Wellness Visit within the encounter summary.**       The following assessments were completed:  · Living Situation  · CAGE  · Depression Screening  · Timed Get Up and Go  · Whisper Test  · Cognitive Function Screening  ·   ·   ·   · Nutrition Screening  · ADL Screening  · PAQ Screening    Vitals:    06/05/20 1052   BP: 139/82   Pulse: (!) 58   Weight: 70.8 kg (156 lb)   Height: 5' 1" (1.549 m)     Body mass index is 29.48 kg/m².  Physical Exam   Constitutional: She is oriented to person, place, and time. She appears well-developed.   HENT:   Head: Normocephalic and atraumatic.   Eyes: Pupils are equal, round, and reactive to light.   Neck: Normal range of motion.   Cardiovascular: Normal rate and regular rhythm.   Pulmonary/Chest: Effort normal and breath sounds normal. No respiratory distress.   Abdominal: Soft. Bowel sounds are normal. She exhibits no distension.   Musculoskeletal: Normal range of motion. She exhibits no edema.   Neurological: She is alert and oriented to person, place, and time.   Skin: Skin is warm and dry. Bruising (BUE) noted.   Psychiatric: She has a normal mood and affect. Her behavior is normal.         Diagnoses and health risks identified today and associated recommendations/orders:    1. Encounter for preventive health examination  Assessment completed. Preventive measures reviewed with patient and patients daughter.    2. Postmenopausal state  Stable, followed by PCP.  - DXA Bone Density Spine And Hip; Future    3. Tortuous aorta  Stable, followed by PCP.    4. Cervical radiculopathy  Stable, followed by PCP.    5. Encephalopathy  Stable, followed " by Neurology.    6. History of seizure  Stable, followed by Neurology.    7. 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    8. SAMSON (generalized anxiety disorder)  Stable, followed by Psychiatry.    9. Mild episode of recurrent major depressive disorder  Stable, followed by Psychiatry.    10. Asymptomatic stenosis of right carotid artery  Stable, followed by PCP.    11. Essential hypertension  Stable, followed by PCP.    12. Pure hypercholesterolemia  Stable, followed by PCP.    13. Pure hyperglyceridemia  Stable, followed by PCP.    14. Stenosis of right carotid artery  Stable, followed by PCP.    15. Senile purpura  Stable, followed by PCP.    16. Subclinical hypothyroidism  Stable, followed by PCP.    17. Gastroesophageal reflux disease, esophagitis presence not specified  Stable, followed by PCP.    18. Primary open angle glaucoma (POAG) of both eyes, moderate stage  Stable, followed by Optometry.    Provided Daisha with a 5-10 year written screening schedule and personal prevention plan. Recommendations were developed using the USPSTF age appropriate recommendations. Education, counseling, and referrals were provided as needed. After Visit Summary printed and given to patient which includes a list of additional screenings\tests needed.    No follow-ups on file.    Susana Osman NP  I offered to discuss end of life issues, including information on how to make advance directives that the patient could use to name someone who would make medical decisions on their behalf if they became too ill to make themselves.    ___Patient declined  _X_Patient is interested, I provided paper work and offered to discuss.

## 2020-06-08 ENCOUNTER — TELEPHONE (OUTPATIENT)
Dept: INTERNAL MEDICINE | Facility: CLINIC | Age: 75
End: 2020-06-08

## 2020-06-08 DIAGNOSIS — E78.5 HYPERLIPIDEMIA, UNSPECIFIED HYPERLIPIDEMIA TYPE: Primary | ICD-10-CM

## 2020-06-08 NOTE — TELEPHONE ENCOUNTER
Please inform patient labs are significant for elevated cholesterol.  Encouraged healthy diet and exercise as tolerated.  Will monitor for now.  Schedule repeat labs in 3 months.

## 2020-06-09 LAB — VIT B1 BLD-MCNC: 62 UG/L (ref 38–122)

## 2020-06-16 ENCOUNTER — APPOINTMENT (OUTPATIENT)
Dept: RADIOLOGY | Facility: CLINIC | Age: 75
End: 2020-06-16
Attending: NURSE PRACTITIONER
Payer: MEDICARE

## 2020-06-16 DIAGNOSIS — Z78.0 POSTMENOPAUSAL STATE: ICD-10-CM

## 2020-06-16 PROCEDURE — 77080 DXA BONE DENSITY AXIAL: CPT | Mod: TC,HCNC,PO

## 2020-06-16 PROCEDURE — 77080 DXA BONE DENSITY AXIAL: CPT | Mod: 26,HCNC,, | Performed by: INTERNAL MEDICINE

## 2020-06-16 PROCEDURE — 77080 DEXA BONE DENSITY SPINE HIP: ICD-10-PCS | Mod: 26,HCNC,, | Performed by: INTERNAL MEDICINE

## 2020-06-22 ENCOUNTER — HOSPITAL ENCOUNTER (OUTPATIENT)
Dept: RADIOLOGY | Facility: HOSPITAL | Age: 75
Discharge: HOME | End: 2020-06-22
Attending: INTERNAL MEDICINE
Payer: MEDICARE

## 2020-06-22 ENCOUNTER — OFFICE VISIT (OUTPATIENT)
Dept: PSYCHIATRY | Facility: CLINIC | Age: 75
End: 2020-06-22
Payer: MEDICARE

## 2020-06-22 DIAGNOSIS — E51.9 THIAMINE DEFICIENCY: ICD-10-CM

## 2020-06-22 DIAGNOSIS — U07.1 ENCEPHALOPATHY DUE TO COVID-19 VIRUS: Primary | ICD-10-CM

## 2020-06-22 DIAGNOSIS — F41.1 GAD (GENERALIZED ANXIETY DISORDER): ICD-10-CM

## 2020-06-22 DIAGNOSIS — G93.49 ENCEPHALOPATHY DUE TO COVID-19 VIRUS: Primary | ICD-10-CM

## 2020-06-22 DIAGNOSIS — Z12.31 ENCOUNTER FOR SCREENING MAMMOGRAM FOR BREAST CANCER: ICD-10-CM

## 2020-06-22 PROCEDURE — 90833 PSYTX W PT W E/M 30 MIN: CPT | Mod: HCNC,95,, | Performed by: PSYCHIATRY & NEUROLOGY

## 2020-06-22 PROCEDURE — 1159F PR MEDICATION LIST DOCUMENTED IN MEDICAL RECORD: ICD-10-PCS | Mod: HCNC,95,, | Performed by: PSYCHIATRY & NEUROLOGY

## 2020-06-22 PROCEDURE — 77067 SCR MAMMO BI INCL CAD: CPT | Mod: TC,HCNC,PN

## 2020-06-22 PROCEDURE — 77067 MAMMO DIGITAL SCREENING BILAT WITH TOMOSYNTHESIS_CAD: ICD-10-PCS | Mod: 26,HCNC,, | Performed by: RADIOLOGY

## 2020-06-22 PROCEDURE — 90833 PR PSYCHOTHERAPY W/PATIENT W/E&M, 30 MIN (ADD ON): ICD-10-PCS | Mod: HCNC,95,, | Performed by: PSYCHIATRY & NEUROLOGY

## 2020-06-22 PROCEDURE — 99213 PR OFFICE/OUTPT VISIT, EST, LEVL III, 20-29 MIN: ICD-10-PCS | Mod: HCNC,95,, | Performed by: PSYCHIATRY & NEUROLOGY

## 2020-06-22 PROCEDURE — 1101F PT FALLS ASSESS-DOCD LE1/YR: CPT | Mod: HCNC,CPTII,95, | Performed by: PSYCHIATRY & NEUROLOGY

## 2020-06-22 PROCEDURE — 77067 SCR MAMMO BI INCL CAD: CPT | Mod: 26,HCNC,, | Performed by: RADIOLOGY

## 2020-06-22 PROCEDURE — 1101F PR PT FALLS ASSESS DOC 0-1 FALLS W/OUT INJ PAST YR: ICD-10-PCS | Mod: HCNC,CPTII,95, | Performed by: PSYCHIATRY & NEUROLOGY

## 2020-06-22 PROCEDURE — 77063 MAMMO DIGITAL SCREENING BILAT WITH TOMOSYNTHESIS_CAD: ICD-10-PCS | Mod: 26,HCNC,, | Performed by: RADIOLOGY

## 2020-06-22 PROCEDURE — 99213 OFFICE O/P EST LOW 20 MIN: CPT | Mod: HCNC,95,, | Performed by: PSYCHIATRY & NEUROLOGY

## 2020-06-22 PROCEDURE — 77063 BREAST TOMOSYNTHESIS BI: CPT | Mod: 26,HCNC,, | Performed by: RADIOLOGY

## 2020-06-22 PROCEDURE — 1159F MED LIST DOCD IN RCRD: CPT | Mod: HCNC,95,, | Performed by: PSYCHIATRY & NEUROLOGY

## 2020-06-22 RX ORDER — LANOLIN ALCOHOL/MO/W.PET/CERES
100 CREAM (GRAM) TOPICAL DAILY
Qty: 90 TABLET | Refills: 1 | Status: SHIPPED | OUTPATIENT
Start: 2020-06-22 | End: 2020-10-02 | Stop reason: SDUPTHER

## 2020-06-22 RX ORDER — ESCITALOPRAM OXALATE 10 MG/1
10 TABLET ORAL DAILY
Qty: 90 TABLET | Refills: 1 | Status: SHIPPED | OUTPATIENT
Start: 2020-06-22 | End: 2020-10-02 | Stop reason: SDUPTHER

## 2020-06-22 NOTE — PROGRESS NOTES
Telepsychiatry Visit.    The patient location is: Home  The chief complaint leading to consultation is: Anxiety and mental confusion  Visit type: audiovisual  Total time spent with patient: 24 min  Each patient to whom he or she provides medical services by telemedicine is:  (1) informed of the relationship between the physician and patient and the respective role of any other health care provider with respect to management of the patient; and (2) notified that he or she may decline to receive medical services by telemedicine and may withdraw from such care at any time.    Notes:     6/22/2020    Clinical Status of Patient:  Outpatient (Ambulatory)    Chief Complaint:  Daisha Mayo is a 74 y.o. female who presents today for follow-up of anxiety, memory problem and attention problems.  Met with patient    Interval History and Content of Current Session:  Interim Events/Subjective Report/Content of Current Session:  Ms. Mayo was interviewed alone for the first time.  Her daughter was out of town, making arrangements to sell her property and move back to [place] permanently.  Pt presented casually dressed and neatly groomed, smiling, with no abnormal movements.  She handled the a/v equipment with no problems.  She reported that she also is now able to balance a checkbook easily and she has resumed managing her own and her 's medicines.  She was discouraged from driving until formally cleared to do so.    Pt stated that her mood has been good as she recovers from COVID-19 encephalopathy.  She remains cautious and still follows isolation recommendations, because data are unclear as to how much immunity she might have now as a result of her recent infection.  She denied any new problems and is optimistic about the future.  Medications were discussed.  Lexapro and thiamine are her only remaining psychotropics.  She will continue these unchanged and f/u in about 3 , calling prn any unexpected  "changes.    Psychotherapy:  · Target symptoms: depression, anxiety , confusion  · Why chosen therapy is appropriate versus another modality: relevant to diagnosis, evidence based practice  · Outcome monitoring methods: self-report, observation, feedback from family  · Therapeutic intervention type: supportive psychotherapy, psychoeducation  · Topics discussed/themes: stress related to medical comorbidities, building skills sets for symptom management, symptom recognition  · The patient's response to the intervention is motivated. The patient's progress toward treatment goals is good.   · Duration of intervention: 24 minutes.    Review of Systems   · PSYCHIATRIC: Pertinant items are noted in the narrative.  · NEUROLOGIC: encephalitis -- resolved  · RESPIRATORY: Recent COVID-19 infection    Past Medical, Family and Social History: The patient's past medical, family and social history have been reviewed and updated as appropriate within the electronic medical record - see encounter notes.    Compliance: yes.    Side effects: None    Risk Parameters:  Patient reports no suicidal ideation  Patient reports no homicidal ideation  Patient reports no self-injurious behavior  Patient reports no violent behavior    Exam (detailed: at least 9 elements; comprehensive: all 15 elements)   Constitutional  Vitals:  Most recent vital signs, dated less than 90 days prior to this appointment, were reviewed.   Vitals - 1 value per visit 6/5/2020   SYSTOLIC 139   DIASTOLIC 82   PULSE 58   TEMPERATURE    RESPIRATIONS    SPO2    Weight (lb) 156   Weight (kg) 70.761   HEIGHT 5' 1"   BODY MASS INDEX 29.48   VISIT REPORT    Pain Score  0     There were no vitals filed for this visit.     General:  age appropriate, casually dressed     Musculoskeletal  Muscle Strength/Tone:  not examined   Gait & Station:  Telemed visit     Psychiatric  Speech:  spontaneous   Mood & Affect:  euthymic  increased in intensity, mood-congruent   Thought Process:  " goal-directed, logical   Associations:  intact   Thought Content:  normal, no suicidality, no homicidality, delusions, or paranoia   Insight:  has awareness of illness   Judgement: behavior is adequate to circumstances   Orientation:  person, place, situation, time/date, day of week, month of year, year   Memory: intact for content of interview   Language: grossly intact   Attention Span & Concentration:  able to focus   Fund of Knowledge:  intact and appropriate to age and level of education     Assessment and Diagnosis   Status/Progress: Based on the examination today, the patient's problem(s) is/are improved.  New problems have not been presented today.   Co-morbidities are not complicating management of the primary condition.  There are no active rule-out diagnoses for this patient at this time.     General Impression: Pt is euthymic and has resumed most prior cognitive tasks.      ICD-10-CM ICD-9-CM   1. Encephalopathy due to COVID-19 virus  U07.1     G93.49    2. SAMSON (generalized anxiety disorder)  F41.1 300.02   3. Thiamine deficiency  E51.9 265.1       Intervention/Counseling/Treatment Plan   · Medication Management: Continue current medications.   · Do not drive yet.  · Lipid panel can be rechecked at subsequent visit after isolation period is over.      Return to Clinic: 3 months

## 2020-07-01 ENCOUNTER — PATIENT MESSAGE (OUTPATIENT)
Dept: PSYCHIATRY | Facility: CLINIC | Age: 75
End: 2020-07-01

## 2020-09-12 ENCOUNTER — PATIENT MESSAGE (OUTPATIENT)
Dept: INTERNAL MEDICINE | Facility: CLINIC | Age: 75
End: 2020-09-12

## 2020-09-17 ENCOUNTER — PATIENT MESSAGE (OUTPATIENT)
Dept: PSYCHIATRY | Facility: CLINIC | Age: 75
End: 2020-09-17

## 2020-10-02 ENCOUNTER — OFFICE VISIT (OUTPATIENT)
Dept: PSYCHIATRY | Facility: CLINIC | Age: 75
End: 2020-10-02
Payer: MEDICARE

## 2020-10-02 DIAGNOSIS — G93.49 ENCEPHALOPATHY DUE TO COVID-19 VIRUS: Primary | ICD-10-CM

## 2020-10-02 DIAGNOSIS — E51.9 THIAMINE DEFICIENCY: ICD-10-CM

## 2020-10-02 DIAGNOSIS — U07.1 ENCEPHALOPATHY DUE TO COVID-19 VIRUS: Primary | ICD-10-CM

## 2020-10-02 DIAGNOSIS — F41.1 GAD (GENERALIZED ANXIETY DISORDER): ICD-10-CM

## 2020-10-02 PROCEDURE — 99213 PR OFFICE/OUTPT VISIT, EST, LEVL III, 20-29 MIN: ICD-10-PCS | Mod: HCNC,95,, | Performed by: PSYCHIATRY & NEUROLOGY

## 2020-10-02 PROCEDURE — 1101F PT FALLS ASSESS-DOCD LE1/YR: CPT | Mod: HCNC,CPTII,, | Performed by: PSYCHIATRY & NEUROLOGY

## 2020-10-02 PROCEDURE — 1159F MED LIST DOCD IN RCRD: CPT | Mod: HCNC,,, | Performed by: PSYCHIATRY & NEUROLOGY

## 2020-10-02 PROCEDURE — 90833 PR PSYCHOTHERAPY W/PATIENT W/E&M, 30 MIN (ADD ON): ICD-10-PCS | Mod: HCNC,95,, | Performed by: PSYCHIATRY & NEUROLOGY

## 2020-10-02 PROCEDURE — 99213 OFFICE O/P EST LOW 20 MIN: CPT | Mod: HCNC,95,, | Performed by: PSYCHIATRY & NEUROLOGY

## 2020-10-02 PROCEDURE — 1101F PR PT FALLS ASSESS DOC 0-1 FALLS W/OUT INJ PAST YR: ICD-10-PCS | Mod: HCNC,CPTII,, | Performed by: PSYCHIATRY & NEUROLOGY

## 2020-10-02 PROCEDURE — 90833 PSYTX W PT W E/M 30 MIN: CPT | Mod: HCNC,95,, | Performed by: PSYCHIATRY & NEUROLOGY

## 2020-10-02 PROCEDURE — 1159F PR MEDICATION LIST DOCUMENTED IN MEDICAL RECORD: ICD-10-PCS | Mod: HCNC,,, | Performed by: PSYCHIATRY & NEUROLOGY

## 2020-10-02 RX ORDER — ESCITALOPRAM OXALATE 10 MG/1
10 TABLET ORAL DAILY
Qty: 90 TABLET | Refills: 1 | Status: SHIPPED | OUTPATIENT
Start: 2020-10-02 | End: 2021-04-15 | Stop reason: SDUPTHER

## 2020-10-02 RX ORDER — LANOLIN ALCOHOL/MO/W.PET/CERES
100 CREAM (GRAM) TOPICAL DAILY
Qty: 90 TABLET | Refills: 1 | Status: SHIPPED | OUTPATIENT
Start: 2020-10-02 | End: 2021-04-15 | Stop reason: SDUPTHER

## 2020-10-10 NOTE — PROGRESS NOTES
Telepsychiatry Visit.    The patient location is: Home  The chief complaint leading to consultation is: Anxiety and mental confusion  Visit type: audiovisual  Total time spent with patient: 18 min  Each patient to whom he or she provides medical services by telemedicine is:  (1) informed of the relationship between the physician and patient and the respective role of any other health care provider with respect to management of the patient; and (2) notified that he or she may decline to receive medical services by telemedicine and may withdraw from such care at any time.    Notes:     10/2/2020    Clinical Status of Patient:  Outpatient (Ambulatory)    Chief Complaint:  Daisha Mayo is a 75 y.o. female who presents today for follow-up of anxiety, memory problem and attention problems.  Met with patient    Interval History and Content of Current Session:  Interim Events/Subjective Report/Content of Current Session:  Ms. Mayo was interviewed alone again.  She was casually dressed and neatly groomed, with smiling affect.  She reported that her mood has been good and cognitive processes by her estimate are returned to full pre-COVID levels.  She has been out of the house some, and is still following isolation precautions as the possibility of a second infection is not fully known.  She is looking forward to an end to the pandemic and resumption of all of her usual activities.  The rest of her family remains healthy.    Medications were reviewed and continued unchanged with plans to do so until the pandemic is over.  Pt was asked to return virtually or in person in 3 months.    Psychotherapy:  · Target symptoms: depression, anxiety , confusion  · Why chosen therapy is appropriate versus another modality: relevant to diagnosis, evidence based practice  · Outcome monitoring methods: self-report, observation, feedback from family  · Therapeutic intervention type: supportive psychotherapy, psychoeducation  · Topics  discussed/themes: stress related to medical comorbidities, building skills sets for symptom management, symptom recognition  · The patient's response to the intervention is motivated. The patient's progress toward treatment goals is good.   · Duration of intervention: 18 minutes.    Review of Systems   · PSYCHIATRIC: Pertinant items are noted in the narrative.  · NEUROLOGIC: encephalitis -- resolved  · RESPIRATORY: Recent COVID-19 infection    Past Medical, Family and Social History: The patient's past medical, family and social history have been reviewed and updated as appropriate within the electronic medical record - see encounter notes.    Compliance: yes.    Side effects: None    Risk Parameters:  Patient reports no suicidal ideation  Patient reports no homicidal ideation  Patient reports no self-injurious behavior  Patient reports no violent behavior    Exam (detailed: at least 9 elements; comprehensive: all 15 elements)   Constitutional  Vitals:  Most recent vital signs, dated less than 90 days prior to this appointment, were not reviewed.   There were no vitals filed for this visit.     General:  age appropriate, casually dressed     Musculoskeletal  Muscle Strength/Tone:  not examined   Gait & Station:  Telemed visit     Psychiatric  Speech:  spontaneous   Mood & Affect:  euthymic  increased in intensity, mood-congruent   Thought Process:  goal-directed, logical   Associations:  intact   Thought Content:  normal, no suicidality, no homicidality, delusions, or paranoia   Insight:  has awareness of illness   Judgement: behavior is adequate to circumstances   Orientation:  person, place, situation, time/date, day of week, month of year, year   Memory: intact for content of interview   Language: grossly intact   Attention Span & Concentration:  able to focus   Fund of Knowledge:  intact and appropriate to age and level of education     Assessment and Diagnosis   Status/Progress: Based on the examination today,  the patient's problem(s) is/are well controlled.  New problems have not been presented today.   Co-morbidities are not complicating management of the primary condition.  There are no active rule-out diagnoses for this patient at this time.     General Impression: Pt is euthymic and has resumed all prior cognitive tasks.      ICD-10-CM ICD-9-CM   1. Encephalopathy due to COVID-19 virus  U07.1 348.39    G93.49 079.89   2. Thiamine deficiency  E51.9 265.1   3. SAMSON (generalized anxiety disorder)  F41.1 300.02       Intervention/Counseling/Treatment Plan   · Medication Management: Continue current medications.   · Driving is approved if Pt feels competent to do so.  Short neighborhood trips are recommended at first.  · Lipid panel can be rechecked at subsequent visit after isolation period is over.      Return to Clinic: 3 months

## 2020-10-14 ENCOUNTER — IMMUNIZATION (OUTPATIENT)
Dept: PHARMACY | Facility: CLINIC | Age: 75
End: 2020-10-14
Payer: MEDICARE

## 2020-10-29 ENCOUNTER — OFFICE VISIT (OUTPATIENT)
Dept: PRIMARY CARE CLINIC | Facility: CLINIC | Age: 75
End: 2020-10-29
Payer: MEDICARE

## 2020-10-29 VITALS
HEIGHT: 61 IN | OXYGEN SATURATION: 98 % | RESPIRATION RATE: 18 BRPM | WEIGHT: 163.38 LBS | HEART RATE: 67 BPM | SYSTOLIC BLOOD PRESSURE: 132 MMHG | BODY MASS INDEX: 30.85 KG/M2 | DIASTOLIC BLOOD PRESSURE: 65 MMHG

## 2020-10-29 DIAGNOSIS — G93.49 ENCEPHALOPATHY DUE TO COVID-19 VIRUS: Primary | ICD-10-CM

## 2020-10-29 DIAGNOSIS — I65.21 ASYMPTOMATIC STENOSIS OF RIGHT CAROTID ARTERY: ICD-10-CM

## 2020-10-29 DIAGNOSIS — F33.0 MILD EPISODE OF RECURRENT MAJOR DEPRESSIVE DISORDER: ICD-10-CM

## 2020-10-29 DIAGNOSIS — I10 ESSENTIAL HYPERTENSION: ICD-10-CM

## 2020-10-29 DIAGNOSIS — U07.1 ENCEPHALOPATHY DUE TO COVID-19 VIRUS: Primary | ICD-10-CM

## 2020-10-29 DIAGNOSIS — Z12.11 SCREEN FOR COLON CANCER: ICD-10-CM

## 2020-10-29 DIAGNOSIS — R40.4 TRANSIENT ALTERATION OF AWARENESS: ICD-10-CM

## 2020-10-29 DIAGNOSIS — F41.1 GAD (GENERALIZED ANXIETY DISORDER): ICD-10-CM

## 2020-10-29 PROBLEM — E78.1 PURE HYPERGLYCERIDEMIA: Status: RESOLVED | Noted: 2017-03-31 | Resolved: 2020-10-29

## 2020-10-29 PROBLEM — E78.00 PURE HYPERCHOLESTEROLEMIA: Status: RESOLVED | Noted: 2017-03-31 | Resolved: 2020-10-29

## 2020-10-29 PROCEDURE — 1126F PR PAIN SEVERITY QUANTIFIED, NO PAIN PRESENT: ICD-10-PCS | Mod: HCNC,S$GLB,, | Performed by: FAMILY MEDICINE

## 2020-10-29 PROCEDURE — 99214 PR OFFICE/OUTPT VISIT, EST, LEVL IV, 30-39 MIN: ICD-10-PCS | Mod: HCNC,S$GLB,, | Performed by: FAMILY MEDICINE

## 2020-10-29 PROCEDURE — 1159F MED LIST DOCD IN RCRD: CPT | Mod: HCNC,S$GLB,, | Performed by: FAMILY MEDICINE

## 2020-10-29 PROCEDURE — 99214 OFFICE O/P EST MOD 30 MIN: CPT | Mod: HCNC,S$GLB,, | Performed by: FAMILY MEDICINE

## 2020-10-29 PROCEDURE — 1126F AMNT PAIN NOTED NONE PRSNT: CPT | Mod: HCNC,S$GLB,, | Performed by: FAMILY MEDICINE

## 2020-10-29 PROCEDURE — 3078F PR MOST RECENT DIASTOLIC BLOOD PRESSURE < 80 MM HG: ICD-10-PCS | Mod: HCNC,CPTII,S$GLB, | Performed by: FAMILY MEDICINE

## 2020-10-29 PROCEDURE — 3008F BODY MASS INDEX DOCD: CPT | Mod: HCNC,CPTII,S$GLB, | Performed by: FAMILY MEDICINE

## 2020-10-29 PROCEDURE — 3078F DIAST BP <80 MM HG: CPT | Mod: HCNC,CPTII,S$GLB, | Performed by: FAMILY MEDICINE

## 2020-10-29 PROCEDURE — 3008F PR BODY MASS INDEX (BMI) DOCUMENTED: ICD-10-PCS | Mod: HCNC,CPTII,S$GLB, | Performed by: FAMILY MEDICINE

## 2020-10-29 PROCEDURE — 1101F PR PT FALLS ASSESS DOC 0-1 FALLS W/OUT INJ PAST YR: ICD-10-PCS | Mod: HCNC,CPTII,S$GLB, | Performed by: FAMILY MEDICINE

## 2020-10-29 PROCEDURE — 3075F SYST BP GE 130 - 139MM HG: CPT | Mod: HCNC,CPTII,S$GLB, | Performed by: FAMILY MEDICINE

## 2020-10-29 PROCEDURE — 3075F PR MOST RECENT SYSTOLIC BLOOD PRESS GE 130-139MM HG: ICD-10-PCS | Mod: HCNC,CPTII,S$GLB, | Performed by: FAMILY MEDICINE

## 2020-10-29 PROCEDURE — 1159F PR MEDICATION LIST DOCUMENTED IN MEDICAL RECORD: ICD-10-PCS | Mod: HCNC,S$GLB,, | Performed by: FAMILY MEDICINE

## 2020-10-29 PROCEDURE — 1101F PT FALLS ASSESS-DOCD LE1/YR: CPT | Mod: HCNC,CPTII,S$GLB, | Performed by: FAMILY MEDICINE

## 2020-10-29 PROCEDURE — 99999 PR PBB SHADOW E&M-EST. PATIENT-LVL IV: CPT | Mod: PBBFAC,HCNC,, | Performed by: FAMILY MEDICINE

## 2020-10-29 PROCEDURE — 99999 PR PBB SHADOW E&M-EST. PATIENT-LVL IV: ICD-10-PCS | Mod: PBBFAC,HCNC,, | Performed by: FAMILY MEDICINE

## 2020-10-29 RX ORDER — ASPIRIN 325 MG/1
TABLET, FILM COATED ORAL
COMMUNITY
Start: 2020-10-02 | End: 2021-04-15 | Stop reason: SDUPTHER

## 2020-10-29 NOTE — PROGRESS NOTES
Subjective:      Patient ID: Daisha Mayo is a 75 y.o. female.    Chief Complaint: Hypertension    Here today for HTN & post COVID symptoms.  She brings in her home blood pressure readings which have been average 133/66.  She took amlodipine in the past because some leg swelling.  She also took hydrochlorothiazide, but then she lost weight and was eating better.    In April, she was diagnosed with COVID.  Her daughter Andrzej moved home from [place] to care for her.  She has been eating more, gained weight, blood pressure slightly increased, but it is not always high.  She denies any chest pain shortness of breath or headache.    She was in a wheelchair for 3 months, but now walking.  Her main complaint is increased abdominal gas.  Some loose stools.  She is taking Lexapro daily which works well for anxiety and stress    She was seen by Psychiatry October 2nd for encephalopathy due to COVID virus      MRI & CT head 2020 normal, nothing worrisome seen      Current Outpatient Medications:     cholecalciferol, vitamin D3, 50,000 unit Tab, Take 1 tablet by mouth every 7 days., Disp: 12 tablet, Rfl: 4    dorzolamide-timolol (COSOPT) 2-0.5 % ophthalmic solution, Place 1 drop into both eyes 2 (two) times daily.  , Disp: , Rfl:     escitalopram oxalate (LEXAPRO) 10 MG tablet, Take 1 tablet (10 mg total) by mouth once daily., Disp: 90 tablet, Rfl: 1    latanoprost 0.005 % ophthalmic solution, Place 1 drop into both eyes every evening., Disp: , Rfl:     multivitamin with minerals (HAIR,SKIN AND NAILS ORAL), Take 1 tablet by mouth once daily., Disp: , Rfl:     multivitamin-minerals-lutein (CENTRUM SILVER) Tab, Take 1 tablet by mouth Daily. 1 Tablet Oral Every day, Disp: , Rfl:     omega-3 fatty acids-vitamin E (FISH OIL) 1,000 mg Cap, , Disp: , Rfl:     thiamine 100 MG tablet, Take 1 tablet (100 mg total) by mouth once daily., Disp: 90 tablet, Rfl: 1    thiamine mononitrate, vit B1, (VITAMIN B-1, MONONITRATE,)  100 mg Tab, , Disp: , Rfl:     VITAMIN D3 5,000 unit Tab, , Disp: , Rfl:     Lab Results   Component Value Date    HGBA1C 5.1 11/26/2006     No results found for: MICALBCREAT  Lab Results   Component Value Date    LDLCALC 151.2 06/04/2020    LDLCALC 75.6 03/28/2019    CHOL 235 (H) 06/04/2020    HDL 69 06/04/2020    TRIG 74 06/04/2020       Lab Results   Component Value Date     06/04/2020    K 3.7 06/04/2020     06/04/2020    CO2 29 06/04/2020    GLU 75 06/04/2020    BUN 12 06/04/2020    CREATININE 0.7 06/04/2020    CALCIUM 9.0 06/04/2020    PROT 7.0 06/04/2020    ALBUMIN 3.8 06/04/2020    BILITOT 1.0 06/04/2020    ALKPHOS 100 06/04/2020    AST 12 06/04/2020    ALT 8 (L) 06/04/2020    ANIONGAP 5 (L) 06/04/2020    ESTGFRAFRICA >60.0 06/04/2020    EGFRNONAA >60.0 06/04/2020    WBC 5.00 06/04/2020    HGB 13.4 06/04/2020    HGB 12.9 04/06/2020    HCT 42.6 06/04/2020     (H) 06/04/2020     06/04/2020    TSH 3.464 04/15/2020    HEPCAB Negative 10/14/2015       Lab Results   Component Value Date    FSH 67.40 01/10/2020    DRQMHUFN04OH 71 11/27/2019    QMKIXSAZ09QA 73 03/28/2019    WFKQWFWO74 1534 (H) 04/15/2020    FERRITIN 499 (H) 04/03/2020         Past Medical History:   Diagnosis Date    Arthritis     Cataract     Class 1 obesity due to excess calories in adult 10/19/2018    Depression     Around [place]; treated with Wellbutrin    Early cataracts, bilateral     SAMSON (generalized anxiety disorder) 2/5/2019    GERD (gastroesophageal reflux disease)     Glaucoma     Hyperprolactinemia     Hypertension     Hypothyroidism     Need for prophylactic vaccination against Streptococcus pneumoniae (pneumococcus) 10/14/2015    Osteopenia     Psychiatric problem     Pure hyperglyceridemia 3/31/2017    Recurrent UTI     Every few months    Sciatica neuralgia     Seizures 2006    temporal lobe seizure    Temporal lobe seizure 2006    Trigeminal neuralgia     Vitamin D deficiency disease   "    Past Surgical History:   Procedure Laterality Date    BREAST BIOPSY      duct excision    CHOLECYSTECTOMY      laporascopic    COLONOSCOPY      EYE SURGERY      LASIK surgery to one eye; cannot remember which one    HYSTERECTOMY      low transverse incision.    TOTAL ABDOMINAL HYSTERECTOMY W/ BILATERAL SALPINGOOPHORECTOMY      ; diffinitive treatment for menorrhagia     Social History     Social History Narrative    dtr Zayra moved home from [place] to help         Family History   Problem Relation Age of Onset    Heart disease Mother     Hypertension Mother     Thyroid disease Mother     Colon cancer Mother 90    Breast cancer Mother 104    Cancer Mother 103        colon and breast    Kidney disease Father     Heart disease Sister         pacemaker    Hypertension Sister     Thyroid disease Sister     Glaucoma Sister     Fibromyalgia Brother     Kidney disease Brother     Sleep apnea Brother     Hypertension Brother     Glaucoma Brother     Heart disease Brother         sudden death at age 67    Cancer Cousin 31         from breast cancer at age 31    Breast cancer Cousin     Glaucoma Paternal Aunt     Glaucoma Paternal Uncle     Glaucoma Paternal Grandfather         Went blind from Glaucoma    Breast cancer Maternal Aunt     Celiac disease Neg Hx     Cirrhosis Neg Hx     Colon polyps Neg Hx     Crohn's disease Neg Hx     Cystic fibrosis Neg Hx     Esophageal cancer Neg Hx     Hemochromatosis Neg Hx     Inflammatory bowel disease Neg Hx     Irritable bowel syndrome Neg Hx     Liver cancer Neg Hx     Liver disease Neg Hx     Rectal cancer Neg Hx     Stomach cancer Neg Hx     Ulcerative colitis Neg Hx     Moses's disease Neg Hx      Vitals:    10/29/20 1414   BP: 132/65   Pulse: 67   Resp: 18   SpO2: 98%   Weight: 74.1 kg (163 lb 5.8 oz)   Height: 5' 1" (1.549 m)   PainSc: 0-No pain     Objective:   Physical Exam  Constitutional:       Appearance: " She is well-developed.   HENT:      Head: Normocephalic and atraumatic.      Nose:      Comments: Wearing mask due to current COVID 19 pandemic, Nose & mouth exam deferred  Eyes:      Pupils: Pupils are equal, round, and reactive to light.   Neck:      Musculoskeletal: Neck supple.      Thyroid: No thyromegaly.   Cardiovascular:      Rate and Rhythm: Normal rate and regular rhythm.      Heart sounds: Normal heart sounds. No murmur.   Pulmonary:      Effort: Pulmonary effort is normal.      Breath sounds: Normal breath sounds. No wheezing.   Abdominal:      General: Bowel sounds are normal. There is no distension.      Palpations: Abdomen is soft. There is no mass.      Tenderness: There is no abdominal tenderness. There is no guarding or rebound.   Lymphadenopathy:      Cervical: No cervical adenopathy.   Skin:     General: Skin is warm and dry.   Neurological:      Mental Status: She is alert and oriented to person, place, and time.   Psychiatric:         Behavior: Behavior normal.       Assessment:     1. Encephalopathy due to COVID-19 virus    2. Essential hypertension    3. Asymptomatic stenosis of right carotid artery    4. Mild episode of recurrent major depressive disorder    5. SAMSON (generalized anxiety disorder)    6. Transient alteration of awareness    7. Screen for colon cancer      Plan:     Orders Placed This Encounter    Cologuard Screening (Multitarget Stool DNA)     Since blood pressure is in the normal range, no need for daily blood pressure medication    Goal < 139/89. If BP stays higher than this, let me know & consider starting HCTZ or amlodipine again.     ==============================================    Try to stop these things that can elevate blood pressure: salt, caffeine, energy drinks, diet pills, sudafed, alcohol , taking NSAIDS daily (advil, alleve, ibuprofen, naproxen) and birth control    DECREASE ALCOHOL CONSUMPTION    DECREASE SALT (fast foods, frozen, canned, processed foods, ham,  turkey, fried foods, chips, crackers, etc) & drink 8 glasses of water a day with minimal caffeine ( 1 cup a day)   ==============================================    Follow with psychiatrist    See me yearly with fasting labs prior  There are no Patient Instructions on file for this visit.

## 2020-11-02 ENCOUNTER — TELEPHONE (OUTPATIENT)
Dept: PRIMARY CARE CLINIC | Facility: CLINIC | Age: 75
End: 2020-11-02

## 2020-11-02 NOTE — TELEPHONE ENCOUNTER
"LOV 10/29/2020 HTN    Spoke with patient, she states [employer] insurance is paying for PCP visits through the rest of the year as long as the "covid code" is attached to her visits.  She is calling to ensure the "covid code" is listed on the visit.  "

## 2020-11-02 NOTE — TELEPHONE ENCOUNTER
"----- Message from Jacquie Mark sent at 11/2/2020  9:17 AM CST -----  Regarding: Patient wants to make sure that appt that was on 10/29/20 was coded for covid-19 (billing purposes)  Contact: [phone]  Patient called and said that she had an appointment with Dr. Prasad on Oct 29, 2020 and that she wants to make sure that the appt is coded under "Covid-19" for billing purposes with her insurance company ([employer]).     "

## 2020-11-02 NOTE — TELEPHONE ENCOUNTER
"Please let pt know that I put her code for "Encephalopathy due to COVID" as #1 on her office visit. Please let the coders/ billing dept know this also. thanks  "

## 2020-11-19 LAB — Lab: NEGATIVE

## 2020-11-23 ENCOUNTER — PATIENT OUTREACH (OUTPATIENT)
Dept: ADMINISTRATIVE | Facility: HOSPITAL | Age: 75
End: 2020-11-23

## 2020-11-24 ENCOUNTER — PATIENT MESSAGE (OUTPATIENT)
Dept: PRIMARY CARE CLINIC | Facility: CLINIC | Age: 75
End: 2020-11-24

## 2020-11-24 NOTE — TELEPHONE ENCOUNTER
Good news!    Your Cologuard  COLON CANCER SCREENING stool test shows NO BLOOD     Recommendations are to repeat this in THREE YEARS as screening for colon cancer.

## 2021-01-09 ENCOUNTER — IMMUNIZATION (OUTPATIENT)
Dept: PRIMARY CARE CLINIC | Facility: CLINIC | Age: 76
End: 2021-01-09
Payer: MEDICARE

## 2021-01-09 DIAGNOSIS — Z23 NEED FOR VACCINATION: ICD-10-CM

## 2021-01-09 PROCEDURE — 91300 COVID-19, MRNA, LNP-S, PF, 30 MCG/0.3 ML DOSE VACCINE: CPT | Mod: HCNC,PBBFAC | Performed by: FAMILY MEDICINE

## 2021-01-30 ENCOUNTER — IMMUNIZATION (OUTPATIENT)
Dept: PRIMARY CARE CLINIC | Facility: CLINIC | Age: 76
End: 2021-01-30
Payer: MEDICARE

## 2021-01-30 DIAGNOSIS — Z23 NEED FOR VACCINATION: Primary | ICD-10-CM

## 2021-01-30 PROCEDURE — 91300 COVID-19, MRNA, LNP-S, PF, 30 MCG/0.3 ML DOSE VACCINE: CPT | Mod: HCNC,PBBFAC | Performed by: EMERGENCY MEDICINE

## 2021-01-30 PROCEDURE — 0002A COVID-19, MRNA, LNP-S, PF, 30 MCG/0.3 ML DOSE VACCINE: CPT | Mod: HCNC,PBBFAC | Performed by: EMERGENCY MEDICINE

## 2021-01-31 ENCOUNTER — PATIENT MESSAGE (OUTPATIENT)
Dept: INTERNAL MEDICINE | Facility: CLINIC | Age: 76
End: 2021-01-31

## 2021-02-01 ENCOUNTER — PATIENT MESSAGE (OUTPATIENT)
Dept: INTERNAL MEDICINE | Facility: CLINIC | Age: 76
End: 2021-02-01

## 2021-02-01 DIAGNOSIS — E78.5 HYPERLIPIDEMIA, UNSPECIFIED HYPERLIPIDEMIA TYPE: Primary | ICD-10-CM

## 2021-02-02 ENCOUNTER — LAB VISIT (OUTPATIENT)
Dept: LAB | Facility: HOSPITAL | Age: 76
End: 2021-02-02
Attending: FAMILY MEDICINE
Payer: MEDICARE

## 2021-02-02 DIAGNOSIS — E78.5 HYPERLIPIDEMIA, UNSPECIFIED HYPERLIPIDEMIA TYPE: ICD-10-CM

## 2021-02-02 LAB
ALBUMIN SERPL BCP-MCNC: 3.6 G/DL (ref 3.5–5.2)
ALP SERPL-CCNC: 114 U/L (ref 55–135)
ALT SERPL W/O P-5'-P-CCNC: 13 U/L (ref 10–44)
ANION GAP SERPL CALC-SCNC: 9 MMOL/L (ref 8–16)
AST SERPL-CCNC: 17 U/L (ref 10–40)
BASOPHILS # BLD AUTO: 0.03 K/UL (ref 0–0.2)
BASOPHILS NFR BLD: 0.7 % (ref 0–1.9)
BILIRUB SERPL-MCNC: 0.5 MG/DL (ref 0.1–1)
BUN SERPL-MCNC: 9 MG/DL (ref 8–23)
CALCIUM SERPL-MCNC: 8.9 MG/DL (ref 8.7–10.5)
CHLORIDE SERPL-SCNC: 104 MMOL/L (ref 95–110)
CHOLEST SERPL-MCNC: 222 MG/DL (ref 120–199)
CHOLEST/HDLC SERPL: 3.9 {RATIO} (ref 2–5)
CO2 SERPL-SCNC: 29 MMOL/L (ref 23–29)
CREAT SERPL-MCNC: 0.7 MG/DL (ref 0.5–1.4)
DIFFERENTIAL METHOD: ABNORMAL
EOSINOPHIL # BLD AUTO: 0.1 K/UL (ref 0–0.5)
EOSINOPHIL NFR BLD: 2.3 % (ref 0–8)
ERYTHROCYTE [DISTWIDTH] IN BLOOD BY AUTOMATED COUNT: 12.6 % (ref 11.5–14.5)
EST. GFR  (AFRICAN AMERICAN): >60 ML/MIN/1.73 M^2
EST. GFR  (NON AFRICAN AMERICAN): >60 ML/MIN/1.73 M^2
GLUCOSE SERPL-MCNC: 81 MG/DL (ref 70–110)
HCT VFR BLD AUTO: 43.5 % (ref 37–48.5)
HDLC SERPL-MCNC: 57 MG/DL (ref 40–75)
HDLC SERPL: 25.7 % (ref 20–50)
HGB BLD-MCNC: 13.8 G/DL (ref 12–16)
IMM GRANULOCYTES # BLD AUTO: 0.01 K/UL (ref 0–0.04)
IMM GRANULOCYTES NFR BLD AUTO: 0.2 % (ref 0–0.5)
LDLC SERPL CALC-MCNC: 150.2 MG/DL (ref 63–159)
LYMPHOCYTES # BLD AUTO: 1.5 K/UL (ref 1–4.8)
LYMPHOCYTES NFR BLD: 33.3 % (ref 18–48)
MCH RBC QN AUTO: 32.1 PG (ref 27–31)
MCHC RBC AUTO-ENTMCNC: 31.7 G/DL (ref 32–36)
MCV RBC AUTO: 101 FL (ref 82–98)
MONOCYTES # BLD AUTO: 0.7 K/UL (ref 0.3–1)
MONOCYTES NFR BLD: 15.5 % (ref 4–15)
NEUTROPHILS # BLD AUTO: 2.1 K/UL (ref 1.8–7.7)
NEUTROPHILS NFR BLD: 48 % (ref 38–73)
NONHDLC SERPL-MCNC: 165 MG/DL
NRBC BLD-RTO: 0 /100 WBC
PLATELET # BLD AUTO: 235 K/UL (ref 150–350)
PMV BLD AUTO: 10 FL (ref 9.2–12.9)
POTASSIUM SERPL-SCNC: 4.3 MMOL/L (ref 3.5–5.1)
PROT SERPL-MCNC: 6.9 G/DL (ref 6–8.4)
RBC # BLD AUTO: 4.3 M/UL (ref 4–5.4)
SODIUM SERPL-SCNC: 142 MMOL/L (ref 136–145)
TRIGL SERPL-MCNC: 74 MG/DL (ref 30–150)
TSH SERPL DL<=0.005 MIU/L-ACNC: 2.8 UIU/ML (ref 0.4–4)
WBC # BLD AUTO: 4.39 K/UL (ref 3.9–12.7)

## 2021-02-02 PROCEDURE — 80061 LIPID PANEL: CPT

## 2021-02-02 PROCEDURE — 85025 COMPLETE CBC W/AUTO DIFF WBC: CPT

## 2021-02-02 PROCEDURE — 36415 COLL VENOUS BLD VENIPUNCTURE: CPT | Mod: PN

## 2021-02-02 PROCEDURE — 80053 COMPREHEN METABOLIC PANEL: CPT

## 2021-02-02 PROCEDURE — 84443 ASSAY THYROID STIM HORMONE: CPT

## 2021-02-03 ENCOUNTER — PATIENT MESSAGE (OUTPATIENT)
Dept: PSYCHIATRY | Facility: CLINIC | Age: 76
End: 2021-02-03

## 2021-02-05 ENCOUNTER — OFFICE VISIT (OUTPATIENT)
Dept: INTERNAL MEDICINE | Facility: CLINIC | Age: 76
End: 2021-02-05
Payer: MEDICARE

## 2021-02-05 VITALS
SYSTOLIC BLOOD PRESSURE: 122 MMHG | HEART RATE: 64 BPM | OXYGEN SATURATION: 97 % | TEMPERATURE: 97 F | DIASTOLIC BLOOD PRESSURE: 86 MMHG | BODY MASS INDEX: 31.18 KG/M2 | HEIGHT: 61 IN | WEIGHT: 165.13 LBS

## 2021-02-05 DIAGNOSIS — G93.40 ENCEPHALOPATHY: ICD-10-CM

## 2021-02-05 DIAGNOSIS — Z86.16 HISTORY OF COVID-19: ICD-10-CM

## 2021-02-05 DIAGNOSIS — R22.43 MASS OF BOTH LOWER EXTREMITIES: ICD-10-CM

## 2021-02-05 DIAGNOSIS — E03.8 SUBCLINICAL HYPOTHYROIDISM: Primary | ICD-10-CM

## 2021-02-05 DIAGNOSIS — Z79.899 OTHER LONG TERM (CURRENT) DRUG THERAPY: ICD-10-CM

## 2021-02-05 PROCEDURE — 3008F BODY MASS INDEX DOCD: CPT | Mod: CPTII,S$GLB,, | Performed by: INTERNAL MEDICINE

## 2021-02-05 PROCEDURE — 99999 PR PBB SHADOW E&M-EST. PATIENT-LVL V: CPT | Mod: PBBFAC,,, | Performed by: INTERNAL MEDICINE

## 2021-02-05 PROCEDURE — 1100F PR PT FALLS ASSESS DOC 2+ FALLS/FALL W/INJURY/YR: ICD-10-PCS | Mod: CPTII,S$GLB,, | Performed by: INTERNAL MEDICINE

## 2021-02-05 PROCEDURE — 3288F PR FALLS RISK ASSESSMENT DOCUMENTED: ICD-10-PCS | Mod: CPTII,S$GLB,, | Performed by: INTERNAL MEDICINE

## 2021-02-05 PROCEDURE — 3288F FALL RISK ASSESSMENT DOCD: CPT | Mod: CPTII,S$GLB,, | Performed by: INTERNAL MEDICINE

## 2021-02-05 PROCEDURE — 3074F PR MOST RECENT SYSTOLIC BLOOD PRESSURE < 130 MM HG: ICD-10-PCS | Mod: CPTII,S$GLB,, | Performed by: INTERNAL MEDICINE

## 2021-02-05 PROCEDURE — 1100F PTFALLS ASSESS-DOCD GE2>/YR: CPT | Mod: CPTII,S$GLB,, | Performed by: INTERNAL MEDICINE

## 2021-02-05 PROCEDURE — 1126F PR PAIN SEVERITY QUANTIFIED, NO PAIN PRESENT: ICD-10-PCS | Mod: S$GLB,,, | Performed by: INTERNAL MEDICINE

## 2021-02-05 PROCEDURE — 3008F PR BODY MASS INDEX (BMI) DOCUMENTED: ICD-10-PCS | Mod: CPTII,S$GLB,, | Performed by: INTERNAL MEDICINE

## 2021-02-05 PROCEDURE — 3079F DIAST BP 80-89 MM HG: CPT | Mod: CPTII,S$GLB,, | Performed by: INTERNAL MEDICINE

## 2021-02-05 PROCEDURE — 99214 OFFICE O/P EST MOD 30 MIN: CPT | Mod: S$GLB,,, | Performed by: INTERNAL MEDICINE

## 2021-02-05 PROCEDURE — 99214 PR OFFICE/OUTPT VISIT, EST, LEVL IV, 30-39 MIN: ICD-10-PCS | Mod: S$GLB,,, | Performed by: INTERNAL MEDICINE

## 2021-02-05 PROCEDURE — 3074F SYST BP LT 130 MM HG: CPT | Mod: CPTII,S$GLB,, | Performed by: INTERNAL MEDICINE

## 2021-02-05 PROCEDURE — 1159F PR MEDICATION LIST DOCUMENTED IN MEDICAL RECORD: ICD-10-PCS | Mod: S$GLB,,, | Performed by: INTERNAL MEDICINE

## 2021-02-05 PROCEDURE — 99999 PR PBB SHADOW E&M-EST. PATIENT-LVL V: ICD-10-PCS | Mod: PBBFAC,,, | Performed by: INTERNAL MEDICINE

## 2021-02-05 PROCEDURE — 3079F PR MOST RECENT DIASTOLIC BLOOD PRESSURE 80-89 MM HG: ICD-10-PCS | Mod: CPTII,S$GLB,, | Performed by: INTERNAL MEDICINE

## 2021-02-05 PROCEDURE — 1126F AMNT PAIN NOTED NONE PRSNT: CPT | Mod: S$GLB,,, | Performed by: INTERNAL MEDICINE

## 2021-02-05 PROCEDURE — 1159F MED LIST DOCD IN RCRD: CPT | Mod: S$GLB,,, | Performed by: INTERNAL MEDICINE

## 2021-02-05 RX ORDER — PANTOPRAZOLE SODIUM 40 MG/1
40 TABLET, DELAYED RELEASE ORAL DAILY
Qty: 90 TABLET | Refills: 3 | Status: SHIPPED | OUTPATIENT
Start: 2021-02-05 | End: 2022-01-20

## 2021-02-09 ENCOUNTER — PATIENT MESSAGE (OUTPATIENT)
Dept: INTERNAL MEDICINE | Facility: CLINIC | Age: 76
End: 2021-02-09

## 2021-02-09 ENCOUNTER — LAB VISIT (OUTPATIENT)
Dept: LAB | Facility: HOSPITAL | Age: 76
End: 2021-02-09
Attending: INTERNAL MEDICINE
Payer: MEDICARE

## 2021-02-09 DIAGNOSIS — Z86.16 HISTORY OF COVID-19: ICD-10-CM

## 2021-02-09 DIAGNOSIS — E03.8 SUBCLINICAL HYPOTHYROIDISM: ICD-10-CM

## 2021-02-09 DIAGNOSIS — Z79.899 OTHER LONG TERM (CURRENT) DRUG THERAPY: ICD-10-CM

## 2021-02-09 DIAGNOSIS — G93.40 ENCEPHALOPATHY: ICD-10-CM

## 2021-02-09 LAB
FOLATE SERPL-MCNC: 13.4 NG/ML (ref 4–24)
T3 SERPL-MCNC: 79 NG/DL (ref 60–180)
T4 FREE SERPL-MCNC: 1 NG/DL (ref 0.71–1.51)
TSH SERPL DL<=0.005 MIU/L-ACNC: 2.61 UIU/ML (ref 0.4–4)
VIT B12 SERPL-MCNC: 728 PG/ML (ref 210–950)

## 2021-02-09 PROCEDURE — 82746 ASSAY OF FOLIC ACID SERUM: CPT

## 2021-02-09 PROCEDURE — 84425 ASSAY OF VITAMIN B-1: CPT

## 2021-02-09 PROCEDURE — 84480 ASSAY TRIIODOTHYRONINE (T3): CPT

## 2021-02-09 PROCEDURE — 84439 ASSAY OF FREE THYROXINE: CPT

## 2021-02-09 PROCEDURE — 82607 VITAMIN B-12: CPT

## 2021-02-09 PROCEDURE — 84443 ASSAY THYROID STIM HORMONE: CPT

## 2021-02-15 LAB — VIT B1 BLD-MCNC: 63 UG/L (ref 38–122)

## 2021-02-22 ENCOUNTER — OFFICE VISIT (OUTPATIENT)
Dept: PSYCHIATRY | Facility: CLINIC | Age: 76
End: 2021-02-22
Payer: MEDICARE

## 2021-02-22 DIAGNOSIS — G93.49 ENCEPHALOPATHY DUE TO COVID-19 VIRUS: Primary | ICD-10-CM

## 2021-02-22 DIAGNOSIS — F41.1 GAD (GENERALIZED ANXIETY DISORDER): ICD-10-CM

## 2021-02-22 DIAGNOSIS — U07.1 ENCEPHALOPATHY DUE TO COVID-19 VIRUS: Primary | ICD-10-CM

## 2021-02-22 DIAGNOSIS — E51.9 THIAMINE DEFICIENCY: ICD-10-CM

## 2021-02-22 PROCEDURE — 99213 PR OFFICE/OUTPT VISIT, EST, LEVL III, 20-29 MIN: ICD-10-PCS | Mod: 95,,, | Performed by: PSYCHIATRY & NEUROLOGY

## 2021-02-22 PROCEDURE — 1159F PR MEDICATION LIST DOCUMENTED IN MEDICAL RECORD: ICD-10-PCS | Mod: 95,,, | Performed by: PSYCHIATRY & NEUROLOGY

## 2021-02-22 PROCEDURE — 90833 PR PSYCHOTHERAPY W/PATIENT W/E&M, 30 MIN (ADD ON): ICD-10-PCS | Mod: 95,,, | Performed by: PSYCHIATRY & NEUROLOGY

## 2021-02-22 PROCEDURE — 90833 PSYTX W PT W E/M 30 MIN: CPT | Mod: 95,,, | Performed by: PSYCHIATRY & NEUROLOGY

## 2021-02-22 PROCEDURE — 99213 OFFICE O/P EST LOW 20 MIN: CPT | Mod: 95,,, | Performed by: PSYCHIATRY & NEUROLOGY

## 2021-02-22 PROCEDURE — 1159F MED LIST DOCD IN RCRD: CPT | Mod: 95,,, | Performed by: PSYCHIATRY & NEUROLOGY

## 2021-02-24 ENCOUNTER — HOSPITAL ENCOUNTER (OUTPATIENT)
Dept: RADIOLOGY | Facility: HOSPITAL | Age: 76
Discharge: HOME | End: 2021-02-24
Attending: INTERNAL MEDICINE
Payer: MEDICARE

## 2021-02-24 DIAGNOSIS — R22.43 MASS OF BOTH LOWER EXTREMITIES: ICD-10-CM

## 2021-02-24 PROCEDURE — 76882 US LMTD JT/FCL EVL NVASC XTR: CPT | Mod: 26,RT,, | Performed by: RADIOLOGY

## 2021-02-24 PROCEDURE — 76882 US SOFT TISSUE, LOWER EXTREMITY, RIGHT: ICD-10-PCS | Mod: 26,RT,, | Performed by: RADIOLOGY

## 2021-02-24 PROCEDURE — 76882 US SOFT TISSUE, LOWER EXTREMITY, LEFT: ICD-10-PCS | Mod: 26,LT,, | Performed by: RADIOLOGY

## 2021-02-24 PROCEDURE — 76882 US LMTD JT/FCL EVL NVASC XTR: CPT | Mod: TC,LT

## 2021-02-24 PROCEDURE — 76882 US LMTD JT/FCL EVL NVASC XTR: CPT | Mod: TC,RT

## 2021-02-24 PROCEDURE — 76882 US LMTD JT/FCL EVL NVASC XTR: CPT | Mod: 26,LT,, | Performed by: RADIOLOGY

## 2021-03-03 ENCOUNTER — PATIENT MESSAGE (OUTPATIENT)
Dept: INTERNAL MEDICINE | Facility: CLINIC | Age: 76
End: 2021-03-03

## 2021-03-18 ENCOUNTER — PATIENT MESSAGE (OUTPATIENT)
Dept: RESEARCH | Facility: HOSPITAL | Age: 76
End: 2021-03-18

## 2021-03-26 ENCOUNTER — PATIENT MESSAGE (OUTPATIENT)
Dept: RESEARCH | Facility: HOSPITAL | Age: 76
End: 2021-03-26

## 2021-04-15 ENCOUNTER — PATIENT MESSAGE (OUTPATIENT)
Dept: PSYCHIATRY | Facility: CLINIC | Age: 76
End: 2021-04-15

## 2021-04-15 ENCOUNTER — PATIENT MESSAGE (OUTPATIENT)
Dept: INTERNAL MEDICINE | Facility: CLINIC | Age: 76
End: 2021-04-15

## 2021-04-15 RX ORDER — ASPIRIN 325 MG/1
100 TABLET, FILM COATED ORAL DAILY
Qty: 100 TABLET | Refills: 1 | Status: SHIPPED | OUTPATIENT
Start: 2021-04-15 | End: 2021-09-03 | Stop reason: SDUPTHER

## 2021-04-21 ENCOUNTER — PES CALL (OUTPATIENT)
Dept: ADMINISTRATIVE | Facility: CLINIC | Age: 76
End: 2021-04-21

## 2021-04-28 ENCOUNTER — OFFICE VISIT (OUTPATIENT)
Dept: PRIMARY CARE CLINIC | Facility: CLINIC | Age: 76
End: 2021-04-28
Payer: MEDICARE

## 2021-04-28 VITALS
HEIGHT: 61 IN | OXYGEN SATURATION: 98 % | WEIGHT: 168.44 LBS | SYSTOLIC BLOOD PRESSURE: 126 MMHG | HEART RATE: 60 BPM | DIASTOLIC BLOOD PRESSURE: 67 MMHG | RESPIRATION RATE: 18 BRPM | BODY MASS INDEX: 31.8 KG/M2 | TEMPERATURE: 98 F

## 2021-04-28 DIAGNOSIS — M17.12 PRIMARY OSTEOARTHRITIS OF LEFT KNEE: ICD-10-CM

## 2021-04-28 DIAGNOSIS — N95.2 POSTMENOPAUSE ATROPHIC VAGINITIS: ICD-10-CM

## 2021-04-28 DIAGNOSIS — K21.9 GASTROESOPHAGEAL REFLUX DISEASE, UNSPECIFIED WHETHER ESOPHAGITIS PRESENT: ICD-10-CM

## 2021-04-28 DIAGNOSIS — G93.49 ENCEPHALOPATHY DUE TO COVID-19 VIRUS: ICD-10-CM

## 2021-04-28 DIAGNOSIS — M25.471 RIGHT ANKLE SWELLING: Primary | ICD-10-CM

## 2021-04-28 DIAGNOSIS — U07.1 ENCEPHALOPATHY DUE TO COVID-19 VIRUS: ICD-10-CM

## 2021-04-28 DIAGNOSIS — F41.1 GAD (GENERALIZED ANXIETY DISORDER): ICD-10-CM

## 2021-04-28 PROBLEM — D69.2 SENILE PURPURA: Status: RESOLVED | Noted: 2019-06-03 | Resolved: 2021-04-28

## 2021-04-28 PROBLEM — R41.0 CONFUSION: Status: RESOLVED | Noted: 2020-04-13 | Resolved: 2021-04-28

## 2021-04-28 PROBLEM — F33.0 MILD EPISODE OF RECURRENT MAJOR DEPRESSIVE DISORDER: Status: RESOLVED | Noted: 2019-02-05 | Resolved: 2021-04-28

## 2021-04-28 PROBLEM — I65.21 ASYMPTOMATIC STENOSIS OF RIGHT CAROTID ARTERY: Status: RESOLVED | Noted: 2017-11-14 | Resolved: 2021-04-28

## 2021-04-28 PROBLEM — I10 ESSENTIAL HYPERTENSION: Status: RESOLVED | Noted: 2017-11-14 | Resolved: 2021-04-28

## 2021-04-28 PROBLEM — I77.1 TORTUOUS AORTA: Status: RESOLVED | Noted: 2020-06-06 | Resolved: 2021-04-28

## 2021-04-28 PROBLEM — I65.21 STENOSIS OF RIGHT CAROTID ARTERY: Status: RESOLVED | Noted: 2018-10-19 | Resolved: 2021-04-28

## 2021-04-28 PROCEDURE — 99214 PR OFFICE/OUTPT VISIT, EST, LEVL IV, 30-39 MIN: ICD-10-PCS | Mod: S$GLB,,, | Performed by: FAMILY MEDICINE

## 2021-04-28 PROCEDURE — 1159F MED LIST DOCD IN RCRD: CPT | Mod: S$GLB,,, | Performed by: FAMILY MEDICINE

## 2021-04-28 PROCEDURE — 3008F BODY MASS INDEX DOCD: CPT | Mod: CPTII,S$GLB,, | Performed by: FAMILY MEDICINE

## 2021-04-28 PROCEDURE — 1101F PT FALLS ASSESS-DOCD LE1/YR: CPT | Mod: CPTII,S$GLB,, | Performed by: FAMILY MEDICINE

## 2021-04-28 PROCEDURE — 1126F AMNT PAIN NOTED NONE PRSNT: CPT | Mod: S$GLB,,, | Performed by: FAMILY MEDICINE

## 2021-04-28 PROCEDURE — 99999 PR PBB SHADOW E&M-EST. PATIENT-LVL IV: CPT | Mod: PBBFAC,,, | Performed by: FAMILY MEDICINE

## 2021-04-28 PROCEDURE — 99999 PR PBB SHADOW E&M-EST. PATIENT-LVL IV: ICD-10-PCS | Mod: PBBFAC,,, | Performed by: FAMILY MEDICINE

## 2021-04-28 PROCEDURE — 99214 OFFICE O/P EST MOD 30 MIN: CPT | Mod: S$GLB,,, | Performed by: FAMILY MEDICINE

## 2021-04-28 PROCEDURE — 3288F PR FALLS RISK ASSESSMENT DOCUMENTED: ICD-10-PCS | Mod: CPTII,S$GLB,, | Performed by: FAMILY MEDICINE

## 2021-04-28 PROCEDURE — 3008F PR BODY MASS INDEX (BMI) DOCUMENTED: ICD-10-PCS | Mod: CPTII,S$GLB,, | Performed by: FAMILY MEDICINE

## 2021-04-28 PROCEDURE — 1101F PR PT FALLS ASSESS DOC 0-1 FALLS W/OUT INJ PAST YR: ICD-10-PCS | Mod: CPTII,S$GLB,, | Performed by: FAMILY MEDICINE

## 2021-04-28 PROCEDURE — 1159F PR MEDICATION LIST DOCUMENTED IN MEDICAL RECORD: ICD-10-PCS | Mod: S$GLB,,, | Performed by: FAMILY MEDICINE

## 2021-04-28 PROCEDURE — 3288F FALL RISK ASSESSMENT DOCD: CPT | Mod: CPTII,S$GLB,, | Performed by: FAMILY MEDICINE

## 2021-04-28 PROCEDURE — 1126F PR PAIN SEVERITY QUANTIFIED, NO PAIN PRESENT: ICD-10-PCS | Mod: S$GLB,,, | Performed by: FAMILY MEDICINE

## 2021-04-28 RX ORDER — ATORVASTATIN CALCIUM 10 MG/1
10 TABLET, FILM COATED ORAL DAILY
Qty: 90 TABLET | Refills: 3 | Status: CANCELLED | OUTPATIENT
Start: 2021-04-28 | End: 2022-04-28

## 2021-04-28 RX ORDER — NAPROXEN SODIUM 220 MG/1
81 TABLET, FILM COATED ORAL DAILY
Qty: 90 TABLET | Refills: 0
Start: 2021-04-28 | End: 2022-07-18

## 2021-05-03 ENCOUNTER — PES CALL (OUTPATIENT)
Dept: ADMINISTRATIVE | Facility: CLINIC | Age: 76
End: 2021-05-03

## 2021-06-11 ENCOUNTER — PATIENT MESSAGE (OUTPATIENT)
Dept: PRIMARY CARE CLINIC | Facility: CLINIC | Age: 76
End: 2021-06-11

## 2021-06-11 DIAGNOSIS — N76.0 ACUTE VAGINITIS: Primary | ICD-10-CM

## 2021-06-11 DIAGNOSIS — Z12.11 SCREEN FOR COLON CANCER: ICD-10-CM

## 2021-06-15 ENCOUNTER — TELEPHONE (OUTPATIENT)
Dept: INTERNAL MEDICINE | Facility: CLINIC | Age: 76
End: 2021-06-15

## 2021-06-15 DIAGNOSIS — Z12.31 VISIT FOR SCREENING MAMMOGRAM: Primary | ICD-10-CM

## 2021-06-17 ENCOUNTER — OFFICE VISIT (OUTPATIENT)
Dept: HOME HEALTH SERVICES | Facility: CLINIC | Age: 76
End: 2021-06-17
Payer: MEDICARE

## 2021-06-17 VITALS
HEART RATE: 57 BPM | TEMPERATURE: 98 F | OXYGEN SATURATION: 98 % | WEIGHT: 165 LBS | HEIGHT: 61 IN | DIASTOLIC BLOOD PRESSURE: 58 MMHG | SYSTOLIC BLOOD PRESSURE: 126 MMHG | BODY MASS INDEX: 31.15 KG/M2

## 2021-06-17 DIAGNOSIS — Z00.00 ENCOUNTER FOR PREVENTIVE HEALTH EXAMINATION: Primary | ICD-10-CM

## 2021-06-17 DIAGNOSIS — K21.9 GASTROESOPHAGEAL REFLUX DISEASE, UNSPECIFIED WHETHER ESOPHAGITIS PRESENT: ICD-10-CM

## 2021-06-17 DIAGNOSIS — F41.1 GAD (GENERALIZED ANXIETY DISORDER): ICD-10-CM

## 2021-06-17 DIAGNOSIS — M85.80 SENILE OSTEOPENIA: ICD-10-CM

## 2021-06-17 PROBLEM — U07.1 COVID-19 VIRUS INFECTION: Status: RESOLVED | Noted: 2020-04-03 | Resolved: 2021-06-17

## 2021-06-17 PROCEDURE — G0439 PR MEDICARE ANNUAL WELLNESS SUBSEQUENT VISIT: ICD-10-PCS | Mod: S$GLB,,, | Performed by: NURSE PRACTITIONER

## 2021-06-17 PROCEDURE — 1158F PR ADVANCE CARE PLANNING DISCUSS DOCUMENTED IN MEDICAL RECORD: ICD-10-PCS | Mod: S$GLB,,, | Performed by: NURSE PRACTITIONER

## 2021-06-17 PROCEDURE — 1126F PR PAIN SEVERITY QUANTIFIED, NO PAIN PRESENT: ICD-10-PCS | Mod: S$GLB,,, | Performed by: NURSE PRACTITIONER

## 2021-06-17 PROCEDURE — 1158F ADVNC CARE PLAN TLK DOCD: CPT | Mod: S$GLB,,, | Performed by: NURSE PRACTITIONER

## 2021-06-17 PROCEDURE — 1100F PR PT FALLS ASSESS DOC 2+ FALLS/FALL W/INJURY/YR: ICD-10-PCS | Mod: CPTII,S$GLB,, | Performed by: NURSE PRACTITIONER

## 2021-06-17 PROCEDURE — 3008F BODY MASS INDEX DOCD: CPT | Mod: CPTII,S$GLB,, | Performed by: NURSE PRACTITIONER

## 2021-06-17 PROCEDURE — 3288F PR FALLS RISK ASSESSMENT DOCUMENTED: ICD-10-PCS | Mod: CPTII,S$GLB,, | Performed by: NURSE PRACTITIONER

## 2021-06-17 PROCEDURE — 1100F PTFALLS ASSESS-DOCD GE2>/YR: CPT | Mod: CPTII,S$GLB,, | Performed by: NURSE PRACTITIONER

## 2021-06-17 PROCEDURE — G0439 PPPS, SUBSEQ VISIT: HCPCS | Mod: S$GLB,,, | Performed by: NURSE PRACTITIONER

## 2021-06-17 PROCEDURE — 3008F PR BODY MASS INDEX (BMI) DOCUMENTED: ICD-10-PCS | Mod: CPTII,S$GLB,, | Performed by: NURSE PRACTITIONER

## 2021-06-17 PROCEDURE — 1126F AMNT PAIN NOTED NONE PRSNT: CPT | Mod: S$GLB,,, | Performed by: NURSE PRACTITIONER

## 2021-06-17 PROCEDURE — 3288F FALL RISK ASSESSMENT DOCD: CPT | Mod: CPTII,S$GLB,, | Performed by: NURSE PRACTITIONER

## 2021-06-23 ENCOUNTER — CLINICAL SUPPORT (OUTPATIENT)
Dept: URGENT CARE | Facility: CLINIC | Age: 76
End: 2021-06-23
Payer: MEDICARE

## 2021-06-23 ENCOUNTER — PATIENT MESSAGE (OUTPATIENT)
Dept: PRIMARY CARE CLINIC | Facility: CLINIC | Age: 76
End: 2021-06-23

## 2021-06-23 DIAGNOSIS — Z11.59 SCREENING FOR VIRAL DISEASE: Primary | ICD-10-CM

## 2021-06-23 LAB
CTP QC/QA: YES
SARS-COV-2 RDRP RESP QL NAA+PROBE: NEGATIVE

## 2021-06-23 PROCEDURE — U0002 COVID-19 LAB TEST NON-CDC: HCPCS | Mod: QW,S$GLB,, | Performed by: NURSE PRACTITIONER

## 2021-06-23 PROCEDURE — U0002: ICD-10-PCS | Mod: QW,S$GLB,, | Performed by: NURSE PRACTITIONER

## 2021-06-30 ENCOUNTER — PATIENT MESSAGE (OUTPATIENT)
Dept: PSYCHIATRY | Facility: CLINIC | Age: 76
End: 2021-06-30

## 2021-07-08 ENCOUNTER — HOSPITAL ENCOUNTER (OUTPATIENT)
Dept: RADIOLOGY | Facility: HOSPITAL | Age: 76
Discharge: HOME | End: 2021-07-08
Attending: INTERNAL MEDICINE
Payer: MEDICARE

## 2021-07-08 ENCOUNTER — OFFICE VISIT (OUTPATIENT)
Dept: OBSTETRICS AND GYNECOLOGY | Facility: CLINIC | Age: 76
End: 2021-07-08
Payer: MEDICARE

## 2021-07-08 VITALS
SYSTOLIC BLOOD PRESSURE: 120 MMHG | DIASTOLIC BLOOD PRESSURE: 92 MMHG | HEIGHT: 61 IN | BODY MASS INDEX: 32.22 KG/M2 | WEIGHT: 170.63 LBS

## 2021-07-08 DIAGNOSIS — N76.0 ACUTE VAGINITIS: ICD-10-CM

## 2021-07-08 DIAGNOSIS — Z12.31 VISIT FOR SCREENING MAMMOGRAM: ICD-10-CM

## 2021-07-08 DIAGNOSIS — L28.0 LICHEN SIMPLEX CHRONICUS: ICD-10-CM

## 2021-07-08 DIAGNOSIS — Z01.411 ENCOUNTER FOR WELL WOMAN EXAM WITH ABNORMAL FINDINGS: Primary | ICD-10-CM

## 2021-07-08 PROCEDURE — 1101F PT FALLS ASSESS-DOCD LE1/YR: CPT | Mod: CPTII,S$GLB,, | Performed by: OBSTETRICS & GYNECOLOGY

## 2021-07-08 PROCEDURE — 1126F AMNT PAIN NOTED NONE PRSNT: CPT | Mod: S$GLB,,, | Performed by: OBSTETRICS & GYNECOLOGY

## 2021-07-08 PROCEDURE — 77063 BREAST TOMOSYNTHESIS BI: CPT | Mod: 26,,, | Performed by: RADIOLOGY

## 2021-07-08 PROCEDURE — 77067 MAMMO DIGITAL SCREENING BILAT WITH TOMO: ICD-10-PCS | Mod: 26,,, | Performed by: RADIOLOGY

## 2021-07-08 PROCEDURE — 77067 SCR MAMMO BI INCL CAD: CPT | Mod: 26,,, | Performed by: RADIOLOGY

## 2021-07-08 PROCEDURE — 1101F PR PT FALLS ASSESS DOC 0-1 FALLS W/OUT INJ PAST YR: ICD-10-PCS | Mod: CPTII,S$GLB,, | Performed by: OBSTETRICS & GYNECOLOGY

## 2021-07-08 PROCEDURE — 77067 SCR MAMMO BI INCL CAD: CPT | Mod: TC,PN

## 2021-07-08 PROCEDURE — 3288F PR FALLS RISK ASSESSMENT DOCUMENTED: ICD-10-PCS | Mod: CPTII,S$GLB,, | Performed by: OBSTETRICS & GYNECOLOGY

## 2021-07-08 PROCEDURE — 87661 TRICHOMONAS VAGINALIS AMPLIF: CPT | Mod: 59 | Performed by: OBSTETRICS & GYNECOLOGY

## 2021-07-08 PROCEDURE — 3008F BODY MASS INDEX DOCD: CPT | Mod: CPTII,S$GLB,, | Performed by: OBSTETRICS & GYNECOLOGY

## 2021-07-08 PROCEDURE — 87481 CANDIDA DNA AMP PROBE: CPT | Performed by: OBSTETRICS & GYNECOLOGY

## 2021-07-08 PROCEDURE — 3288F FALL RISK ASSESSMENT DOCD: CPT | Mod: CPTII,S$GLB,, | Performed by: OBSTETRICS & GYNECOLOGY

## 2021-07-08 PROCEDURE — 1126F PR PAIN SEVERITY QUANTIFIED, NO PAIN PRESENT: ICD-10-PCS | Mod: S$GLB,,, | Performed by: OBSTETRICS & GYNECOLOGY

## 2021-07-08 PROCEDURE — 99999 PR PBB SHADOW E&M-EST. PATIENT-LVL IV: CPT | Mod: PBBFAC,,, | Performed by: OBSTETRICS & GYNECOLOGY

## 2021-07-08 PROCEDURE — 77063 MAMMO DIGITAL SCREENING BILAT WITH TOMO: ICD-10-PCS | Mod: 26,,, | Performed by: RADIOLOGY

## 2021-07-08 PROCEDURE — G0101 CA SCREEN;PELVIC/BREAST EXAM: HCPCS | Mod: S$GLB,,, | Performed by: OBSTETRICS & GYNECOLOGY

## 2021-07-08 PROCEDURE — 99999 PR PBB SHADOW E&M-EST. PATIENT-LVL IV: ICD-10-PCS | Mod: PBBFAC,,, | Performed by: OBSTETRICS & GYNECOLOGY

## 2021-07-08 PROCEDURE — G0101 PR CA SCREEN;PELVIC/BREAST EXAM: ICD-10-PCS | Mod: S$GLB,,, | Performed by: OBSTETRICS & GYNECOLOGY

## 2021-07-08 PROCEDURE — 3008F PR BODY MASS INDEX (BMI) DOCUMENTED: ICD-10-PCS | Mod: CPTII,S$GLB,, | Performed by: OBSTETRICS & GYNECOLOGY

## 2021-07-08 RX ORDER — CLOBETASOL PROPIONATE 0.5 MG/G
OINTMENT TOPICAL 2 TIMES DAILY
Qty: 60 G | Refills: 3 | Status: SHIPPED | OUTPATIENT
Start: 2021-07-08 | End: 2022-11-23

## 2021-07-15 LAB
BACTERIAL VAGINOSIS DNA: NEGATIVE
CANDIDA GLABRATA DNA: NEGATIVE
CANDIDA KRUSEI DNA: NEGATIVE
CANDIDA RRNA VAG QL PROBE: NEGATIVE
T VAGINALIS RRNA GENITAL QL PROBE: NEGATIVE

## 2021-07-23 ENCOUNTER — OFFICE VISIT (OUTPATIENT)
Dept: URGENT CARE | Facility: CLINIC | Age: 76
End: 2021-07-23
Payer: MEDICARE

## 2021-07-23 VITALS
SYSTOLIC BLOOD PRESSURE: 160 MMHG | HEIGHT: 61 IN | OXYGEN SATURATION: 98 % | TEMPERATURE: 98 F | DIASTOLIC BLOOD PRESSURE: 79 MMHG | RESPIRATION RATE: 16 BRPM | HEART RATE: 60 BPM | WEIGHT: 170 LBS | BODY MASS INDEX: 32.1 KG/M2

## 2021-07-23 DIAGNOSIS — Z20.822 ENCOUNTER FOR LABORATORY TESTING FOR COVID-19 VIRUS: Primary | ICD-10-CM

## 2021-07-23 DIAGNOSIS — R51.9 ACUTE NONINTRACTABLE HEADACHE, UNSPECIFIED HEADACHE TYPE: ICD-10-CM

## 2021-07-23 DIAGNOSIS — J00 ACUTE RHINITIS: ICD-10-CM

## 2021-07-23 LAB
CTP QC/QA: YES
SARS-COV-2 RDRP RESP QL NAA+PROBE: NEGATIVE

## 2021-07-23 PROCEDURE — 99213 OFFICE O/P EST LOW 20 MIN: CPT | Mod: S$GLB,CS,, | Performed by: PHYSICIAN ASSISTANT

## 2021-07-23 PROCEDURE — 3077F SYST BP >= 140 MM HG: CPT | Mod: CPTII,S$GLB,, | Performed by: PHYSICIAN ASSISTANT

## 2021-07-23 PROCEDURE — 3078F DIAST BP <80 MM HG: CPT | Mod: CPTII,S$GLB,, | Performed by: PHYSICIAN ASSISTANT

## 2021-07-23 PROCEDURE — 3077F PR MOST RECENT SYSTOLIC BLOOD PRESSURE >= 140 MM HG: ICD-10-PCS | Mod: CPTII,S$GLB,, | Performed by: PHYSICIAN ASSISTANT

## 2021-07-23 PROCEDURE — 3078F PR MOST RECENT DIASTOLIC BLOOD PRESSURE < 80 MM HG: ICD-10-PCS | Mod: CPTII,S$GLB,, | Performed by: PHYSICIAN ASSISTANT

## 2021-07-23 PROCEDURE — 99213 PR OFFICE/OUTPT VISIT, EST, LEVL III, 20-29 MIN: ICD-10-PCS | Mod: S$GLB,CS,, | Performed by: PHYSICIAN ASSISTANT

## 2021-07-23 PROCEDURE — U0002: ICD-10-PCS | Mod: QW,S$GLB,, | Performed by: PHYSICIAN ASSISTANT

## 2021-07-23 PROCEDURE — 3008F BODY MASS INDEX DOCD: CPT | Mod: CPTII,S$GLB,, | Performed by: PHYSICIAN ASSISTANT

## 2021-07-23 PROCEDURE — 3008F PR BODY MASS INDEX (BMI) DOCUMENTED: ICD-10-PCS | Mod: CPTII,S$GLB,, | Performed by: PHYSICIAN ASSISTANT

## 2021-07-23 PROCEDURE — U0002 COVID-19 LAB TEST NON-CDC: HCPCS | Mod: QW,S$GLB,, | Performed by: PHYSICIAN ASSISTANT

## 2021-08-12 ENCOUNTER — PATIENT MESSAGE (OUTPATIENT)
Dept: OBSTETRICS AND GYNECOLOGY | Facility: CLINIC | Age: 76
End: 2021-08-12

## 2021-09-01 ENCOUNTER — PATIENT MESSAGE (OUTPATIENT)
Dept: PSYCHIATRY | Facility: CLINIC | Age: 76
End: 2021-09-01

## 2021-09-02 ENCOUNTER — PATIENT MESSAGE (OUTPATIENT)
Dept: PSYCHIATRY | Facility: CLINIC | Age: 76
End: 2021-09-02

## 2021-09-03 ENCOUNTER — OFFICE VISIT (OUTPATIENT)
Dept: PSYCHIATRY | Facility: CLINIC | Age: 76
End: 2021-09-03
Payer: MEDICARE

## 2021-09-03 DIAGNOSIS — E51.9 THIAMINE DEFICIENCY: Primary | ICD-10-CM

## 2021-09-03 PROCEDURE — 1160F PR REVIEW ALL MEDS BY PRESCRIBER/CLIN PHARMACIST DOCUMENTED: ICD-10-PCS | Mod: CPTII,95,, | Performed by: PSYCHIATRY & NEUROLOGY

## 2021-09-03 PROCEDURE — 1160F RVW MEDS BY RX/DR IN RCRD: CPT | Mod: CPTII,95,, | Performed by: PSYCHIATRY & NEUROLOGY

## 2021-09-03 PROCEDURE — 90833 PSYTX W PT W E/M 30 MIN: CPT | Mod: 95,,, | Performed by: PSYCHIATRY & NEUROLOGY

## 2021-09-03 PROCEDURE — 1159F PR MEDICATION LIST DOCUMENTED IN MEDICAL RECORD: ICD-10-PCS | Mod: CPTII,95,, | Performed by: PSYCHIATRY & NEUROLOGY

## 2021-09-03 PROCEDURE — 99213 PR OFFICE/OUTPT VISIT, EST, LEVL III, 20-29 MIN: ICD-10-PCS | Mod: 95,,, | Performed by: PSYCHIATRY & NEUROLOGY

## 2021-09-03 PROCEDURE — 90833 PR PSYCHOTHERAPY W/PATIENT W/E&M, 30 MIN (ADD ON): ICD-10-PCS | Mod: 95,,, | Performed by: PSYCHIATRY & NEUROLOGY

## 2021-09-03 PROCEDURE — 99213 OFFICE O/P EST LOW 20 MIN: CPT | Mod: 95,,, | Performed by: PSYCHIATRY & NEUROLOGY

## 2021-09-03 PROCEDURE — 1159F MED LIST DOCD IN RCRD: CPT | Mod: CPTII,95,, | Performed by: PSYCHIATRY & NEUROLOGY

## 2021-09-03 RX ORDER — ASPIRIN 325 MG/1
100 TABLET, FILM COATED ORAL DAILY
Qty: 90 TABLET | Refills: 3 | Status: SHIPPED | OUTPATIENT
Start: 2021-09-03

## 2021-09-03 RX ORDER — ESCITALOPRAM OXALATE 10 MG/1
10 TABLET ORAL DAILY
Qty: 90 TABLET | Refills: 2 | Status: SHIPPED | OUTPATIENT
Start: 2021-09-03 | End: 2022-03-07 | Stop reason: SDUPTHER

## 2021-09-17 ENCOUNTER — IMMUNIZATION (OUTPATIENT)
Dept: INTERNAL MEDICINE | Facility: CLINIC | Age: 76
End: 2021-09-17
Payer: MEDICARE

## 2021-09-17 DIAGNOSIS — Z23 NEED FOR VACCINATION: Primary | ICD-10-CM

## 2021-09-17 PROCEDURE — 0003A COVID-19, MRNA, LNP-S, PF, 30 MCG/0.3 ML DOSE VACCINE: ICD-10-PCS | Mod: HCNC,CV19,, | Performed by: INTERNAL MEDICINE

## 2021-09-17 PROCEDURE — 91300 COVID-19, MRNA, LNP-S, PF, 30 MCG/0.3 ML DOSE VACCINE: ICD-10-PCS | Mod: HCNC,,, | Performed by: INTERNAL MEDICINE

## 2021-09-17 PROCEDURE — 91300 COVID-19, MRNA, LNP-S, PF, 30 MCG/0.3 ML DOSE VACCINE: CPT | Mod: HCNC,,, | Performed by: INTERNAL MEDICINE

## 2021-09-17 PROCEDURE — 0003A COVID-19, MRNA, LNP-S, PF, 30 MCG/0.3 ML DOSE VACCINE: CPT | Mod: HCNC,CV19,, | Performed by: INTERNAL MEDICINE

## 2021-10-14 ENCOUNTER — PATIENT MESSAGE (OUTPATIENT)
Dept: PSYCHIATRY | Facility: CLINIC | Age: 76
End: 2021-10-14

## 2021-10-18 ENCOUNTER — PATIENT MESSAGE (OUTPATIENT)
Dept: PSYCHIATRY | Facility: CLINIC | Age: 76
End: 2021-10-18
Payer: MEDICARE

## 2021-10-25 ENCOUNTER — PATIENT MESSAGE (OUTPATIENT)
Dept: PSYCHIATRY | Facility: CLINIC | Age: 76
End: 2021-10-25
Payer: MEDICARE

## 2021-10-27 ENCOUNTER — OFFICE VISIT (OUTPATIENT)
Dept: PSYCHIATRY | Facility: CLINIC | Age: 76
End: 2021-10-27
Payer: MEDICARE

## 2021-10-27 VITALS
SYSTOLIC BLOOD PRESSURE: 151 MMHG | DIASTOLIC BLOOD PRESSURE: 69 MMHG | BODY MASS INDEX: 33.03 KG/M2 | WEIGHT: 174.81 LBS | HEART RATE: 67 BPM

## 2021-10-27 DIAGNOSIS — F02.80 MAJOR NEUROCOGNITIVE DISORDER DUE TO ANOTHER MEDICAL CONDITION: Primary | ICD-10-CM

## 2021-10-27 DIAGNOSIS — F41.1 GAD (GENERALIZED ANXIETY DISORDER): ICD-10-CM

## 2021-10-27 DIAGNOSIS — E51.9 THIAMINE DEFICIENCY: ICD-10-CM

## 2021-10-27 DIAGNOSIS — G93.49 ENCEPHALOPATHY DUE TO COVID-19 VIRUS: ICD-10-CM

## 2021-10-27 DIAGNOSIS — U07.1 ENCEPHALOPATHY DUE TO COVID-19 VIRUS: ICD-10-CM

## 2021-10-27 PROCEDURE — 90836 PSYTX W PT W E/M 45 MIN: CPT | Mod: HCNC,S$GLB,, | Performed by: PSYCHIATRY & NEUROLOGY

## 2021-10-27 PROCEDURE — 1160F PR REVIEW ALL MEDS BY PRESCRIBER/CLIN PHARMACIST DOCUMENTED: ICD-10-PCS | Mod: HCNC,CPTII,S$GLB, | Performed by: PSYCHIATRY & NEUROLOGY

## 2021-10-27 PROCEDURE — 1159F MED LIST DOCD IN RCRD: CPT | Mod: HCNC,CPTII,S$GLB, | Performed by: PSYCHIATRY & NEUROLOGY

## 2021-10-27 PROCEDURE — 90836 PR PSYCHOTHERAPY W/PATIENT W/E&M, 45 MIN (ADD ON): ICD-10-PCS | Mod: HCNC,S$GLB,, | Performed by: PSYCHIATRY & NEUROLOGY

## 2021-10-27 PROCEDURE — 1159F PR MEDICATION LIST DOCUMENTED IN MEDICAL RECORD: ICD-10-PCS | Mod: HCNC,CPTII,S$GLB, | Performed by: PSYCHIATRY & NEUROLOGY

## 2021-10-27 PROCEDURE — 3077F PR MOST RECENT SYSTOLIC BLOOD PRESSURE >= 140 MM HG: ICD-10-PCS | Mod: HCNC,CPTII,S$GLB, | Performed by: PSYCHIATRY & NEUROLOGY

## 2021-10-27 PROCEDURE — 99999 PR PBB SHADOW E&M-EST. PATIENT-LVL III: CPT | Mod: PBBFAC,HCNC,, | Performed by: PSYCHIATRY & NEUROLOGY

## 2021-10-27 PROCEDURE — 99499 RISK ADDL DX/OHS AUDIT: ICD-10-PCS | Mod: HCNC,S$GLB,, | Performed by: PSYCHIATRY & NEUROLOGY

## 2021-10-27 PROCEDURE — 3078F DIAST BP <80 MM HG: CPT | Mod: HCNC,CPTII,S$GLB, | Performed by: PSYCHIATRY & NEUROLOGY

## 2021-10-27 PROCEDURE — 3077F SYST BP >= 140 MM HG: CPT | Mod: HCNC,CPTII,S$GLB, | Performed by: PSYCHIATRY & NEUROLOGY

## 2021-10-27 PROCEDURE — 3078F PR MOST RECENT DIASTOLIC BLOOD PRESSURE < 80 MM HG: ICD-10-PCS | Mod: HCNC,CPTII,S$GLB, | Performed by: PSYCHIATRY & NEUROLOGY

## 2021-10-27 PROCEDURE — 99213 OFFICE O/P EST LOW 20 MIN: CPT | Mod: HCNC,S$GLB,, | Performed by: PSYCHIATRY & NEUROLOGY

## 2021-10-27 PROCEDURE — 99499 UNLISTED E&M SERVICE: CPT | Mod: HCNC,S$GLB,, | Performed by: PSYCHIATRY & NEUROLOGY

## 2021-10-27 PROCEDURE — 99999 PR PBB SHADOW E&M-EST. PATIENT-LVL III: ICD-10-PCS | Mod: PBBFAC,HCNC,, | Performed by: PSYCHIATRY & NEUROLOGY

## 2021-10-27 PROCEDURE — 99213 PR OFFICE/OUTPT VISIT, EST, LEVL III, 20-29 MIN: ICD-10-PCS | Mod: HCNC,S$GLB,, | Performed by: PSYCHIATRY & NEUROLOGY

## 2021-10-27 PROCEDURE — 1160F RVW MEDS BY RX/DR IN RCRD: CPT | Mod: HCNC,CPTII,S$GLB, | Performed by: PSYCHIATRY & NEUROLOGY

## 2021-12-27 ENCOUNTER — PATIENT MESSAGE (OUTPATIENT)
Dept: INTERNAL MEDICINE | Facility: CLINIC | Age: 76
End: 2021-12-27
Payer: MEDICARE

## 2021-12-27 ENCOUNTER — PATIENT MESSAGE (OUTPATIENT)
Dept: PRIMARY CARE CLINIC | Facility: CLINIC | Age: 76
End: 2021-12-27
Payer: MEDICARE

## 2021-12-27 DIAGNOSIS — E01.0 THYROMEGALY: Primary | ICD-10-CM

## 2021-12-28 ENCOUNTER — PATIENT MESSAGE (OUTPATIENT)
Dept: INTERNAL MEDICINE | Facility: CLINIC | Age: 76
End: 2021-12-28
Payer: MEDICARE

## 2021-12-29 ENCOUNTER — HOSPITAL ENCOUNTER (OUTPATIENT)
Dept: RADIOLOGY | Facility: OTHER | Age: 76
Discharge: HOME | End: 2021-12-29
Attending: INTERNAL MEDICINE
Payer: MEDICARE

## 2021-12-29 DIAGNOSIS — E01.0 THYROMEGALY: ICD-10-CM

## 2021-12-29 PROCEDURE — 76536 US EXAM OF HEAD AND NECK: CPT | Mod: TC,HCNC

## 2021-12-29 PROCEDURE — 76536 US EXAM OF HEAD AND NECK: CPT | Mod: 26,HCNC,, | Performed by: RADIOLOGY

## 2021-12-29 PROCEDURE — 76536 US SOFT TISSUE HEAD NECK THYROID: ICD-10-PCS | Mod: 26,HCNC,, | Performed by: RADIOLOGY

## 2022-01-12 ENCOUNTER — OFFICE VISIT (OUTPATIENT)
Dept: INTERNAL MEDICINE | Facility: CLINIC | Age: 77
End: 2022-01-12
Payer: MEDICARE

## 2022-01-12 VITALS
WEIGHT: 172.63 LBS | DIASTOLIC BLOOD PRESSURE: 80 MMHG | RESPIRATION RATE: 20 BRPM | HEART RATE: 58 BPM | TEMPERATURE: 98 F | SYSTOLIC BLOOD PRESSURE: 112 MMHG | BODY MASS INDEX: 32.59 KG/M2 | OXYGEN SATURATION: 99 % | HEIGHT: 61 IN

## 2022-01-12 DIAGNOSIS — F02.80 MAJOR NEUROCOGNITIVE DISORDER DUE TO ANOTHER MEDICAL CONDITION: ICD-10-CM

## 2022-01-12 DIAGNOSIS — E03.8 SUBCLINICAL HYPOTHYROIDISM: Primary | ICD-10-CM

## 2022-01-12 DIAGNOSIS — Z79.899 OTHER LONG TERM (CURRENT) DRUG THERAPY: ICD-10-CM

## 2022-01-12 DIAGNOSIS — M54.2 NECK PAIN: ICD-10-CM

## 2022-01-12 DIAGNOSIS — E78.5 HYPERLIPIDEMIA, UNSPECIFIED HYPERLIPIDEMIA TYPE: ICD-10-CM

## 2022-01-12 DIAGNOSIS — M25.512 LEFT SHOULDER PAIN, UNSPECIFIED CHRONICITY: ICD-10-CM

## 2022-01-12 PROCEDURE — 99213 OFFICE O/P EST LOW 20 MIN: CPT | Mod: HCNC,S$GLB,, | Performed by: INTERNAL MEDICINE

## 2022-01-12 PROCEDURE — 1101F PT FALLS ASSESS-DOCD LE1/YR: CPT | Mod: HCNC,CPTII,S$GLB, | Performed by: INTERNAL MEDICINE

## 2022-01-12 PROCEDURE — 1159F PR MEDICATION LIST DOCUMENTED IN MEDICAL RECORD: ICD-10-PCS | Mod: HCNC,CPTII,S$GLB, | Performed by: INTERNAL MEDICINE

## 2022-01-12 PROCEDURE — 1160F RVW MEDS BY RX/DR IN RCRD: CPT | Mod: HCNC,CPTII,S$GLB, | Performed by: INTERNAL MEDICINE

## 2022-01-12 PROCEDURE — 1160F PR REVIEW ALL MEDS BY PRESCRIBER/CLIN PHARMACIST DOCUMENTED: ICD-10-PCS | Mod: HCNC,CPTII,S$GLB, | Performed by: INTERNAL MEDICINE

## 2022-01-12 PROCEDURE — 3288F FALL RISK ASSESSMENT DOCD: CPT | Mod: HCNC,CPTII,S$GLB, | Performed by: INTERNAL MEDICINE

## 2022-01-12 PROCEDURE — 1101F PR PT FALLS ASSESS DOC 0-1 FALLS W/OUT INJ PAST YR: ICD-10-PCS | Mod: HCNC,CPTII,S$GLB, | Performed by: INTERNAL MEDICINE

## 2022-01-12 PROCEDURE — 99999 PR PBB SHADOW E&M-EST. PATIENT-LVL V: CPT | Mod: PBBFAC,HCNC,, | Performed by: INTERNAL MEDICINE

## 2022-01-12 PROCEDURE — 99999 PR PBB SHADOW E&M-EST. PATIENT-LVL V: ICD-10-PCS | Mod: PBBFAC,HCNC,, | Performed by: INTERNAL MEDICINE

## 2022-01-12 PROCEDURE — 3079F PR MOST RECENT DIASTOLIC BLOOD PRESSURE 80-89 MM HG: ICD-10-PCS | Mod: HCNC,CPTII,S$GLB, | Performed by: INTERNAL MEDICINE

## 2022-01-12 PROCEDURE — 1125F PR PAIN SEVERITY QUANTIFIED, PAIN PRESENT: ICD-10-PCS | Mod: HCNC,CPTII,S$GLB, | Performed by: INTERNAL MEDICINE

## 2022-01-12 PROCEDURE — 3074F PR MOST RECENT SYSTOLIC BLOOD PRESSURE < 130 MM HG: ICD-10-PCS | Mod: HCNC,CPTII,S$GLB, | Performed by: INTERNAL MEDICINE

## 2022-01-12 PROCEDURE — 3074F SYST BP LT 130 MM HG: CPT | Mod: HCNC,CPTII,S$GLB, | Performed by: INTERNAL MEDICINE

## 2022-01-12 PROCEDURE — 99499 RISK ADDL DX/OHS AUDIT: ICD-10-PCS | Mod: S$GLB,,, | Performed by: INTERNAL MEDICINE

## 2022-01-12 PROCEDURE — 3079F DIAST BP 80-89 MM HG: CPT | Mod: HCNC,CPTII,S$GLB, | Performed by: INTERNAL MEDICINE

## 2022-01-12 PROCEDURE — 99499 UNLISTED E&M SERVICE: CPT | Mod: S$GLB,,, | Performed by: INTERNAL MEDICINE

## 2022-01-12 PROCEDURE — 99213 PR OFFICE/OUTPT VISIT, EST, LEVL III, 20-29 MIN: ICD-10-PCS | Mod: HCNC,S$GLB,, | Performed by: INTERNAL MEDICINE

## 2022-01-12 PROCEDURE — 1159F MED LIST DOCD IN RCRD: CPT | Mod: HCNC,CPTII,S$GLB, | Performed by: INTERNAL MEDICINE

## 2022-01-12 PROCEDURE — 1125F AMNT PAIN NOTED PAIN PRSNT: CPT | Mod: HCNC,CPTII,S$GLB, | Performed by: INTERNAL MEDICINE

## 2022-01-12 PROCEDURE — 3288F PR FALLS RISK ASSESSMENT DOCUMENTED: ICD-10-PCS | Mod: HCNC,CPTII,S$GLB, | Performed by: INTERNAL MEDICINE

## 2022-01-12 NOTE — PROGRESS NOTES
CC:  Neck swelling  HPI:  The patient is a 76 y.o. year old female who presents to the office for neck swelling.  Her symptoms started last month.  She reports pain on the left side neck.  The pain is a constant aching sensation.  Pain radiates around the back of her neck and down her left arm.  Pain is 6/10.  The patient is right hand dominant.  She is taking tylenol without relief.  She also reports intermittent headaches with changes in her vision.  She reports decreased vision.  Headache is an aching in sensation that lasts 3 to 4 days.  Her blood pressure is elevated during headaches.  Her blood pressure then returned to normal.  Thyroid ultrasound reveals stable subcentimeter nodule on the right which does not meet criteria for follow-up or FNA.  There are no new nodules.  She complains of intermittent knee pain as well.    PAST MEDICAL HISTORY:  Past Medical History:   Diagnosis Date    Arthritis     Cataract     Depression     Around [place]; treated with Wellbutrin    Early cataracts, bilateral     Encephalopathy due to COVID-19 virus 04/03/2020    resoved 1 year after COVID infection, 2/21, Psych [facility]    SAMSON (generalized anxiety disorder) 2/5/2019    GERD (gastroesophageal reflux disease)     Glaucoma     Hyperprolactinemia     Hypertension     Hypothyroidism     Osteopenia     Postmenopause atrophic vaginitis 4/28/2021    Primary osteoarthritis of left knee 4/28/2021    PT [facility] 2021    Recurrent UTI     Every few months    Right ankle swelling 4/28/2021    Sciatica neuralgia     Temporal lobe seizure 2006    Trigeminal neuralgia     Vitamin D deficiency disease        SURGICAL HISTORY:  Past Surgical History:   Procedure Laterality Date    BREAST BIOPSY      duct excision    CHOLECYSTECTOMY  1994    laporascopic    COLONOSCOPY      EYE SURGERY      LASIK surgery to one eye; cannot remember which one    TOTAL ABDOMINAL HYSTERECTOMY W/ BILATERAL SALPINGOOPHORECTOMY      1985;  diffinitive treatment for menorrhagia       MEDS:  Medcard reviewed and updated    ALLERGIES: Allergy Card reviewed and updated    SOCIAL HISTORY:   The patient is a nonsmoker.    PE:   APPEARANCE: Well nourished, well developed, in no acute distress.    NECK: Supple, no thyromegaly.  CHEST: Lungs clear to auscultation with unlabored respirations.  CARDIOVASCULAR: Normal S1, S2. No murmurs. No carotid bruits. No pedal edema.  ABDOMEN: Bowel sounds normal. Not distended. Soft. No tenderness or masses. No organomegaly.  PSYCHIATRIC: The patient is oriented to person, place, and time and has a pleasant affect.        ASSESSMENT/PLAN:  Daisha was seen today for discuss enlarged thyroid and discuss rx.    Diagnoses and all orders for this visit:    Subclinical hypothyroidism  -     VITAMIN B1; Future  -     CBC Auto Differential; Future  -     Comprehensive Metabolic Panel; Future  -     Lipid Panel; Future  -     TSH; Future  -     Vitamin B12; Future  -     X-Ray Cervical Spine AP And Lateral; Future  -     Ambulatory referral/consult to Orthopedics; Future    Neck pain  -     X-Ray Cervical Spine AP And Lateral; Future    Left shoulder pain, unspecified chronicity  -     Ambulatory referral/consult to Orthopedics; Future    Hyperlipidemia, unspecified hyperlipidemia type  -     VITAMIN B1; Future  -     CBC Auto Differential; Future  -     Comprehensive Metabolic Panel; Future  -     Lipid Panel; Future  -     TSH; Future  -     Vitamin B12; Future  -     X-Ray Cervical Spine AP And Lateral; Future  -     Ambulatory referral/consult to Orthopedics; Future    Major neurocognitive disorder due to another medical condition  -     Vitamin B12; Future    Other long term (current) drug therapy   -     Vitamin B12; Future

## 2022-01-13 ENCOUNTER — HOSPITAL ENCOUNTER (OUTPATIENT)
Dept: RADIOLOGY | Facility: HOSPITAL | Age: 77
Discharge: HOME | End: 2022-01-13
Attending: INTERNAL MEDICINE
Payer: MEDICARE

## 2022-01-13 DIAGNOSIS — E03.8 SUBCLINICAL HYPOTHYROIDISM: ICD-10-CM

## 2022-01-13 DIAGNOSIS — E78.5 HYPERLIPIDEMIA, UNSPECIFIED HYPERLIPIDEMIA TYPE: ICD-10-CM

## 2022-01-13 DIAGNOSIS — M54.2 NECK PAIN: ICD-10-CM

## 2022-01-13 PROCEDURE — 72040 XR CERVICAL SPINE AP LATERAL: ICD-10-PCS | Mod: 26,HCNC,, | Performed by: RADIOLOGY

## 2022-01-13 PROCEDURE — 72040 X-RAY EXAM NECK SPINE 2-3 VW: CPT | Mod: 26,HCNC,, | Performed by: RADIOLOGY

## 2022-01-13 PROCEDURE — 72040 X-RAY EXAM NECK SPINE 2-3 VW: CPT | Mod: TC,HCNC,PN

## 2022-01-17 ENCOUNTER — PATIENT MESSAGE (OUTPATIENT)
Dept: INTERNAL MEDICINE | Facility: CLINIC | Age: 77
End: 2022-01-17
Payer: MEDICARE

## 2022-01-20 NOTE — PROGRESS NOTES
CC: Left neck, shoulder and arm pain     76 y.o. Female presents as a new patient to me. Complaint is bilateral neck pain (left>right) , subjective stiffness, and referred pain down her left arm. Atraumatic onset. History of reported cervical radiculopathy previously treated for in 2014 and 2020 with physical therapy. Does have significant neck pain intermittently for years. Treatment thus far has included activity modifications, rest, and oral medication and physical therapy.  I currently taking any medication for this.  Denies any numbness or tingling into the left hand.  Pain radiates often to the upper arm, less so distal to the elbow.    Denies any balance issues specifically.  No trouble buttoning buttons but does state that she has been dropping things lately.    Here today to discuss diagnosis and treatment options.     Pain Score:   5    PAST MEDICAL HISTORY:   Past Medical History:   Diagnosis Date    Arthritis     Cataract     Depression     Around [place]; treated with Wellbutrin    Early cataracts, bilateral     Encephalopathy due to COVID-19 virus 04/03/2020    resoved 1 year after COVID infection, 2/21, [facility]    SAMSON (generalized anxiety disorder) 2/5/2019    GERD (gastroesophageal reflux disease)     Glaucoma     Hyperprolactinemia     Hypertension     Hypothyroidism     Osteopenia     Postmenopause atrophic vaginitis 4/28/2021    Primary osteoarthritis of left knee 4/28/2021    PT [facility] 2021    Recurrent UTI     Every few months    Right ankle swelling 4/28/2021    Sciatica neuralgia     Temporal lobe seizure 2006    Trigeminal neuralgia     Vitamin D deficiency disease        PAST SURGICAL HISTORY:  Past Surgical History:   Procedure Laterality Date    BREAST BIOPSY      duct excision    CHOLECYSTECTOMY  1994    laporascopic    COLONOSCOPY      EYE SURGERY      LASIK surgery to one eye; cannot remember which one    TOTAL ABDOMINAL HYSTERECTOMY W/ BILATERAL  SALPINGOOPHORECTOMY      ; diffinitive treatment for menorrhagia       FAMILY HISTORY:  Family History   Problem Relation Age of Onset    Heart disease Mother     Hypertension Mother     Thyroid disease Mother     Colon cancer Mother 90    Breast cancer Mother 104    Cancer Mother 103        colon and breast    Kidney disease Father     Heart disease Sister         pacemaker    Hypertension Sister     Thyroid disease Sister     Glaucoma Sister     Kidney disease Sister         stage 4 ckd - refuses dialysis if worsens    Fibromyalgia Brother     Kidney disease Brother     Sleep apnea Brother     Hypertension Brother         orthostatic hypotension    Glaucoma Brother     Cancer Brother         prostate    Heart disease Brother         sudden death at age 67    Cancer Cousin 31         from breast cancer at age 31    Breast cancer Cousin     Glaucoma Paternal Aunt     Glaucoma Paternal Uncle     Glaucoma Paternal Grandfather         Went blind from Glaucoma    Breast cancer Maternal Aunt     Celiac disease Neg Hx     Cirrhosis Neg Hx     Colon polyps Neg Hx     Crohn's disease Neg Hx     Cystic fibrosis Neg Hx     Esophageal cancer Neg Hx     Hemochromatosis Neg Hx     Inflammatory bowel disease Neg Hx     Irritable bowel syndrome Neg Hx     Liver cancer Neg Hx     Liver disease Neg Hx     Rectal cancer Neg Hx     Stomach cancer Neg Hx     Ulcerative colitis Neg Hx     Moses's disease Neg Hx      MEDICATIONS:    Current Outpatient Medications:     alendronate-vitamin D3 (FOSAMAX PLUS D) 70 mg- 2,800 unit per tablet, Take 1 tablet by mouth every 7 days., Disp: , Rfl:     ascorbic acid, vitamin C, (VITAMIN C) 100 MG tablet, Take 100 mg by mouth once daily., Disp: , Rfl:     aspirin 81 MG Chew, Take 1 tablet (81 mg total) by mouth once daily., Disp: 90 tablet, Rfl: 0    clobetasol 0.05% (TEMOVATE) 0.05 % Oint, Apply topically 2 (two) times daily., Disp: 60 g, Rfl:  3    dorzolamide-timolol (COSOPT) 2-0.5 % ophthalmic solution, Place 1 drop into both eyes 2 (two) times daily., Disp: , Rfl:     EScitalopram oxalate (LEXAPRO) 10 MG tablet, Take 1 tablet (10 mg total) by mouth once daily., Disp: 90 tablet, Rfl: 2    latanoprost 0.005 % ophthalmic solution, Place 1 drop into both eyes every evening., Disp: , Rfl:     multivitamin with minerals (HAIR,SKIN AND NAILS ORAL), Take 1 tablet by mouth once daily., Disp: , Rfl:     multivitamin-minerals-lutein Tab, Take 1 tablet by mouth Daily. 1 Tablet Oral Every day, Disp: , Rfl:     pantoprazole (PROTONIX) 40 MG tablet, TAKE 1 TABLET EVERY DAY, Disp: 90 tablet, Rfl: 3    thiamine 100 MG tablet, Take 100 mg by mouth once daily., Disp: , Rfl:     VITAMIN D3 5,000 unit Tab, , Disp: , Rfl:     celecoxib (CELEBREX) 100 MG capsule, Take 1 capsule (100 mg total) by mouth 2 (two) times daily., Disp: 40 capsule, Rfl: 1    cholecalciferol, vitamin D3, 50,000 unit Tab, Take 1 tablet by mouth every 7 days. (Patient not taking: No sig reported), Disp: 12 tablet, Rfl: 4    methocarbamoL (ROBAXIN) 500 MG Tab, Take 1 tablet (500 mg total) by mouth 4 (four) times daily. for 10 days, Disp: 20 tablet, Rfl: 0    thiamine mononitrate, vit B1, (VITAMIN B-1, MONONITRATE,) 100 mg Tab, Take 1 tablet (100 mg total) by mouth once daily. (Patient not taking: No sig reported), Disp: 90 tablet, Rfl: 3    zinc sulfate (ZINC-15 ORAL), Take by mouth once daily. With copper, Disp: , Rfl:     ALLERGIES:  Review of patient's allergies indicates:   Allergen Reactions    Latex      Other reaction(s): Rash  Other reaction(s): Rash     REVIEW OF SYSTEMS:  Constitution: Negative. Negative for chills, fever and night sweats.    Hematologic/Lymphatic: Negative for bleeding problem. Does not bruise/bleed easily.   Skin: Negative for dry skin, itching and rash.   Musculoskeletal: Negative for falls. Positive for left shoulder/arm pain and muscle weakness.     All  "other review of symptoms were reviewed and found to be noncontributory.    PHYSICAL EXAMINATION:  Vitals:  BP (!) 155/80   Pulse 85   Ht 5' 1" (1.549 m)   Wt 80.3 kg (177 lb 0.5 oz)   LMP  (LMP Unknown)   BMI 33.45 kg/m²    General: Well-developed well-nourished 76 y.o. femalein no acute distress   Cardiovascular: Regular rhythm by palpation of distal pulse, normal color and temperature, no concerning varicosities on symptomatic side   Lungs: No labored breathing or wheezing appreciated   Neuro: Alert and oriented ×3   Psychiatric: well oriented to person, place and time, demonstrates normal mood and affect   Skin: No rashes, lesions or ulcers, normal temperature, turgor, and texture on uninvolved extremity    Ortho/SPM Exam  Examination of the left shoulder demonstrates active forward elevation to 160, ER with arm at side to 50 IR to T12. Passive FE to 160, ER to 50. Nontender over the AC joint, Codman's point and proximal biceps.  Also nontender over the lateral subdeltoid recess.  No reported pain to resisted cuff testing.  Intact motor function and strength distally.  No sensory deficit.  She does have some mild left supraclavicular swelling.  + Midline neck and more so left paraspinal tenderness. +Spurling's maneuver for typical left upper extremity complaint.  Reduced cervical neck range of motion with subjective stiffness.  No pathologic reflexes appreciated.      IMAGING:  Xrays including AP, Outlet and Axillary Lateral of Left shoulder are ordered / images reviewed by me:   No significant DJD.    Recent x-rays of the cervical spine show:  Multiple level cervical degenerative disc disease with junctional kyphosis at C5-C6    ASSESSMENT:      ICD-10-CM ICD-9-CM   1. Cervical radiculopathy  M54.12 723.4   2. Cervical spondylosis  M47.812 721.0   3. Chronic left shoulder pain  M25.512 719.41    G89.29 338.29     PLAN:     -Findings and treatment options were discussed with the patient.  Findings most " consistent with symptomatic cervical spondylosis and likely some degree of underlying radiculopathy.  She does report some problems dropping objects and there may be a myelopathic component as well.  Regardless I would like to get an MRI of the neck at this point for further assessment.  I am less concerned regarding her shoulder.  Will set up a virtual visit to discuss MRI results but I would like to go ahead and get her over to the spine team for an evaluation.  No need for further workup in regards to the shoulder at this time.  -Scripts given for Robaxin and Celebrex for now.  -All questions answered    Procedures

## 2022-01-21 DIAGNOSIS — M25.512 CHRONIC LEFT SHOULDER PAIN: Primary | ICD-10-CM

## 2022-01-21 DIAGNOSIS — G89.29 CHRONIC LEFT SHOULDER PAIN: Primary | ICD-10-CM

## 2022-01-24 ENCOUNTER — PATIENT OUTREACH (OUTPATIENT)
Dept: ADMINISTRATIVE | Facility: OTHER | Age: 77
End: 2022-01-24
Payer: MEDICARE

## 2022-01-24 ENCOUNTER — HOSPITAL ENCOUNTER (OUTPATIENT)
Dept: RADIOLOGY | Facility: HOSPITAL | Age: 77
Discharge: HOME | End: 2022-01-24
Attending: ORTHOPAEDIC SURGERY
Payer: MEDICARE

## 2022-01-24 ENCOUNTER — OFFICE VISIT (OUTPATIENT)
Dept: SPORTS MEDICINE | Facility: CLINIC | Age: 77
End: 2022-01-24
Payer: MEDICARE

## 2022-01-24 VITALS
BODY MASS INDEX: 33.42 KG/M2 | WEIGHT: 177 LBS | SYSTOLIC BLOOD PRESSURE: 155 MMHG | HEIGHT: 61 IN | DIASTOLIC BLOOD PRESSURE: 80 MMHG | HEART RATE: 85 BPM

## 2022-01-24 DIAGNOSIS — M54.12 CERVICAL RADICULOPATHY: Primary | ICD-10-CM

## 2022-01-24 DIAGNOSIS — M47.812 CERVICAL SPONDYLOSIS: ICD-10-CM

## 2022-01-24 DIAGNOSIS — G89.29 CHRONIC LEFT SHOULDER PAIN: ICD-10-CM

## 2022-01-24 DIAGNOSIS — M25.512 CHRONIC LEFT SHOULDER PAIN: ICD-10-CM

## 2022-01-24 PROCEDURE — 3288F FALL RISK ASSESSMENT DOCD: CPT | Mod: HCNC,CPTII,S$GLB, | Performed by: ORTHOPAEDIC SURGERY

## 2022-01-24 PROCEDURE — 99203 PR OFFICE/OUTPT VISIT, NEW, LEVL III, 30-44 MIN: ICD-10-PCS | Mod: HCNC,S$GLB,, | Performed by: ORTHOPAEDIC SURGERY

## 2022-01-24 PROCEDURE — 3079F PR MOST RECENT DIASTOLIC BLOOD PRESSURE 80-89 MM HG: ICD-10-PCS | Mod: HCNC,CPTII,S$GLB, | Performed by: ORTHOPAEDIC SURGERY

## 2022-01-24 PROCEDURE — 99999 PR PBB SHADOW E&M-EST. PATIENT-LVL V: CPT | Mod: PBBFAC,HCNC,, | Performed by: ORTHOPAEDIC SURGERY

## 2022-01-24 PROCEDURE — 73030 X-RAY EXAM OF SHOULDER: CPT | Mod: 26,HCNC,LT, | Performed by: RADIOLOGY

## 2022-01-24 PROCEDURE — 99999 PR PBB SHADOW E&M-EST. PATIENT-LVL V: ICD-10-PCS | Mod: PBBFAC,HCNC,, | Performed by: ORTHOPAEDIC SURGERY

## 2022-01-24 PROCEDURE — 1101F PT FALLS ASSESS-DOCD LE1/YR: CPT | Mod: HCNC,CPTII,S$GLB, | Performed by: ORTHOPAEDIC SURGERY

## 2022-01-24 PROCEDURE — 73030 XR SHOULDER COMPLETE 2 OR MORE VIEWS LEFT: ICD-10-PCS | Mod: 26,HCNC,LT, | Performed by: RADIOLOGY

## 2022-01-24 PROCEDURE — 99203 OFFICE O/P NEW LOW 30 MIN: CPT | Mod: HCNC,S$GLB,, | Performed by: ORTHOPAEDIC SURGERY

## 2022-01-24 PROCEDURE — 73030 X-RAY EXAM OF SHOULDER: CPT | Mod: TC,HCNC,PN,LT

## 2022-01-24 PROCEDURE — 3077F SYST BP >= 140 MM HG: CPT | Mod: HCNC,CPTII,S$GLB, | Performed by: ORTHOPAEDIC SURGERY

## 2022-01-24 PROCEDURE — 3288F PR FALLS RISK ASSESSMENT DOCUMENTED: ICD-10-PCS | Mod: HCNC,CPTII,S$GLB, | Performed by: ORTHOPAEDIC SURGERY

## 2022-01-24 PROCEDURE — 1159F PR MEDICATION LIST DOCUMENTED IN MEDICAL RECORD: ICD-10-PCS | Mod: HCNC,CPTII,S$GLB, | Performed by: ORTHOPAEDIC SURGERY

## 2022-01-24 PROCEDURE — 1125F PR PAIN SEVERITY QUANTIFIED, PAIN PRESENT: ICD-10-PCS | Mod: HCNC,CPTII,S$GLB, | Performed by: ORTHOPAEDIC SURGERY

## 2022-01-24 PROCEDURE — 3079F DIAST BP 80-89 MM HG: CPT | Mod: HCNC,CPTII,S$GLB, | Performed by: ORTHOPAEDIC SURGERY

## 2022-01-24 PROCEDURE — 1125F AMNT PAIN NOTED PAIN PRSNT: CPT | Mod: HCNC,CPTII,S$GLB, | Performed by: ORTHOPAEDIC SURGERY

## 2022-01-24 PROCEDURE — 3077F PR MOST RECENT SYSTOLIC BLOOD PRESSURE >= 140 MM HG: ICD-10-PCS | Mod: HCNC,CPTII,S$GLB, | Performed by: ORTHOPAEDIC SURGERY

## 2022-01-24 PROCEDURE — 1159F MED LIST DOCD IN RCRD: CPT | Mod: HCNC,CPTII,S$GLB, | Performed by: ORTHOPAEDIC SURGERY

## 2022-01-24 PROCEDURE — 1101F PR PT FALLS ASSESS DOC 0-1 FALLS W/OUT INJ PAST YR: ICD-10-PCS | Mod: HCNC,CPTII,S$GLB, | Performed by: ORTHOPAEDIC SURGERY

## 2022-01-24 RX ORDER — METHOCARBAMOL 500 MG/1
500 TABLET, FILM COATED ORAL 4 TIMES DAILY
Qty: 20 TABLET | Refills: 0 | Status: SHIPPED | OUTPATIENT
Start: 2022-01-24 | End: 2022-02-03

## 2022-01-24 RX ORDER — LANOLIN ALCOHOL/MO/W.PET/CERES
100 CREAM (GRAM) TOPICAL DAILY
COMMUNITY
End: 2022-07-18

## 2022-01-24 RX ORDER — CELECOXIB 100 MG/1
100 CAPSULE ORAL 2 TIMES DAILY
Qty: 40 CAPSULE | Refills: 1 | Status: SHIPPED | OUTPATIENT
Start: 2022-01-24 | End: 2022-03-16 | Stop reason: SDUPTHER

## 2022-01-31 NOTE — PROGRESS NOTES
Telemedicine/Virtual Visit Documentation:     The patient location is: home    The chief complaint leading to consultation is: see HPI    VISIT TYPE X   Virtual visit with synchronous audio and video X   Telephone E/M service      Total time spent with patient: see X carolina on chart below.   More than half of the time was spent counseling or coordinating care including prognosis, differential diagnosis, risks and benefits of treatment, instructions, compliance risk reductions     EST MINUTES X   07940 5    10544 10 X   99213 15    84587 25    93322 40    NEW     70883 10    25811 20    35002 30    09662 45    62476 60    PHONE      5-10    84211 11-20    79273 21-30      H&P  Orthopaedics    SUBJECTIVE:     History of Present Illness:  Patient is a 76 y.o. female who presents for MRI review of cervical spine.  Concern for symptomatic cervical spondylosis and likely some degree of underlying radiculopathy. He has been taking Robaxin and Celebrex. Patient does not report any new incidents or injuries since their last appointment. Pain and symptoms remain unchanged since his last appointment. Here today to discuss treatment options.  Again she reports her primary complaint is lateral subdeltoid level pain in the arm which often radiates down the entire arm.  Extends proximally into her left trapezius and neck region.  Reports intermittent numbness and tingling.    Review of patient's allergies indicates:   Allergen Reactions    Latex      Other reaction(s): Rash  Other reaction(s): Rash       Past Medical History:   Diagnosis Date    Arthritis     Cataract     Depression     [place]; treated with Wellbutrin    Early cataracts, bilateral     Encephalopathy due to COVID-19 virus 04/03/2020    resoved 1 year after COVID infection, 2/21, [facility]    SAMSON (generalized anxiety disorder) 2/5/2019    GERD (gastroesophageal reflux disease)     Glaucoma     Hyperprolactinemia     Hypertension      Hypothyroidism     Osteopenia     Postmenopause atrophic vaginitis 2021    Primary osteoarthritis of left knee 2021    [facility]     Recurrent UTI     Every few months    Right ankle swelling 2021    Sciatica neuralgia     Temporal lobe seizure 2006    Trigeminal neuralgia     Vitamin D deficiency disease      Past Surgical History:   Procedure Laterality Date    BREAST BIOPSY      duct excision    CHOLECYSTECTOMY      laporascopic    COLONOSCOPY      EYE SURGERY      LASIK surgery to one eye; cannot remember which one    TOTAL ABDOMINAL HYSTERECTOMY W/ BILATERAL SALPINGOOPHORECTOMY      ; diffinitive treatment for menorrhagia     Family History   Problem Relation Age of Onset    Heart disease Mother     Hypertension Mother     Thyroid disease Mother     Colon cancer Mother 90    Breast cancer Mother 104    Cancer Mother 103        colon and breast    Kidney disease Father     Heart disease Sister         pacemaker    Hypertension Sister     Thyroid disease Sister     Glaucoma Sister     Kidney disease Sister         stage 4 ckd - refuses dialysis if worsens    Fibromyalgia Brother     Kidney disease Brother     Sleep apnea Brother     Hypertension Brother         orthostatic hypotension    Glaucoma Brother     Cancer Brother         prostate    Heart disease Brother         sudden death at age 67    Cancer Cousin 31         from breast cancer at age 31    Breast cancer Cousin     Glaucoma Paternal Aunt     Glaucoma Paternal Uncle     Glaucoma Paternal Grandfather         Went blind from Glaucoma    Breast cancer Maternal Aunt     Celiac disease Neg Hx     Cirrhosis Neg Hx     Colon polyps Neg Hx     Crohn's disease Neg Hx     Cystic fibrosis Neg Hx     Esophageal cancer Neg Hx     Hemochromatosis Neg Hx     Inflammatory bowel disease Neg Hx     Irritable bowel syndrome Neg Hx     Liver cancer Neg Hx     Liver disease Neg Hx     Rectal  cancer Neg Hx     Stomach cancer Neg Hx     Ulcerative colitis Neg Hx     Moses's disease Neg Hx      Social History     Tobacco Use    Smoking status: Never Smoker    Smokeless tobacco: Never Used   Substance Use Topics    Alcohol use: No    Drug use: No      Review of Systems:  Patient denies constitutional symptoms, cardiac symptoms, respiratory symptoms, GI symptoms.  The remainder of the musculoskeletal ROS is included in the HPI.    OBJECTIVE:     Physical Exam:  Gen:  No acute distress    MRI cervical spine:  Cervical spondylosis.  Mild spinal canal stenosis and mild to moderate neural foraminal narrowing at C3-C5.    ASSESSMENT/PLAN:     A/P: Daisha Mayo is a 76 y.o. with likely dual pathology of left upper extremity clinical cervical radiculopathy in addition to what I believe is some intrinsic shoulder specific pain.  I suspect she may have some degree of a symptomatic underlying cuff tear.  In my opinion the neurogenic complaints have been predominant.    Plan:  Treatment options discussed.  Referral to the spine team for further evaluation of her neck in treatment options for her radiculopathy.  I do think she has some intrinsic shoulder pathology and I have recommended that she return to clinic for a subacromial steroid injection.  This would be helpful from a diagnostic and hopefully therapeutic standpoint.  Physical therapy for the shoulder and neck recommended.  She will return for the injection.

## 2022-02-01 ENCOUNTER — HOSPITAL ENCOUNTER (OUTPATIENT)
Dept: RADIOLOGY | Facility: HOSPITAL | Age: 77
Discharge: HOME | End: 2022-02-01
Attending: ORTHOPAEDIC SURGERY
Payer: MEDICARE

## 2022-02-01 DIAGNOSIS — M54.12 CERVICAL RADICULOPATHY: ICD-10-CM

## 2022-02-01 PROCEDURE — 72141 MRI NECK SPINE W/O DYE: CPT | Mod: 26,HCNC,, | Performed by: RADIOLOGY

## 2022-02-01 PROCEDURE — 72141 MRI NECK SPINE W/O DYE: CPT | Mod: TC,HCNC

## 2022-02-01 PROCEDURE — 72141 MRI CERVICAL SPINE WITHOUT CONTRAST: ICD-10-PCS | Mod: 26,HCNC,, | Performed by: RADIOLOGY

## 2022-02-02 ENCOUNTER — TELEPHONE (OUTPATIENT)
Dept: INTERNAL MEDICINE | Facility: CLINIC | Age: 77
End: 2022-02-02
Payer: MEDICARE

## 2022-02-02 ENCOUNTER — PATIENT MESSAGE (OUTPATIENT)
Dept: PSYCHIATRY | Facility: CLINIC | Age: 77
End: 2022-02-02
Payer: MEDICARE

## 2022-02-02 DIAGNOSIS — E78.5 HYPERLIPIDEMIA, UNSPECIFIED HYPERLIPIDEMIA TYPE: Primary | ICD-10-CM

## 2022-02-02 NOTE — TELEPHONE ENCOUNTER
Please inform patient that cholesterol has increased significantly.  Ten year risk of vascular disease is very high at 28%.  However, this lab is inconsistent with previous labs, especially since the patient has been off cholesterol medication for over a year.  Recommend repeat labs, as this may be an erroneous result.  If still elevated, would recommend restarting cholesterol medication.  Please schedule labs.

## 2022-02-03 ENCOUNTER — OFFICE VISIT (OUTPATIENT)
Dept: SPORTS MEDICINE | Facility: CLINIC | Age: 77
End: 2022-02-03
Payer: MEDICARE

## 2022-02-03 ENCOUNTER — TELEPHONE (OUTPATIENT)
Dept: SPINE | Facility: CLINIC | Age: 77
End: 2022-02-03
Payer: MEDICARE

## 2022-02-03 DIAGNOSIS — M25.512 CHRONIC LEFT SHOULDER PAIN: ICD-10-CM

## 2022-02-03 DIAGNOSIS — M47.812 CERVICAL SPONDYLOSIS: ICD-10-CM

## 2022-02-03 DIAGNOSIS — G89.29 CHRONIC LEFT SHOULDER PAIN: ICD-10-CM

## 2022-02-03 DIAGNOSIS — M54.12 CERVICAL RADICULOPATHY: Primary | ICD-10-CM

## 2022-02-03 PROCEDURE — 99212 OFFICE O/P EST SF 10 MIN: CPT | Mod: HCNC,95,, | Performed by: ORTHOPAEDIC SURGERY

## 2022-02-03 PROCEDURE — 99212 PR OFFICE/OUTPT VISIT, EST, LEVL II, 10-19 MIN: ICD-10-PCS | Mod: HCNC,95,, | Performed by: ORTHOPAEDIC SURGERY

## 2022-02-03 RX ORDER — METHOCARBAMOL 500 MG/1
500 TABLET, FILM COATED ORAL 3 TIMES DAILY
Qty: 40 TABLET | Refills: 0 | Status: SHIPPED | OUTPATIENT
Start: 2022-02-03 | End: 2022-02-13

## 2022-02-04 ENCOUNTER — PATIENT MESSAGE (OUTPATIENT)
Dept: SPORTS MEDICINE | Facility: CLINIC | Age: 77
End: 2022-02-04
Payer: MEDICARE

## 2022-02-04 DIAGNOSIS — M54.12 CERVICAL RADICULOPATHY: Primary | ICD-10-CM

## 2022-02-04 RX ORDER — METHOCARBAMOL 500 MG/1
500 TABLET, FILM COATED ORAL 3 TIMES DAILY
Qty: 40 TABLET | Refills: 0 | Status: SHIPPED | OUTPATIENT
Start: 2022-02-04 | End: 2022-02-14

## 2022-02-07 ENCOUNTER — PATIENT MESSAGE (OUTPATIENT)
Dept: INTERNAL MEDICINE | Facility: CLINIC | Age: 77
End: 2022-02-07
Payer: MEDICARE

## 2022-02-07 DIAGNOSIS — M54.12 CERVICAL RADICULOPATHY: ICD-10-CM

## 2022-02-07 DIAGNOSIS — M47.812 CERVICAL SPONDYLOSIS: Primary | ICD-10-CM

## 2022-02-09 ENCOUNTER — LAB VISIT (OUTPATIENT)
Dept: LAB | Facility: HOSPITAL | Age: 77
End: 2022-02-09
Attending: INTERNAL MEDICINE
Payer: MEDICARE

## 2022-02-09 DIAGNOSIS — E78.5 HYPERLIPIDEMIA, UNSPECIFIED HYPERLIPIDEMIA TYPE: ICD-10-CM

## 2022-02-09 LAB
ALBUMIN SERPL BCP-MCNC: 3.7 G/DL (ref 3.5–5.2)
ALP SERPL-CCNC: 100 U/L (ref 55–135)
ALT SERPL W/O P-5'-P-CCNC: 9 U/L (ref 10–44)
ANION GAP SERPL CALC-SCNC: 5 MMOL/L (ref 8–16)
AST SERPL-CCNC: 14 U/L (ref 10–40)
BILIRUB SERPL-MCNC: 0.8 MG/DL (ref 0.1–1)
BUN SERPL-MCNC: 16 MG/DL (ref 8–23)
CALCIUM SERPL-MCNC: 8.9 MG/DL (ref 8.7–10.5)
CHLORIDE SERPL-SCNC: 101 MMOL/L (ref 95–110)
CHOLEST SERPL-MCNC: 225 MG/DL (ref 120–199)
CHOLEST/HDLC SERPL: 4.1 {RATIO} (ref 2–5)
CO2 SERPL-SCNC: 31 MMOL/L (ref 23–29)
CREAT SERPL-MCNC: 0.7 MG/DL (ref 0.5–1.4)
EST. GFR  (AFRICAN AMERICAN): >60 ML/MIN/1.73 M^2
EST. GFR  (NON AFRICAN AMERICAN): >60 ML/MIN/1.73 M^2
GLUCOSE SERPL-MCNC: 92 MG/DL (ref 70–110)
HDLC SERPL-MCNC: 55 MG/DL (ref 40–75)
HDLC SERPL: 24.4 % (ref 20–50)
LDLC SERPL CALC-MCNC: 152.8 MG/DL (ref 63–159)
NONHDLC SERPL-MCNC: 170 MG/DL
POTASSIUM SERPL-SCNC: 4.7 MMOL/L (ref 3.5–5.1)
PROT SERPL-MCNC: 6.9 G/DL (ref 6–8.4)
SODIUM SERPL-SCNC: 137 MMOL/L (ref 136–145)
TRIGL SERPL-MCNC: 86 MG/DL (ref 30–150)

## 2022-02-09 PROCEDURE — 80061 LIPID PANEL: CPT | Mod: HCNC | Performed by: INTERNAL MEDICINE

## 2022-02-09 PROCEDURE — 36415 COLL VENOUS BLD VENIPUNCTURE: CPT | Mod: HCNC,PN | Performed by: INTERNAL MEDICINE

## 2022-02-09 PROCEDURE — 80053 COMPREHEN METABOLIC PANEL: CPT | Mod: HCNC | Performed by: INTERNAL MEDICINE

## 2022-02-22 ENCOUNTER — PATIENT MESSAGE (OUTPATIENT)
Dept: SPORTS MEDICINE | Facility: CLINIC | Age: 77
End: 2022-02-22
Payer: MEDICARE

## 2022-03-07 ENCOUNTER — OFFICE VISIT (OUTPATIENT)
Dept: PSYCHIATRY | Facility: CLINIC | Age: 77
End: 2022-03-07
Payer: MEDICARE

## 2022-03-07 VITALS
WEIGHT: 177.56 LBS | HEART RATE: 71 BPM | BODY MASS INDEX: 33.55 KG/M2 | SYSTOLIC BLOOD PRESSURE: 159 MMHG | DIASTOLIC BLOOD PRESSURE: 74 MMHG

## 2022-03-07 DIAGNOSIS — F02.80 MAJOR NEUROCOGNITIVE DISORDER DUE TO ANOTHER MEDICAL CONDITION: Primary | ICD-10-CM

## 2022-03-07 DIAGNOSIS — F41.1 GAD (GENERALIZED ANXIETY DISORDER): ICD-10-CM

## 2022-03-07 PROCEDURE — 3077F PR MOST RECENT SYSTOLIC BLOOD PRESSURE >= 140 MM HG: ICD-10-PCS | Mod: CPTII,S$GLB,, | Performed by: PSYCHIATRY & NEUROLOGY

## 2022-03-07 PROCEDURE — 99213 PR OFFICE/OUTPT VISIT, EST, LEVL III, 20-29 MIN: ICD-10-PCS | Mod: S$GLB,,, | Performed by: PSYCHIATRY & NEUROLOGY

## 2022-03-07 PROCEDURE — 1159F PR MEDICATION LIST DOCUMENTED IN MEDICAL RECORD: ICD-10-PCS | Mod: CPTII,S$GLB,, | Performed by: PSYCHIATRY & NEUROLOGY

## 2022-03-07 PROCEDURE — 99999 PR PBB SHADOW E&M-EST. PATIENT-LVL III: CPT | Mod: PBBFAC,,, | Performed by: PSYCHIATRY & NEUROLOGY

## 2022-03-07 PROCEDURE — 99213 OFFICE O/P EST LOW 20 MIN: CPT | Mod: S$GLB,,, | Performed by: PSYCHIATRY & NEUROLOGY

## 2022-03-07 PROCEDURE — 1160F RVW MEDS BY RX/DR IN RCRD: CPT | Mod: CPTII,S$GLB,, | Performed by: PSYCHIATRY & NEUROLOGY

## 2022-03-07 PROCEDURE — 3078F PR MOST RECENT DIASTOLIC BLOOD PRESSURE < 80 MM HG: ICD-10-PCS | Mod: CPTII,S$GLB,, | Performed by: PSYCHIATRY & NEUROLOGY

## 2022-03-07 PROCEDURE — 90833 PSYTX W PT W E/M 30 MIN: CPT | Mod: S$GLB,,, | Performed by: PSYCHIATRY & NEUROLOGY

## 2022-03-07 PROCEDURE — 3077F SYST BP >= 140 MM HG: CPT | Mod: CPTII,S$GLB,, | Performed by: PSYCHIATRY & NEUROLOGY

## 2022-03-07 PROCEDURE — 90833 PR PSYCHOTHERAPY W/PATIENT W/E&M, 30 MIN (ADD ON): ICD-10-PCS | Mod: S$GLB,,, | Performed by: PSYCHIATRY & NEUROLOGY

## 2022-03-07 PROCEDURE — 3078F DIAST BP <80 MM HG: CPT | Mod: CPTII,S$GLB,, | Performed by: PSYCHIATRY & NEUROLOGY

## 2022-03-07 PROCEDURE — 1160F PR REVIEW ALL MEDS BY PRESCRIBER/CLIN PHARMACIST DOCUMENTED: ICD-10-PCS | Mod: CPTII,S$GLB,, | Performed by: PSYCHIATRY & NEUROLOGY

## 2022-03-07 PROCEDURE — 1159F MED LIST DOCD IN RCRD: CPT | Mod: CPTII,S$GLB,, | Performed by: PSYCHIATRY & NEUROLOGY

## 2022-03-07 PROCEDURE — 99999 PR PBB SHADOW E&M-EST. PATIENT-LVL III: ICD-10-PCS | Mod: PBBFAC,,, | Performed by: PSYCHIATRY & NEUROLOGY

## 2022-03-07 RX ORDER — PRAZOSIN HYDROCHLORIDE 1 MG/1
1 CAPSULE ORAL NIGHTLY
Qty: 30 CAPSULE | Refills: 1 | Status: SHIPPED | OUTPATIENT
Start: 2022-03-07 | End: 2022-07-16

## 2022-03-07 RX ORDER — ESCITALOPRAM OXALATE 10 MG/1
5 TABLET ORAL DAILY
Qty: 90 TABLET | Refills: 2
Start: 2022-03-07 | End: 2022-07-16

## 2022-03-07 NOTE — PATIENT INSTRUCTIONS
Decrease Lexapro to 5 mg daily for 2 months, then stop Lexapro.  Restart it at 10 mg and call me if depression returns.    Call if problems arise.  Go to ER if in crisis.

## 2022-03-13 NOTE — PROGRESS NOTES
3/7/2022     Psychiatry Outpatient Visit    Clinical Status of Patient:  Outpatient (Ambulatory)    Chief Complaint:  Daisha Mayo is a 76 y.o. female who presents today for follow-up of anxiety, memory problem and attention problems.  Met with patient and daughter.    Interval History and Content of Current Session:  Interim Events/Subjective Report/Content of Current Session:  Ms. Mayo returned with her daughter, casually dressed and neatly groomed.  Her mood was good and her affect full and appropriate.  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  Anxiety is also under good control.      Medications were discussed.  Pt would like to decrease her number of medications.  A plan to slowly taper off of Lexapro was proposed and accepted.  Pt will restart it and call if anxiety or depression returns.  The need for better control of BP was covered again.  Driving was discussed as a special case, and discretion was urged until she is sure that cognition and reflexes are up to the task.  Her daughter and caregiver will likely return to full-time work.  Pt was asked to return here in 4 months.    Psychotherapy:  · Target symptoms: depression, anxiety , confusion  · Why chosen therapy is appropriate versus another modality: relevant to diagnosis, evidence based practice  · Outcome monitoring methods: self-report, observation, feedback from family  · Therapeutic intervention type: supportive psychotherapy, psychoeducation  · Topics discussed/themes: stress related to medical comorbidities, building skills sets for symptom management, symptom recognition  · The patient's response to the intervention is motivated. The patient's progress toward treatment goals is good.   · Duration of intervention: 28 minutes.    Review of Systems   · PSYCHIATRIC: Pertinant  items are noted in the narrative.  · NEUROLOGIC: encephalitis -- resolved  · RESPIRATORY: s/p COVID-19 infection  · CARDIOVASCULAR: HTN    Past Medical, Family and Social History: The patient's past medical, family and social history have been reviewed and updated as appropriate within the electronic medical record - see encounter notes.    Compliance: yes.    Side effects: None    Risk Parameters:  Patient reports no suicidal ideation  Patient reports no homicidal ideation  Patient reports no self-injurious behavior  Patient reports no violent behavior    Exam (detailed: at least 9 elements; comprehensive: all 15 elements)   Constitutional  Vitals:  Most recent vital signs, dated less than 90 days prior to this appointment, were not reviewed.   Vitals:    03/07/22 1424   BP: (!) 159/74   Pulse: 71   Weight: 80.6 kg (177 lb 9.3 oz)        General:  age appropriate, casually dressed     Musculoskeletal  Muscle Strength/Tone:  no rigidity, no dyskinesia, no dystonia, no tremor   Gait & Station:  non-ataxic     Psychiatric  Speech:  spontaneous   Mood & Affect:  euthymic  increased in intensity, mood-congruent   Thought Process:  goal-directed, logical, slowed   Associations:  intact   Thought Content:  normal, no suicidality, no homicidality, delusions, or paranoia   Insight:  has awareness of illness   Judgement: behavior is adequate to circumstances   Orientation:  person, place, situation, time/date, day of week, month of year, year   Memory: MSE normal except for serial 7's and slowing   Language: grossly intact   Attention Span & Concentration:  some decline   Fund of Knowledge:  intact and appropriate to age and level of education     Assessment and Diagnosis   Status/Progress: Based on the examination today, the patient's problem(s) is/are adequately but not ideally controlled.  New problems have not been presented today.   Co-morbidities are not complicating management of the primary condition.  There are no  active rule-out diagnoses for this patient at this time.     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      ICD-10-CM ICD-9-CM   1. Major neurocognitive disorder due to another medical condition  F02.80 294.9   2. SAMSON (generalized anxiety disorder)  F41.1 300.02       Intervention/Counseling/Treatment Plan   · Medication Management: Continue current medications.   · Continue all previous activities except driving.  · Lipid panel can be rechecked at subsequent visit after isolation period is over.      Return to Clinic: 4 months

## 2022-03-16 ENCOUNTER — OFFICE VISIT (OUTPATIENT)
Dept: ORTHOPEDICS | Facility: CLINIC | Age: 77
End: 2022-03-16
Payer: MEDICARE

## 2022-03-16 DIAGNOSIS — M54.12 CERVICAL RADICULOPATHY: ICD-10-CM

## 2022-03-16 DIAGNOSIS — M47.812 CERVICAL SPONDYLOSIS: ICD-10-CM

## 2022-03-16 PROCEDURE — 99999 PR PBB SHADOW E&M-EST. PATIENT-LVL III: ICD-10-PCS | Mod: PBBFAC,,, | Performed by: ORTHOPAEDIC SURGERY

## 2022-03-16 PROCEDURE — 1159F MED LIST DOCD IN RCRD: CPT | Mod: CPTII,S$GLB,, | Performed by: ORTHOPAEDIC SURGERY

## 2022-03-16 PROCEDURE — 99204 PR OFFICE/OUTPT VISIT, NEW, LEVL IV, 45-59 MIN: ICD-10-PCS | Mod: S$GLB,,, | Performed by: ORTHOPAEDIC SURGERY

## 2022-03-16 PROCEDURE — 99999 PR PBB SHADOW E&M-EST. PATIENT-LVL III: CPT | Mod: PBBFAC,,, | Performed by: ORTHOPAEDIC SURGERY

## 2022-03-16 PROCEDURE — 99204 OFFICE O/P NEW MOD 45 MIN: CPT | Mod: S$GLB,,, | Performed by: ORTHOPAEDIC SURGERY

## 2022-03-16 PROCEDURE — 1125F AMNT PAIN NOTED PAIN PRSNT: CPT | Mod: CPTII,S$GLB,, | Performed by: ORTHOPAEDIC SURGERY

## 2022-03-16 PROCEDURE — 1159F PR MEDICATION LIST DOCUMENTED IN MEDICAL RECORD: ICD-10-PCS | Mod: CPTII,S$GLB,, | Performed by: ORTHOPAEDIC SURGERY

## 2022-03-16 PROCEDURE — 1125F PR PAIN SEVERITY QUANTIFIED, PAIN PRESENT: ICD-10-PCS | Mod: CPTII,S$GLB,, | Performed by: ORTHOPAEDIC SURGERY

## 2022-03-16 RX ORDER — CELECOXIB 100 MG/1
100 CAPSULE ORAL 2 TIMES DAILY
Qty: 40 CAPSULE | Refills: 1 | Status: SHIPPED | OUTPATIENT
Start: 2022-03-16 | End: 2022-07-15 | Stop reason: SDUPTHER

## 2022-03-18 NOTE — PROGRESS NOTES
DATE: 3/18/2022  PATIENT: Daisha Mayo    Attending Physician: Bolivar Serrano M.D.    CHIEF COMPLAINT:  Neck pain, left periscapular pain, left arm pain    HISTORY:  Daisha Mayo is a 76 y.o. female who presents for evaluation of neck pain, left periscapular pain, and left arm pain.  This began spontaneously in December of 2021.  Previously she has had 1 episode of left upper extremity radiculopathy that resolved with conservative treatment.  Recently she has been going to physical therapy with some benefit.  She has was taking a muscle relaxer but has stopped it.  She continues to take Celebrex.  Overall her pain has improved.  The patient reports that she was dropping things for a while, but denies other myelopathic symptoms.  Of note the patient reports she has decreased energy since COVID in 2020.  The patient reports pain at night.    The Patient denies myelopathic symptoms such as handwriting changes or difficulty with buttons/coins/keys. Denies perineal paresthesias, bowel/bladder dysfunction.    PAST MEDICAL/SURGICAL HISTORY:  Past Medical History:   Diagnosis Date    Arthritis     Cataract     Depression     Around [place]; treated with Wellbutrin    Early cataracts, bilateral     Encephalopathy due to COVID-19 virus 04/03/2020    resoved 1 year after COVID infection, 2/21, [facility]    SAMSON (generalized anxiety disorder) 2/5/2019    GERD (gastroesophageal reflux disease)     Glaucoma     Hyperprolactinemia     Hypertension     Hypothyroidism     Osteopenia     Postmenopause atrophic vaginitis 4/28/2021    Primary osteoarthritis of left knee 4/28/2021    PT [facility] 2021    Recurrent UTI     Every few months    Right ankle swelling 4/28/2021    Sciatica neuralgia     Temporal lobe seizure 2006    Trigeminal neuralgia     Vitamin D deficiency disease      Past Surgical History:   Procedure Laterality Date    BREAST BIOPSY      duct excision    CHOLECYSTECTOMY  1994     laporascopic    COLONOSCOPY      EYE SURGERY      LASIK surgery to one eye; cannot remember which one    TOTAL ABDOMINAL HYSTERECTOMY W/ BILATERAL SALPINGOOPHORECTOMY      1985; diffinitive treatment for menorrhagia       Current Medications:   Current Outpatient Medications:     ascorbic acid, vitamin C, (VITAMIN C) 100 MG tablet, Take 100 mg by mouth once daily., Disp: , Rfl:     aspirin 81 MG Chew, Take 1 tablet (81 mg total) by mouth once daily., Disp: 90 tablet, Rfl: 0    clobetasol 0.05% (TEMOVATE) 0.05 % Oint, Apply topically 2 (two) times daily., Disp: 60 g, Rfl: 3    dorzolamide-timolol (COSOPT) 2-0.5 % ophthalmic solution, Place 1 drop into both eyes 2 (two) times daily., Disp: , Rfl:     EScitalopram oxalate (LEXAPRO) 10 MG tablet, Take 0.5 tablets (5 mg total) by mouth once daily., Disp: 90 tablet, Rfl: 2    latanoprost 0.005 % ophthalmic solution, Place 1 drop into both eyes every evening., Disp: , Rfl:     multivitamin with minerals (HAIR,SKIN AND NAILS ORAL), Take 1 tablet by mouth once daily., Disp: , Rfl:     multivitamin-minerals-lutein Tab, Take 1 tablet by mouth Daily. 1 Tablet Oral Every day, Disp: , Rfl:     pantoprazole (PROTONIX) 40 MG tablet, TAKE 1 TABLET EVERY DAY, Disp: 90 tablet, Rfl: 3    prazosin (MINIPRESS) 1 MG Cap, Take 1 capsule (1 mg total) by mouth every evening., Disp: 30 capsule, Rfl: 1    thiamine 100 MG tablet, Take 100 mg by mouth once daily., Disp: , Rfl:     thiamine mononitrate, vit B1, (VITAMIN B-1, MONONITRATE,) 100 mg Tab, Take 1 tablet (100 mg total) by mouth once daily., Disp: 90 tablet, Rfl: 3    VITAMIN D3 5,000 unit Tab, , Disp: , Rfl:     celecoxib (CELEBREX) 100 MG capsule, Take 1 capsule (100 mg total) by mouth 2 (two) times daily., Disp: 40 capsule, Rfl: 1    Social History:   Social History     Socioeconomic History    Marital status:    Tobacco Use    Smoking status: Never Smoker    Smokeless tobacco: Never Used   Substance and  Sexual Activity    Alcohol use: No    Drug use: No    Sexual activity: Not Currently   Social History Narrative    dtr Zayra moved home from [place] to help         Social Determinants of Health     Financial Resource Strain: Low Risk     Difficulty of Paying Living Expenses: Not very hard   Food Insecurity: No Food Insecurity    Worried About Running Out of Food in the Last Year: Never true    Ran Out of Food in the Last Year: Never true   Transportation Needs: No Transportation Needs    Lack of Transportation (Medical): No    Lack of Transportation (Non-Medical): No   Physical Activity: Inactive    Days of Exercise per Week: 0 days    Minutes of Exercise per Session: 0 min   Stress: No Stress Concern Present    Feeling of Stress : Only a little   Social Connections: Socially Integrated    Frequency of Communication with Friends and Family: More than three times a week    Frequency of Social Gatherings with Friends and Family: More than three times a week    Attends Church Services: More than 4 times per year    Active Member of Clubs or Organizations: Yes    Attends Club or Organization Meetings: 1 to 4 times per year    Marital Status:    Housing Stability: Low Risk     Unable to Pay for Housing in the Last Year: No    Number of Places Lived in the Last Year: 1    Unstable Housing in the Last Year: No        EXAM:  LMP  (LMP Unknown)     Gait: Normal station and gait, no difficulty with toe or heel walk.   Skin: Cervical skin negative for rashes, lesions, hairy patches and surgical scars.  Range of motion: Cervical range of motion is acceptable. There is no tenderness to palpation.  Spinal Balance: Global saggital and coronal spinal balance acceptable, no significant for scoliosis and kyphosis.  Musculoskeletal: No pain with the range of motion of the bilateral shoulders and elbows.  There is notable atrophy of the left trapezius. Normal bulk and contour of the bilateral  hands.  Neurological: Normal strength and tone in all major motor groups in the bilateral upper and lower extremities. Normal sensation to light touch in the C5-T1 and L2-S1 dermatomes bilaterally.  Deep tendon reflexes symmetric 1+ in the bilateral upper and lower extremities.  Negative Inverted Radial Reflex and Rangel's bilaterally. Negative Babinski bilaterally.     IMAGING:   Today I personally reviewed AP, Lat and Flex/Ex  upright C-spine films that demonstrate C4-6 spondylosis, there is global cervical kyphosis, there is a C6-7 block vertebra.  Recent MRI demonstrates moderate central stenosis from C3-C6 as well as left-sided C5-6 foraminal stenosis.      There is no height or weight on file to calculate BMI.  Hemoglobin A1C   Date Value Ref Range Status   11/26/2006 5.1 4.5 - 6.2 % Final       ASSESSMENT/PLAN:  Cervical radiculopathy  -     Ambulatory referral/consult to Orthopedics  -     Ambulatory referral[facility]consult to [facility]    Cervical spondylosis  -     Ambulatory referral/consult to Orthopedics  -     Ambulatory referral/consult to [facility]    Other orders  -     celecoxib (CELEBREX) 100 MG capsule; Take 1 capsule (100 mg total) by mouth 2 (two) times daily.  Dispense: 40 capsule; Refill: 1      No follow-ups on file.  Today we discussed options, overall her pain does appear to be improving and she is not have any motor deficits.  I recommended physical that therapy and Celebrex.  We can consider an epidural steroid injection the future.

## 2022-03-29 ENCOUNTER — PATIENT MESSAGE (OUTPATIENT)
Dept: RESEARCH | Facility: HOSPITAL | Age: 77
End: 2022-03-29
Payer: MEDICARE

## 2022-04-06 ENCOUNTER — OFFICE VISIT (OUTPATIENT)
Dept: PRIMARY CARE CLINIC | Facility: CLINIC | Age: 77
End: 2022-04-06
Payer: MEDICARE

## 2022-04-06 VITALS
DIASTOLIC BLOOD PRESSURE: 84 MMHG | SYSTOLIC BLOOD PRESSURE: 128 MMHG | OXYGEN SATURATION: 98 % | WEIGHT: 173.75 LBS | HEART RATE: 63 BPM | HEIGHT: 61 IN | TEMPERATURE: 98 F | BODY MASS INDEX: 32.8 KG/M2

## 2022-04-06 DIAGNOSIS — U07.1 ENCEPHALOPATHY DUE TO COVID-19 VIRUS: ICD-10-CM

## 2022-04-06 DIAGNOSIS — M47.812 SPONDYLOSIS OF CERVICAL JOINT WITHOUT MYELOPATHY: ICD-10-CM

## 2022-04-06 DIAGNOSIS — R22.1 LOCALIZED SWELLING, MASS AND LUMP, NECK: Primary | ICD-10-CM

## 2022-04-06 DIAGNOSIS — M26.622 ARTHRALGIA OF LEFT TEMPOROMANDIBULAR JOINT: ICD-10-CM

## 2022-04-06 DIAGNOSIS — E03.8 SUBCLINICAL HYPOTHYROIDISM: ICD-10-CM

## 2022-04-06 DIAGNOSIS — G93.49 ENCEPHALOPATHY DUE TO COVID-19 VIRUS: ICD-10-CM

## 2022-04-06 PROCEDURE — 99999 PR PBB SHADOW E&M-EST. PATIENT-LVL V: ICD-10-PCS | Mod: PBBFAC,,, | Performed by: FAMILY MEDICINE

## 2022-04-06 PROCEDURE — 3074F PR MOST RECENT SYSTOLIC BLOOD PRESSURE < 130 MM HG: ICD-10-PCS | Mod: CPTII,S$GLB,, | Performed by: FAMILY MEDICINE

## 2022-04-06 PROCEDURE — 1101F PR PT FALLS ASSESS DOC 0-1 FALLS W/OUT INJ PAST YR: ICD-10-PCS | Mod: CPTII,S$GLB,, | Performed by: FAMILY MEDICINE

## 2022-04-06 PROCEDURE — 99214 OFFICE O/P EST MOD 30 MIN: CPT | Mod: S$GLB,,, | Performed by: FAMILY MEDICINE

## 2022-04-06 PROCEDURE — 3074F SYST BP LT 130 MM HG: CPT | Mod: CPTII,S$GLB,, | Performed by: FAMILY MEDICINE

## 2022-04-06 PROCEDURE — 1159F MED LIST DOCD IN RCRD: CPT | Mod: CPTII,S$GLB,, | Performed by: FAMILY MEDICINE

## 2022-04-06 PROCEDURE — 3079F PR MOST RECENT DIASTOLIC BLOOD PRESSURE 80-89 MM HG: ICD-10-PCS | Mod: CPTII,S$GLB,, | Performed by: FAMILY MEDICINE

## 2022-04-06 PROCEDURE — 3288F FALL RISK ASSESSMENT DOCD: CPT | Mod: CPTII,S$GLB,, | Performed by: FAMILY MEDICINE

## 2022-04-06 PROCEDURE — 3079F DIAST BP 80-89 MM HG: CPT | Mod: CPTII,S$GLB,, | Performed by: FAMILY MEDICINE

## 2022-04-06 PROCEDURE — 1160F PR REVIEW ALL MEDS BY PRESCRIBER/CLIN PHARMACIST DOCUMENTED: ICD-10-PCS | Mod: CPTII,S$GLB,, | Performed by: FAMILY MEDICINE

## 2022-04-06 PROCEDURE — 1125F AMNT PAIN NOTED PAIN PRSNT: CPT | Mod: CPTII,S$GLB,, | Performed by: FAMILY MEDICINE

## 2022-04-06 PROCEDURE — 99214 PR OFFICE/OUTPT VISIT, EST, LEVL IV, 30-39 MIN: ICD-10-PCS | Mod: S$GLB,,, | Performed by: FAMILY MEDICINE

## 2022-04-06 PROCEDURE — 3288F PR FALLS RISK ASSESSMENT DOCUMENTED: ICD-10-PCS | Mod: CPTII,S$GLB,, | Performed by: FAMILY MEDICINE

## 2022-04-06 PROCEDURE — 99999 PR PBB SHADOW E&M-EST. PATIENT-LVL V: CPT | Mod: PBBFAC,,, | Performed by: FAMILY MEDICINE

## 2022-04-06 PROCEDURE — 1160F RVW MEDS BY RX/DR IN RCRD: CPT | Mod: CPTII,S$GLB,, | Performed by: FAMILY MEDICINE

## 2022-04-06 PROCEDURE — 1125F PR PAIN SEVERITY QUANTIFIED, PAIN PRESENT: ICD-10-PCS | Mod: CPTII,S$GLB,, | Performed by: FAMILY MEDICINE

## 2022-04-06 PROCEDURE — 1101F PT FALLS ASSESS-DOCD LE1/YR: CPT | Mod: CPTII,S$GLB,, | Performed by: FAMILY MEDICINE

## 2022-04-06 PROCEDURE — 1159F PR MEDICATION LIST DOCUMENTED IN MEDICAL RECORD: ICD-10-PCS | Mod: CPTII,S$GLB,, | Performed by: FAMILY MEDICINE

## 2022-04-06 NOTE — PROGRESS NOTES
Subjective:      Patient ID: Daisha Mayo is a 76 y.o. female.    Chief Complaint: Lump (Left, neck region)    Here today for swelling in L neck, shoulder region.  Has been ongoing for 2 years since her daughter moved back home with COVID. Has been ongoing from before she was in PT. No fever, chills, weight loss    7/26/2021 normal Mammogram, but hx of L breast duct excision, noncancerous in the past.     This week tx with Dentist for TMJ on the L side, which is causing some facial swelling    BP has been high, but recently nml BP at home by dtr who is a nurse.     NECK ULTRASOUND 12/29/2021  Stable subcentimeter nodule on the right which does not meet criteria for follow-up or FNA.  No new nodules.        She did see Dr Serrano recently 3/16/22 for cervical radiculopathy. Has been in PT for neck, shoulder & L arm since Feb 2022.     MRI CERVICAL 2/1/22  Cervical spondylosis.  Mild spinal canal stenosis and mild to moderate neural foraminal narrowing at C3-C5.         Current Outpatient Medications   Medication Instructions    ascorbic acid (vitamin C) (VITAMIN C) 100 mg, Oral, Daily    aspirin 81 mg, Oral, Daily    celecoxib (CELEBREX) 100 mg, Oral, 2 times daily    clobetasol 0.05% (TEMOVATE) 0.05 % Oint Topical (Top), 2 times daily    dorzolamide-timolol (COSOPT) 2-0.5 % ophthalmic solution 1 drop, Both Eyes, 2 times daily,      EScitalopram oxalate (LEXAPRO) 5 mg, Oral, Daily    latanoprost 0.005 % ophthalmic solution 1 drop, Nightly    multivitamin with minerals (HAIR,SKIN AND NAILS ORAL) 1 tablet, Oral, Daily    multivitamin-minerals-lutein Tab 1 tablet, Daily    prazosin (MINIPRESS) 1 mg, Oral, Nightly    thiamine mononitrate (vit B1) (VITAMIN B-1 (MONONITRATE)) 100 mg, Oral, Daily    thiamine 100 mg, Oral, Daily    VITAMIN D3 5,000 unit Tab No dose, route, or frequency recorded.       Lab Results   Component Value Date    HGBA1C 5.1 11/26/2006     No results found for: MICALBCREAT  Lab  Results   Component Value Date    LDLCALC 152.8 02/09/2022    LDLCALC 188.2 (H) 01/13/2022    CHOL 225 (H) 02/09/2022    HDL 55 02/09/2022    TRIG 86 02/09/2022       Lab Results   Component Value Date     02/09/2022    K 4.7 02/09/2022     02/09/2022    CO2 31 (H) 02/09/2022    GLU 92 02/09/2022    BUN 16 02/09/2022    CREATININE 0.7 02/09/2022    CALCIUM 8.9 02/09/2022    PROT 6.9 02/09/2022    ALBUMIN 3.7 02/09/2022    BILITOT 0.8 02/09/2022    ALKPHOS 100 02/09/2022    AST 14 02/09/2022    ALT 9 (L) 02/09/2022    ANIONGAP 5 (L) 02/09/2022    ESTGFRAFRICA >60.0 02/09/2022    EGFRNONAA >60.0 02/09/2022    WBC 5.10 01/13/2022    HGB 14.5 01/13/2022    HGB 13.8 02/02/2021    HCT 44.4 01/13/2022    MCV 98 01/13/2022     01/13/2022    TSH 3.347 01/13/2022    HEPCAB Negative 10/14/2015       Lab Results   Component Value Date    FSH 67.40 01/10/2020    AXPZTDXX39MK 71 11/27/2019    FERBBRDO77NP 73 03/28/2019    FDSUMRAX62 528 01/13/2022    FERRITIN 499 (H) 04/03/2020         Past Medical History:   Diagnosis Date    Arthritis     Cataract     Depression     Around [place]; treated with Wellbutrin    Early cataracts, bilateral     Encephalopathy due to COVID-19 virus 04/03/2020    resoved 1 year after COVID infection, 2/21, [facility]    SAMSON (generalized anxiety disorder) 2/5/2019    GERD (gastroesophageal reflux disease)     Glaucoma     Hyperprolactinemia     Hypertension     Hypothyroidism     Osteopenia     Postmenopause atrophic vaginitis 4/28/2021    Primary osteoarthritis of left knee 4/28/2021    PT [facility] 2021    Recurrent UTI     Every few months    Right ankle swelling 4/28/2021    Sciatica neuralgia     Temporal lobe seizure 2006    Trigeminal neuralgia     Vitamin D deficiency disease      Past Surgical History:   Procedure Laterality Date    BREAST BIOPSY      duct excision    CHOLECYSTECTOMY  1994    laporascopic    COLONOSCOPY      EYE SURGERY      LASIK surgery  "to one eye; cannot remember which one    TOTAL ABDOMINAL HYSTERECTOMY W/ BILATERAL SALPINGOOPHORECTOMY      ; diffinitive treatment for menorrhagia     Social History     Social History Narrative    dtr Zayra moved home from [place] to help         Family History   Problem Relation Age of Onset    Heart disease Mother     Hypertension Mother     Thyroid disease Mother     Colon cancer Mother 90    Breast cancer Mother 104    Cancer Mother 103        colon and breast    Kidney disease Father     Heart disease Sister         pacemaker    Hypertension Sister     Thyroid disease Sister     Glaucoma Sister     Kidney disease Sister         stage 4 ckd - refuses dialysis if worsens    Fibromyalgia Brother     Kidney disease Brother     Sleep apnea Brother     Hypertension Brother         orthostatic hypotension    Glaucoma Brother     Cancer Brother         prostate    Heart disease Brother         sudden death at age 67    Cancer Cousin 31         from breast cancer at age 31    Breast cancer Cousin     Glaucoma Paternal Aunt     Glaucoma Paternal Uncle     Glaucoma Paternal Grandfather         Went blind from Glaucoma    Breast cancer Maternal Aunt     Celiac disease Neg Hx     Cirrhosis Neg Hx     Colon polyps Neg Hx     Crohn's disease Neg Hx     Cystic fibrosis Neg Hx     Esophageal cancer Neg Hx     Hemochromatosis Neg Hx     Inflammatory bowel disease Neg Hx     Irritable bowel syndrome Neg Hx     Liver cancer Neg Hx     Liver disease Neg Hx     Rectal cancer Neg Hx     Stomach cancer Neg Hx     Ulcerative colitis Neg Hx     Moses's disease Neg Hx      Vitals:    22 1356   BP: 128/84   Pulse: 63   Temp: 98.2 °F (36.8 °C)   SpO2: 98%   Weight: 78.8 kg (173 lb 11.6 oz)   Height: 5' 1" (1.549 m)   PainSc:   1     Objective:   Physical Exam  Neck:      Comments: Mild swelling obvious L supraclavicular area, but nontender, not erythematous, no lymph nodes " palpated supraclavicular, or cervical, or axillary  Cardiovascular:      Rate and Rhythm: Normal rate and regular rhythm.      Heart sounds: Normal heart sounds.      Comments: Breasts symmetrical, nontender, no mass, no supraclavicular or axillary lymphadenopathy    Pulmonary:      Effort: Pulmonary effort is normal. No respiratory distress.      Breath sounds: Normal breath sounds.   Musculoskeletal:      Cervical back: Normal range of motion and neck supple. No rigidity.   Psychiatric:         Behavior: Behavior normal.         Thought Content: Thought content normal.         Judgment: Judgment normal.       Assessment:     1. Localized swelling, mass and lump, neck    2. Spondylosis of cervical joint without myelopathy    3. Subclinical hypothyroidism    4. Arthralgia of left temporomandibular joint    5. Encephalopathy due to COVID-19 virus      Plan:     Orders Placed This Encounter    US Soft Tissue Head Neck Thyroid       Continue PT    Follow with Dentis    Continue medications    I will notify pt of results  There are no Patient Instructions on file for this visit.

## 2022-04-11 ENCOUNTER — RESEARCH ENCOUNTER (OUTPATIENT)
Dept: RESEARCH | Facility: HOSPITAL | Age: 77
End: 2022-04-11
Payer: MEDICARE

## 2022-04-11 ENCOUNTER — PATIENT MESSAGE (OUTPATIENT)
Dept: RESEARCH | Facility: HOSPITAL | Age: 77
End: 2022-04-11
Payer: MEDICARE

## 2022-04-11 ENCOUNTER — HOSPITAL ENCOUNTER (OUTPATIENT)
Dept: RADIOLOGY | Facility: HOSPITAL | Age: 77
Discharge: HOME | End: 2022-04-11
Attending: FAMILY MEDICINE
Payer: MEDICARE

## 2022-04-11 DIAGNOSIS — R22.1 LOCALIZED SWELLING, MASS AND LUMP, NECK: ICD-10-CM

## 2022-04-11 PROCEDURE — 76536 US EXAM OF HEAD AND NECK: CPT | Mod: TC

## 2022-04-11 PROCEDURE — 76536 US SOFT TISSUE HEAD NECK THYROID: ICD-10-PCS | Mod: 26,,, | Performed by: RADIOLOGY

## 2022-04-11 PROCEDURE — 76536 US EXAM OF HEAD AND NECK: CPT | Mod: 26,,, | Performed by: RADIOLOGY

## 2022-04-11 NOTE — PROGRESS NOTES
Study title: [facility]  IRB #: [id number]  IRB approval date: 1/19/2022       Patient called to discuss participation into the [facility] study. Explained overview of study. Patient expressed interest and is willing to see us on April 19 @ 1:30pm. Patient voiced understanding.  Reminder MyChart message will be sent about appointment.

## 2022-04-13 ENCOUNTER — PATIENT MESSAGE (OUTPATIENT)
Dept: INTERNAL MEDICINE | Facility: CLINIC | Age: 77
End: 2022-04-13
Payer: MEDICARE

## 2022-04-13 NOTE — TELEPHONE ENCOUNTER
Returned patient's call.  She reports no concerning findings were found on the ultrasound of her neck.

## 2022-04-18 ENCOUNTER — PATIENT MESSAGE (OUTPATIENT)
Dept: RESEARCH | Facility: HOSPITAL | Age: 77
End: 2022-04-18
Payer: MEDICARE

## 2022-04-19 ENCOUNTER — PATIENT MESSAGE (OUTPATIENT)
Dept: RESEARCH | Facility: HOSPITAL | Age: 77
End: 2022-04-19
Payer: MEDICARE

## 2022-04-19 ENCOUNTER — RESEARCH ENCOUNTER (OUTPATIENT)
Dept: HEMATOLOGY/ONCOLOGY | Facility: CLINIC | Age: 77
End: 2022-04-19
Payer: MEDICARE

## 2022-04-19 NOTE — RESEARCH
Met with patient at the Primary Care and [facility] center regarding participation in IRB protocol #2022.001, [facility] study. Pt was agreeable.    The Informed Consent Form (ICF) was reviewed with pt. The discussion included:  - participation is voluntary  - pt can change their mind about participating  - pt was informed that participation in this study would not preclude them from participating in any other research if offered  - specimens will be used by [facility] researchers in this study  - specimens collected (blood, saliva, nasal swab, urine, stool) include only those discussed with the patient at the time of consent and are indicated on the ICF   - specimens will be used in a multi-omics study regarding Covid-19 severity and biomarkers that are involved  - all specimens released to researchers will be stripped of identifiers  - no personal information will be released to any parties outside of this research study  - there will be no other physical risks outside of those involved in standard of care procedure.     Pt had a chance to ask any questions, and all questions were answered. Pt willingly and independently signed ICF.    A copy of ICF was given to pt with instructions to call with any questions that may arise or if they should change their mind regarding participation in [facility] study.    Four questionnaires and two take home sample collection kits were provided to the pt at the end of this encounter. The patient was instructed on how to collect and return these study items. Pt was then informed of a $50 stipend that would be offered to them upon return of these questionnaires and samples.    The following specimens were collected from the pt at the time of this encounter: blood, saliva, nasal swab

## 2022-05-19 ENCOUNTER — IMMUNIZATION (OUTPATIENT)
Dept: INTERNAL MEDICINE | Facility: CLINIC | Age: 77
End: 2022-05-19
Payer: MEDICARE

## 2022-05-19 DIAGNOSIS — Z23 NEED FOR VACCINATION: Primary | ICD-10-CM

## 2022-05-19 PROCEDURE — 91305 COVID-19, MRNA, LNP-S, PF, 30 MCG/0.3 ML DOSE VACCINE (PFIZER): CPT | Mod: PBBFAC | Performed by: INTERNAL MEDICINE

## 2022-05-22 ENCOUNTER — PATIENT MESSAGE (OUTPATIENT)
Dept: PRIMARY CARE CLINIC | Facility: CLINIC | Age: 77
End: 2022-05-22
Payer: MEDICARE

## 2022-05-23 NOTE — TELEPHONE ENCOUNTER
Ok to fill out motor vehicle papers for Luis Manuel & her .     Ask them if they want hang tag or license plate     I can sign tomorrow. THanks

## 2022-05-24 ENCOUNTER — TELEPHONE (OUTPATIENT)
Dept: PRIMARY CARE CLINIC | Facility: CLINIC | Age: 77
End: 2022-05-24
Payer: MEDICARE

## 2022-05-24 ENCOUNTER — PATIENT MESSAGE (OUTPATIENT)
Dept: PRIMARY CARE CLINIC | Facility: CLINIC | Age: 77
End: 2022-05-24
Payer: MEDICARE

## 2022-05-24 DIAGNOSIS — Z12.31 SCREENING MAMMOGRAM, ENCOUNTER FOR: Primary | ICD-10-CM

## 2022-05-24 NOTE — TELEPHONE ENCOUNTER
Ok to check temporary on both of their motor vehicle papers (I already signed them) , so they receive hang tags

## 2022-07-12 ENCOUNTER — OFFICE VISIT (OUTPATIENT)
Dept: PSYCHIATRY | Facility: CLINIC | Age: 77
End: 2022-07-12
Payer: MEDICARE

## 2022-07-12 VITALS
WEIGHT: 174.06 LBS | BODY MASS INDEX: 32.89 KG/M2 | HEART RATE: 63 BPM | DIASTOLIC BLOOD PRESSURE: 67 MMHG | SYSTOLIC BLOOD PRESSURE: 163 MMHG

## 2022-07-12 DIAGNOSIS — E51.9 THIAMINE DEFICIENCY: ICD-10-CM

## 2022-07-12 DIAGNOSIS — F41.1 GAD (GENERALIZED ANXIETY DISORDER): ICD-10-CM

## 2022-07-12 DIAGNOSIS — G93.49 ENCEPHALOPATHY DUE TO COVID-19 VIRUS: ICD-10-CM

## 2022-07-12 DIAGNOSIS — U07.1 ENCEPHALOPATHY DUE TO COVID-19 VIRUS: ICD-10-CM

## 2022-07-12 DIAGNOSIS — F02.80 MAJOR NEUROCOGNITIVE DISORDER DUE TO ANOTHER MEDICAL CONDITION: Primary | ICD-10-CM

## 2022-07-12 PROCEDURE — 99213 PR OFFICE/OUTPT VISIT, EST, LEVL III, 20-29 MIN: ICD-10-PCS | Mod: S$GLB,,, | Performed by: PSYCHIATRY & NEUROLOGY

## 2022-07-12 PROCEDURE — 99999 PR PBB SHADOW E&M-EST. PATIENT-LVL III: ICD-10-PCS | Mod: PBBFAC,,, | Performed by: PSYCHIATRY & NEUROLOGY

## 2022-07-12 PROCEDURE — 3077F PR MOST RECENT SYSTOLIC BLOOD PRESSURE >= 140 MM HG: ICD-10-PCS | Mod: CPTII,S$GLB,, | Performed by: PSYCHIATRY & NEUROLOGY

## 2022-07-12 PROCEDURE — 1159F PR MEDICATION LIST DOCUMENTED IN MEDICAL RECORD: ICD-10-PCS | Mod: CPTII,S$GLB,, | Performed by: PSYCHIATRY & NEUROLOGY

## 2022-07-12 PROCEDURE — 99213 OFFICE O/P EST LOW 20 MIN: CPT | Mod: S$GLB,,, | Performed by: PSYCHIATRY & NEUROLOGY

## 2022-07-12 PROCEDURE — 3077F SYST BP >= 140 MM HG: CPT | Mod: CPTII,S$GLB,, | Performed by: PSYCHIATRY & NEUROLOGY

## 2022-07-12 PROCEDURE — 99999 PR PBB SHADOW E&M-EST. PATIENT-LVL III: CPT | Mod: PBBFAC,,, | Performed by: PSYCHIATRY & NEUROLOGY

## 2022-07-12 PROCEDURE — 1159F MED LIST DOCD IN RCRD: CPT | Mod: CPTII,S$GLB,, | Performed by: PSYCHIATRY & NEUROLOGY

## 2022-07-12 PROCEDURE — 3078F PR MOST RECENT DIASTOLIC BLOOD PRESSURE < 80 MM HG: ICD-10-PCS | Mod: CPTII,S$GLB,, | Performed by: PSYCHIATRY & NEUROLOGY

## 2022-07-12 PROCEDURE — 90833 PSYTX W PT W E/M 30 MIN: CPT | Mod: S$GLB,,, | Performed by: PSYCHIATRY & NEUROLOGY

## 2022-07-12 PROCEDURE — 1160F PR REVIEW ALL MEDS BY PRESCRIBER/CLIN PHARMACIST DOCUMENTED: ICD-10-PCS | Mod: CPTII,S$GLB,, | Performed by: PSYCHIATRY & NEUROLOGY

## 2022-07-12 PROCEDURE — 90833 PR PSYCHOTHERAPY W/PATIENT W/E&M, 30 MIN (ADD ON): ICD-10-PCS | Mod: S$GLB,,, | Performed by: PSYCHIATRY & NEUROLOGY

## 2022-07-12 PROCEDURE — 1160F RVW MEDS BY RX/DR IN RCRD: CPT | Mod: CPTII,S$GLB,, | Performed by: PSYCHIATRY & NEUROLOGY

## 2022-07-12 PROCEDURE — 3078F DIAST BP <80 MM HG: CPT | Mod: CPTII,S$GLB,, | Performed by: PSYCHIATRY & NEUROLOGY

## 2022-07-16 NOTE — PROGRESS NOTES
7/12/2022     Psychiatry Outpatient Visit    Clinical Status of Patient:  Outpatient (Ambulatory)    Chief Complaint:  Daisha Mayo is a 76 y.o. female who presents today for follow-up of anxiety, memory problem and attention problems.  Met with patient and son.    Interval History and Content of Current Session:  Interim Events/Subjective Report/Content of Current Session:  Ms. Mayo returned with her son, casually dressed and neatly groomed.  Her mood was euthymic with a pleasant affect.  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  She has resumed all activities except driving, and she is unsure whether she wants to do that again.  Otherwise, she said that her life is good.     Medications were discussed.  She successfully tapered off of Lexapro and has been off of it for 2 months with no increase in anxiety or feelings of depression.  Prazosin was stopped as ineffective.  Pt is currently on no psychotropics, though she should probably continue thiamine supplementation due to documented low levels.  She will call prn any return of above problems, but no f/u is otherwise scheduled.  She is aware of HTN and need to address this with her PCP.    Psychotherapy:  · Target symptoms: depression, anxiety , confusion  · Why chosen therapy is appropriate versus another modality: relevant to diagnosis, evidence based practice  · Outcome monitoring methods: self-report, observation, feedback from family  · Therapeutic intervention type: supportive psychotherapy, psychoeducation  · Topics discussed/themes: stress related to medical comorbidities, building skills sets for symptom management, symptom recognition  · The patient's response to the intervention is motivated. The patient's progress toward treatment goals is excellent.   · Duration of intervention: 27 minutes.    Review of Systems   · PSYCHIATRIC:  Pertinant items are noted in the narrative.  · NEUROLOGIC: encephalitis -- resolved  · RESPIRATORY: s/p COVID-19 infection  · CARDIOVASCULAR: HTN    Past Medical, Family and Social History: The patient's past medical, family and social history have been reviewed and updated as appropriate within the electronic medical record - see encounter notes.    Compliance: yes.    Side effects: None    Risk Parameters:  Patient reports no suicidal ideation  Patient reports no homicidal ideation  Patient reports no self-injurious behavior  Patient reports no violent behavior    Exam (detailed: at least 9 elements; comprehensive: all 15 elements)   Constitutional  Vitals:  Most recent vital signs, dated less than 90 days prior to this appointment, were not reviewed.   Vitals:    07/12/22 0951 07/12/22 1023   BP: (!) 163/82 (!) 163/67   Pulse: 69 63   Weight: 78.9 kg (174 lb 0.9 oz)         General:  age appropriate, casually dressed     Musculoskeletal  Muscle Strength/Tone:  no rigidity, no dyskinesia, no dystonia, no tremor   Gait & Station:  non-ataxic     Psychiatric  Speech:  spontaneous   Mood & Affect:  euthymic  mood-congruent   Thought Process:  goal-directed, logical   Associations:  intact   Thought Content:  normal, no suicidality, no homicidality, delusions, or paranoia   Insight:  has awareness of illness   Judgement: behavior is adequate to circumstances   Orientation:  person, place, situation, time/date, day of week, month of year, year   Memory: MSE normal except for serial 7's and slowing   Language: grossly intact   Attention Span & Concentration:  able to focus   Fund of Knowledge:  intact and appropriate to age and level of education     Assessment and Diagnosis   Status/Progress: Based on the examination today, the patient's problem(s) is/are resolved.  New problems have not been presented today.   Co-morbidities are not complicating management of the primary condition.  There are no active rule-out  diagnoses for this patient at this time.     General Impression: Pt is euthymic and has recovered from COVID encephalitis.      ICD-10-CM ICD-9-CM   1. Major neurocognitive disorder due to another medical condition  F02.80 294.9   2. SAMSON (generalized anxiety disorder)  F41.1 300.02   3. Encephalopathy due to COVID-19 virus  U07.1 348.39    G93.49 079.89       Intervention/Counseling/Treatment Plan   · Medication Management: Continue current medications.   · Continue all previous activities.  Pt is undecided on whether she will drive again.  · Continue regular medical f/u.      Return to Clinic: as needed

## 2022-07-18 ENCOUNTER — PATIENT MESSAGE (OUTPATIENT)
Dept: PRIMARY CARE CLINIC | Facility: CLINIC | Age: 77
End: 2022-07-18
Payer: MEDICARE

## 2022-07-18 ENCOUNTER — TELEPHONE (OUTPATIENT)
Dept: PRIMARY CARE CLINIC | Facility: CLINIC | Age: 77
End: 2022-07-18
Payer: MEDICARE

## 2022-07-18 NOTE — TELEPHONE ENCOUNTER
Patient is complaining of having high blood pressure states the weekend it was 161/112. Patient says she has gain all the wegth back that she had lost and thinks this is a factor effecting her weight. Patient did take hydrochlorothiazide 25 mg on Saturday Sunday and Monday her pressure today is 148/100 the second reading was 126/80 patient says it could be because she is anxious. Please advise

## 2022-07-19 ENCOUNTER — CLINICAL SUPPORT (OUTPATIENT)
Dept: PRIMARY CARE CLINIC | Facility: CLINIC | Age: 77
End: 2022-07-19
Payer: MEDICARE

## 2022-07-19 VITALS — DIASTOLIC BLOOD PRESSURE: 70 MMHG | SYSTOLIC BLOOD PRESSURE: 138 MMHG

## 2022-07-19 DIAGNOSIS — Z01.30 BLOOD PRESSURE CHECK: Primary | ICD-10-CM

## 2022-07-19 PROCEDURE — 99999 PR PBB SHADOW E&M-EST. PATIENT-LVL I: ICD-10-PCS | Mod: PBBFAC,,,

## 2022-07-19 PROCEDURE — 99999 PR PBB SHADOW E&M-EST. PATIENT-LVL I: CPT | Mod: PBBFAC,,,

## 2022-07-20 ENCOUNTER — OFFICE VISIT (OUTPATIENT)
Dept: PRIMARY CARE CLINIC | Facility: CLINIC | Age: 77
End: 2022-07-20
Payer: MEDICARE

## 2022-07-20 DIAGNOSIS — F41.1 GAD (GENERALIZED ANXIETY DISORDER): ICD-10-CM

## 2022-07-20 DIAGNOSIS — I10 ESSENTIAL HYPERTENSION: Primary | ICD-10-CM

## 2022-07-20 PROCEDURE — 1159F PR MEDICATION LIST DOCUMENTED IN MEDICAL RECORD: ICD-10-PCS | Mod: CPTII,95,, | Performed by: FAMILY MEDICINE

## 2022-07-20 PROCEDURE — 99214 OFFICE O/P EST MOD 30 MIN: CPT | Mod: 95,,, | Performed by: FAMILY MEDICINE

## 2022-07-20 PROCEDURE — 99214 PR OFFICE/OUTPT VISIT, EST, LEVL IV, 30-39 MIN: ICD-10-PCS | Mod: 95,,, | Performed by: FAMILY MEDICINE

## 2022-07-20 PROCEDURE — 1159F MED LIST DOCD IN RCRD: CPT | Mod: CPTII,95,, | Performed by: FAMILY MEDICINE

## 2022-07-20 PROCEDURE — 1160F RVW MEDS BY RX/DR IN RCRD: CPT | Mod: CPTII,95,, | Performed by: FAMILY MEDICINE

## 2022-07-20 PROCEDURE — 1160F PR REVIEW ALL MEDS BY PRESCRIBER/CLIN PHARMACIST DOCUMENTED: ICD-10-PCS | Mod: CPTII,95,, | Performed by: FAMILY MEDICINE

## 2022-07-20 RX ORDER — HYDROCHLOROTHIAZIDE 25 MG/1
25 TABLET ORAL DAILY
Qty: 90 TABLET | Refills: 3 | Status: SHIPPED | OUTPATIENT
Start: 2022-07-20 | End: 2022-08-25 | Stop reason: SDUPTHER

## 2022-07-20 NOTE — PROGRESS NOTES
Subjective:      Patient ID: Daisha Mayo is a 76 y.o. female.    Chief Complaint: No chief complaint on file.    The patient location is: home  The chief complaint leading to consultation is: HTN    Visit type: audiovisual    Face to Face time with patient:   20 minutes of total time spent on the encounter, which includes face to face time and non-face to face time preparing to see the patient (eg, review of tests), Obtaining and/or reviewing separately obtained history, Documenting clinical information in the electronic or other health record, Independently interpreting results (not separately reported) and communicating results to the patient/family/caregiver, or Care coordination (not separately reported).         Each patient to whom he or she provides medical services by telemedicine is:  (1) informed of the relationship between the physician and patient and the respective role of any other health care provider with respect to management of the patient; and (2) notified that he or she may decline to receive medical services by telemedicine and may withdraw from such care at any time.    Notes:     BP was normal here yesterday. Taking HCTZ 25 mg again. I am nervous travelling to see my brother in [place] next week. Had a headache. My BP was high    I gained all the weight I lost before when I was able to stop taking HCTZ 25.     BP now better with HCTZ 25 daily 138/70    Dr Peters prescribed Prazosin 1mg qhs to help with PTSD aftter COVID illness & dreams affecting her sleep. She has not been taking this medication    Current Outpatient Medications   Medication Instructions    ascorbic acid (vitamin C) (VITAMIN C) 100 mg, Oral, Daily    celecoxib (CELEBREX) 100 mg, Oral, 2 times daily    clobetasol 0.05% (TEMOVATE) 0.05 % Oint Topical (Top), 2 times daily    dorzolamide-timolol (COSOPT) 2-0.5 % ophthalmic solution 1 drop, Both Eyes, 2 times daily,      hydroCHLOROthiazide (HYDRODIURIL) 25 mg, Oral, Daily     latanoprost 0.005 % ophthalmic solution 1 drop, Nightly    multivitamin with minerals (HAIR,SKIN AND NAILS ORAL) 1 tablet, Oral, Daily    multivitamin-minerals-lutein Tab 1 tablet, Daily    thiamine mononitrate (vit B1) (VITAMIN B-1 (MONONITRATE)) 100 mg, Oral, Daily    VITAMIN D3 5,000 unit Tab No dose, route, or frequency recorded.       Lab Results   Component Value Date    HGBA1C 5.1 11/26/2006     No results found for: MICALBCREAT  Lab Results   Component Value Date    LDLCALC 152.8 02/09/2022    LDLCALC 188.2 (H) 01/13/2022    CHOL 225 (H) 02/09/2022    HDL 55 02/09/2022    TRIG 86 02/09/2022       Lab Results   Component Value Date     02/09/2022    K 4.7 02/09/2022     02/09/2022    CO2 31 (H) 02/09/2022    GLU 92 02/09/2022    BUN 16 02/09/2022    CREATININE 0.7 02/09/2022    CALCIUM 8.9 02/09/2022    PROT 6.9 02/09/2022    ALBUMIN 3.7 02/09/2022    BILITOT 0.8 02/09/2022    ALKPHOS 100 02/09/2022    AST 14 02/09/2022    ALT 9 (L) 02/09/2022    ANIONGAP 5 (L) 02/09/2022    ESTGFRAFRICA >60.0 02/09/2022    EGFRNONAA >60.0 02/09/2022    WBC 5.10 01/13/2022    HGB 14.5 01/13/2022    HGB 13.8 02/02/2021    HCT 44.4 01/13/2022    MCV 98 01/13/2022     01/13/2022    TSH 3.347 01/13/2022    HEPCAB Negative 10/14/2015       Lab Results   Component Value Date    FSH 67.40 01/10/2020    [id number] 71 11/27/2019    HGNRXEAJ69ZH 73 03/28/2019    KLLWLPYN59 528 01/13/2022    FERRITIN 499 (H) 04/03/2020         Past Medical History:   Diagnosis Date    Arthritis     Cataract     Depression     Around [place]; treated with Wellbutrin    Early cataracts, bilateral     Encephalopathy due to COVID-19 virus 04/03/2020    resoved 1 year after COVID infection, 2/21, Psych [facility]    SAMSON (generalized anxiety disorder) 2/5/2019    GERD (gastroesophageal reflux disease)     Glaucoma     Hyperprolactinemia     Hypertension     Hypothyroidism     Osteopenia     Postmenopause atrophic  vaginitis 2021    Primary osteoarthritis of left knee 2021    PT [facility]     Recurrent UTI     Every few months    Right ankle swelling 2021    Sciatica neuralgia     Temporal lobe seizure 2006    Trigeminal neuralgia     Vitamin D deficiency disease      Past Surgical History:   Procedure Laterality Date    BREAST BIOPSY      duct excision    CHOLECYSTECTOMY      laporascopic    COLONOSCOPY      EYE SURGERY      LASIK surgery to one eye; cannot remember which one    TOTAL ABDOMINAL HYSTERECTOMY W/ BILATERAL SALPINGOOPHORECTOMY      ; diffinitive treatment for menorrhagia     Social History     Social History Narrative    dtr Zayra moved home from [place] to help         Family History   Problem Relation Age of Onset    Heart disease Mother     Hypertension Mother     Thyroid disease Mother     Colon cancer Mother 90    Breast cancer Mother 104    Cancer Mother 103        colon and breast    Kidney disease Father     Heart disease Sister         pacemaker    Hypertension Sister     Thyroid disease Sister     Glaucoma Sister     Kidney disease Sister         stage 4 ckd - refuses dialysis if worsens    Fibromyalgia Brother     Kidney disease Brother     Sleep apnea Brother     Hypertension Brother         orthostatic hypotension    Glaucoma Brother     Cancer Brother         prostate    Heart disease Brother         sudden death at age 67    Cancer Cousin 31         from breast cancer at age 31    Breast cancer Cousin     Glaucoma Paternal Aunt     Glaucoma Paternal Uncle     Glaucoma Paternal Grandfather         Went blind from Glaucoma    Breast cancer Maternal Aunt     Celiac disease Neg Hx     Cirrhosis Neg Hx     Colon polyps Neg Hx     Crohn's disease Neg Hx     Cystic fibrosis Neg Hx     Esophageal cancer Neg Hx     Hemochromatosis Neg Hx     Inflammatory bowel disease Neg Hx     Irritable bowel syndrome Neg Hx     Liver cancer Neg  Hx     Liver disease Neg Hx     Rectal cancer Neg Hx     Stomach cancer Neg Hx     Ulcerative colitis Neg Hx     Moses's disease Neg Hx      There were no vitals filed for this visit.  Objective:   Physical Exam  Assessment:     1. Essential hypertension    2. SAMSON (generalized anxiety disorder)      Plan:     Orders Placed This Encounter    hydroCHLOROthiazide (HYDRODIURIL) 25 MG tablet       ==============================================  FOR HIGH BLOOD PRESSURE (HYPERTENSION)-------------    Take your medication every day     Blood pressure stable < 140/90    Try to stop these things that can elevate blood pressure: salt, caffeine, energy drinks, diet pills, sudafed, alcohol , taking NSAIDS daily (advil, alleve, ibuprofen, naproxen) and birth control    DECREASE ALCOHOL CONSUMPTION    DECREASE SALT (fast foods, frozen, canned, processed foods, ham, turkey, fried foods, chips, crackers, etc) & drink 8 glasses of water a day with minimal caffeine ( 1 cup a day)   ==============================================    Ok to take HCTZ 25mg daily   OR  The Prazosin 1 mg nightly to help with PTSD    NOT both    Check BP if you're dizzy & let me know if it's low or high    Good luck with  Travelling to [place] to see brother    NICOLA    There are no Patient Instructions on file for this visit.                            Answers for HPI/ROS submitted by the patient on 7/19/2022  Onset: more than 1 year ago  Progression since onset: waxing and waning  Condition status: resistant  anxiety: Yes  blurred vision: Yes  chest pain: No  headaches: Yes  malaise/fatigue: Yes  neck pain: Yes  orthopnea: No  palpitations: No  peripheral edema: Yes  PND: No  shortness of breath: Yes  sweats: No  Agents associated with hypertension: no associated agents  CAD risks: dyslipidemia, family history, obesity, sedentary lifestyle, stress  Compliance problems: diet, exercise  Past treatments: lifestyle changes  Improvement on treatment:  moderate

## 2022-08-17 ENCOUNTER — HOSPITAL ENCOUNTER (OUTPATIENT)
Dept: RADIOLOGY | Facility: HOSPITAL | Age: 77
Discharge: HOME | End: 2022-08-17
Attending: FAMILY MEDICINE
Payer: MEDICARE

## 2022-08-17 VITALS — HEIGHT: 61 IN | BODY MASS INDEX: 32.28 KG/M2 | WEIGHT: 171 LBS

## 2022-08-17 DIAGNOSIS — Z12.31 SCREENING MAMMOGRAM, ENCOUNTER FOR: ICD-10-CM

## 2022-08-17 PROCEDURE — 77067 MAMMO DIGITAL SCREENING BILAT WITH TOMO: ICD-10-PCS | Mod: 26,,, | Performed by: RADIOLOGY

## 2022-08-17 PROCEDURE — 77063 MAMMO DIGITAL SCREENING BILAT WITH TOMO: ICD-10-PCS | Mod: 26,,, | Performed by: RADIOLOGY

## 2022-08-17 PROCEDURE — 77063 BREAST TOMOSYNTHESIS BI: CPT | Mod: TC,PO

## 2022-08-17 PROCEDURE — 77067 SCR MAMMO BI INCL CAD: CPT | Mod: 26,,, | Performed by: RADIOLOGY

## 2022-08-17 PROCEDURE — 77063 BREAST TOMOSYNTHESIS BI: CPT | Mod: 26,,, | Performed by: RADIOLOGY

## 2022-08-25 RX ORDER — HYDROCHLOROTHIAZIDE 25 MG/1
25 TABLET ORAL DAILY
Qty: 90 TABLET | Refills: 3 | Status: SHIPPED | OUTPATIENT
Start: 2022-08-25 | End: 2023-07-25

## 2022-08-25 NOTE — TELEPHONE ENCOUNTER
----- Message from Emmy Cote sent at 8/25/2022  9:38 AM CDT -----  Contact: Henna chamberlain/ [place]   PHARMACY CHANGE    Requesting an RX refill or new RX.  Is this a refill or new RX: new  RX name and strength (copy/paste from chart):  hydroCHLOROthiazide (HYDRODIURIL) 25 MG tablet  Is this a 30 day or 90 day RX:   Pharmacy name and phone # (copy/paste from chart):   [facility] (Now [facility]) - [place], [place] - [address]  [address] [facility] [address] [address]  Phone: [phone] Fax: [phone]

## 2022-08-25 NOTE — TELEPHONE ENCOUNTER
No new care gaps identified.  [facility] Embedded Care Gaps. Reference number: [id number]. 8/25/2022   9:49:48 AM KUSHT

## 2022-09-07 NOTE — PROGRESS NOTES
[facility] Note    Chief Complaint    annual wellness visit.       History of Present Illness      Daisha Mayo is a 76 y.o. female who presents today for annual wellness visit.  She is feeling well today. She denies any SOB, chest pain, N/V, unintentional weight loss, loss of appetite, fatigue, diarrhea, constipation. She is active daily and remains independent with ADL's.  She wishes to wait until end of October for her influenza vaccine. She does not want to get 2nd shingles vaccine because she reports a bad experience getting shingles after her first vaccine.     Problem List Items Addressed This Visit    None  Visit Diagnoses       Encounter for preventive health examination    -  Primary          Review for Opioid Screening: Pt does not have Rx for Opioids    Review for Substance Use Disorders: Patient does not use substance         Review of Systems   Constitutional: Negative.    HENT: Negative.     Eyes: Negative.    Respiratory: Negative.     Cardiovascular: Negative.    Gastrointestinal: Negative.    Genitourinary: Negative.    Musculoskeletal: Negative.    Skin: Negative.    Neurological: Negative.    Endo/Heme/Allergies: Negative.    Psychiatric/Behavioral: Negative.        Past Medical History:  Past Medical History:   Diagnosis Date    Arthritis     Cataract     Depression     Around [place]; treated with Wellbutrin    Early cataracts, bilateral     Encephalopathy due to COVID-19 virus 04/03/2020    resoved 1 year after COVID infection, 2/21, Psych [facility]    SAMSON (generalized anxiety disorder) 2/5/2019    GERD (gastroesophageal reflux disease)     Glaucoma     Hyperprolactinemia     Hypertension     Hypothyroidism     Osteopenia     Postmenopause atrophic vaginitis 4/28/2021    Primary osteoarthritis of left knee 4/28/2021    PT [facility] 2021    Recurrent UTI     Every few months    Right ankle swelling 4/28/2021    Sciatica neuralgia     Temporal lobe seizure 2006    Trigeminal  neuralgia     Vitamin D deficiency disease        Past Surgical History:  Past Surgical History:   Procedure Laterality Date    BREAST BIOPSY      duct excision    CHOLECYSTECTOMY  1994    laporascopic    COLONOSCOPY      EYE SURGERY      LASIK surgery to one eye; cannot remember which one    TOTAL ABDOMINAL HYSTERECTOMY W/ BILATERAL SALPINGOOPHORECTOMY      1985; diffinitive treatment for menorrhagia       Family History:  family history includes Breast cancer in her cousin and maternal aunt; Breast cancer (age of onset: 104) in her mother; Cancer in her brother; Cancer (age of onset: 103) in her mother; Cancer (age of onset: 31) in her cousin; Colon cancer (age of onset: 90) in her mother; Fibromyalgia in her brother; Glaucoma in her brother, paternal aunt, paternal grandfather, paternal uncle, and sister; Heart disease in her brother, mother, and sister; Hypertension in her brother, mother, and sister; Kidney disease in her brother, father, and sister; Sleep apnea in her brother; Thyroid disease in her mother and sister.   Family history was reviewed with patient.     Social History:  Social History     Socioeconomic History    Marital status:    Tobacco Use    Smoking status: Never    Smokeless tobacco: Never   Substance and Sexual Activity    Alcohol use: No    Drug use: No    Sexual activity: Not Currently   Social History Narrative    dtr Zayra moved home from [place] to help         Social Determinants of Health     Financial Resource Strain: Low Risk     Difficulty of Paying Living Expenses: Not hard at all   Food Insecurity: No Food Insecurity    Worried About Running Out of Food in the Last Year: Never true    Ran Out of Food in the Last Year: Never true   Transportation Needs: No Transportation Needs    Lack of Transportation (Medical): No    Lack of Transportation (Non-Medical): No   Physical Activity: Inactive    Days of Exercise per Week: 0 days    Minutes of Exercise per Session: 0 min    Stress: No Stress Concern Present    Feeling of Stress : Not at all   Social Connections: Socially Integrated    Frequency of Communication with Friends and Family: More than three times a week    Frequency of Social Gatherings with Friends and Family: More than three times a week    Attends Sabianism Services: 1 to 4 times per year    Active Member of Clubs or Organizations: Yes    Attends Club or Organization Meetings: 1 to 4 times per year    Marital Status:    Housing Stability: Low Risk     Unable to Pay for Housing in the Last Year: No    Number of Places Lived in the Last Year: 1    Unstable Housing in the Last Year: No         Medications:  Outpatient Encounter Medications as of 9/13/2022   Medication Sig Dispense Refill    ascorbic acid, vitamin C, (VITAMIN C) 100 MG tablet Take 100 mg by mouth once daily.      celecoxib (CELEBREX) 100 MG capsule Take 1 capsule (100 mg total) by mouth 2 (two) times daily. 40 capsule 1    clobetasol 0.05% (TEMOVATE) 0.05 % Oint Apply topically 2 (two) times daily. 60 g 3    dorzolamide-timolol (COSOPT) 2-0.5 % ophthalmic solution Place 1 drop into both eyes 2 (two) times daily.      hydroCHLOROthiazide (HYDRODIURIL) 25 MG tablet Take 1 tablet (25 mg total) by mouth once daily. 90 tablet 3    latanoprost 0.005 % ophthalmic solution Place 1 drop into both eyes every evening.      multivitamin with minerals (HAIR,SKIN AND NAILS ORAL) Take 1 tablet by mouth once daily.      multivitamin-minerals-lutein Tab Take 1 tablet by mouth Daily. 1 Tablet Oral Every day      thiamine mononitrate, vit B1, (VITAMIN B-1, MONONITRATE,) 100 mg Tab Take 1 tablet (100 mg total) by mouth once daily. 90 tablet 3    VITAMIN D3 5,000 unit Tab        No facility-administered encounter medications on file as of 9/13/2022.       Allergies:  Review of patient's allergies indicates:   Allergen Reactions    Latex      Other reaction(s): Rash  Other reaction(s): Rash       Health  "Maintenance:  Health Maintenance   Topic Date Due    DEXA Scan  06/16/2022    Lipid Panel  02/09/2023    Mammogram  08/17/2024    TETANUS VACCINE  06/03/2029    Hepatitis C Screening  Completed     Health Maintenance Topics with due status: Not Due       Topic Last Completion Date    TETANUS VACCINE 06/03/2019    Lipid Panel 02/09/2022    Mammogram 08/17/2022       Physical Exam      Vital Signs  Temp: 98.2 °F (36.8 °C)  Pulse: 66  Resp: 18  SpO2: 98 %  BP: (!) 142/80  Pain Score: 0-No pain  Height and Weight  Height: 5' 1" (154.9 cm)  Weight: 78.2 kg (172 lb 6.4 oz)  BSA (Calculated - sq m): 1.83 sq meters  BMI (Calculated): 32.6  Weight in (lb) to have BMI = 25: 132]    Physical Exam  Constitutional:       Appearance: Normal appearance. She is normal weight.   HENT:      Head: Normocephalic and atraumatic.      Right Ear: Tympanic membrane, ear canal and external ear normal.      Left Ear: Tympanic membrane, ear canal and external ear normal.      Nose: Nose normal.      Mouth/Throat:      Mouth: Mucous membranes are moist.      Pharynx: Oropharynx is clear.   Eyes:      Extraocular Movements: Extraocular movements intact.      Conjunctiva/sclera: Conjunctivae normal.      Pupils: Pupils are equal, round, and reactive to light.   Cardiovascular:      Rate and Rhythm: Normal rate and regular rhythm.      Pulses: Normal pulses.      Heart sounds: Normal heart sounds.   Pulmonary:      Effort: Pulmonary effort is normal.      Breath sounds: Normal breath sounds.   Abdominal:      General: Abdomen is flat. Bowel sounds are normal.      Palpations: Abdomen is soft.   Musculoskeletal:         General: Normal range of motion.      Cervical back: Normal range of motion and neck supple.   Skin:     General: Skin is warm and dry.      Capillary Refill: Capillary refill takes less than 2 seconds.   Neurological:      General: No focal deficit present.      Mental Status: She is alert and oriented to person, place, and time. " Mental status is at baseline.   Psychiatric:         Mood and Affect: Mood normal.         Behavior: Behavior normal.         Thought Content: Thought content normal.         Judgment: Judgment normal.        Laboratory:  CBC:  Recent Labs   Lab 06/04/20  0834 02/02/21  0819 01/13/22  0808   WBC 5.00 4.39 5.10   RBC 4.12 4.30 4.53   Hemoglobin 13.4 13.8 14.5   Hematocrit 42.6 43.5 44.4   Platelets 225 235 264    H 101 H 98   MCH 32.5 H 32.1 H 32.0 H   MCHC 31.5 L 31.7 L 32.7       CMP:  Recent Labs   Lab 02/02/21  0819 01/13/22  0808 02/09/22  0745   Glucose 81 95 92   Calcium 8.9 9.3 8.9   Albumin 3.6 3.9 3.7   Total Protein 6.9 7.3 6.9   Sodium 142 137 137   Potassium 4.3 4.3 4.7   CO2 29 29 31 H   Chloride 104 102 101   BUN 9 14 16   Alkaline Phosphatase 114 97 100   ALT 13 9 L 9 L   AST 17 13 14   Total Bilirubin 0.5 0.8 0.8       URINALYSIS:  Recent Labs   Lab 03/31/20  1310 04/03/20  2018   Color, UA Yellow Yellow   Specific Gravity, UA 1.005 1.010   pH, UA >8.0 A 8.0   Protein, UA Negative Negative   Bacteria Rare  --    Nitrite, UA Negative Negative   Leukocytes, UA Negative Trace A        LIPIDS:  Recent Labs   Lab 02/02/21  0819 02/09/21  0827 01/13/22  0808 02/09/22  0745   TSH 2.796 2.608 3.347  --    HDL 57  --  63 55   Cholesterol 222 H  --  269 H 225 H   Triglycerides 74  --  89 86   LDL Cholesterol 150.2  --  188.2 H 152.8   HDL/Cholesterol Ratio 25.7  --  23.4 24.4   Non-HDL Cholesterol 165  --  206 170   Total Cholesterol/HDL Ratio 3.9  --  4.3 4.1       TSH:  Recent Labs   Lab 02/02/21  0819 02/09/21  0827 01/13/22  0808   TSH 2.796 2.608 3.347       A1C:        Radiology:        Assessment/Plan     Daisha Mayo is a 76 y.o.female with:    Encounter for preventive health examination    As above, continue current medications and maintain follow up with specialists.  Return to clinic as needed.    I spent 33 minutes on the day of this encounter for preparing, evaluating, examining,  treating, and discussing plan of care with this patient.  Greater than 50% of this time was spent face to face with patient.  All questions were answered to patient's satisfaction.         Suzette Johansen, LIZETH-C  [facility]                    I offered to discuss advanced care planning, including how to pick a person who would make decisions for you if you were unable to make them for yourself, called a health care power of , and what kind of decisions you might make such as use of life sustaining treatments such as ventilators and tube feeding when faced with a life limiting illness recorded on a living will that they will need to know. (How you want to be cared for as you near the end of your natural life)     X Patient is interested in learning more about how to make advanced directives.  I provided them paperwork and offered to discuss this with them.

## 2022-09-13 ENCOUNTER — OFFICE VISIT (OUTPATIENT)
Dept: PRIMARY CARE CLINIC | Facility: CLINIC | Age: 77
End: 2022-09-13
Payer: MEDICARE

## 2022-09-13 VITALS
TEMPERATURE: 98 F | HEIGHT: 61 IN | HEART RATE: 66 BPM | DIASTOLIC BLOOD PRESSURE: 80 MMHG | WEIGHT: 172.38 LBS | SYSTOLIC BLOOD PRESSURE: 132 MMHG | BODY MASS INDEX: 32.55 KG/M2 | OXYGEN SATURATION: 98 % | RESPIRATION RATE: 18 BRPM

## 2022-09-13 DIAGNOSIS — M81.0 OSTEOPOROSIS, UNSPECIFIED OSTEOPOROSIS TYPE, UNSPECIFIED PATHOLOGICAL FRACTURE PRESENCE: ICD-10-CM

## 2022-09-13 DIAGNOSIS — M85.80 OSTEOPENIA, UNSPECIFIED LOCATION: ICD-10-CM

## 2022-09-13 DIAGNOSIS — Z74.09 OTHER REDUCED MOBILITY: ICD-10-CM

## 2022-09-13 DIAGNOSIS — Z00.00 ENCOUNTER FOR PREVENTIVE HEALTH EXAMINATION: Primary | ICD-10-CM

## 2022-09-13 PROCEDURE — 1159F PR MEDICATION LIST DOCUMENTED IN MEDICAL RECORD: ICD-10-PCS | Mod: CPTII,S$GLB,, | Performed by: NURSE PRACTITIONER

## 2022-09-13 PROCEDURE — 3288F FALL RISK ASSESSMENT DOCD: CPT | Mod: CPTII,S$GLB,, | Performed by: NURSE PRACTITIONER

## 2022-09-13 PROCEDURE — G0439 PPPS, SUBSEQ VISIT: HCPCS | Mod: S$GLB,,, | Performed by: NURSE PRACTITIONER

## 2022-09-13 PROCEDURE — 99999 PR PBB SHADOW E&M-EST. PATIENT-LVL V: ICD-10-PCS | Mod: PBBFAC,,, | Performed by: NURSE PRACTITIONER

## 2022-09-13 PROCEDURE — 3288F PR FALLS RISK ASSESSMENT DOCUMENTED: ICD-10-PCS | Mod: CPTII,S$GLB,, | Performed by: NURSE PRACTITIONER

## 2022-09-13 PROCEDURE — 1126F PR PAIN SEVERITY QUANTIFIED, NO PAIN PRESENT: ICD-10-PCS | Mod: CPTII,S$GLB,, | Performed by: NURSE PRACTITIONER

## 2022-09-13 PROCEDURE — 99999 PR PBB SHADOW E&M-EST. PATIENT-LVL V: CPT | Mod: PBBFAC,,, | Performed by: NURSE PRACTITIONER

## 2022-09-13 PROCEDURE — 1160F RVW MEDS BY RX/DR IN RCRD: CPT | Mod: CPTII,S$GLB,, | Performed by: NURSE PRACTITIONER

## 2022-09-13 PROCEDURE — 1101F PR PT FALLS ASSESS DOC 0-1 FALLS W/OUT INJ PAST YR: ICD-10-PCS | Mod: CPTII,S$GLB,, | Performed by: NURSE PRACTITIONER

## 2022-09-13 PROCEDURE — 1101F PT FALLS ASSESS-DOCD LE1/YR: CPT | Mod: CPTII,S$GLB,, | Performed by: NURSE PRACTITIONER

## 2022-09-13 PROCEDURE — G0439 PR MEDICARE ANNUAL WELLNESS SUBSEQUENT VISIT: ICD-10-PCS | Mod: S$GLB,,, | Performed by: NURSE PRACTITIONER

## 2022-09-13 PROCEDURE — 1160F PR REVIEW ALL MEDS BY PRESCRIBER/CLIN PHARMACIST DOCUMENTED: ICD-10-PCS | Mod: CPTII,S$GLB,, | Performed by: NURSE PRACTITIONER

## 2022-09-13 PROCEDURE — 1170F FXNL STATUS ASSESSED: CPT | Mod: CPTII,S$GLB,, | Performed by: NURSE PRACTITIONER

## 2022-09-13 PROCEDURE — 1159F MED LIST DOCD IN RCRD: CPT | Mod: CPTII,S$GLB,, | Performed by: NURSE PRACTITIONER

## 2022-09-13 PROCEDURE — 1170F PR FUNCTIONAL STATUS ASSESSED: ICD-10-PCS | Mod: CPTII,S$GLB,, | Performed by: NURSE PRACTITIONER

## 2022-09-13 PROCEDURE — 1126F AMNT PAIN NOTED NONE PRSNT: CPT | Mod: CPTII,S$GLB,, | Performed by: NURSE PRACTITIONER

## 2022-09-13 NOTE — PATIENT INSTRUCTIONS
Counseling and Referral of Other Preventative  (Italic type indicates deductible and co-insurance are waived)    Patient Name: Daisha Mayo  Today's Date: 9/13/2022    Health Maintenance       Date Due Completion Date    Shingles Vaccine (2 of 3) 01/27/2016 12/2/2015    DEXA Scan 06/16/2022 6/16/2020    Influenza Vaccine (1) 09/01/2022 11/2/2021    COVID-19 Vaccine (5 - Booster for Pfizer series) 09/19/2022 5/19/2022    Colorectal Cancer Screening 11/01/2023 (Originally 6/4/2020) 11/19/2020    Override on 11/12/2020: Done    Lipid Panel 02/09/2023 2/9/2022    Mammogram 08/17/2024 8/17/2022    TETANUS VACCINE 06/03/2029 6/3/2019        Orders Placed This Encounter   Procedures    DXA Bone Density Appendicular Skeleton       The following information is provided to all patients.  This information is to help you find resources for any of the problems found today that may be affecting your health:                Living healthy guide: www.[facility].[place].[facility]      Understanding Diabetes: www.diabetes.org      Eating healthy: www.cdc.gov/healthyweight      [facility] [place] home safety checklist: www.cdc.gov/steadi/patient.html      Agency on Aging: www.goea.[place].gov      Alcoholics anonymous (AA): www.aa.org      Physical Activity: www.raymundo.nih.gov/pg9tfbm      Tobacco use: www.quitwithusla.org     Counseling and Referral of Other Preventative  (Italic type indicates deductible and co-insurance are waived)    Patient Name: Daisha Mayo  Today's Date: 9/13/2022    Health Maintenance       Date Due Completion Date    COVID-19 Vaccine (5 - Booster for Pfizer series) 09/19/2022 5/19/2022    DEXA Scan 09/13/2022 (Originally 6/16/2022) 6/16/2020    Influenza Vaccine (1) 10/27/2022 (Originally 9/1/2022) 11/2/2021    Shingles Vaccine (2 of 3) 09/13/2023 (Originally 1/27/2016) 12/2/2015    Colorectal Cancer Screening 11/01/2023 (Originally 6/4/2020) 11/19/2020    Override on 11/12/2020: Done    Lipid Panel 02/09/2023 2/9/2022     Mammogram 08/17/2024 8/17/2022    TETANUS VACCINE 06/03/2029 6/3/2019        Orders Placed This Encounter   Procedures    DXA Bone Density Appendicular Skeleton     The following information is provided to all patients.  This information is to help you find resources for any of the problems found today that may be affecting your health:                Living healthy guide: www.[facility].[place].[facility]      Understanding Diabetes: www.diabetes.org      Eating healthy: www.cdc.gov/healthyweight      CDC home safety checklist: www.cdc.gov/steadi/patient.html      Agency on Aging: www.goea.[place].[facility]      Alcoholics anonymous (AA): www.aa.org      Physical Activity: www.raymundo.nih.gov/yq6zxzz      Tobacco use: www.quitwithusla.org

## 2022-10-11 ENCOUNTER — APPOINTMENT (OUTPATIENT)
Dept: RADIOLOGY | Facility: CLINIC | Age: 77
End: 2022-10-11
Attending: NURSE PRACTITIONER
Payer: MEDICARE

## 2022-10-11 DIAGNOSIS — M81.0 OSTEOPOROSIS, UNSPECIFIED OSTEOPOROSIS TYPE, UNSPECIFIED PATHOLOGICAL FRACTURE PRESENCE: ICD-10-CM

## 2022-10-11 PROCEDURE — 77080 DXA BONE DENSITY AXIAL: CPT | Mod: TC,PO

## 2022-10-11 PROCEDURE — 77080 DEXA BONE DENSITY SPINE HIP: ICD-10-PCS | Mod: 26,,, | Performed by: INTERNAL MEDICINE

## 2022-10-11 PROCEDURE — 77080 DXA BONE DENSITY AXIAL: CPT | Mod: 26,,, | Performed by: INTERNAL MEDICINE

## 2022-10-26 ENCOUNTER — IMMUNIZATION (OUTPATIENT)
Dept: INTERNAL MEDICINE | Facility: CLINIC | Age: 77
End: 2022-10-26
Payer: MEDICARE

## 2022-10-26 DIAGNOSIS — Z23 NEED FOR VACCINATION: Primary | ICD-10-CM

## 2022-10-26 PROCEDURE — 91312 COVID-19, MRNA, LNP-S, BIVALENT BOOSTER, PF, 30 MCG/0.3 ML DOSE: CPT | Mod: S$GLB,,, | Performed by: INTERNAL MEDICINE

## 2022-10-26 PROCEDURE — 91312 COVID-19, MRNA, LNP-S, BIVALENT BOOSTER, PF, 30 MCG/0.3 ML DOSE: ICD-10-PCS | Mod: S$GLB,,, | Performed by: INTERNAL MEDICINE

## 2022-10-26 PROCEDURE — 0124A COVID-19, MRNA, LNP-S, BIVALENT BOOSTER, PF, 30 MCG/0.3 ML DOSE: CPT | Mod: PBBFAC | Performed by: INTERNAL MEDICINE

## 2022-11-14 ENCOUNTER — TELEPHONE (OUTPATIENT)
Dept: PRIMARY CARE CLINIC | Facility: CLINIC | Age: 77
End: 2022-11-14
Payer: MEDICARE

## 2022-11-14 NOTE — TELEPHONE ENCOUNTER
Per patient --   Thanks for your response . The problem with my pain in both my legs is that I have what I believe to be Lipomo(fatty tissue). (Another problem)On Friday, I was having a problem with getting the right words for what I wanted to say and again for what I wanted to write. I just did not feel right like I had brain fog and just a weird feeling. My head also hurts when I get like that. I have had the brain fog off and on since I had COVID back in March 2020 . Also I have a pain that comes and go in my chest on the left side. The left side is where my neck , shoulder and head hurts. I know this is a lot .    Praying I can get some answers .    Thanks Sincerely,   Milvia Mayo    When would you like to see her? Did you want to work her in?

## 2022-11-18 ENCOUNTER — PATIENT MESSAGE (OUTPATIENT)
Dept: PRIMARY CARE CLINIC | Facility: CLINIC | Age: 77
End: 2022-11-18
Payer: MEDICARE

## 2022-11-23 ENCOUNTER — OFFICE VISIT (OUTPATIENT)
Dept: PRIMARY CARE CLINIC | Facility: CLINIC | Age: 77
End: 2022-11-23
Payer: MEDICARE

## 2022-11-23 VITALS
OXYGEN SATURATION: 98 % | DIASTOLIC BLOOD PRESSURE: 74 MMHG | SYSTOLIC BLOOD PRESSURE: 128 MMHG | BODY MASS INDEX: 31.84 KG/M2 | HEART RATE: 65 BPM | WEIGHT: 168.63 LBS | HEIGHT: 61 IN

## 2022-11-23 DIAGNOSIS — M54.30 SCIATICA, UNSPECIFIED LATERALITY: ICD-10-CM

## 2022-11-23 DIAGNOSIS — M41.116 JUVENILE IDIOPATHIC SCOLIOSIS OF LUMBAR REGION: ICD-10-CM

## 2022-11-23 DIAGNOSIS — Z86.59 HISTORY OF DEPRESSION: ICD-10-CM

## 2022-11-23 DIAGNOSIS — M47.812 SPONDYLOSIS OF CERVICAL JOINT WITHOUT MYELOPATHY: ICD-10-CM

## 2022-11-23 DIAGNOSIS — M62.838 NECK MUSCLE SPASM: ICD-10-CM

## 2022-11-23 DIAGNOSIS — M17.12 PRIMARY OSTEOARTHRITIS OF LEFT KNEE: Primary | ICD-10-CM

## 2022-11-23 PROCEDURE — 1159F MED LIST DOCD IN RCRD: CPT | Mod: CPTII,S$GLB,, | Performed by: FAMILY MEDICINE

## 2022-11-23 PROCEDURE — 1125F PR PAIN SEVERITY QUANTIFIED, PAIN PRESENT: ICD-10-PCS | Mod: CPTII,S$GLB,, | Performed by: FAMILY MEDICINE

## 2022-11-23 PROCEDURE — 99214 PR OFFICE/OUTPT VISIT, EST, LEVL IV, 30-39 MIN: ICD-10-PCS | Mod: S$GLB,,, | Performed by: FAMILY MEDICINE

## 2022-11-23 PROCEDURE — 99214 OFFICE O/P EST MOD 30 MIN: CPT | Mod: S$GLB,,, | Performed by: FAMILY MEDICINE

## 2022-11-23 PROCEDURE — 3078F PR MOST RECENT DIASTOLIC BLOOD PRESSURE < 80 MM HG: ICD-10-PCS | Mod: CPTII,S$GLB,, | Performed by: FAMILY MEDICINE

## 2022-11-23 PROCEDURE — 3288F FALL RISK ASSESSMENT DOCD: CPT | Mod: CPTII,S$GLB,, | Performed by: FAMILY MEDICINE

## 2022-11-23 PROCEDURE — 99999 PR PBB SHADOW E&M-EST. PATIENT-LVL IV: ICD-10-PCS | Mod: PBBFAC,,, | Performed by: FAMILY MEDICINE

## 2022-11-23 PROCEDURE — 1125F AMNT PAIN NOTED PAIN PRSNT: CPT | Mod: CPTII,S$GLB,, | Performed by: FAMILY MEDICINE

## 2022-11-23 PROCEDURE — 3078F DIAST BP <80 MM HG: CPT | Mod: CPTII,S$GLB,, | Performed by: FAMILY MEDICINE

## 2022-11-23 PROCEDURE — 1160F RVW MEDS BY RX/DR IN RCRD: CPT | Mod: CPTII,S$GLB,, | Performed by: FAMILY MEDICINE

## 2022-11-23 PROCEDURE — 3288F PR FALLS RISK ASSESSMENT DOCUMENTED: ICD-10-PCS | Mod: CPTII,S$GLB,, | Performed by: FAMILY MEDICINE

## 2022-11-23 PROCEDURE — 3074F PR MOST RECENT SYSTOLIC BLOOD PRESSURE < 130 MM HG: ICD-10-PCS | Mod: CPTII,S$GLB,, | Performed by: FAMILY MEDICINE

## 2022-11-23 PROCEDURE — 1160F PR REVIEW ALL MEDS BY PRESCRIBER/CLIN PHARMACIST DOCUMENTED: ICD-10-PCS | Mod: CPTII,S$GLB,, | Performed by: FAMILY MEDICINE

## 2022-11-23 PROCEDURE — 1101F PR PT FALLS ASSESS DOC 0-1 FALLS W/OUT INJ PAST YR: ICD-10-PCS | Mod: CPTII,S$GLB,, | Performed by: FAMILY MEDICINE

## 2022-11-23 PROCEDURE — 3074F SYST BP LT 130 MM HG: CPT | Mod: CPTII,S$GLB,, | Performed by: FAMILY MEDICINE

## 2022-11-23 PROCEDURE — 1101F PT FALLS ASSESS-DOCD LE1/YR: CPT | Mod: CPTII,S$GLB,, | Performed by: FAMILY MEDICINE

## 2022-11-23 PROCEDURE — 1159F PR MEDICATION LIST DOCUMENTED IN MEDICAL RECORD: ICD-10-PCS | Mod: CPTII,S$GLB,, | Performed by: FAMILY MEDICINE

## 2022-11-23 PROCEDURE — 99999 PR PBB SHADOW E&M-EST. PATIENT-LVL IV: CPT | Mod: PBBFAC,,, | Performed by: FAMILY MEDICINE

## 2022-11-23 RX ORDER — LANOLIN ALCOHOL/MO/W.PET/CERES
CREAM (GRAM) TOPICAL
COMMUNITY
Start: 2022-09-13

## 2022-11-23 RX ORDER — PRENATAL VIT CALC,IRON,FOLIC
TABLET ORAL
COMMUNITY
Start: 2022-09-13

## 2022-11-23 RX ORDER — PANTOPRAZOLE SODIUM 40 MG/1
TABLET, DELAYED RELEASE ORAL
COMMUNITY
Start: 2022-08-19 | End: 2022-11-23

## 2022-11-23 RX ORDER — PANTOPRAZOLE SODIUM 40 MG/1
40 TABLET, DELAYED RELEASE ORAL DAILY
Qty: 90 TABLET | Refills: 3 | Status: SHIPPED | OUTPATIENT
Start: 2022-11-23 | End: 2023-09-26

## 2023-01-17 ENCOUNTER — PATIENT MESSAGE (OUTPATIENT)
Dept: PRIMARY CARE CLINIC | Facility: CLINIC | Age: 78
End: 2023-01-17
Payer: MEDICARE

## 2023-01-17 DIAGNOSIS — M41.116 JUVENILE IDIOPATHIC SCOLIOSIS OF LUMBAR REGION: Primary | ICD-10-CM

## 2023-01-17 DIAGNOSIS — M54.12 CERVICAL RADICULOPATHY: ICD-10-CM

## 2023-01-17 DIAGNOSIS — M54.30 SCIATICA, UNSPECIFIED LATERALITY: ICD-10-CM

## 2023-01-17 NOTE — TELEPHONE ENCOUNTER
Pt daughter states that pt is complaining of leg pain. She suffers with Sciatic nerve problems and has Scoliosis.It was conveyed to her that this was arthritis. Not sure if she should come back to see you or get a referral to another specialists. Please advise

## 2023-01-23 NOTE — PROGRESS NOTES
Subjective:      Patient ID: Daisha Mayo is a 77 y.o. female.    Chief Complaint: Leg Pain    Ms Mayo is a 76 yo female sent in consultation by Dr. Prasad for evaluation of back and leg pain.  She started with right outer leg pain 10 years ago, and that will bother her.  The march 8 2020 she had covid and then she could not walk.  She felt like she started having left leg pain with covid and could not walk.  The right leg pain has been her main issue until covid.  Then in November she started having left outer hip pain, but worse 2 weeks ago.  She started celebrex.  She can feel pain with pressing on it.  She also has pain to the touch in her calf.  She has trouble lying on her side.  She has trouble sitting, walking.  Pain is 1/10 now, worst 8/10 2 weeks ago, best 1/10.  The celebrex for the past 2 weeks has been helpful.  She will take tylenol.  She had to use a cane the other day.  She was not walking for 4 months after covid    MRI cervical 2/1/2022  MRI cervical spine 08/04/2014     FINDINGS:  Cervical spine demonstrates straightening of the cervical lordosis and mild reversal about the C5-C6 apex.  Multilevel disc height loss most significant at C6-C7 with osseous fusion at this level.  Trace anterolisthesis C5 on C6.  No acute fracture. No marrow signal abnormality suspicious for an infiltrative process.     The cervical cord is normal in caliber and signal characteristics.  The craniocervical junction and visualized intracranial contents are unremarkable.  The adjacent soft tissue structures show no significant abnormalities.There are findings of multilevel cervical spondylosis, as below.     C2-C3:Mild left uncovertebral spurring facet arthropathy contributing to mild left neural foraminal narrowing..     C3-C4:Uncovertebral spurring, facet arthropathy, posterior disc osteophyte complex indenting or abutting the cord resulting in mild spinal canal stenosis and moderate right mild left neural  foraminal narrowing.     C4-C5:Posterior disc osteophyte complex, uncovertebral spurring, mild facet arthropathy resulting in mild spinal canal stenosis and mild right neural foraminal narrowing.     C5-C6:Small posterior disc osteophyte complex and mild uncovertebral spurring on the left.  No neural foraminal narrowing or spinal canal stenosis.     C6-C7: No neural foraminal narrowing or spinal canal stenosis.     C7-T1:Small posterior disc osteophyte complex and mild left uncovertebral spurring resulting in mild left neural foraminal narrowing.     Impression:     Cervical spondylosis.  Mild spinal canal stenosis and mild to moderate neural foraminal narrowing at C3-C5.    MRI lumbar 2019  Alignment: Levoconvex scoliosis with the apex at L2-3.  Mild anterolisthesis of L5 on S1.     Vertebrae: Heterogeneous fatty marrow signal. No fracture.     Discs: Intervertebral disc space narrowing most prominent at L2-3.     Cord: Normal.  Conus terminates at L1-2.     Degenerative findings:     T12-L1: No significant spinal canal stenosis or neural foraminal narrowing.     L1-L2: No significant spinal canal stenosis or neural foraminal narrowing.     L2-L3: Asymmetric right sided disc bulge with some effacement of the anterior thecal sac and mild right neural foraminal narrowing.     L3-L4: Diffuse posterior disc bulge and buckling of the ligamentum flavum resulting in mild spinal canal stenosis and mild right neural foraminal narrowing noting that the disc abuts the exiting L3 nerve root.     L4-L5: Diffuse posterior disc bulge, buckling of the ligamentum flavum, and bilateral facet arthropathy resulting in mild spinal canal stenosis and no significant neural foraminal narrowing.     L5-S1: Mild anterolisthesis with bilateral facet arthropathy without significant spinal canal stenosis is seen or neural foraminal narrowing.  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     Paraspinal muscles & soft tissues: Mild fatty atrophy of the paraspinal musculature.     Impression:     Multilevel lumbar spondylosis most prominent from L3-L5 resulting in mild spinal canal stenosis and mild right neural foraminal narrowing.     Levoconvex lumbar scoliosis.    Past Medical History:  No date: Arthritis  No date: Benign hypertension  No date: Early cataracts, bilateral  04/03/2020: Encephalopathy due to COVID-19 virus      Comment:  resoved 1 year after COVID infection, 2/21, Psych                [facility]  02/05/2019: SAMSON (generalized anxiety disorder)  No date: GERD (gastroesophageal reflux disease)  No date: Glaucoma  No date: History of depression      Comment:  Around Ge[place]; treated with Wellbutrin  No date: Hyperprolactinemia  11/23/2022: Juvenile idiopathic scoliosis of lumbar region  No date: Osteopenia  04/28/2021: Postmenopause atrophic vaginitis  04/28/2021: Primary osteoarthritis of left knee      Comment:  PT [facility] 2021  No date: Recurrent UTI      Comment:  Every few months  04/28/2021: Right ankle swelling  No date: Sciatica neuralgia  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  No date: Trigeminal neuralgia  No date: Vitamin D deficiency disease    Past Surgical History:  No date: BREAST BIOPSY      Comment:  duct excision  1994: CHOLECYSTECTOMY      Comment:  laporascopic  No date: COLONOSCOPY  No date: EYE SURGERY      Comment:  LASIK surgery to one eye; cannot remember which one  No date: TOTAL ABDOMINAL HYSTERECTOMY W/ BILATERAL SALPINGOOPHORECTOMY      Comment:  1985; diffinitive treatment for menorrhagia    Review of patient's family history indicates:      Social History    Socioeconomic History      Marital status:     Tobacco Use      Smoking status: Never      Smokeless tobacco: Never    Substance and Sexual Activity      Alcohol use: No      Drug use: No      Sexual activity: Not  Currently    Social History Narrative      dtr Zayra moved home from [place] to help          Social Determinants of Health  Financial Resource Strain: Low Risk       Difficulty of Paying Living Expenses: Not hard at all  Food Insecurity: No Food Insecurity      Worried About Running Out of Food in the Last Year: Never true      Ran Out of Food in the Last Year: Never true  Transportation Needs: No Transportation Needs      Lack of Transportation (Medical): No      Lack of Transportation (Non-Medical): No  Physical Activity: Insufficiently Active      Days of Exercise per Week: 1 day      Minutes of Exercise per Session: 30 min  Stress: No Stress Concern Present      Feeling of Stress : Only a little  Social Connections: Socially Integrated      Frequency of Communication with Friends and Family: More than three times a week      Frequency of Social Gatherings with Friends and Family: More than three times a week      Attends Sikhism Services: 1 to 4 times per year      Active Member of Clubs or Organizations: Yes      Attends Club or Organization Meetings: More than 4 times per year      Marital Status:   Housing Stability: Low Risk       Unable to Pay for Housing in the Last Year: No      Number of Places Lived in the Last Year: 1      Unstable Housing in the Last Year: No    Current Outpatient Medications:  ascorbic acid, vitamin C, (VITAMIN C) 100 MG tablet, Take 100 mg by mouth once daily., Disp: , Rfl:   calcium citrate-vitamin D3 200 mg-6.25 mcg (250 unit) Tab, , Disp: , Rfl:   celecoxib (CELEBREX) 100 MG capsule, Take 1 capsule (100 mg total) by mouth 2 (two) times daily., Disp: 40 capsule, Rfl: 1  cyanocobalamin (VITAMIN B-12) 1000 MCG tablet, , Disp: , Rfl:   dorzolamide-timolol (COSOPT) 2-0.5 % ophthalmic solution, Place 1 drop into both eyes 2 (two) times daily., Disp: , Rfl:   hydroCHLOROthiazide (HYDRODIURIL) 25 MG tablet, Take 1 tablet (25 mg total) by mouth once daily., Disp: 90 tablet,  Rfl: 3  latanoprost 0.005 % ophthalmic solution, Place 1 drop into both eyes every evening., Disp: , Rfl:   multivitamin with minerals (HAIR,SKIN AND NAILS ORAL), Take 1 tablet by mouth once daily., Disp: , Rfl:   multivitamin-minerals-lutein Tab, Take 1 tablet by mouth Daily. 1 Tablet Oral Every day, Disp: , Rfl:   pantoprazole (PROTONIX) 40 MG tablet, Take 1 tablet (40 mg total) by mouth once daily., Disp: 90 tablet, Rfl: 3  thiamine mononitrate, vit B1, (VITAMIN B-1, MONONITRATE,) 100 mg Tab, Take 1 tablet (100 mg total) by mouth once daily., Disp: 90 tablet, Rfl: 3  VITAMIN D3 5,000 unit Tab, , Disp: , Rfl:     No current facility-administered medications for this visit.      Review of patient's allergies indicates:   -- Latex     --  Other reaction(s): Rash             Other reaction(s): Rash        Review of Systems   Constitutional: Negative for weight gain and weight loss.   Cardiovascular:  Positive for chest pain.   Respiratory:  Negative for shortness of breath.    Musculoskeletal:  Positive for back pain. Negative for joint pain and joint swelling.   Gastrointestinal:  Negative for abdominal pain, bowel incontinence, nausea and vomiting.   Genitourinary:  Negative for bladder incontinence.   Neurological:  Negative for numbness and paresthesias.       Objective:        General: Daisha is well-developed, well-nourished, appears stated age, in no acute distress, alert and oriented to time, place and person.     General    Vitals reviewed.  Constitutional: She is oriented to person, place, and time. She appears well-developed and well-nourished.   HENT:   Head: Normocephalic and atraumatic.   Pulmonary/Chest: Effort normal.   Neurological: She is alert and oriented to person, place, and time.   Psychiatric: She has a normal mood and affect. Her behavior is normal. Judgment and thought content normal.     General Musculoskeletal Exam   Gait: normal     Right Ankle/Foot Exam     Tests   Heel Walk: able to  perform  Tiptoe Walk: able to perform    Left Ankle/Foot Exam     Tests   Heel Walk: able to perform  Tiptoe Walk: able to perform      Right Hip Exam     Tenderness  Also right side trochanteric tenderness.  Left Hip Exam     Tenderness  Also left side trochanteric tenderness.      Back (L-Spine & T-Spine) / Neck (C-Spine) Exam     Tenderness   The patient is tender to palpation of the right side trochanteric and left side trochanteric. Right paramedian tenderness of the Sacrum.     Back (L-Spine & T-Spine) Range of Motion   Extension:  10   Flexion:  90   Lateral bend right:  10   Lateral bend left:  10   Rotation right:  30   Rotation left:  30     Spinal Sensation   Right Side Sensation  C-Spine Level: normal   L-Spine Level: normal  S-Spine Level: normal  Left Side Sensation  C-Spine Level: normal  L-Spine Level: normal  S-Spine Level: normal    Back (L-Spine & T-Spine) Tests   Right Side Tests  Straight leg raise:        Sitting SLR: > 70 degrees    Left Side Tests  Straight leg raise:       Sitting SLR: > 70 degrees      Other   She has no scoliosis .  Spinal Kyphosis:  Absent    Comments:  Pos TITI with back pain bilateral      Muscle Strength   Right Upper Extremity   Biceps: 5/5   Deltoid:  5/5  Triceps:  5/5  Wrist extension: 5/5   Finger Flexors:  5/5  Left Upper Extremity  Biceps: 5/5   Deltoid:  5/5  Triceps:  5/5  Wrist extension: 5/5   Finger Flexors:  5/5  Right Lower Extremity   Hip Flexion: 5/5   Quadriceps:  5/5   Anterior tibial:  5/5   EHL:  5/5  Left Lower Extremity   Hip Flexion: 5/5   Quadriceps:  5/5   Anterior tibial:  5/5   EHL:  5/5    Reflexes     Left Side  Biceps:  2+  Triceps:  2+  Brachioradialis:  2+  Achilles:  2+  Left Rangel's Sign:  Absent  Babinski Sign:  absent  Quadriceps:  2+    Right Side   Biceps:  2+  Triceps:  2+  Brachioradialis:  2+  Achilles:  2+  Right Rangel's Sign:  absent  Babinski Sign:  absent  Quadriceps:  2+    Vascular Exam     Right  Pulses        Carotid:                  2+    Left Pulses        Carotid:                  2+            Assessment:       1. Trochanteric bursitis of both hips    2. Juvenile idiopathic scoliosis of lumbar region    3. Cervical radiculopathy    4. Sciatica, unspecified laterality    5. Spondylosis of lumbar region without myelopathy or radiculopathy    6. SI (sacroiliac) joint dysfunction           Plan:       Orders Placed This Encounter    Ambulatory referral/consult to Physical/Occupational Therapy     We discussed back and hip pain and the nature of back and hip pain.  We discussed that it is not one thing that causes the pain but an accumulation of multiple things that we do.  She had some left hip pain that has since improved and we discussed bilateral hip bursitis.  She does have some back and si joint pain, as well as hip arthritis  She does also have muscle soreness and tenderness to touch in lower legs.  I do not think this is from the back  We discussed exercise and the importance of strength training and staying active.  She is not going to do it on her own  We discussed the benefits of therapy and exercise and continuing to move.  She is hesitant but will try therapy  PT Back and core strengthening, extension exercises and itb stretches and glute and quad strengthening, and [facility]  We discussed a repeat x-ray they want to wait  Continue celebrex 100 BID as needed.  It is helpful .  She does not need a refill but wants a 3 month supply when she does  RTC 4 months    More than 50% of the total time  of 45 minutes was spent face to face in counseling on diagnosis and treatment options. I also counseled patient  on common and most usual side effect of prescribed medications.  I reviewed Primary care , and other specialty's notes to better coordinate patient's care. All questions were answered, and patient voiced understanding.       Follow-up: No follow-ups  on file. If there are any questions prior to this, the patient was instructed to contact the office.

## 2023-01-25 ENCOUNTER — OFFICE VISIT (OUTPATIENT)
Dept: SPINE | Facility: CLINIC | Age: 78
End: 2023-01-25
Attending: PHYSICAL MEDICINE & REHABILITATION
Payer: MEDICARE

## 2023-01-25 VITALS
TEMPERATURE: 98 F | HEART RATE: 61 BPM | OXYGEN SATURATION: 100 % | DIASTOLIC BLOOD PRESSURE: 80 MMHG | BODY MASS INDEX: 32.07 KG/M2 | RESPIRATION RATE: 18 BRPM | SYSTOLIC BLOOD PRESSURE: 169 MMHG | WEIGHT: 169.75 LBS

## 2023-01-25 DIAGNOSIS — M41.116 JUVENILE IDIOPATHIC SCOLIOSIS OF LUMBAR REGION: ICD-10-CM

## 2023-01-25 DIAGNOSIS — M54.12 CERVICAL RADICULOPATHY: ICD-10-CM

## 2023-01-25 DIAGNOSIS — M47.816 SPONDYLOSIS OF LUMBAR REGION WITHOUT MYELOPATHY OR RADICULOPATHY: ICD-10-CM

## 2023-01-25 DIAGNOSIS — M70.62 TROCHANTERIC BURSITIS OF BOTH HIPS: Primary | ICD-10-CM

## 2023-01-25 DIAGNOSIS — M54.30 SCIATICA, UNSPECIFIED LATERALITY: ICD-10-CM

## 2023-01-25 DIAGNOSIS — M70.61 TROCHANTERIC BURSITIS OF BOTH HIPS: Primary | ICD-10-CM

## 2023-01-25 DIAGNOSIS — M53.3 SI (SACROILIAC) JOINT DYSFUNCTION: ICD-10-CM

## 2023-01-25 PROCEDURE — 1160F RVW MEDS BY RX/DR IN RCRD: CPT | Mod: HCNC,CPTII,S$GLB, | Performed by: PHYSICAL MEDICINE & REHABILITATION

## 2023-01-25 PROCEDURE — 99204 OFFICE O/P NEW MOD 45 MIN: CPT | Mod: HCNC,S$GLB,, | Performed by: PHYSICAL MEDICINE & REHABILITATION

## 2023-01-25 PROCEDURE — 1159F PR MEDICATION LIST DOCUMENTED IN MEDICAL RECORD: ICD-10-PCS | Mod: HCNC,CPTII,S$GLB, | Performed by: PHYSICAL MEDICINE & REHABILITATION

## 2023-01-25 PROCEDURE — 1101F PT FALLS ASSESS-DOCD LE1/YR: CPT | Mod: HCNC,CPTII,S$GLB, | Performed by: PHYSICAL MEDICINE & REHABILITATION

## 2023-01-25 PROCEDURE — 1159F MED LIST DOCD IN RCRD: CPT | Mod: HCNC,CPTII,S$GLB, | Performed by: PHYSICAL MEDICINE & REHABILITATION

## 2023-01-25 PROCEDURE — 99204 PR OFFICE/OUTPT VISIT, NEW, LEVL IV, 45-59 MIN: ICD-10-PCS | Mod: HCNC,S$GLB,, | Performed by: PHYSICAL MEDICINE & REHABILITATION

## 2023-01-25 PROCEDURE — 1160F PR REVIEW ALL MEDS BY PRESCRIBER/CLIN PHARMACIST DOCUMENTED: ICD-10-PCS | Mod: HCNC,CPTII,S$GLB, | Performed by: PHYSICAL MEDICINE & REHABILITATION

## 2023-01-25 PROCEDURE — 3077F SYST BP >= 140 MM HG: CPT | Mod: HCNC,CPTII,S$GLB, | Performed by: PHYSICAL MEDICINE & REHABILITATION

## 2023-01-25 PROCEDURE — 3077F PR MOST RECENT SYSTOLIC BLOOD PRESSURE >= 140 MM HG: ICD-10-PCS | Mod: HCNC,CPTII,S$GLB, | Performed by: PHYSICAL MEDICINE & REHABILITATION

## 2023-01-25 PROCEDURE — 99999 PR PBB SHADOW E&M-EST. PATIENT-LVL V: CPT | Mod: PBBFAC,HCNC,, | Performed by: PHYSICAL MEDICINE & REHABILITATION

## 2023-01-25 PROCEDURE — 3288F FALL RISK ASSESSMENT DOCD: CPT | Mod: HCNC,CPTII,S$GLB, | Performed by: PHYSICAL MEDICINE & REHABILITATION

## 2023-01-25 PROCEDURE — 1125F AMNT PAIN NOTED PAIN PRSNT: CPT | Mod: HCNC,CPTII,S$GLB, | Performed by: PHYSICAL MEDICINE & REHABILITATION

## 2023-01-25 PROCEDURE — 3288F PR FALLS RISK ASSESSMENT DOCUMENTED: ICD-10-PCS | Mod: HCNC,CPTII,S$GLB, | Performed by: PHYSICAL MEDICINE & REHABILITATION

## 2023-01-25 PROCEDURE — 1101F PR PT FALLS ASSESS DOC 0-1 FALLS W/OUT INJ PAST YR: ICD-10-PCS | Mod: HCNC,CPTII,S$GLB, | Performed by: PHYSICAL MEDICINE & REHABILITATION

## 2023-01-25 PROCEDURE — 1125F PR PAIN SEVERITY QUANTIFIED, PAIN PRESENT: ICD-10-PCS | Mod: HCNC,CPTII,S$GLB, | Performed by: PHYSICAL MEDICINE & REHABILITATION

## 2023-01-25 PROCEDURE — 99999 PR PBB SHADOW E&M-EST. PATIENT-LVL V: ICD-10-PCS | Mod: PBBFAC,HCNC,, | Performed by: PHYSICAL MEDICINE & REHABILITATION

## 2023-01-25 PROCEDURE — 3079F DIAST BP 80-89 MM HG: CPT | Mod: HCNC,CPTII,S$GLB, | Performed by: PHYSICAL MEDICINE & REHABILITATION

## 2023-01-25 PROCEDURE — 3079F PR MOST RECENT DIASTOLIC BLOOD PRESSURE 80-89 MM HG: ICD-10-PCS | Mod: HCNC,CPTII,S$GLB, | Performed by: PHYSICAL MEDICINE & REHABILITATION

## 2023-02-07 DIAGNOSIS — Z00.00 ENCOUNTER FOR MEDICARE ANNUAL WELLNESS EXAM: ICD-10-CM

## 2023-02-09 ENCOUNTER — PATIENT MESSAGE (OUTPATIENT)
Dept: SPINE | Facility: CLINIC | Age: 78
End: 2023-02-09
Payer: MEDICARE

## 2023-02-09 DIAGNOSIS — Z00.00 ENCOUNTER FOR MEDICARE ANNUAL WELLNESS EXAM: ICD-10-CM

## 2023-02-09 DIAGNOSIS — M54.12 CERVICAL RADICULOPATHY: ICD-10-CM

## 2023-02-09 RX ORDER — LANOLIN ALCOHOL/MO/W.PET/CERES
CREAM (GRAM) TOPICAL
Status: CANCELLED | OUTPATIENT
Start: 2023-02-09

## 2023-02-10 DIAGNOSIS — M54.12 CERVICAL RADICULOPATHY: ICD-10-CM

## 2023-02-10 RX ORDER — LANOLIN ALCOHOL/MO/W.PET/CERES
CREAM (GRAM) TOPICAL
OUTPATIENT
Start: 2023-02-10

## 2023-02-10 RX ORDER — CELECOXIB 100 MG/1
100 CAPSULE ORAL 2 TIMES DAILY
Qty: 40 CAPSULE | Refills: 1 | Status: CANCELLED | OUTPATIENT
Start: 2023-02-10

## 2023-02-10 RX ORDER — CELECOXIB 100 MG/1
100 CAPSULE ORAL 2 TIMES DAILY
Qty: 180 CAPSULE | Refills: 1 | Status: SHIPPED | OUTPATIENT
Start: 2023-02-10 | End: 2023-07-12

## 2023-02-27 ENCOUNTER — PATIENT MESSAGE (OUTPATIENT)
Dept: PRIMARY CARE CLINIC | Facility: CLINIC | Age: 78
End: 2023-02-27
Payer: MEDICARE

## 2023-02-27 DIAGNOSIS — D51.8 OTHER VITAMIN B12 DEFICIENCY ANEMIAS: ICD-10-CM

## 2023-02-27 DIAGNOSIS — E55.9 VITAMIN D DEFICIENCY, UNSPECIFIED: ICD-10-CM

## 2023-02-27 DIAGNOSIS — I10 BENIGN HYPERTENSION: Primary | ICD-10-CM

## 2023-02-27 DIAGNOSIS — R62.7 ADULT FAILURE TO THRIVE: ICD-10-CM

## 2023-03-22 ENCOUNTER — TELEPHONE (OUTPATIENT)
Dept: SPINE | Facility: CLINIC | Age: 78
End: 2023-03-22
Payer: MEDICARE

## 2023-03-22 NOTE — TELEPHONE ENCOUNTER
----- Message from Anton Reina sent at 3/22/2023 12:16 PM CDT -----  Name of Who is Calling: BEN MONROE [[id number]]           What is the request in detail: Patient is requesting a call back to speak with a nurse.  No details given. Please assist.           Can the clinic reply by [facility]: No           [facility]: [phone]

## 2023-03-23 ENCOUNTER — LAB VISIT (OUTPATIENT)
Dept: LAB | Facility: HOSPITAL | Age: 78
End: 2023-03-23
Attending: FAMILY MEDICINE
Payer: MEDICARE

## 2023-03-23 DIAGNOSIS — E55.9 VITAMIN D DEFICIENCY, UNSPECIFIED: ICD-10-CM

## 2023-03-23 DIAGNOSIS — I10 BENIGN HYPERTENSION: ICD-10-CM

## 2023-03-23 DIAGNOSIS — R62.7 ADULT FAILURE TO THRIVE: ICD-10-CM

## 2023-03-23 DIAGNOSIS — D51.8 OTHER VITAMIN B12 DEFICIENCY ANEMIAS: ICD-10-CM

## 2023-03-23 LAB
ALBUMIN SERPL BCP-MCNC: 3.7 G/DL (ref 3.5–5.2)
ALP SERPL-CCNC: 81 U/L (ref 55–135)
ALT SERPL W/O P-5'-P-CCNC: 12 U/L (ref 10–44)
ANION GAP SERPL CALC-SCNC: 4 MMOL/L (ref 8–16)
AST SERPL-CCNC: 14 U/L (ref 10–40)
BASOPHILS # BLD AUTO: 0.05 K/UL (ref 0–0.2)
BASOPHILS NFR BLD: 1 % (ref 0–1.9)
BILIRUB SERPL-MCNC: 0.7 MG/DL (ref 0.1–1)
BUN SERPL-MCNC: 17 MG/DL (ref 8–23)
CALCIUM SERPL-MCNC: 9 MG/DL (ref 8.7–10.5)
CHLORIDE SERPL-SCNC: 101 MMOL/L (ref 95–110)
CHOLEST SERPL-MCNC: 224 MG/DL (ref 120–199)
CHOLEST/HDLC SERPL: 4.3 {RATIO} (ref 2–5)
CO2 SERPL-SCNC: 34 MMOL/L (ref 23–29)
CREAT SERPL-MCNC: 0.7 MG/DL (ref 0.5–1.4)
DIFFERENTIAL METHOD: ABNORMAL
EOSINOPHIL # BLD AUTO: 0.2 K/UL (ref 0–0.5)
EOSINOPHIL NFR BLD: 3.7 % (ref 0–8)
ERYTHROCYTE [DISTWIDTH] IN BLOOD BY AUTOMATED COUNT: 12.7 % (ref 11.5–14.5)
EST. GFR  (NO RACE VARIABLE): >60 ML/MIN/1.73 M^2
GLUCOSE SERPL-MCNC: 95 MG/DL (ref 70–110)
HCT VFR BLD AUTO: 40.6 % (ref 37–48.5)
HDLC SERPL-MCNC: 52 MG/DL (ref 40–75)
HDLC SERPL: 23.2 % (ref 20–50)
HGB BLD-MCNC: 13 G/DL (ref 12–16)
IMM GRANULOCYTES # BLD AUTO: 0.01 K/UL (ref 0–0.04)
IMM GRANULOCYTES NFR BLD AUTO: 0.2 % (ref 0–0.5)
LDLC SERPL CALC-MCNC: 155.4 MG/DL (ref 63–159)
LYMPHOCYTES # BLD AUTO: 1.6 K/UL (ref 1–4.8)
LYMPHOCYTES NFR BLD: 32.5 % (ref 18–48)
MCH RBC QN AUTO: 31.7 PG (ref 27–31)
MCHC RBC AUTO-ENTMCNC: 32 G/DL (ref 32–36)
MCV RBC AUTO: 99 FL (ref 82–98)
MONOCYTES # BLD AUTO: 0.6 K/UL (ref 0.3–1)
MONOCYTES NFR BLD: 13 % (ref 4–15)
NEUTROPHILS # BLD AUTO: 2.5 K/UL (ref 1.8–7.7)
NEUTROPHILS NFR BLD: 49.6 % (ref 38–73)
NONHDLC SERPL-MCNC: 172 MG/DL
NRBC BLD-RTO: 0 /100 WBC
PLATELET # BLD AUTO: 270 K/UL (ref 150–450)
PMV BLD AUTO: 9.6 FL (ref 9.2–12.9)
POTASSIUM SERPL-SCNC: 4.1 MMOL/L (ref 3.5–5.1)
PROT SERPL-MCNC: 6.8 G/DL (ref 6–8.4)
RBC # BLD AUTO: 4.1 M/UL (ref 4–5.4)
SODIUM SERPL-SCNC: 139 MMOL/L (ref 136–145)
TRIGL SERPL-MCNC: 83 MG/DL (ref 30–150)
VIT B12 SERPL-MCNC: 867 PG/ML (ref 210–950)
WBC # BLD AUTO: 4.93 K/UL (ref 3.9–12.7)

## 2023-03-23 PROCEDURE — 85025 COMPLETE CBC W/AUTO DIFF WBC: CPT | Mod: HCNC | Performed by: FAMILY MEDICINE

## 2023-03-23 PROCEDURE — 36415 COLL VENOUS BLD VENIPUNCTURE: CPT | Mod: HCNC,PN | Performed by: FAMILY MEDICINE

## 2023-03-23 PROCEDURE — 82652 VIT D 1 25-DIHYDROXY: CPT | Mod: HCNC | Performed by: FAMILY MEDICINE

## 2023-03-23 PROCEDURE — 80061 LIPID PANEL: CPT | Mod: HCNC | Performed by: FAMILY MEDICINE

## 2023-03-23 PROCEDURE — 82607 VITAMIN B-12: CPT | Mod: HCNC | Performed by: FAMILY MEDICINE

## 2023-03-23 PROCEDURE — 84425 ASSAY OF VITAMIN B-1: CPT | Mod: HCNC | Performed by: FAMILY MEDICINE

## 2023-03-23 PROCEDURE — 80053 COMPREHEN METABOLIC PANEL: CPT | Mod: HCNC | Performed by: FAMILY MEDICINE

## 2023-03-25 LAB — 1,25(OH)2D3 SERPL-MCNC: 44 PG/ML (ref 20–79)

## 2023-03-28 ENCOUNTER — OFFICE VISIT (OUTPATIENT)
Dept: PRIMARY CARE CLINIC | Facility: CLINIC | Age: 78
End: 2023-03-28
Payer: MEDICARE

## 2023-03-28 VITALS
SYSTOLIC BLOOD PRESSURE: 132 MMHG | HEIGHT: 61 IN | OXYGEN SATURATION: 99 % | DIASTOLIC BLOOD PRESSURE: 74 MMHG | TEMPERATURE: 99 F | WEIGHT: 167.56 LBS | HEART RATE: 62 BPM | BODY MASS INDEX: 31.63 KG/M2

## 2023-03-28 DIAGNOSIS — F02.80 MAJOR NEUROCOGNITIVE DISORDER DUE TO ANOTHER MEDICAL CONDITION: ICD-10-CM

## 2023-03-28 DIAGNOSIS — K21.9 GASTROESOPHAGEAL REFLUX DISEASE, UNSPECIFIED WHETHER ESOPHAGITIS PRESENT: ICD-10-CM

## 2023-03-28 DIAGNOSIS — M17.12 PRIMARY OSTEOARTHRITIS OF LEFT KNEE: ICD-10-CM

## 2023-03-28 DIAGNOSIS — M81.0 OSTEOPOROSIS, UNSPECIFIED OSTEOPOROSIS TYPE, UNSPECIFIED PATHOLOGICAL FRACTURE PRESENCE: ICD-10-CM

## 2023-03-28 DIAGNOSIS — I10 BENIGN HYPERTENSION: Primary | ICD-10-CM

## 2023-03-28 PROCEDURE — 1159F MED LIST DOCD IN RCRD: CPT | Mod: HCNC,CPTII,S$GLB, | Performed by: FAMILY MEDICINE

## 2023-03-28 PROCEDURE — 1159F PR MEDICATION LIST DOCUMENTED IN MEDICAL RECORD: ICD-10-PCS | Mod: HCNC,CPTII,S$GLB, | Performed by: FAMILY MEDICINE

## 2023-03-28 PROCEDURE — 99214 OFFICE O/P EST MOD 30 MIN: CPT | Mod: HCNC,S$GLB,, | Performed by: FAMILY MEDICINE

## 2023-03-28 PROCEDURE — 3288F PR FALLS RISK ASSESSMENT DOCUMENTED: ICD-10-PCS | Mod: HCNC,CPTII,S$GLB, | Performed by: FAMILY MEDICINE

## 2023-03-28 PROCEDURE — 1126F PR PAIN SEVERITY QUANTIFIED, NO PAIN PRESENT: ICD-10-PCS | Mod: HCNC,CPTII,S$GLB, | Performed by: FAMILY MEDICINE

## 2023-03-28 PROCEDURE — 1101F PR PT FALLS ASSESS DOC 0-1 FALLS W/OUT INJ PAST YR: ICD-10-PCS | Mod: HCNC,CPTII,S$GLB, | Performed by: FAMILY MEDICINE

## 2023-03-28 PROCEDURE — 99214 PR OFFICE/OUTPT VISIT, EST, LEVL IV, 30-39 MIN: ICD-10-PCS | Mod: HCNC,S$GLB,, | Performed by: FAMILY MEDICINE

## 2023-03-28 PROCEDURE — 99999 PR PBB SHADOW E&M-EST. PATIENT-LVL IV: ICD-10-PCS | Mod: PBBFAC,HCNC,, | Performed by: FAMILY MEDICINE

## 2023-03-28 PROCEDURE — 1160F PR REVIEW ALL MEDS BY PRESCRIBER/CLIN PHARMACIST DOCUMENTED: ICD-10-PCS | Mod: HCNC,CPTII,S$GLB, | Performed by: FAMILY MEDICINE

## 2023-03-28 PROCEDURE — 1101F PT FALLS ASSESS-DOCD LE1/YR: CPT | Mod: HCNC,CPTII,S$GLB, | Performed by: FAMILY MEDICINE

## 2023-03-28 PROCEDURE — 3288F FALL RISK ASSESSMENT DOCD: CPT | Mod: HCNC,CPTII,S$GLB, | Performed by: FAMILY MEDICINE

## 2023-03-28 PROCEDURE — 3078F PR MOST RECENT DIASTOLIC BLOOD PRESSURE < 80 MM HG: ICD-10-PCS | Mod: HCNC,CPTII,S$GLB, | Performed by: FAMILY MEDICINE

## 2023-03-28 PROCEDURE — 99999 PR PBB SHADOW E&M-EST. PATIENT-LVL IV: CPT | Mod: PBBFAC,HCNC,, | Performed by: FAMILY MEDICINE

## 2023-03-28 PROCEDURE — 3078F DIAST BP <80 MM HG: CPT | Mod: HCNC,CPTII,S$GLB, | Performed by: FAMILY MEDICINE

## 2023-03-28 PROCEDURE — 3075F PR MOST RECENT SYSTOLIC BLOOD PRESS GE 130-139MM HG: ICD-10-PCS | Mod: HCNC,CPTII,S$GLB, | Performed by: FAMILY MEDICINE

## 2023-03-28 PROCEDURE — 1126F AMNT PAIN NOTED NONE PRSNT: CPT | Mod: HCNC,CPTII,S$GLB, | Performed by: FAMILY MEDICINE

## 2023-03-28 PROCEDURE — 3075F SYST BP GE 130 - 139MM HG: CPT | Mod: HCNC,CPTII,S$GLB, | Performed by: FAMILY MEDICINE

## 2023-03-28 PROCEDURE — 1160F RVW MEDS BY RX/DR IN RCRD: CPT | Mod: HCNC,CPTII,S$GLB, | Performed by: FAMILY MEDICINE

## 2023-03-28 NOTE — PROGRESS NOTES
"Subjective:      Patient ID: Daisha Mayo is a 77 y.o. female.    Chief Complaint: follow up    76 yo w cervical spondlysis, lumbar scoliosis, trochanteric bursitis both hips, OA L knee, sciatica, neck muscle spasm, GERD, history depression    Here today for follow up    She sees Psychiatrist DR Peters    We reviewed labs, within normal limits except for cholesterol elevated    She was evaluated by Dr Davidson , pain mgmt. Referred to PT but he did not "get a call" or set this up, tx w Celebrex 100 BID, now down to qd.  She states pain has mostly subsided.     Denies any chest pain, shortness of breath, nausea vomiting constipation diarrhea, blood in stool,     Needs daily protonix for heartburn    Current Outpatient Medications   Medication Instructions    ascorbic acid (vitamin C) (VITAMIN C) 100 mg, Oral, Daily    calcium citrate-vitamin D3 200 mg-6.25 mcg (250 unit) Tab No dose, route, or frequency recorded.    celecoxib (CELEBREX) 100 mg, Oral, 2 times daily    cyanocobalamin (VITAMIN B-12) 1000 MCG tablet No dose, route, or frequency recorded.    dorzolamide-timolol (COSOPT) 2-0.5 % ophthalmic solution 1 drop, Both Eyes, 2 times daily,      hydroCHLOROthiazide (HYDRODIURIL) 25 mg, Oral, Daily    latanoprost 0.005 % ophthalmic solution 1 drop, Nightly    multivitamin with minerals (HAIR,SKIN AND NAILS ORAL) 1 tablet, Oral, Daily    multivitamin-minerals-lutein Tab 1 tablet, Daily    pantoprazole (PROTONIX) 40 mg, Oral, Daily    thiamine mononitrate (vit B1) (VITAMIN B-1 (MONONITRATE)) 100 mg, Oral, Daily    VITAMIN D3 5,000 unit Tab No dose, route, or frequency recorded.       Lab Results   Component Value Date    HGBA1C 5.1 11/26/2006     No results found for: MICALBCREAT  Lab Results   Component Value Date    LDLCALC 155.4 03/23/2023    LDLCALC 152.8 02/09/2022    CHOL 224 (H) 03/23/2023    HDL 52 03/23/2023    TRIG 83 03/23/2023       Lab Results   Component Value Date     03/23/2023    K 4.1 " 03/23/2023     03/23/2023    CO2 34 (H) 03/23/2023    GLU 95 03/23/2023    BUN 17 03/23/2023    CREATININE 0.7 03/23/2023    CALCIUM 9.0 03/23/2023    PROT 6.8 03/23/2023    ALBUMIN 3.7 03/23/2023    BILITOT 0.7 03/23/2023    ALKPHOS 81 03/23/2023    AST 14 03/23/2023    ALT 12 03/23/2023    ANIONGAP 4 (L) 03/23/2023    ESTGFRAFRICA >60.0 02/09/2022    EGFRNONAA >60.0 02/09/2022    WBC 4.93 03/23/2023    HGB 13.0 03/23/2023    HGB 14.5 01/13/2022    HCT 40.6 03/23/2023    MCV 99 (H) 03/23/2023     03/23/2023    TSH 3.347 01/13/2022    HEPCAB Negative 10/14/2015       Lab Results   Component Value Date    FSH 67.40 01/10/2020    TEEBELOK09QZ 71 11/27/2019    JNNGKRGK18NO 73 03/28/2019    LUJKZHMS72 867 03/23/2023    FERRITIN 499 (H) 04/03/2020         Past Medical History:   Diagnosis Date    Arthritis     Benign hypertension     Early cataracts, bilateral     Encephalopathy due to COVID-19 virus 04/03/2020    resoved 1 year after COVID infection, 2/21, Psych [facility]    SAMSON (generalized anxiety disorder) 02/05/2019    GERD (gastroesophageal reflux disease)     Glaucoma     History of depression     Around [place]; treated with Wellbutrin    Hyperprolactinemia     Juvenile idiopathic scoliosis of lumbar region 11/23/2022    Osteopenia     Postmenopause atrophic vaginitis 04/28/2021    Primary osteoarthritis of left knee 04/28/2021    PT [facility] 2021    Recurrent UTI     Every few months    Right ankle swelling 04/28/2021    Sciatica neuralgia     Temporal lobe seizure 2006    Trigeminal neuralgia     Vitamin D deficiency disease      Past Surgical History:   Procedure Laterality Date    BREAST BIOPSY      duct excision    CHOLECYSTECTOMY  1994    laporascopic    COLONOSCOPY      EYE SURGERY      LASIK surgery to one eye; cannot remember which one    TOTAL ABDOMINAL HYSTERECTOMY W/ BILATERAL SALPINGOOPHORECTOMY      1985; diffinitive treatment for menorrhagia     Social History     Social History Narrative     "dtr Zayra moved home from [place] to help         Family History   Problem Relation Age of Onset    Heart disease Mother     Hypertension Mother     Thyroid disease Mother     Colon cancer Mother 90    Breast cancer Mother 104    Cancer Mother 103        colon and breast    Kidney disease Father     Heart disease Sister         pacemaker    Hypertension Sister     Thyroid disease Sister     Glaucoma Sister     Kidney disease Sister         stage 4 ckd - refuses dialysis if worsens    Fibromyalgia Brother     Kidney disease Brother     Sleep apnea Brother     Hypertension Brother         orthostatic hypotension    Glaucoma Brother     Cancer Brother         prostate    Kidney disease Brother     Heart disease Brother         sudden death at age 67    Glaucoma Paternal Grandfather         Went blind from Glaucoma    Breast cancer Maternal Aunt     Glaucoma Paternal Aunt     Glaucoma Paternal Uncle     Cancer Cousin 31         from breast cancer at age 31    Breast cancer Cousin     Celiac disease Neg Hx     Cirrhosis Neg Hx     Colon polyps Neg Hx     Crohn's disease Neg Hx     Cystic fibrosis Neg Hx     Esophageal cancer Neg Hx     Hemochromatosis Neg Hx     Inflammatory bowel disease Neg Hx     Irritable bowel syndrome Neg Hx     Liver cancer Neg Hx     Liver disease Neg Hx     Rectal cancer Neg Hx     Stomach cancer Neg Hx     Ulcerative colitis Neg Hx     Moses's disease Neg Hx      Vitals:    23 1358   BP: 132/74   Pulse: 62   Temp: 98.7 °F (37.1 °C)   TempSrc: Oral   SpO2: 99%   Weight: 76 kg (167 lb 8.8 oz)   Height: 5' 1" (1.549 m)   PainSc: 0-No pain     Objective:   Physical Exam  Constitutional:       Appearance: She is well-developed.   HENT:      Nose: Nose normal.      Mouth/Throat:      Mouth: Mucous membranes are moist.      Pharynx: Oropharynx is clear.   Eyes:      Pupils: Pupils are equal, round, and reactive to light.   Neck:      Thyroid: No thyromegaly.   Cardiovascular:      Rate " and Rhythm: Normal rate and regular rhythm.      Heart sounds: Normal heart sounds. No murmur heard.  Pulmonary:      Effort: Pulmonary effort is normal.      Breath sounds: Normal breath sounds. No wheezing.   Abdominal:      General: Bowel sounds are normal. There is no distension.      Palpations: Abdomen is soft. There is no mass.      Tenderness: There is no abdominal tenderness. There is no guarding or rebound.   Musculoskeletal:      Cervical back: Neck supple.   Lymphadenopathy:      Cervical: No cervical adenopathy.   Skin:     General: Skin is warm and dry.   Neurological:      Mental Status: She is alert and oriented to person, place, and time.   Psychiatric:         Behavior: Behavior normal.     Assessment:     1. Benign hypertension    2. 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 Gastroesophageal reflux disease, unspecified whether esophagitis present    4. Osteoporosis, unspecified osteoporosis type, unspecified pathological fracture presence    5. Primary osteoarthritis of left knee      Plan:        Celebrex daily, follow w Dr Davidson if needed  I recommend starting PT if worse  Follow w Psychiatrist  Continue meds  Keep moving    Your #1 MEDICINE is 10 minutes of WALKING EVERY DAY to wake up your metabolism to burn off the food you eat  Respect the #1 cause of death- cardiovascular disease (HEART ATTACK & STROKE). Keep normal blood pressure, cholesterol, sugars, & quit smoking.     VACCINES: FLU yearly, PNEUMONIA at 65,  SHINGLES at 50  COLON CANCER screening colonoscopy starting at 46 yo  Eat more food grown from the earth (picked from trees or out of the ground)    Follow up yearly with LABS ONE WEEK PRIOR so we can discuss at your visit    There are no Patient Instructions on file for this visit.

## 2023-03-30 LAB — VIT B1 BLD-MCNC: 65 UG/L (ref 38–122)

## 2023-04-13 ENCOUNTER — PATIENT MESSAGE (OUTPATIENT)
Dept: PRIMARY CARE CLINIC | Facility: CLINIC | Age: 78
End: 2023-04-13
Payer: MEDICARE

## 2023-04-13 ENCOUNTER — OFFICE VISIT (OUTPATIENT)
Dept: INTERNAL MEDICINE | Facility: CLINIC | Age: 78
End: 2023-04-13
Attending: FAMILY MEDICINE
Payer: MEDICARE

## 2023-04-13 VITALS
WEIGHT: 165 LBS | DIASTOLIC BLOOD PRESSURE: 73 MMHG | TEMPERATURE: 99 F | HEART RATE: 71 BPM | OXYGEN SATURATION: 97 % | SYSTOLIC BLOOD PRESSURE: 122 MMHG | HEIGHT: 61 IN | BODY MASS INDEX: 31.15 KG/M2

## 2023-04-13 DIAGNOSIS — M54.30 SCIATICA, UNSPECIFIED LATERALITY: ICD-10-CM

## 2023-04-13 DIAGNOSIS — Z86.19 HISTORY OF SHINGLES: ICD-10-CM

## 2023-04-13 DIAGNOSIS — R21 RASH: Primary | ICD-10-CM

## 2023-04-13 PROCEDURE — 99999 PR PBB SHADOW E&M-EST. PATIENT-LVL V: ICD-10-PCS | Mod: PBBFAC,HCNC,, | Performed by: FAMILY MEDICINE

## 2023-04-13 PROCEDURE — 3078F PR MOST RECENT DIASTOLIC BLOOD PRESSURE < 80 MM HG: ICD-10-PCS | Mod: HCNC,CPTII,S$GLB, | Performed by: FAMILY MEDICINE

## 2023-04-13 PROCEDURE — 99999 PR PBB SHADOW E&M-EST. PATIENT-LVL V: CPT | Mod: PBBFAC,HCNC,, | Performed by: FAMILY MEDICINE

## 2023-04-13 PROCEDURE — 1101F PT FALLS ASSESS-DOCD LE1/YR: CPT | Mod: HCNC,CPTII,S$GLB, | Performed by: FAMILY MEDICINE

## 2023-04-13 PROCEDURE — 99213 OFFICE O/P EST LOW 20 MIN: CPT | Mod: HCNC,S$GLB,, | Performed by: FAMILY MEDICINE

## 2023-04-13 PROCEDURE — 3288F PR FALLS RISK ASSESSMENT DOCUMENTED: ICD-10-PCS | Mod: HCNC,CPTII,S$GLB, | Performed by: FAMILY MEDICINE

## 2023-04-13 PROCEDURE — 1125F AMNT PAIN NOTED PAIN PRSNT: CPT | Mod: HCNC,CPTII,S$GLB, | Performed by: FAMILY MEDICINE

## 2023-04-13 PROCEDURE — 3074F PR MOST RECENT SYSTOLIC BLOOD PRESSURE < 130 MM HG: ICD-10-PCS | Mod: HCNC,CPTII,S$GLB, | Performed by: FAMILY MEDICINE

## 2023-04-13 PROCEDURE — 1125F PR PAIN SEVERITY QUANTIFIED, PAIN PRESENT: ICD-10-PCS | Mod: HCNC,CPTII,S$GLB, | Performed by: FAMILY MEDICINE

## 2023-04-13 PROCEDURE — 99213 PR OFFICE/OUTPT VISIT, EST, LEVL III, 20-29 MIN: ICD-10-PCS | Mod: HCNC,S$GLB,, | Performed by: FAMILY MEDICINE

## 2023-04-13 PROCEDURE — 3074F SYST BP LT 130 MM HG: CPT | Mod: HCNC,CPTII,S$GLB, | Performed by: FAMILY MEDICINE

## 2023-04-13 PROCEDURE — 3078F DIAST BP <80 MM HG: CPT | Mod: HCNC,CPTII,S$GLB, | Performed by: FAMILY MEDICINE

## 2023-04-13 PROCEDURE — 1160F RVW MEDS BY RX/DR IN RCRD: CPT | Mod: HCNC,CPTII,S$GLB, | Performed by: FAMILY MEDICINE

## 2023-04-13 PROCEDURE — 1101F PR PT FALLS ASSESS DOC 0-1 FALLS W/OUT INJ PAST YR: ICD-10-PCS | Mod: HCNC,CPTII,S$GLB, | Performed by: FAMILY MEDICINE

## 2023-04-13 PROCEDURE — 1160F PR REVIEW ALL MEDS BY PRESCRIBER/CLIN PHARMACIST DOCUMENTED: ICD-10-PCS | Mod: HCNC,CPTII,S$GLB, | Performed by: FAMILY MEDICINE

## 2023-04-13 PROCEDURE — 1159F PR MEDICATION LIST DOCUMENTED IN MEDICAL RECORD: ICD-10-PCS | Mod: HCNC,CPTII,S$GLB, | Performed by: FAMILY MEDICINE

## 2023-04-13 PROCEDURE — 1159F MED LIST DOCD IN RCRD: CPT | Mod: HCNC,CPTII,S$GLB, | Performed by: FAMILY MEDICINE

## 2023-04-13 PROCEDURE — 3288F FALL RISK ASSESSMENT DOCD: CPT | Mod: HCNC,CPTII,S$GLB, | Performed by: FAMILY MEDICINE

## 2023-04-13 RX ORDER — MUPIROCIN 20 MG/G
OINTMENT TOPICAL 3 TIMES DAILY
Qty: 22 G | Refills: 1 | Status: SHIPPED | OUTPATIENT
Start: 2023-04-13 | End: 2023-04-20

## 2023-04-13 RX ORDER — MUPIROCIN 20 MG/G
OINTMENT TOPICAL 3 TIMES DAILY
Qty: 22 G | Refills: 1 | Status: SHIPPED | OUTPATIENT
Start: 2023-04-13 | End: 2023-04-13 | Stop reason: SDUPTHER

## 2023-04-13 RX ORDER — VALACYCLOVIR HYDROCHLORIDE 1 G/1
1000 TABLET, FILM COATED ORAL 3 TIMES DAILY
Qty: 21 TABLET | Refills: 0 | Status: SHIPPED | OUTPATIENT
Start: 2023-04-13 | End: 2023-06-29

## 2023-04-13 RX ORDER — VALACYCLOVIR HYDROCHLORIDE 1 G/1
1000 TABLET, FILM COATED ORAL 3 TIMES DAILY
Qty: 21 TABLET | Refills: 0 | Status: SHIPPED | OUTPATIENT
Start: 2023-04-13 | End: 2023-04-13 | Stop reason: SDUPTHER

## 2023-04-13 NOTE — TELEPHONE ENCOUNTER
Would need to be evaluated. By one of my colleagues Virtual or Urgent care (since I'm not back until Tues)

## 2023-04-13 NOTE — TELEPHONE ENCOUNTER
Pt had shingles in May 2016. She now has blisters at the base of her neck. She has been using Neosporin. Now  she see tiny spots around the same area now. We have no appts here. Do you want her to go to urgent care? Please advise.

## 2023-04-13 NOTE — PROGRESS NOTES
Subjective:       Patient ID: Daisha Mayo is a 77 y.o. female.    Chief Complaint: Herpes Zoster    New patient, same day urgent care presents - rash to left shoulder area times few days.  Concerned of recurrent shingles.  Had a substantial shingles outbreak 2016.  That occurred 6 months after initial immunization, she has not had the updated shingles shot.  This is characterized by 1 area that she states had a small blister.  That has more or less resolved.  There was some small red areas around it.  None of those had blisters.  Does not report tingling, dysesthesia etc..  No systemic symptoms such as fever chills or constitutional complaints.    Chronic back pain with radiation to leg.  Had been evaluated by PM&R recently.  Referred to physical therapy, do not think she made appointment.  No change from previous evaluation.      Review of Systems   Constitutional:  Negative for appetite change, chills, diaphoresis, fatigue and fever.   HENT:  Positive for congestion. Negative for postnasal drip, rhinorrhea, sore throat and trouble swallowing.    Eyes:  Negative for visual disturbance.   Respiratory:  Negative for cough, choking, chest tightness, shortness of breath and wheezing.    Cardiovascular:  Negative for chest pain and leg swelling.   Gastrointestinal:  Negative for abdominal distention, abdominal pain, diarrhea, nausea and vomiting.   Genitourinary:  Negative for difficulty urinating and hematuria.   Musculoskeletal:  Negative for arthralgias and myalgias.   Skin:  Positive for rash.   Neurological:  Negative for weakness, light-headedness and headaches.   Hematological:  Does not bruise/bleed easily.   Psychiatric/Behavioral:  Negative for decreased concentration and dysphoric mood.      Objective:      Physical Exam  Vitals and nursing note reviewed.   Constitutional:       General: She is not in acute distress.     Appearance: She is well-developed.   Pulmonary:      Effort: Pulmonary effort is  normal.   Musculoskeletal:      Cervical back: Neck supple.      Right lower leg: No edema.      Left lower leg: No edema.   Skin:     General: Skin is warm and dry.      Findings: No rash.          Neurological:      Mental Status: She is alert and oriented to person, place, and time.   Psychiatric:         Behavior: Behavior normal.         Thought Content: Thought content normal.         Judgment: Judgment normal.       Assessment:       1. Rash    2. History of shingles    3. Sciatica, unspecified laterality        Plan:     Medication List with Changes/Refills   New Medications    MUPIROCIN (BACTROBAN) 2 % OINTMENT    Apply topically 3 (three) times daily. for 7 days    VALACYCLOVIR (VALTREX) 1000 MG TABLET    Take 1 tablet (1,000 mg total) by mouth 3 (three) times daily. for 7 days   Current Medications    ASCORBIC ACID, VITAMIN C, (VITAMIN C) 100 MG TABLET    Take 100 mg by mouth once daily.    CALCIUM CITRATE-VITAMIN D3 200 MG-6.25 MCG (250 UNIT) TAB        CELECOXIB (CELEBREX) 100 MG CAPSULE    Take 1 capsule (100 mg total) by mouth 2 (two) times daily.    CYANOCOBALAMIN (VITAMIN B-12) 1000 MCG TABLET        DORZOLAMIDE-TIMOLOL (COSOPT) 2-0.5 % OPHTHALMIC SOLUTION    Place 1 drop into both eyes 2 (two) times daily.    HYDROCHLOROTHIAZIDE (HYDRODIURIL) 25 MG TABLET    Take 1 tablet (25 mg total) by mouth once daily.    LATANOPROST 0.005 % OPHTHALMIC SOLUTION    Place 1 drop into both eyes every evening.    MULTIVITAMIN WITH MINERALS (HAIR,SKIN AND NAILS ORAL)    Take 1 tablet by mouth once daily.    MULTIVITAMIN-MINERALS-LUTEIN TAB    Take 1 tablet by mouth Daily. 1 Tablet Oral Every day    PANTOPRAZOLE (PROTONIX) 40 MG TABLET    Take 1 tablet (40 mg total) by mouth once daily.    THIAMINE MONONITRATE, VIT B1, (VITAMIN B-1, MONONITRATE,) 100 MG TAB    Take 1 tablet (100 mg total) by mouth once daily.    VITAMIN D3 5,000 UNIT TAB         1. Rash  -     Discontinue: mupirocin (BACTROBAN) 2 % ointment; Apply  topically 3 (three) times daily. for 7 days  Dispense: 22 g; Refill: 1  -     Discontinue: valACYclovir (VALTREX) 1000 MG tablet; Take 1 tablet (1,000 mg total) by mouth 3 (three) times daily. for 7 days  Dispense: 21 tablet; Refill: 0  -     valACYclovir (VALTREX) 1000 MG tablet; Take 1 tablet (1,000 mg total) by mouth 3 (three) times daily. for 7 days  Dispense: 21 tablet; Refill: 0  -     mupirocin (BACTROBAN) 2 % ointment; Apply topically 3 (three) times daily. for 7 days  Dispense: 22 g; Refill: 1    2. History of shingles  Comments:  2016 - large outbreak L anterior and lat upper CW, distal neck  Overview:  2016 - large outbreak L anterior and lat upper CW, distal neck      3. Sciatica, unspecified laterality  -     Ambulatory referral/consult to [facility]; Future; Expected date: 04/20/2023  -     Ambulatory referral/consult to Physical/Occupational Therapy; Future; Expected date: 04/20/2023      See meds, orders, follow up, routing and instructions sections of encounter and AVS. Discussed with patient and provided on AVS.    Although her current exanthem is rather limited, will treat empirically with Valtrex and notify us if any change occurs.  Concerning sciatica will refer back to physical therapy and if symptoms continue then back and spine center.

## 2023-05-03 ENCOUNTER — PES CALL (OUTPATIENT)
Dept: ADMINISTRATIVE | Facility: CLINIC | Age: 78
End: 2023-05-03
Payer: MEDICARE

## 2023-05-09 ENCOUNTER — CLINICAL SUPPORT (OUTPATIENT)
Dept: REHABILITATION | Facility: OTHER | Age: 78
End: 2023-05-09
Payer: MEDICARE

## 2023-05-09 DIAGNOSIS — R29.898 BILATERAL LEG WEAKNESS: ICD-10-CM

## 2023-05-09 DIAGNOSIS — M54.30 SCIATICA, UNSPECIFIED LATERALITY: ICD-10-CM

## 2023-05-09 PROCEDURE — 97110 THERAPEUTIC EXERCISES: CPT | Mod: PN

## 2023-05-09 PROCEDURE — 97161 PT EVAL LOW COMPLEX 20 MIN: CPT | Mod: PN

## 2023-05-09 NOTE — PROGRESS NOTES
"[facility]  Physical Therapy Initial Evaluation    Name: Daisha Mayo  Clinic Number: [id number]    Therapy Diagnosis:   Encounter Diagnoses   Name Primary?    Sciatica, unspecified laterality     Bilateral leg weakness      Physician: Juan Alberto Oakes MD    Physician Orders: Physical Therapy Evaluate and Treat  Medical Diagnosis from Referral: sciatica  Evaluation Date: 5/9/23  Authorization Period Expiration: 4/12/24  Plan of Care Expiration: 5/9/2023 to 8/9/23  Visit # / Visits authorized: 1/1 (pending additional authorization following initial evaluation)   FOTO: 0/3       Time In: 100p  Time Out: 200p  Total Billable Time: 60 minutes    Precautions: standard    Subjective     History of current condition - Daisha reports: 2006 right side lower extremity pain and difficulty sleeping on right side    ~ 1 year ago left lower extremity started to become painful - difficulty sleeping on left side    Past two weeks first thing in am 2nd - 4th "fingers lock" which makes manipulating pills and other objects     Bilateral lateral thigh "lumps" which are painful to the touch     Medical History:   Past Medical History:   Diagnosis Date    Arthritis     Benign hypertension     Early cataracts, bilateral     Encephalopathy due to COVID-19 virus 04/03/2020    resoved 1 year after COVID infection, 2/21, [facility]    SAMSON (generalized anxiety disorder) 02/05/2019    GERD (gastroesophageal reflux disease)     Glaucoma     History of depression     Around [place]; treated with Wellbutrin    Hyperprolactinemia     Juvenile idiopathic scoliosis of lumbar region 11/23/2022    Osteopenia     Postmenopause atrophic vaginitis 04/28/2021    Primary osteoarthritis of left knee 04/28/2021    PT [facility] 2021    Recurrent UTI     Every few months    Right ankle swelling 04/28/2021    Sciatica neuralgia     Temporal lobe seizure 2006    Trigeminal neuralgia     Vitamin D deficiency disease  "       Surgical History:   Daisha Mayo  has a past surgical history that includes Cholecystectomy (1994); Colonoscopy; Eye surgery; Total abdominal hysterectomy w/ bilateral salpingoophorectomy; and Breast biopsy.    Medications:   Daisha has a current medication list which includes the following prescription(s): ascorbic acid (vitamin c), calcium citrate-vitamin d3, celecoxib, cyanocobalamin, dorzolamide-timolol 2-0.5%, hydrochlorothiazide, latanoprost, multivitamin with minerals, multivitamin-minerals-lutein, pantoprazole, thiamine mononitrate (vit b1), valacyclovir, and vitamin d3.    Allergies:   Review of patient's allergies indicates:   Allergen Reactions    Latex      Other reaction(s): Rash  Other reaction(s): Rash        Imaging: NA    Prior Therapy: no  Social History: 76 yo female - lives with SO  Occupation: retired  Prior Level of Function: ind with ADLs, IADLs, and recreational activity  Current Level of Function: difficulty sleeping, with transfers    Pain:  Current 5/10, worst 9/10, best 4/10   Location: bilateral lateral thigh and over greater trochanter   Description: Aching  Aggravating Factors: Walking, sit to stand  Easing Factors: none    Pts goals: Pt would like to return to walking, getting on/off the floor with no increase in hip/LE pain.    Objective     WNL=within normal limits  WFL=within functional limits  NT=not tested  *=pain    Posture: l/s left facing concavity / left rotation  Palpation: tenderness to palpation at bilateral greater trochanter, posterior hip, proximal piriformis  Sensation: intact  Deep tendon reflexes: NT    Lumbar Active range of motion  Pain/dysfunction with movement:   Flexion 90    Extension 50 Bilateral LBP   Right side bending 75    Left side bending 100    Right rotation 75    Left rotation 100          Right LE   Left LE      Iliopsoas:  L2 3/5 Iliopsoas: L2 3/5    Quadriceps:  L3 - femoral nerve 4/5 Quadriceps: L3 - femoral nerve 4/5    Hip  adduction:  L3 - obterator 4/5 Hip adduction: L3 - obterator 4/5    Hamstrings:  S2 4/5 Hamstrings: S2 4/5    Ankle DF/EV:  L4 5/5 Ankle DF/EV: L4 4/5    GT Ext:  L5 5/5 GT Ext L5 4/5    Hip Abduction:  L5-S1 - Superior gluteal nerve 3/5  Hip Abduction: L5-S1 - Superior gluteal nerve 3/5    Hip extension:  L5-S2 - Inferior gluteal nerve 3/5 Hip extension: L5-S2 - Inferior gluteal nerve 3/5    Ankle PF:   S1 - tibial nerve NT Ankle PF S1 - tibial nerve NT         Slump R:  Slump L:     SLR R:  SLR L:      Joint mobility:   Thoracic: NT  Lumbar: WNL      Gait analysis: patient ambulates with hip drop bilaterally - will further assess foot posture in static / with dynamic movements         CMS Impairment/Limitation/Restriction for FOTO Survey    Therapist reviewed FOTO scores for Daisha Mayo on 5/9/2023.   FOTO documents entered into [employer] - see Media section.    Limitation Score: TBD%  Predicted Goal: TBD%    Category: Mobility     TREATMENT     Treatment Time In: 145p  Treatment Time Out: 200p  Total Treatment time separate from Evaluation: 15 minutes    Therapeutic Exercises were provided for 15 minutes to improve strength and AROM including:    TA activation x 10  Bridge x 10  SLR x10  Supine hamstring stretch with strap [id number]  Supine hamstring stretch with reciprocal inhibition x30 secs    Home Exercises and Patient Education Provided:    Education provided:   - Findings; prognosis and plan of care (POC)  - Home exercise program (HEP)  - Modality options  - Therapist contact information    Written Home Exercises Provided: Yes  Exercises were reviewed and Daisha was able to demonstrate them prior to the end of the session.  Daisha demonstrated good understanding of the education provided.       Assessment     Daisha is a 77 y.o. female referred to outpatient Physical Therapy with a medical diagnosis of bilateral hip pain and sciatica. Pt presents to PT with pain, decreased LE ROM, decreased strength  and flexibility, poor posture, and functional deficits with sleeping/transfers/walking. These deficits are negatively impacting this patient's ability to complete their work duties and activities of daily living.     Pt prognosis is Good.   Pt will benefit from skilled outpatient Physical Therapy to address the deficits stated above and in the chart below, provide pt/family education, and to maximize pt's level of independence.     Plan of care discussed with patient: Yes  Pt's spiritual, cultural and educational needs considered and pt agreeable to plan of care and goals as stated below:     Anticipated Barriers for therapy: None      Medical Necessity is demonstrated by the following  History  Co-morbidities and personal factors that may impact the plan of care Co-morbidities:   NA    Personal Factors:   no deficits     low   Examination  Body Structures and Functions, activity limitations and participation restrictions that may impact the plan of care Body Regions:   lower extremities    Body Systems:    ROM  strength  gross coordinated movement  balance  gait  transfers  transitions  motor control    Participation Restrictions:   Walking    Activity limitations:   Learning and applying knowledge  no deficits    General Tasks and Commands  No Deficits    Communication  No Deficits    Mobility  walking    Self care  no deficits    Domestic Life  No Deficits    Interactions/Relationships  No Deficits    Life Areas  No Deficits    Community and Social Life  No Deficits         low   Clinical Presentation stable and uncomplicated low   Decision Making/ Complexity Score: low     GOALS:  Short Term Goals (4 Weeks):  1. Patient will be compliant with home exercise program to supplement therapy in restoring pain free function. (progressing, not met)    2. Patient will improve impaired lower extremity manual muscle tests to >/= 4/5 to improve dynamic hip/knee support for functional tasks. (progressing, not met)    3. Pt  will tolerate walking for 30 minutes with no increase in hip pain to return to PLOF. (progressing, not met)    4. Pt will improve hip pain to 3/10 at worst for improved QOL. (progressing, not met)        Long Term Goals (8 Weeks):  1. Patient will improve FOTO score by 10% limited to decrease perceived limitation with mobility. (progressing, not met)    2. Patient will improve impaired lower extremity manual muscle tests to >/= 4+/5 to improve dynamic hip/knee support for functional tasks. (progressing, not met)    3. Patient will tolerate walking for 45 minutes with no increase in hip pain to return to PLOF. (progressing, not met)    4. Patient will deny pain waking her up at night for improved QOL.   (progressing, not met)          Plan     Plan of care Certification: 5/9/2023 to 8/9/23    Outpatient Physical Therapy 1 times weekly for 12 weeks to include the following interventions: Therapeutic Exercises, Manual Therapeutic Technique, Neuromuscular Re Education, Therapeutic Activities. Modalities, Kinesiotape prn, and Functional Dry Needling as needed.    Bolivar Piedra, PT,  DPT, OCS  Agree with PT/OT assessment and approve continuation of care. MD Ryan, MA

## 2023-05-18 ENCOUNTER — CLINICAL SUPPORT (OUTPATIENT)
Dept: REHABILITATION | Facility: OTHER | Age: 78
End: 2023-05-18
Payer: MEDICARE

## 2023-05-18 DIAGNOSIS — R29.898 BILATERAL LEG WEAKNESS: Primary | ICD-10-CM

## 2023-05-18 PROCEDURE — 97110 THERAPEUTIC EXERCISES: CPT | Mod: PN

## 2023-05-18 PROCEDURE — 97140 MANUAL THERAPY 1/> REGIONS: CPT | Mod: PN

## 2023-05-18 PROCEDURE — 97530 THERAPEUTIC ACTIVITIES: CPT | Mod: PN

## 2023-05-18 PROCEDURE — 97112 NEUROMUSCULAR REEDUCATION: CPT | Mod: PN

## 2023-05-18 NOTE — PROGRESS NOTES
"[facility]   Physical Therapy Treatment Note     [facility]  Clinic Number: [id number]    Therapy Diagnosis:   Encounter Diagnosis   Name Primary?    Bilateral leg weakness Yes     Physician: Juan Alberto Oakes MD    Visit Date: 5/18/2023    Physician Orders: PT Evaluate and Treat  Medical Diagnosis: sciatica   Evaluation Date: 5/9/23  Authorization Period Expiration: 8/9/23  Plan of Care Certification Period: 5/9/2023 to 8/9/23   Progress Note Due: 6/9/23    Visit # / Visits authorized: 1/ 12   FOTO: 1/ 3       Precautions: Standard    Time In: 0900am  Time Out: 1000am  Total Billable Time: 60 minutes      SUBJECTIVE     Pt reports: that she is doing well today. Pt states that she was "sore everywhere" after her initial evaluation. Pt attributes this to doing new exercises that she has never done before.  She was compliant with home exercise program.  Response to previous treatment: improved tolerance to exercises  Functional change: improved walking ability    Pain: 0/10  Location: N/A    OBJECTIVE     Objective Measures updated at progress report unless specified.       TREATMENT     Total Treatment time (time-based codes) separate from Evaluation: 60 minutes     Daisha received the treatments listed below:      Patient received therapeutic exercises for 8 minutes for improved strength and AROM including:  R/L piriformis stretch 3x20" each  R/L hip ER stretch Figure 4 3x20" each         Patient received manual therapeutic technique for 8 minutes for improved soft tissue and joint mobility including:  R/L hip lateral glide with mobilization belt  R/L LAD with mobilization belt and gentle oscillations      Patient received neuromuscular reeducation for 30 minutes for improved proprioception and balance including:  Supine TrA contraction with 5" hold 30 repetitions  Supine TrA contraction with marches 20x   R/L SLR 3x10 repetitions  SL balance to failure 5x R/L   Side " stepping and tandem walking on foam beam x2 minutes each with fingertips on windowsill PRN      Patient received therapeutic activities for 14 minutes for improved tolerance to functional activities including:  Sit <> Stand 3x8 repetitions  Hesitation marches x 2 laps on turfs      PATIENT EDUCATION AND HOME EXERCISES     Home Exercises Provided and Patient Education Provided     Education provided:   PT educated pt on importance of compliance with their HEP this visit.     Written Home Exercises Provided: Patient instructed to cont prior HEP. Exercises were reviewed and Daisha was able to demonstrate them prior to the end of the session.  Daisha demonstrated good  understanding of the education provided. See EMR under Patient Instructions for exercises provided during therapy sessions    ASSESSMENT   Pt tolerated tx session well today and completed all therapeutic exercises with minimal/no increase in hip pain. Progressed transverse abdominus strengthening exercises this visit. Pt reported decreased lateral hip pain following hip distraction mobilization evidencing need for skilled manual therapy treatment for improved gait mechanics. Pt deconditioned and requires frequent rest breaks between exercises. Continue PT POC.     Daisha Is progressing well towards her goals.   Pt prognosis is Good.     Pt will continue to benefit from skilled outpatient physical therapy to address the deficits listed in the problem list box on initial evaluation, provide pt/family education and to maximize pt's level of independence in the home and community environment.     Pt's spiritual, cultural and educational needs considered and pt agreeable to plan of care and goals.     Anticipated barriers to physical therapy: None    Goals:   Short Term Goals (4 Weeks):  1. Patient will be compliant with home exercise program to supplement therapy in restoring pain free function. (progressing, not met)    2. Patient will improve impaired  lower extremity manual muscle tests to >/= 4/5 to improve dynamic hip/knee support for functional tasks. (progressing, not met)    3. Pt will tolerate walking for 30 minutes with no increase in hip pain to return to PLOF. (progressing, not met)    4. Pt will improve hip pain to 3/10 at worst for improved QOL. (progressing, not met)          Long Term Goals (8 Weeks):  1. Patient will improve FOTO score by 10% limited to decrease perceived limitation with mobility. (progressing, not met)    2. Patient will improve impaired lower extremity manual muscle tests to >/= 4+/5 to improve dynamic hip/knee support for functional tasks. (progressing, not met)    3. Patient will tolerate walking for 45 minutes with no increase in hip pain to return to PLOF. (progressing, not met)    4. Patient will deny pain waking her up at night for improved QOL.   (progressing, not met)      PLAN   Plan of care Certification: 5/9/2023 to 8/9/23     Outpatient Physical Therapy 1 times weekly for 12 weeks to include the following interventions: Therapeutic Exercises, Manual Therapeutic Technique, Neuromuscular Re Education, Therapeutic Activities. Modalities, Kinesiotape prn, and Functional Dry Needling as needed.      Shabana Sneed, PT

## 2023-05-23 ENCOUNTER — CLINICAL SUPPORT (OUTPATIENT)
Dept: REHABILITATION | Facility: OTHER | Age: 78
End: 2023-05-23
Payer: MEDICARE

## 2023-05-23 DIAGNOSIS — R29.898 BILATERAL LEG WEAKNESS: Primary | ICD-10-CM

## 2023-05-23 PROCEDURE — 97140 MANUAL THERAPY 1/> REGIONS: CPT | Mod: PN

## 2023-05-23 PROCEDURE — 97530 THERAPEUTIC ACTIVITIES: CPT | Mod: PN

## 2023-05-23 PROCEDURE — 97112 NEUROMUSCULAR REEDUCATION: CPT | Mod: PN

## 2023-05-23 NOTE — PROGRESS NOTES
"[facility]   Physical Therapy Treatment Note     [facility]  [id number]    Therapy Diagnosis:   Encounter Diagnosis   Name Primary?    Bilateral leg weakness Yes     Physician: Juan Alberto Oakes MD    Visit Date: 5/23/2023    Physician Orders: PT Evaluate and Treat  Medical Diagnosis: sciatica   Evaluation Date: 5/9/23  Authorization Period Expiration: 8/9/23  Plan of Care Certification Period: 5/9/2023 to 8/9/23   Progress Note Due: 6/9/23    Visit # / Visits authorized: 3/ 12   FOTO: 1/ 3       Precautions: Standard    Time In: 0100pm  Time Out: 0200pm  Total Billable Time: 60 minutes      SUBJECTIVE     Pt reports: that she is doing well today. Pt states that she was initially sore after her last visit. However, this subsided after 24 hours. Pt states that her social life interferes with her home exercises but she plans to be diligent with her exercises.     She was compliant with home exercise program.  Response to previous treatment: improved tolerance to exercises  Functional change: improved walking ability    Pain: 0/10  Location: N/A    OBJECTIVE     Objective Measures updated at progress report unless specified.       TREATMENT     Total Treatment time (time-based codes) separate from Evaluation: 60 minutes     Daisha received the treatments listed below:      Patient received therapeutic exercises for 0 minutes for improved strength and AROM including:  R/L piriformis stretch 3x20" each  R/L hip ER stretch Figure 4 3x20" each         Patient received manual therapeutic technique for 16 minutes for improved soft tissue and joint mobility including:  R/L hip lateral glide with mobilization belt  R/L LAD with mobilization belt and gentle oscillations      Patient received neuromuscular reeducation for 30 minutes for improved proprioception and balance including:  Supine TrA contraction with 5" hold 30 repetitions  Supine TrA contraction with marches " 20x   R/L SLR 3x10 repetitions  SL balance to failure 5x R/L   Side stepping and tandem walking on foam beam x2 minutes each with fingertips on windowsill PRN  +SL hip abduction 3x10 repetitions R/L  +R/L SL clams vs GTB 3x10 repetitions each       Patient received therapeutic activities for 14 minutes for improved tolerance to functional activities including:  Sit <> Stand 3x8 repetitions  Hesitation marches x 2 laps on turf      PATIENT EDUCATION AND HOME EXERCISES     Home Exercises Provided and Patient Education Provided     Education provided:   PT educated pt on importance of compliance with their HEP this visit.     Written Home Exercises Provided: Patient instructed to cont prior HEP. Exercises were reviewed and Daisha was able to demonstrate them prior to the end of the session.  Daisha demonstrated good  understanding of the education provided. See EMR under Patient Instructions for exercises provided during therapy sessions    ASSESSMENT   Pt tolerated treatment session well today. Pt demonstrated improved gait mechanics following B hip mobilization. Continue PT POC with emphasis on improving pt's strength and endurance. Educated pt on continuance of home exercises and walking program upon conclusion of therapy treatment.    Daisha Is progressing well towards her goals.   Pt prognosis is Good.     Pt will continue to benefit from skilled outpatient physical therapy to address the deficits listed in the problem list box on initial evaluation, provide pt/family education and to maximize pt's level of independence in the home and community environment.     Pt's spiritual, cultural and educational needs considered and pt agreeable to plan of care and goals.     Anticipated barriers to physical therapy: None    Goals:   Short Term Goals (4 Weeks):  1. Patient will be compliant with home exercise program to supplement therapy in restoring pain free function. (progressing, not met)    2. Patient will  improve impaired lower extremity manual muscle tests to >/= 4/5 to improve dynamic hip/knee support for functional tasks. (progressing, not met)    3. Pt will tolerate walking for 30 minutes with no increase in hip pain to return to PLOF. (progressing, not met)    4. Pt will improve hip pain to 3/10 at worst for improved QOL. (progressing, not met)          Long Term Goals (8 Weeks):  1. Patient will improve FOTO score by 10% limited to decrease perceived limitation with mobility. (progressing, not met)    2. Patient will improve impaired lower extremity manual muscle tests to >/= 4+/5 to improve dynamic hip/knee support for functional tasks. (progressing, not met)    3. Patient will tolerate walking for 45 minutes with no increase in hip pain to return to PLOF. (progressing, not met)    4. Patient will deny pain waking her up at night for improved QOL.   (progressing, not met)      PLAN   Plan of care Certification: 5/9/2023 to 8/9/23     Outpatient Physical Therapy 1 times weekly for 12 weeks to include the following interventions: Therapeutic Exercises, Manual Therapeutic Technique, Neuromuscular Re Education, Therapeutic Activities. Modalities, Kinesiotape prn, and Functional Dry Needling as needed.      Shabana Sneed, PT

## 2023-05-30 ENCOUNTER — CLINICAL SUPPORT (OUTPATIENT)
Dept: REHABILITATION | Facility: OTHER | Age: 78
End: 2023-05-30
Payer: MEDICARE

## 2023-05-30 DIAGNOSIS — R29.898 BILATERAL LEG WEAKNESS: Primary | ICD-10-CM

## 2023-05-30 PROCEDURE — 97530 THERAPEUTIC ACTIVITIES: CPT | Mod: PN

## 2023-05-30 PROCEDURE — 97140 MANUAL THERAPY 1/> REGIONS: CPT | Mod: PN

## 2023-05-30 PROCEDURE — 97112 NEUROMUSCULAR REEDUCATION: CPT | Mod: PN

## 2023-05-30 NOTE — PROGRESS NOTES
"[facility]   Physical Therapy Treatment Note     [facility]  [facility] [id number]    Therapy Diagnosis:   Encounter Diagnosis   Name Primary?    Bilateral leg weakness Yes     Physician: Juan Alberto Oakes MD    Visit Date: 5/30/2023    Physician Orders: PT Evaluate and Treat  Medical Diagnosis: sciatica   Evaluation Date: 5/9/23  Authorization Period Expiration: 8/9/23  Plan of Care Certification Period: 5/9/2023 to 8/9/23   Progress Note Due: 6/9/23    Visit # / Visits authorized: 4 / 12   FOTO: 2/ 3       Precautions: Standard    Time In: 855a  Time Out: 955a  Total Billable Time: 60 minutes      SUBJECTIVE     Pt reports: that she is doing well today. Patient stated that she is able to lie on either side with decreased pain. She continues to feel somewhat unstable while doing the stairs at [employer]. She has struggled performing home exercise program daily 2nd to other obligations.    She was compliant with home exercise program.  Response to previous treatment: improved tolerance to exercises  Functional change: improved walking ability    Pain: 0/10  Location: N/A    OBJECTIVE     Objective Measures updated at progress report unless specified.       TREATMENT     Total Treatment time (time-based codes) separate from Evaluation: 60 minutes     Daisha received the treatments listed below:      Patient received therapeutic exercises for 0 minutes for improved strength and AROM including:  R/L piriformis stretch 3x20" each  R/L hip ER stretch Figure 4 3x20" each         Patient received manual therapeutic technique for 15 minutes for improved soft tissue and joint mobility including:  Right hip lateral glide with mobilization belt  Right LAD with mobilization belt and gentle oscillations  Right posterior hip release      Patient received neuromuscular reeducation for 30 minutes for improved proprioception and balance including:  Bridges 3x10  Bridges on ball " "3x10  Supine TrA contraction with 5" hold 30 repetitions  Supine TrA contraction with marches 20x   R/L SLR 3x10 repetitions  Standing marching with upper extremity assist PRN 2x20  SL balance to failure 5x R/L   Side stepping and tandem walking on foam beam x2 minutes each with fingertips on windowsill PRN  Standing hip abduction 3x10 repetitions R/L  +R/L SL clams vs GTB 3x10 repetitions each   Rows with marching vs red thera-band 2x10  Step up 6" bilaterally with balance throughout movement x10      Patient received therapeutic activities for 10 minutes for improved tolerance to functional activities including:  Sit <> Stand with Pilates ring 3x10 repetitions  Hesitation marches x 2 laps on turf      PATIENT EDUCATION AND HOME EXERCISES     Home Exercises Provided and Patient Education Provided     Education provided:   PT educated pt on importance of compliance with their HEP this visit.     Written Home Exercises Provided: Patient instructed to cont prior HEP. Exercises were reviewed and Daisha was able to demonstrate them prior to the end of the session.  Daisha demonstrated good  understanding of the education provided. See EMR under Patient Instructions for exercises provided during therapy sessions    ASSESSMENT     Pt tolerated treatment session well today. Patient demonstrates improved gluteal activation during functional activity. FOTO revealed objective improvement since starting physical therapy. Educated pt on importance of regular performance of home exercise program.    Daisha Is progressing well towards her goals.   Pt prognosis is Good.     Pt will continue to benefit from skilled outpatient physical therapy to address the deficits listed in the problem list box on initial evaluation, provide pt/family education and to maximize pt's level of independence in the home and community environment.     Pt's spiritual, cultural and educational needs considered and pt agreeable to plan of care and " goals.     Anticipated barriers to physical therapy: None    Goals:   Short Term Goals (4 Weeks):  1. Patient will be compliant with home exercise program to supplement therapy in restoring pain free function. (progressing, not met)    2. Patient will improve impaired lower extremity manual muscle tests to >/= 4/5 to improve dynamic hip/knee support for functional tasks. (progressing, not met)    3. Pt will tolerate walking for 30 minutes with no increase in hip pain to return to PLOF. (progressing, not met)    4. Pt will improve hip pain to 3/10 at worst for improved QOL. (progressing, not met)          Long Term Goals (8 Weeks):  1. Patient will improve FOTO score by 10% limited to decrease perceived limitation with mobility. (progressing, not met)    2. Patient will improve impaired lower extremity manual muscle tests to >/= 4+/5 to improve dynamic hip/knee support for functional tasks. (progressing, not met)    3. Patient will tolerate walking for 45 minutes with no increase in hip pain to return to PLOF. (progressing, not met)    4. Patient will deny pain waking her up at night for improved QOL.   (progressing, not met)      PLAN   Plan of care Certification: 5/9/2023 to 8/9/23     Outpatient Physical Therapy 1 times weekly for 12 weeks to include the following interventions: Therapeutic Exercises, Manual Therapeutic Technique, Neuromuscular Re Education, Therapeutic Activities. Modalities, Kinesiotape prn, and Functional Dry Needling as needed.      Bolivar Piedra, PT

## 2023-06-06 ENCOUNTER — CLINICAL SUPPORT (OUTPATIENT)
Dept: REHABILITATION | Facility: OTHER | Age: 78
End: 2023-06-06
Payer: MEDICARE

## 2023-06-06 DIAGNOSIS — R29.898 BILATERAL LEG WEAKNESS: Primary | ICD-10-CM

## 2023-06-06 PROCEDURE — 97112 NEUROMUSCULAR REEDUCATION: CPT | Mod: PN

## 2023-06-06 PROCEDURE — 97140 MANUAL THERAPY 1/> REGIONS: CPT | Mod: PN

## 2023-06-06 PROCEDURE — 97014 ELECTRIC STIMULATION THERAPY: CPT | Mod: PN

## 2023-06-06 NOTE — PROGRESS NOTES
"[facility]   Physical Therapy Treatment Note     [facility]  Cl[facility] Number[facility] [id number]    Therapy Diagnosis:   No diagnosis found.    Physician: Juan Alberto Oakes MD    Visit Date: 6/6/2023    Physician Orders: PT Evaluate and Treat  Medical Diagnosis: sciatica   Evaluation Date: 5/9/23  Authorization Period Expiration: 8/9/23  Plan of Care Certification Period: 5/9/2023 to 8/9/23   Progress Note Due: 6/9/23    Visit # / Visits authorized: 4 / 12   FOTO: 2/ 3       Precautions: Standard    Time In: 1000a  Time Out: 1100a  Total Billable Time: 60 minutes      SUBJECTIVE     Pt reports: that she continues to have hip pain and tightness, but she feels like emotional stressors in life are exacerbating her orthopedic issues.    She was compliant with home exercise program.  Response to previous treatment: improved tolerance to exercises  Functional change: improved walking ability    Pain: 0/10  Location: N/A    OBJECTIVE     Objective Measures updated at progress report unless specified.       TREATMENT     Total Treatment time (time-based codes) separate from Evaluation: 60 minutes     Daisha received the treatments listed below:      Patient received therapeutic exercises for 0 minutes for improved strength and AROM including:  R/L piriformis stretch 3x20" each  R/L hip ER stretch Figure 4 3x20" each         Patient received manual therapeutic technique for 15 minutes for improved soft tissue and joint mobility including:  Right hip lateral glide with mobilization belt  Right LAD with mobilization belt and gentle oscillations  Right posterior hip release    Application of TDN: Pt educated on benefits and potential side effects of dry needling. Educated pt on benefits, precautions, side effects following TDN. Educated pt to use heat following treatment sessions if pt is experiencing pain or soreness. Pt verbalized good understanding of education.  Pt signed written consent " "to dry needling. Pt gave verbal consent for DN    Pt received dry needling to the below listed muscles using 75 mm needles.    Bilateral L2-5 S1 multifidi    With application of electrical stimulation treatment    Pt tolerated treatment well with no adverse events noted.        Patient received neuromuscular reeducation for 10 minutes for improved proprioception and balance including:  Bridges 3x10  Bridges on ball 3x10  Supine TrA contraction with 5" hold 30 repetitions  Supine TrA contraction with marches 20x   R/L SLR 3x10 repetitions  Standing marching with upper extremity assist PRN 2x20  SL balance to failure 5x R/L   Side stepping and tandem walking on foam beam x2 minutes each with fingertips on windowsill PRN  Standing hip abduction 3x10 repetitions R/L  +R/L SL clams vs GTB 3x10 repetitions each   Rows with marching vs red thera-band 2x10  Step up 6" bilaterally with balance throughout movement x10      Patient received therapeutic activities for 00 minutes for improved tolerance to functional activities including:  Sit <> Stand with Pilates ring 3x10 repetitions  Hesitation marches x 2 laps on turf      PATIENT EDUCATION AND HOME EXERCISES     Home Exercises Provided and Patient Education Provided     Education provided:   PT educated pt on importance of compliance with their HEP this visit.     Written Home Exercises Provided: Patient instructed to cont prior HEP. Exercises were reviewed and Daisha was able to demonstrate them prior to the end of the session.  Daisha demonstrated good  understanding of the education provided. See EMR under Patient Instructions for exercises provided during therapy sessions    ASSESSMENT     Pt tolerated treatment session well today. Patient was unable to perform all of her therapeutic exercise 2nd to adding dry needling to treatment. Dry needling was well tolerated. We will assess the efficacy of that treatment next visit. Patient will continue to benefit from " skilled physical therapy to address persisting deficits.      Daisha Is progressing well towards her goals.   Pt prognosis is Good.     Pt will continue to benefit from skilled outpatient physical therapy to address the deficits listed in the problem list box on initial evaluation, provide pt/family education and to maximize pt's level of independence in the home and community environment.     Pt's spiritual, cultural and educational needs considered and pt agreeable to plan of care and goals.     Anticipated barriers to physical therapy: None    Goals:   Short Term Goals (4 Weeks):  1. Patient will be compliant with home exercise program to supplement therapy in restoring pain free function. (progressing, not met)    2. Patient will improve impaired lower extremity manual muscle tests to >/= 4/5 to improve dynamic hip/knee support for functional tasks. (progressing, not met)    3. Pt will tolerate walking for 30 minutes with no increase in hip pain to return to PLOF. (progressing, not met)    4. Pt will improve hip pain to 3/10 at worst for improved QOL. (progressing, not met)          Long Term Goals (8 Weeks):  1. Patient will improve FOTO score by 10% limited to decrease perceived limitation with mobility. (progressing, not met)    2. Patient will improve impaired lower extremity manual muscle tests to >/= 4+/5 to improve dynamic hip/knee support for functional tasks. (progressing, not met)    3. Patient will tolerate walking for 45 minutes with no increase in hip pain to return to PLOF. (progressing, not met)    4. Patient will deny pain waking her up at night for improved QOL.   (progressing, not met)      PLAN   Plan of care Certification: 5/9/2023 to 8/9/23     Outpatient Physical Therapy 1 times weekly for 12 weeks to include the following interventions: Therapeutic Exercises, Manual Therapeutic Technique, Neuromuscular Re Education, Therapeutic Activities. Modalities, Kinesiotape prn, and Functional Dry  Needling as needed.      Bolivar Piedra, PT

## 2023-06-08 ENCOUNTER — CLINICAL SUPPORT (OUTPATIENT)
Dept: REHABILITATION | Facility: OTHER | Age: 78
End: 2023-06-08
Payer: MEDICARE

## 2023-06-08 DIAGNOSIS — R29.898 BILATERAL LEG WEAKNESS: Primary | ICD-10-CM

## 2023-06-08 PROCEDURE — 97530 THERAPEUTIC ACTIVITIES: CPT | Mod: PN

## 2023-06-08 PROCEDURE — 97112 NEUROMUSCULAR REEDUCATION: CPT | Mod: PN

## 2023-06-08 NOTE — PROGRESS NOTES
"[facility]   Physical Therapy Treatment Note     [facility]  [facility] Number: [id number]    Therapy Diagnosis:   Encounter Diagnosis   Name Primary?    Bilateral leg weakness Yes       Physician: Juan Alberto Oakes MD    Visit Date: 6/8/2023    Physician Orders: PT Evaluate and Treat  Medical Diagnosis: sciatica   Evaluation Date: 5/9/23  Authorization Period Expiration: 8/9/23  Plan of Care Certification Period: 5/9/2023 to 8/9/23   Progress Note Due: 6/9/23    Visit # / Visits authorized: 6/ 12   FOTO: 2/ 3       Precautions: Standard    Time In: 1000a  Time Out: 1100a  Total Billable Time: 60 minutes      SUBJECTIVE     Pt reports: that she is doing well today. Pt states that she is improving with PT. However, she is having a hard time lying on her right side because of her bursitis.     She was compliant with home exercise program.  Response to previous treatment: improved tolerance to exercises  Functional change: improved walking ability    Pain: 0/10  Location: N/A    OBJECTIVE     Objective Measures updated at progress report unless specified.       TREATMENT     Total Treatment time (time-based codes) separate from Evaluation: 60 minutes     Daisha received the treatments listed below:      Patient received therapeutic exercises for 0 minutes for improved strength and AROM including:  R/L piriformis stretch 3x20" each  R/L hip ER stretch Figure 4 3x20" each         Patient received manual therapeutic technique for 0 minutes for improved soft tissue and joint mobility including:  Right hip lateral glide with mobilization belt  Right LAD with mobilization belt and gentle oscillations  Right posterior hip release    Application of TDN: Pt educated on benefits and potential side effects of dry needling. Educated pt on benefits, precautions, side effects following TDN. Educated pt to use heat following treatment sessions if pt is experiencing pain or soreness. Pt " "verbalized good understanding of education.  Pt signed written consent to dry needling. Pt gave verbal consent for DN    Pt received dry needling to the below listed muscles using 75 mm needles.    Bilateral L2-5 S1 multifidi    With application of electrical stimulation treatment    Pt tolerated treatment well with no adverse events noted.        Patient received neuromuscular reeducation for 30 minutes for improved proprioception and balance including:  Bridges on ball 3x10  Supine TrA contraction with 5" hold 30 repetitions  Supine TrA contraction with marches 20x   R/L SLR 3x10 repetitions  SL balance to failure 5x R/L   Side stepping and tandem walking on foam beam x2 minutes each with fingertips on windowsill PRN  Standing hip abduction 3x10 repetitions R/L  R/L SL clams vs GTB 3x10 repetitions each   Rows with marching vs red thera-band 2x10  Step up 6" bilaterally with balance throughout movement x10      Patient received therapeutic activities for 30 minutes for improved tolerance to functional activities including:  +Recumbent bike level 1.0 x6 minutes  Sit <> Stand with Pilates ring 3x10 repetitions  Hesitation marches x 2 laps on turf  Squat in // bars 3x10 repetitions  Bridges 3x10    PATIENT EDUCATION AND HOME EXERCISES     Home Exercises Provided and Patient Education Provided     Education provided:   PT educated pt on importance of compliance with their HEP this visit.     Written Home Exercises Provided: Patient instructed to cont prior HEP. Exercises were reviewed and Daisha was able to demonstrate them prior to the end of the session.  Daisha demonstrated good  understanding of the education provided. See EMR under Patient Instructions for exercises provided during therapy sessions    ASSESSMENT     Pt tolerated treatment session well today. Patient fatigued following stationary bike and standing exercises today evidencing need for additional cardiovascular endurance training. Continue PT " ARIEL Ashton Is progressing well towards her goals.   Pt prognosis is Good.     Pt will continue to benefit from skilled outpatient physical therapy to address the deficits listed in the problem list box on initial evaluation, provide pt/family education and to maximize pt's level of independence in the home and community environment.     Pt's spiritual, cultural and educational needs considered and pt agreeable to plan of care and goals.     Anticipated barriers to physical therapy: None    Goals:   Short Term Goals (4 Weeks):  1. Patient will be compliant with home exercise program to supplement therapy in restoring pain free function. (progressing, not met)    2. Patient will improve impaired lower extremity manual muscle tests to >/= 4/5 to improve dynamic hip/knee support for functional tasks. (progressing, not met)    3. Pt will tolerate walking for 30 minutes with no increase in hip pain to return to PLOF. (progressing, not met)    4. Pt will improve hip pain to 3/10 at worst for improved QOL. (progressing, not met)          Long Term Goals (8 Weeks):  1. Patient will improve FOTO score by 10% limited to decrease perceived limitation with mobility. (progressing, not met)    2. Patient will improve impaired lower extremity manual muscle tests to >/= 4+/5 to improve dynamic hip/knee support for functional tasks. (progressing, not met)    3. Patient will tolerate walking for 45 minutes with no increase in hip pain to return to PLOF. (progressing, not met)    4. Patient will deny pain waking her up at night for improved QOL.   (progressing, not met)      PLAN   Plan of care Certification: 5/9/2023 to 8/9/23     Outpatient Physical Therapy 1 times weekly for 12 weeks to include the following interventions: Therapeutic Exercises, Manual Therapeutic Technique, Neuromuscular Re Education, Therapeutic Activities. Modalities, Kinesiotape prn, and Functional Dry Needling as needed.      Shabana Sneed, PT

## 2023-06-13 ENCOUNTER — CLINICAL SUPPORT (OUTPATIENT)
Dept: REHABILITATION | Facility: OTHER | Age: 78
End: 2023-06-13
Payer: MEDICARE

## 2023-06-13 DIAGNOSIS — R29.898 BILATERAL LEG WEAKNESS: Primary | ICD-10-CM

## 2023-06-13 PROCEDURE — 97112 NEUROMUSCULAR REEDUCATION: CPT | Mod: PN

## 2023-06-13 PROCEDURE — 97530 THERAPEUTIC ACTIVITIES: CPT | Mod: PN

## 2023-06-13 NOTE — PROGRESS NOTES
"[facility]   Physical Therapy Treatment Note     [facility]  [facility] Number: [id number]    Therapy Diagnosis:   Encounter Diagnosis   Name Primary?    Bilateral leg weakness Yes       Physician: Juan Alberto Oakes MD    Visit Date: 6/13/2023    Physician Orders: PT Evaluate and Treat  Medical Diagnosis: sciatica   Evaluation Date: 5/9/23  Authorization Period Expiration: 8/9/23  Plan of Care Certification Period: 5/9/2023 to 8/9/23   Progress Note Due: 6/9/23    Visit # / Visits authorized: 7/ 12   FOTO: 2/ 3       Precautions: Standard    Time In: 1000a  Time Out: 1100a  Total Billable Time: 60 minutes      SUBJECTIVE     Pt reports: that she is doing well today. Pt states that she is improving with PT. However, she is having a hard time lying on her right side because of her bursitis. Pt wants to work on her balance.    She was compliant with home exercise program.  Response to previous treatment: improved tolerance to exercises  Functional change: improved walking ability    Pain: 0/10  Location: N/A    OBJECTIVE     Objective Measures updated at progress report unless specified.       TREATMENT     Total Treatment time (time-based codes) separate from Evaluation: 60 minutes     Daisha received the treatments listed below:      Patient received therapeutic exercises for 0 minutes for improved strength and AROM including:  R/L piriformis stretch 3x20" each  R/L hip ER stretch Figure 4 3x20" each         Patient received manual therapeutic technique for 0 minutes for improved soft tissue and joint mobility including:  Right hip lateral glide with mobilization belt  Right LAD with mobilization belt and gentle oscillations  Right posterior hip release    Application of TDN: Pt educated on benefits and potential side effects of dry needling. Educated pt on benefits, precautions, side effects following TDN. Educated pt to use heat following treatment sessions if pt is " "experiencing pain or soreness. Pt verbalized good understanding of education.  Pt signed written consent to dry needling. Pt gave verbal consent for DN    Pt received dry needling to the below listed muscles using 75 mm needles.    Bilateral L2-5 S1 multifidi    With application of electrical stimulation treatment    Pt tolerated treatment well with no adverse events noted.        Patient received neuromuscular reeducation for 30 minutes for improved proprioception and balance including:  Bridges on ball 3x10  Supine TrA contraction with 5" hold 30 repetitions  Supine TrA contraction with marches 20x   R/L SLR 3x10 repetitions  SL balance to failure 5x R/L   Side stepping and tandem walking on foam beam x2 minutes each with fingertips on windowsill PRN  Standing hip abduction 3x10 repetitions R/L  R/L SL clams vs GTB 3x10 repetitions each   Rows with marching vs red thera-band 2x10  Step up 6" bilaterally with balance throughout movement x10      Patient received therapeutic activities for 30 minutes for improved tolerance to functional activities including:  Recumbent bike level 1.0 x6 minutes  Sit <> Stand with Pilates ring 3x10 repetitions  Hesitation marches x 2 laps on turf  Squat in // bars 3x10 repetitions  Bridges 3x10    PATIENT EDUCATION AND HOME EXERCISES     Home Exercises Provided and Patient Education Provided     Education provided:   PT educated pt on importance of compliance with their HEP this visit.     Written Home Exercises Provided: Patient instructed to cont prior HEP. Exercises were reviewed and Daisha was able to demonstrate them prior to the end of the session.  Daisha demonstrated good  understanding of the education provided. See EMR under Patient Instructions for exercises provided during therapy sessions    ASSESSMENT     Pt tolerated treatment session well today. Patient requested to stay late today and is motivated to participate in PT. Noted balance improvements " today.      Daisha Is progressing well towards her goals.   Pt prognosis is Good.     Pt will continue to benefit from skilled outpatient physical therapy to address the deficits listed in the problem list box on initial evaluation, provide pt/family education and to maximize pt's level of independence in the home and community environment.     Pt's spiritual, cultural and educational needs considered and pt agreeable to plan of care and goals.     Anticipated barriers to physical therapy: None    Goals:   Short Term Goals (4 Weeks):  1. Patient will be compliant with home exercise program to supplement therapy in restoring pain free function. (progressing, not met)    2. Patient will improve impaired lower extremity manual muscle tests to >/= 4/5 to improve dynamic hip/knee support for functional tasks. (progressing, not met)    3. Pt will tolerate walking for 30 minutes with no increase in hip pain to return to PLOF. (progressing, not met)    4. Pt will improve hip pain to 3/10 at worst for improved QOL. (progressing, not met)          Long Term Goals (8 Weeks):  1. Patient will improve FOTO score by 10% limited to decrease perceived limitation with mobility. (progressing, not met)    2. Patient will improve impaired lower extremity manual muscle tests to >/= 4+/5 to improve dynamic hip/knee support for functional tasks. (progressing, not met)    3. Patient will tolerate walking for 45 minutes with no increase in hip pain to return to PLOF. (progressing, not met)    4. Patient will deny pain waking her up at night for improved QOL.   (progressing, not met)      PLAN   Plan of care Certification: 5/9/2023 to 8/9/23     Outpatient Physical Therapy 1 times weekly for 12 weeks to include the following interventions: Therapeutic Exercises, Manual Therapeutic Technique, Neuromuscular Re Education, Therapeutic Activities. Modalities, Kinesiotape prn, and Functional Dry Needling as needed.      Shabana Sneed, PT

## 2023-06-15 ENCOUNTER — CLINICAL SUPPORT (OUTPATIENT)
Dept: REHABILITATION | Facility: OTHER | Age: 78
End: 2023-06-15
Payer: MEDICARE

## 2023-06-15 DIAGNOSIS — R29.898 BILATERAL LEG WEAKNESS: Primary | ICD-10-CM

## 2023-06-15 PROCEDURE — 97112 NEUROMUSCULAR REEDUCATION: CPT | Mod: PN

## 2023-06-15 PROCEDURE — 97530 THERAPEUTIC ACTIVITIES: CPT | Mod: PN

## 2023-06-15 NOTE — PROGRESS NOTES
"[facility]   Physical Therapy Treatment Note     [facility]  [id number]    Therapy Diagnosis:   Encounter Diagnosis   Name Primary?    Bilateral leg weakness Yes       Physician: Juan Alberto Oakes MD    Visit Date: 6/15/2023    Physician Orders: PT Evaluate and Treat  Medical Diagnosis: sciatica   Evaluation Date: 5/9/23  Authorization Period Expiration: 8/9/23  Plan of Care Certification Period: 5/9/2023 to 8/9/23   Progress Note Due: 6/9/23    Visit # / Visits authorized: 8/ 12   FOTO: 2/ 3       Precautions: Standard    Time In: 1000a  Time Out: 1100a  Total Billable Time: 60 minutes      SUBJECTIVE     Pt reports: that she is doing well today. Pt states that her hip is feeling better and she feels stronger. Pt states that she feels herself walking a little faster and she doesn't have to pause for pain to go away after getting out of the chair.   She was compliant with home exercise program.  Response to previous treatment: improved tolerance to exercises  Functional change: improved walking ability    Pain: 0/10  Location: N/A    OBJECTIVE     Objective Measures updated at progress report unless specified.       TREATMENT     Total Treatment time (time-based codes) separate from Evaluation: 60 minutes     Daisha received the treatments listed below:      Patient received therapeutic exercises for 0 minutes for improved strength and AROM including:  R/L piriformis stretch 3x20" each  R/L hip ER stretch Figure 4 3x20" each         Patient received manual therapeutic technique for 0 minutes for improved soft tissue and joint mobility including:  Right hip lateral glide with mobilization belt  Right LAD with mobilization belt and gentle oscillations  Right posterior hip release      Patient received neuromuscular reeducation for 30 minutes for improved proprioception and balance including:  Bridges on ball 3x10  Supine TrA contraction with 5" hold 30 " "repetitions  Supine TrA contraction with marches 20x   R/L SLR 3x10 repetitions  SL balance to failure 5x R/L   Side stepping and tandem walking on foam beam x2 minutes each with fingertips on windowsill PRN  Standing hip abduction 3x10 repetitions R/L  R/L SL clams vs GTB 3x10 repetitions each   Rows with marching vs red thera-band 2x10  Step up 6" bilaterally with balance throughout movement x10      Patient received therapeutic activities for 30 minutes for improved tolerance to functional activities including:  Recumbent bike level 1.0 x6 minutes  Sit <> Stand with Pilates ring 3x10 repetitions  Hesitation marches x 2 laps on turf  Squat in // bars 3x10 repetitions  Bridges 3x10    PATIENT EDUCATION AND HOME EXERCISES     Home Exercises Provided and Patient Education Provided     Education provided:   PT educated pt on importance of compliance with their HEP this visit.     Written Home Exercises Provided: Patient instructed to cont prior HEP. Exercises were reviewed and Daisha was able to demonstrate them prior to the end of the session.  Daisha demonstrated good  understanding of the education provided. See EMR under Patient Instructions for exercises provided during therapy sessions    ASSESSMENT     Pt tolerated treatment session well today. Pt continues to demonstrate improved SL balance on R LE during balance/proprioception exercises. Continue PT POC.      Daisha Is progressing well towards her goals.   Pt prognosis is Good.     Pt will continue to benefit from skilled outpatient physical therapy to address the deficits listed in the problem list box on initial evaluation, provide pt/family education and to maximize pt's level of independence in the home and community environment.     Pt's spiritual, cultural and educational needs considered and pt agreeable to plan of care and goals.     Anticipated barriers to physical therapy: None    Goals:   Short Term Goals (4 Weeks):  1. Patient will be " compliant with home exercise program to supplement therapy in restoring pain free function. (progressing, not met)    2. Patient will improve impaired lower extremity manual muscle tests to >/= 4/5 to improve dynamic hip/knee support for functional tasks. (progressing, not met)    3. Pt will tolerate walking for 30 minutes with no increase in hip pain to return to PLOF. (progressing, not met)    4. Pt will improve hip pain to 3/10 at worst for improved QOL. (progressing, not met)          Long Term Goals (8 Weeks):  1. Patient will improve FOTO score by 10% limited to decrease perceived limitation with mobility. (progressing, not met)    2. Patient will improve impaired lower extremity manual muscle tests to >/= 4+/5 to improve dynamic hip/knee support for functional tasks. (progressing, not met)    3. Patient will tolerate walking for 45 minutes with no increase in hip pain to return to PLOF. (progressing, not met)    4. Patient will deny pain waking her up at night for improved QOL.   (progressing, not met)      PLAN   Plan of care Certification: 5/9/2023 to 8/9/23     Outpatient Physical Therapy 1 times weekly for 12 weeks to include the following interventions: Therapeutic Exercises, Manual Therapeutic Technique, Neuromuscular Re Education, Therapeutic Activities. Modalities, Kinesiotape prn, and Functional Dry Needling as needed.      Shabana Sneed, PT

## 2023-06-19 ENCOUNTER — CLINICAL SUPPORT (OUTPATIENT)
Dept: REHABILITATION | Facility: OTHER | Age: 78
End: 2023-06-19
Payer: MEDICARE

## 2023-06-19 DIAGNOSIS — R29.898 BILATERAL LEG WEAKNESS: Primary | ICD-10-CM

## 2023-06-19 PROCEDURE — 97112 NEUROMUSCULAR REEDUCATION: CPT | Mod: PN

## 2023-06-19 PROCEDURE — 97530 THERAPEUTIC ACTIVITIES: CPT | Mod: PN

## 2023-06-19 NOTE — PROGRESS NOTES
"[facility]   Physical Therapy Treatment Note     Name: Daisha Mayo  Clinic Number: [id number]    Therapy Diagnosis:   Encounter Diagnosis   Name Primary?    Bilateral leg weakness Yes       Physician: Juan Alberto Oakes MD    Visit Date: 6/19/2023    Physician Orders: PT Evaluate and Treat  Medical Diagnosis: sciatica   Evaluation Date: 5/9/23  Authorization Period Expiration: 8/9/23  Plan of Care Certification Period: 5/9/2023 to 8/9/23   Progress Note Due: 6/9/23    Visit # / Visits authorized: 8/ 12   FOTO: 2/ 3       Precautions: Standard    Time In: 100p  Time Out: 200p  Total Billable Time: 60 minutes      SUBJECTIVE     Pt reports: that she feels stronger. Functionally she has no complains. She continues to have right > left lateral hip pain which is her chief complaint. She would like to try the dry needling again next visit.    She was compliant with home exercise program.  Response to previous treatment: improved tolerance to exercises  Functional change: improved walking ability    Pain: 0/10  Location: N/A    OBJECTIVE     Objective Measures updated at progress report unless specified.       TREATMENT     Total Treatment time (time-based codes) separate from Evaluation: 60 minutes     Daisha received the treatments listed below:      Patient received therapeutic exercises for 0 minutes for improved strength and AROM including:  R/L piriformis stretch 3x20" each  R/L hip ER stretch Figure 4 3x20" each         Patient received manual therapeutic technique for 0 minutes for improved soft tissue and joint mobility including:  Right hip lateral glide with mobilization belt  Right LAD with mobilization belt and gentle oscillations  Right posterior hip release      Patient received neuromuscular reeducation for 30 minutes for improved proprioception and balance including:  Bridges on ball 3x10  Supine TrA contraction with 5" hold 30 repetitions  Supine TrA contraction with " "marches 20x   Supine TrA contraction with SLR 2x10  R/L SLR 3x10 repetitions  SL balance to failure 5x R/L   Side stepping and tandem walking on foam beam x2 minutes each with fingertips on windowsill PRN  Standing hip abduction with upper extremity on tball 3x10 repetitions R/L  R/L SL clams vs GTB 3x10 repetitions each   Rows with marching vs red thera-band 2x10  Step up 6" bilaterally with balance throughout movement x10      Patient received therapeutic activities for 30 minutes for improved tolerance to functional activities including:  Recumbent bike level 1.0 x6 minutes  Sit <> Stand with Pilates ring 3x10 repetitions  Hesitation marches on turf 2 laps  TRX Squats with hip taps 3x10 repetitions  Bridges 3x10    PATIENT EDUCATION AND HOME EXERCISES     Home Exercises Provided and Patient Education Provided     Education provided:   PT educated pt on importance of compliance with their HEP this visit.     Written Home Exercises Provided: Patient instructed to cont prior HEP. Exercises were reviewed and Daisha was able to demonstrate them prior to the end of the session.  Daisha demonstrated good  understanding of the education provided. See EMR under Patient Instructions for exercises provided during therapy sessions    ASSESSMENT     Pt tolerated treatment session well today. Pt able to tolerate high level activity without complaint of increased pain. Therapeutic exercise doesn't seem to increase/decreased pain level that she experiences at lateral aspect of hip and upper leg. We placed a Iontophoresis patch on the right today to decrease symptoms associated with right side bursitis.Continue PT POC.      Daisha Is progressing well towards her goals.   Pt prognosis is Good.     Pt will continue to benefit from skilled outpatient physical therapy to address the deficits listed in the problem list box on initial evaluation, provide pt/family education and to maximize pt's level of independence in the home " and community environment.     Pt's spiritual, cultural and educational needs considered and pt agreeable to plan of care and goals.     Anticipated barriers to physical therapy: None    Goals:   Short Term Goals (4 Weeks):  1. Patient will be compliant with home exercise program to supplement therapy in restoring pain free function. (progressing, not met)    2. Patient will improve impaired lower extremity manual muscle tests to >/= 4/5 to improve dynamic hip/knee support for functional tasks. (progressing, not met)    3. Pt will tolerate walking for 30 minutes with no increase in hip pain to return to PLOF. (progressing, not met)    4. Pt will improve hip pain to 3/10 at worst for improved QOL. (progressing, not met)          Long Term Goals (8 Weeks):  1. Patient will improve FOTO score by 10% limited to decrease perceived limitation with mobility. (progressing, not met)    2. Patient will improve impaired lower extremity manual muscle tests to >/= 4+/5 to improve dynamic hip/knee support for functional tasks. (progressing, not met)    3. Patient will tolerate walking for 45 minutes with no increase in hip pain to return to PLOF. (progressing, not met)    4. Patient will deny pain waking her up at night for improved QOL.   (progressing, not met)      PLAN   Plan of care Certification: 5/9/2023 to 8/9/23     Outpatient Physical Therapy 1 times weekly for 12 weeks to include the following interventions: Therapeutic Exercises, Manual Therapeutic Technique, Neuromuscular Re Education, Therapeutic Activities. Modalities, Kinesiotape prn, and Functional Dry Needling as needed.      Bolivar Piedra, PT

## 2023-06-22 ENCOUNTER — DOCUMENTATION ONLY (OUTPATIENT)
Dept: REHABILITATION | Facility: OTHER | Age: 78
End: 2023-06-22
Payer: MEDICARE

## 2023-06-22 ENCOUNTER — CLINICAL SUPPORT (OUTPATIENT)
Dept: REHABILITATION | Facility: OTHER | Age: 78
End: 2023-06-22
Payer: MEDICARE

## 2023-06-22 DIAGNOSIS — R29.898 BILATERAL LEG WEAKNESS: Primary | ICD-10-CM

## 2023-06-22 PROCEDURE — 97530 THERAPEUTIC ACTIVITIES: CPT | Mod: PN,CQ

## 2023-06-22 PROCEDURE — 97112 NEUROMUSCULAR REEDUCATION: CPT | Mod: PN,CQ

## 2023-06-22 NOTE — PROGRESS NOTES
Physical Therapist and Physical Therapist Assistant met face to face to discuss patient's treatment plan and progress towards established goals. Pt will be seen by a physical therapist minimally every 6th visit or every 30 days.    Andrew Moe, PTA  6/22/2023

## 2023-06-22 NOTE — PROGRESS NOTES
"[facility]   Physical Therapy Treatment Note     [facility]  [facility] Number[facility] [id number]    Therapy Diagnosis:   Encounter Diagnosis   Name Primary?    Bilateral leg weakness Yes       Physician: Juan Alberto Oakes MD    Visit Date: 6/22/2023    Physician Orders: PT Evaluate and Treat  Medical Diagnosis: sciatica   Evaluation Date: 5/9/23  Authorization Period Expiration: 8/9/23  Plan of Care Certification Period: 5/9/2023 to 8/9/23   Progress Note Due: 6/9/23    Visit # / Visits authorized: 10/ 12   FOTO: 2/ 3       Precautions: Standard    Time In: 0900  Time Out: 1000  Total Billable Time: 30 minutes      SUBJECTIVE     Pt reports: she is seeing some improvement. States she had felt a little better after the [place] patch last visit.     She was compliant with home exercise program.  Response to previous treatment: improved tolerance to exercises  Functional change: improved walking ability    Pain: 1/10  Location: N/A    OBJECTIVE     Objective Measures updated at progress report unless specified.       TREATMENT     Charges based on 1:1 tx:  Daisha received the treatments listed below:      Patient received therapeutic exercises for 0 minutes for improved strength and AROM including:  Recumbent bike level 1.0 x6 minutes    R/L piriformis stretch 3x20" each  R/L hip ER stretch Figure 4 3x20" each         Patient received manual therapeutic technique for 0 minutes for improved soft tissue and joint mobility including:  Right hip lateral glide with mobilization belt  Right LAD with mobilization belt and gentle oscillations  Right posterior hip release      Patient received neuromuscular reeducation for 15 minutes for improved proprioception and balance including:  Bridges on ball 3x10  Supine TrA contraction with 5" hold 30 repetitions  Supine TrA contraction with marches 20x   Supine TrA contraction with SLR 2x10  R/L SLR 3x10 repetitions  SL balance to failure 5x R/L " "  Side stepping and tandem walking on foam beam x2 minutes each with fingertips on windowsill PRN  Standing hip abduction with upper extremity on tball 3x10 repetitions R/L  R/L SL clams vs GTB 3x10 repetitions each   Rows with marching vs red thera-band 2x10  Step up 6" bilaterally with balance throughout movement x10      Patient received therapeutic activities for 15 minutes for improved tolerance to functional activities including:    Sit <> Stand with Pilates ring 3x10 repetitions  Hesitation marches on turf  60ftx2  TRX Squats with hip taps 3x10 repetitions      PATIENT EDUCATION AND HOME EXERCISES     Home Exercises Provided and Patient Education Provided     Education provided:   PT educated pt on importance of compliance with their HEP this visit.     Written Home Exercises Provided: Patient instructed to cont prior HEP. Exercises were reviewed and Daisha was able to demonstrate them prior to the end of the session.  Daisha demonstrated good  understanding of the education provided. See EMR under Patient Instructions for exercises provided during therapy sessions    ASSESSMENT     Daisha demonstrated slightly improved eccentric control with functional sit to stand. Continued with step ups without UE to help improve balance/proprioception and LE awareness. Quad and glute strength appear to be showing signs of improvement however, weakness remains notable at this time.       Daisha Is progressing well towards her goals.   Pt prognosis is Good.     Pt will continue to benefit from skilled outpatient physical therapy to address the deficits listed in the problem list box on initial evaluation, provide pt/family education and to maximize pt's level of independence in the home and community environment.     Pt's spiritual, cultural and educational needs considered and pt agreeable to plan of care and goals.     Anticipated barriers to physical therapy: None    Goals:   Short Term Goals (4 Weeks):  1. " Patient will be compliant with home exercise program to supplement therapy in restoring pain free function. (progressing, not met)    2. Patient will improve impaired lower extremity manual muscle tests to >/= 4/5 to improve dynamic hip/knee support for functional tasks. (progressing, not met)    3. Pt will tolerate walking for 30 minutes with no increase in hip pain to return to PLOF. (progressing, not met)    4. Pt will improve hip pain to 3/10 at worst for improved QOL. (progressing, not met)          Long Term Goals (8 Weeks):  1. Patient will improve FOTO score by 10% limited to decrease perceived limitation with mobility. (progressing, not met)    2. Patient will improve impaired lower extremity manual muscle tests to >/= 4+/5 to improve dynamic hip/knee support for functional tasks. (progressing, not met)    3. Patient will tolerate walking for 45 minutes with no increase in hip pain to return to PLOF. (progressing, not met)    4. Patient will deny pain waking her up at night for improved QOL.   (progressing, not met)      PLAN   Plan of care Certification: 5/9/2023 to 8/9/23       Focus on LE strength and endurance with emphasis on ADL performance.     Outpatient Physical Therapy 1 times weekly for 12 weeks to include the following interventions: Therapeutic Exercises, Manual Therapeutic Technique, Neuromuscular Re Education, Therapeutic Activities. Modalities, Kinesiotape prn, and Functional Dry Needling as needed.      Andrew Moe, PTA

## 2023-06-27 ENCOUNTER — CLINICAL SUPPORT (OUTPATIENT)
Dept: REHABILITATION | Facility: OTHER | Age: 78
End: 2023-06-27
Payer: MEDICARE

## 2023-06-27 DIAGNOSIS — R29.898 BILATERAL LEG WEAKNESS: Primary | ICD-10-CM

## 2023-06-27 PROCEDURE — 97140 MANUAL THERAPY 1/> REGIONS: CPT | Mod: HCNC,PN

## 2023-06-27 PROCEDURE — 97014 ELECTRIC STIMULATION THERAPY: CPT | Mod: HCNC,PN

## 2023-06-27 PROCEDURE — 97164 PT RE-EVAL EST PLAN CARE: CPT | Mod: 59,HCNC,PN

## 2023-06-27 NOTE — PROGRESS NOTES
[facility]   Physical Therapy Updated Plan of Care    Name: Daisha Mayo  Clinic Number: [id number]    Therapy Diagnosis:   Encounter Diagnosis   Name Primary?    Bilateral leg weakness Yes       Physician: Juan Alberto Oakes MD    Visit Date: 6/27/2023    Physician Orders: PT Evaluate and Treat  Medical Diagnosis: sciatica   Evaluation Date: 5/9/23  Authorization Period Expiration: 8/9/23  Plan of Care Certification Period: 6/27/23 to 9/27/23  Progress Note Due: 7/27/23  Visit # / Visits authorized: 10/ 12   FOTO: 3 / 3       Precautions: Standard    Time In: 0900  Time Out: 1000  Total Billable Time: 30 minutes      SUBJECTIVE     Pt reports: she is seeing some improvement. States she had felt a little better after the Ionto patch last visit.     She was compliant with home exercise program.  Response to previous treatment: improved tolerance to exercises  Functional change: improved walking ability    Pain: 3/10  Location: N/A    OBJECTIVE       6/27/23    WNL=within normal limits  WFL=within functional limits  NT=not tested  *=pain     Posture: l/s left facing concavity / left rotation  Palpation: tenderness to palpation at right greater trochanter, posterior hip, proximal piriformis  Sensation: intact  Deep tendon reflexes: NT     Lumbar Active range of motion  Pain/dysfunction with movement:   Flexion 100     Extension 75    Right side bending 100     Left side bending 100     Right rotation 75     Left rotation 100              Right LE     Left LE        Iliopsoas:  L2 3+/5 Iliopsoas: L2 4/5    Quadriceps:  L3 - femoral nerve 5/5 Quadriceps: L3 - femoral nerve 5/5    Hip adduction:  L3 - obterator 5/5 Hip adduction: L3 - obterator 5/5    Hamstrings:  S2 5/5 Hamstrings: S2 5/5    Ankle DF/EV:  L4 5/5 Ankle DF/EV: L4 4/5    GT Ext:  L5 5/5 GT Ext L5 4/5    Hip Abduction:  L5-S1 - Superior gluteal nerve 4/5  Hip Abduction: L5-S1 - Superior gluteal nerve 4/5    Hip extension:   "L5-S2 - Inferior gluteal nerve 4/5 Hip extension: L5-S2 - Inferior gluteal nerve 4/5    Ankle PF:   S1 - tibial nerve NT Ankle PF S1 - tibial nerve NT            Slump R: positive with right side LBP  Slump L: negative     SLR R: positive with right side LBP  SLR L: negative        Joint mobility:   Thoracic: NT  Lumbar: WNL        Gait analysis: Gait mechanics have normalized           CMS Impairment/Limitation/Restriction for FOTO Survey     Therapist reviewed FOTO scores for Daisha Mayo on 5/9/2023.   FOTO documents entered into [employer] - see Media section.     Limitation Score: 40%  Predicted Goal: 46%     Category: Mobility           TREATMENT     Charges based on 1:1 tx:  Daisha received the treatments listed below:      Patient received therapeutic exercises for 0 minutes for improved strength and AROM including:  Recumbent bike level 1.0 x6 minutes    R/L piriformis stretch 3x20" each  R/L hip ER stretch Figure 4 3x20" each       Patient received manual therapeutic technique for 30 minutes for improved soft tissue and joint mobility including:  Right hip lateral glide with mobilization belt  Right LAD with mobilization belt and gentle oscillations  Right posterior hip release    Application of TDN: Pt educated on benefits and potential side effects of dry needling. Educated pt on benefits, precautions, side effects following TDN. Educated pt to use heat following treatment sessions if pt is experiencing pain or soreness. Pt verbalized good understanding of education.  Pt signed written consent to dry needling. Pt gave verbal consent for DN    Pt received dry needling to the below listed muscles using 75/100 mm needles.  Bilateral L2-5 S1 multifidi  Right proximal piriformis    With application of electrical stimulation treatment    Pt tolerated treatment well with no adverse events noted.    Soft tissue massage/trigger point release right posterior hip    Patient received neuromuscular reeducation for " "00 minutes for improved proprioception and balance including:  Bridges on ball 3x10  Supine TrA contraction with 5" hold 30 repetitions  Supine TrA contraction with marches 20x   Supine TrA contraction with SLR 2x10  R/L SLR 3x10 repetitions  SL balance to failure 5x R/L   Side stepping and tandem walking on foam beam x2 minutes each with fingertips on windowsill PRN  Standing hip abduction with upper extremity on tball 3x10 repetitions R/L  R/L SL clams vs GTB 3x10 repetitions each   Rows with marching vs red thera-band 2x10  Step up 6" bilaterally with balance throughout movement x10      Patient received therapeutic activities for 0 minutes for improved tolerance to functional activities including:    Sit <> Stand with Pilates ring 3x10 repetitions  Hesitation marches on turf  60ftx2  TRX Squats with hip taps 3x10 repetitions      PATIENT EDUCATION AND HOME EXERCISES     Home Exercises Provided and Patient Education Provided     Education provided:   PT educated pt on importance of compliance with their HEP this visit.     Written Home Exercises Provided: Patient instructed to cont prior HEP. Exercises were reviewed and Daisha was able to demonstrate them prior to the end of the session.  Daisha demonstrated good  understanding of the education provided. See EMR under Patient Instructions for exercises provided during therapy sessions    ASSESSMENT     Mrs. Ashton has made significant improvement since starting physical therapy treatment. She demonstrates improved lower extremity/core strength, gait mechanics, decreased pain, and improved overall function per FOTO. She continues to have right side hip pain and tension which are causing persisting local right hip pain.       Daisha Is progressing well towards her goals.   Pt prognosis is Good.     Pt will continue to benefit from skilled outpatient physical therapy to address the deficits listed in the problem list box on initial evaluation, provide " pt/family education and to maximize pt's level of independence in the home and community environment.     Pt's spiritual, cultural and educational needs considered and pt agreeable to plan of care and goals.     Anticipated barriers to physical therapy: None    Goals:   Short Term Goals (4 Weeks):  1. Patient will be compliant with home exercise program to supplement therapy in restoring pain free function. (met)    2. Patient will improve impaired lower extremity manual muscle tests to >/= 4/5 to improve dynamic hip/knee support for functional tasks. (progressing, not met)    3. Pt will tolerate walking for 30 minutes with no increase in hip pain to return to PLOF. (met)    4. Pt will improve hip pain to 3/10 at worst for improved QOL. (met)          Long Term Goals (8 Weeks):  1. Patient will improve FOTO score by 10% limited to decrease perceived limitation with mobility. (met)    2. Patient will improve impaired lower extremity manual muscle tests to >/= 4+/5 to improve dynamic hip/knee support for functional tasks. (progressing, not met)    3. Patient will tolerate walking for 45 minutes with no increase in hip pain to return to PLOF. (progressing, not met)    4. Patient will deny pain waking her up at night for improved QOL.   (met)      PLAN   Plan of care Certification: 6/27/23 to 9/27/23       Focus on LE strength and endurance with emphasis on ADL performance.     Outpatient Physical Therapy 1 times weekly for 12 weeks to include the following interventions: Therapeutic Exercises, Manual Therapeutic Technique, Neuromuscular Re Education, Therapeutic Activities. Modalities, Kinesiotape prn, and Functional Dry Needling as needed.      Bolivar Piedra, PT          Agree with PT/OT assessment and approve continuation of care. MD Ryan, MA

## 2023-06-29 ENCOUNTER — OFFICE VISIT (OUTPATIENT)
Dept: HOME HEALTH SERVICES | Facility: CLINIC | Age: 78
End: 2023-06-29
Payer: MEDICARE

## 2023-06-29 VITALS
OXYGEN SATURATION: 98 % | SYSTOLIC BLOOD PRESSURE: 130 MMHG | WEIGHT: 168 LBS | TEMPERATURE: 97 F | HEART RATE: 88 BPM | DIASTOLIC BLOOD PRESSURE: 70 MMHG | BODY MASS INDEX: 31.72 KG/M2 | HEIGHT: 61 IN | RESPIRATION RATE: 16 BRPM

## 2023-06-29 DIAGNOSIS — K21.9 GASTROESOPHAGEAL REFLUX DISEASE, UNSPECIFIED WHETHER ESOPHAGITIS PRESENT: ICD-10-CM

## 2023-06-29 DIAGNOSIS — F41.1 GAD (GENERALIZED ANXIETY DISORDER): ICD-10-CM

## 2023-06-29 DIAGNOSIS — R40.4 TRANSIENT ALTERATION OF AWARENESS: ICD-10-CM

## 2023-06-29 DIAGNOSIS — Z00.00 ENCOUNTER FOR MEDICARE ANNUAL WELLNESS EXAM: ICD-10-CM

## 2023-06-29 DIAGNOSIS — M85.80 SENILE OSTEOPENIA: ICD-10-CM

## 2023-06-29 DIAGNOSIS — Z00.00 ENCOUNTER FOR PREVENTIVE HEALTH EXAMINATION: Primary | ICD-10-CM

## 2023-06-29 DIAGNOSIS — I10 BENIGN HYPERTENSION: ICD-10-CM

## 2023-06-29 DIAGNOSIS — F02.80 MAJOR NEUROCOGNITIVE DISORDER DUE TO ANOTHER MEDICAL CONDITION: ICD-10-CM

## 2023-06-29 PROCEDURE — 3288F FALL RISK ASSESSMENT DOCD: CPT | Mod: CPTII,S$GLB,,

## 2023-06-29 PROCEDURE — 3288F PR FALLS RISK ASSESSMENT DOCUMENTED: ICD-10-PCS | Mod: CPTII,S$GLB,,

## 2023-06-29 PROCEDURE — 1170F FXNL STATUS ASSESSED: CPT | Mod: CPTII,S$GLB,,

## 2023-06-29 PROCEDURE — 1159F PR MEDICATION LIST DOCUMENTED IN MEDICAL RECORD: ICD-10-PCS | Mod: CPTII,S$GLB,,

## 2023-06-29 PROCEDURE — 3075F PR MOST RECENT SYSTOLIC BLOOD PRESS GE 130-139MM HG: ICD-10-PCS | Mod: CPTII,S$GLB,,

## 2023-06-29 PROCEDURE — 1126F PR PAIN SEVERITY QUANTIFIED, NO PAIN PRESENT: ICD-10-PCS | Mod: CPTII,S$GLB,,

## 2023-06-29 PROCEDURE — 1160F PR REVIEW ALL MEDS BY PRESCRIBER/CLIN PHARMACIST DOCUMENTED: ICD-10-PCS | Mod: CPTII,S$GLB,,

## 2023-06-29 PROCEDURE — 1159F MED LIST DOCD IN RCRD: CPT | Mod: CPTII,S$GLB,,

## 2023-06-29 PROCEDURE — 1126F AMNT PAIN NOTED NONE PRSNT: CPT | Mod: CPTII,S$GLB,,

## 2023-06-29 PROCEDURE — 1101F PR PT FALLS ASSESS DOC 0-1 FALLS W/OUT INJ PAST YR: ICD-10-PCS | Mod: CPTII,S$GLB,,

## 2023-06-29 PROCEDURE — 1170F PR FUNCTIONAL STATUS ASSESSED: ICD-10-PCS | Mod: CPTII,S$GLB,,

## 2023-06-29 PROCEDURE — G0439 PPPS, SUBSEQ VISIT: HCPCS | Mod: S$GLB,,,

## 2023-06-29 PROCEDURE — 1101F PT FALLS ASSESS-DOCD LE1/YR: CPT | Mod: CPTII,S$GLB,,

## 2023-06-29 PROCEDURE — 1160F RVW MEDS BY RX/DR IN RCRD: CPT | Mod: CPTII,S$GLB,,

## 2023-06-29 PROCEDURE — 3078F DIAST BP <80 MM HG: CPT | Mod: CPTII,S$GLB,,

## 2023-06-29 PROCEDURE — 3078F PR MOST RECENT DIASTOLIC BLOOD PRESSURE < 80 MM HG: ICD-10-PCS | Mod: CPTII,S$GLB,,

## 2023-06-29 PROCEDURE — 3075F SYST BP GE 130 - 139MM HG: CPT | Mod: CPTII,S$GLB,,

## 2023-06-29 PROCEDURE — G0439 PR MEDICARE ANNUAL WELLNESS SUBSEQUENT VISIT: ICD-10-PCS | Mod: S$GLB,,,

## 2023-06-29 NOTE — PROGRESS NOTES
"Daisha Mayo presented for a  Medicare AWV and comprehensive Health Risk Assessment today. The following components were reviewed and updated:    Medical history  Family History  Social history  Allergies and Current Medications  Health Risk Assessment  Health Maintenance  Care Team         ** See Completed Assessments for Annual Wellness Visit within the encounter summary.**         The following assessments were completed:  Living Situation  CAGE  Depression Screening  Timed Get Up and Go  Whisper Test  Cognitive Function Screening    Nutrition Screening  ADL Screening  PAQ Screening        Vitals:    06/29/23 0955   BP: 130/70   BP Location: Left arm   Patient Position: Sitting   Pulse: 88   Resp: 16   Temp: 97 °F (36.1 °C)   SpO2: 98%   Weight: 76.2 kg (168 lb)   Height: 5' 1" (1.549 m)     Body mass index is 31.74 kg/m².    Physical Exam  Vitals reviewed.   Constitutional:       General: She is not in acute distress.     Appearance: Normal appearance.   Cardiovascular:      Rate and Rhythm: Normal rate and regular rhythm.      Pulses: Normal pulses.      Heart sounds: No murmur heard.  Pulmonary:      Effort: Pulmonary effort is normal. No respiratory distress.      Breath sounds: Normal breath sounds.   Abdominal:      General: Bowel sounds are normal.   Musculoskeletal:      Right lower leg: No edema.      Left lower leg: No edema.   Neurological:      General: No focal deficit present.      Mental Status: She is alert and oriented to person, place, and time.   Psychiatric:         Mood and Affect: Mood normal.         Behavior: Behavior normal.             Diagnoses and health risks identified today and associated recommendations/orders:    1. Encounter for preventive health examination  Assessments completed.  HM recommendations reviewed.  F/u with PCP as instructed.     Patient not on chronic opioids.   Risk factors reviewed for any potential opioid use disorder   Pain evaluated during visit.  Current " treatment plan documented.  Will refer to specialist, as appropriate.      2. Encounter for Medicare annual wellness exam  Referred by PCP.   - Ambulatory Referral/Consult to Enhanced Annual Wellness Visit (eAWV)    3. Major neurocognitive disorder due to another medical condition  Chronic, stable on current regimen. Followed by PCP.     4. Transient alteration of awareness  Chronic, stable on current regimen. Followed by PCP.     5. Benign hypertension  Chronic, stable on current HCTZ regimen. Followed by PCP.     6. SAMSON (generalized anxiety disorder)  Chronic, stable on current regimen. Followed by PCP.     7. Gastroesophageal reflux disease, unspecified whether esophagitis present  Chronic, stable on current Protonix regimen. Followed by PCP.     8. Senile osteopenia  Chronic, stable on current Vit D regimen. Followed by PCP.       Provided Daisha with a 5-10 year written screening schedule and personal prevention plan. Recommendations were developed using the USPSTF age appropriate recommendations. Education, counseling, and referrals were provided as needed. After Visit Summary printed and given to patient which includes a list of additional screenings\tests needed.    Follow up in about 1 year (around 6/29/2024) for Medicare AWV.    Isabel Chang NP    I offered to discuss advanced care planning, including how to pick a person who would make decisions for you if you were unable to make them for yourself, called a health care power of , and what kind of decisions you might make such as use of life sustaining treatments such as ventilators and tube feeding when faced with a life limiting illness recorded on a living will that they will need to know. (How you want to be cared for as you near the end of your natural life)     X Patient is interested in learning more about how to make advanced directives.  I provided them paperwork and offered to discuss this with them.

## 2023-06-29 NOTE — PATIENT INSTRUCTIONS
Counseling and Referral of Other Preventative  (Italic type indicates deductible and co-insurance are waived)    Patient Name: Daisha Mayo  Today's Date: 6/29/2023    Health Maintenance       Date Due Completion Date    Shingles Vaccine (2 of 3) 09/13/2023 (Originally 1/27/2016) 12/2/2015    Lipid Panel 03/23/2024 3/23/2023    Mammogram 08/17/2024 8/17/2022    DEXA Scan 10/11/2024 10/11/2022    TETANUS VACCINE 06/03/2029 6/3/2019        No orders of the defined types were placed in this encounter.    The following information is provided to all patients.  This information is to help you find resources for any of the problems found today that may be affecting your health:                Living healthy guide: www.[facility].[place].gov      Understanding Diabetes: www.diabetes.org      Eating healthy: www.cdc.gov/healthyweight      [facility] home safety checklist: www.cdc.gov/steadi/patient.html      Agency on Aging: www.goea.[place].gov      Alcoholics anonymous (AA): www.aa.org      Physical Activity: www.raymundo.nih.gov/kh8ztyr      Tobacco use: www.quitwithusla.org

## 2023-07-04 NOTE — PROGRESS NOTES
[facility]   Physical Therapy Treatment Note    Name: Daisha Mayo  Clinic Number: [id number]    Therapy Diagnosis:   Encounter Diagnosis   Name Primary?    Bilateral leg weakness Yes         Physician: Juan Alberto Oakes MD    Visit Date: 7/5/2023    Physician Orders: PT Evaluate and Treat  Medical Diagnosis: sciatica   Evaluation Date: 5/9/23  Authorization Period Expiration: 8/9/23  Plan of Care Certification Period: 6/27/23 to 9/27/23  Progress Note Due: 7/27/23  Visit # / Visits authorized: 11/ 12   FOTO: 3 / 3       Precautions: Standard    Time In: 0900  Time Out: 1000  Total Billable Time: 60 minutes      SUBJECTIVE     Pt reports: that she is doing well today. Pt is experiencing decreased hip pain. She reports being able to stand and walk for longer periods compared to her previous sessions.     She was compliant with home exercise program.  Response to previous treatment: improved tolerance to exercises  Functional change: improved walking ability    Pain: 3/10  Location: N/A    OBJECTIVE       6/27/23    WNL=within normal limits  WFL=within functional limits  NT=not tested  *=pain     Posture: l/s left facing concavity / left rotation  Palpation: tenderness to palpation at right greater trochanter, posterior hip, proximal piriformis  Sensation: intact  Deep tendon reflexes: NT     Lumbar Active range of motion  Pain/dysfunction with movement:   Flexion 100     Extension 75    Right side bending 100     Left side bending 100     Right rotation 75     Left rotation 100              Right LE     Left LE        Iliopsoas:  L2 3+/5 Iliopsoas: L2 4/5    Quadriceps:  L3 - femoral nerve 5/5 Quadriceps: L3 - femoral nerve 5/5    Hip adduction:  L3 - obterator 5/5 Hip adduction: L3 - obterator 5/5    Hamstrings:  S2 5/5 Hamstrings: S2 5/5    Ankle DF/EV:  L4 5/5 Ankle DF/EV: L4 4/5    GT Ext:  L5 5/5 GT Ext L5 4/5    Hip Abduction:  L5-S1 - Superior gluteal nerve 4/5  Hip  "Abduction: L5-S1 - Superior gluteal nerve 4/5    Hip extension:  L5-S2 - Inferior gluteal nerve 4/5 Hip extension: L5-S2 - Inferior gluteal nerve 4/5    Ankle PF:   S1 - tibial nerve NT Ankle PF S1 - tibial nerve NT            Slump R: positive with right side LBP  Slump L: negative     SLR R: positive with right side LBP  SLR L: negative        Joint mobility:   Thoracic: NT  Lumbar: WNL        Gait analysis: Gait mechanics have normalized           CMS Impairment/Limitation/Restriction for FOTO Survey     Therapist reviewed FOTO scores for Daisha Mayo on 5/9/2023.   FOTO documents entered into [employer] - see Media section.     Limitation Score: 40%  Predicted Goal: 46%     Category: Mobility           TREATMENT     Charges based on 1:1 tx:  Daisha received the treatments listed below:      Patient received therapeutic exercises for 0 minutes for improved strength and AROM including:    R/L piriformis stretch 3x20" each  R/L hip ER stretch Figure 4 3x20" each       Patient received manual therapeutic technique for 0 minutes for improved soft tissue and joint mobility including:  Right hip lateral glide with mobilization belt  Right LAD with mobilization belt and gentle oscillations  Right posterior hip release    Application of TDN: Pt educated on benefits and potential side effects of dry needling. Educated pt on benefits, precautions, side effects following TDN. Educated pt to use heat following treatment sessions if pt is experiencing pain or soreness. Pt verbalized good understanding of education.  Pt signed written consent to dry needling. Pt gave verbal consent for DN    Pt received dry needling to the below listed muscles using 75/100 mm needles.  Bilateral L2-5 S1 multifidi  Right proximal piriformis    With application of electrical stimulation treatment    Pt tolerated treatment well with no adverse events noted.    Soft tissue massage/trigger point release right posterior hip    Patient received " "neuromuscular reeducation for 45 minutes for improved proprioception and balance including:  Bridges on ball 3x10  Supine TrA contraction with 5" hold 30 repetitions  Supine TrA contraction with marches 20x   Supine TrA contraction with SLR 2x10  R/L SLR 3x10 repetitions  SL balance to failure 5x R/L   Side stepping and tandem walking on foam beam x2 minutes each with fingertips on windowsill PRN  Standing hip abduction with upper extremity on tball 3x10 repetitions R/L  R/L SL clams vs GTB 3x10 repetitions each   Rows with marching vs red thera-band 2x10  Step up 6" bilaterally with balance throughout movement x10      Patient received therapeutic activities for 15 minutes for improved tolerance to functional activities including:    Sit <> Stand with Pilates ring 3x10 repetitions  Hesitation marches on turf  60ftx2  TRX Squats with hip taps 3x10 repetitions  Recumbent bike level 1.0 x6 minutes      PATIENT EDUCATION AND HOME EXERCISES     Home Exercises Provided and Patient Education Provided     Education provided:   PT educated pt on importance of compliance with their HEP this visit.     Written Home Exercises Provided: Patient instructed to cont prior HEP. Exercises were reviewed and Daisha was able to demonstrate them prior to the end of the session.  Daisha demonstrated good  understanding of the education provided. See EMR under Patient Instructions for exercises provided during therapy sessions    ASSESSMENT     Pt tolerated treatment session well today with decreased instance of hip pain with walking and ADL's. Continue to challenge pt and progress her as tolerated.     Dasiha Is progressing well towards her goals.   Pt prognosis is Good.     Pt will continue to benefit from skilled outpatient physical therapy to address the deficits listed in the problem list box on initial evaluation, provide pt/family education and to maximize pt's level of independence in the home and community environment. "     Pt's spiritual, cultural and educational needs considered and pt agreeable to plan of care and goals.     Anticipated barriers to physical therapy: None    Goals:   Short Term Goals (4 Weeks):  1. Patient will be compliant with home exercise program to supplement therapy in restoring pain free function. (met)    2. Patient will improve impaired lower extremity manual muscle tests to >/= 4/5 to improve dynamic hip/knee support for functional tasks. (progressing, not met)    3. Pt will tolerate walking for 30 minutes with no increase in hip pain to return to PLOF. (met)    4. Pt will improve hip pain to 3/10 at worst for improved QOL. (met)          Long Term Goals (8 Weeks):  1. Patient will improve FOTO score by 10% limited to decrease perceived limitation with mobility. (met)    2. Patient will improve impaired lower extremity manual muscle tests to >/= 4+/5 to improve dynamic hip/knee support for functional tasks. (progressing, not met)    3. Patient will tolerate walking for 45 minutes with no increase in hip pain to return to PLOF. (progressing, not met)    4. Patient will deny pain waking her up at night for improved QOL.   (met)      PLAN   Plan of care Certification: 6/27/23 to 9/27/23       Focus on LE strength and endurance with emphasis on ADL performance.     Outpatient Physical Therapy 1 times weekly for 12 weeks to include the following interventions: Therapeutic Exercises, Manual Therapeutic Technique, Neuromuscular Re Education, Therapeutic Activities. Modalities, Kinesiotape prn, and Functional Dry Needling as needed.      Shabana Sneed, PT          Agree with PT/OT assessment and approve continuation of care. MD Ryan, MA

## 2023-07-05 ENCOUNTER — CLINICAL SUPPORT (OUTPATIENT)
Dept: REHABILITATION | Facility: OTHER | Age: 78
End: 2023-07-05
Payer: MEDICARE

## 2023-07-05 DIAGNOSIS — R29.898 BILATERAL LEG WEAKNESS: Primary | ICD-10-CM

## 2023-07-05 PROCEDURE — 97530 THERAPEUTIC ACTIVITIES: CPT | Mod: HCNC,PN

## 2023-07-05 PROCEDURE — 97112 NEUROMUSCULAR REEDUCATION: CPT | Mod: HCNC,PN

## 2023-07-11 ENCOUNTER — CLINICAL SUPPORT (OUTPATIENT)
Dept: REHABILITATION | Facility: OTHER | Age: 78
End: 2023-07-11
Payer: MEDICARE

## 2023-07-11 DIAGNOSIS — R29.898 BILATERAL LEG WEAKNESS: Primary | ICD-10-CM

## 2023-07-11 PROCEDURE — 97112 NEUROMUSCULAR REEDUCATION: CPT | Mod: HCNC,PN,CQ

## 2023-07-11 PROCEDURE — 97530 THERAPEUTIC ACTIVITIES: CPT | Mod: HCNC,PN,CQ

## 2023-07-11 NOTE — PROGRESS NOTES
[facility]   Physical Therapy Treatment Note    Name: Daisha Mayo  Clinic Number: [id number]    Therapy Diagnosis:   Encounter Diagnosis   Name Primary?    Bilateral leg weakness Yes         Physician: Juan Alberto Oakes MD    Visit Date: 7/11/2023    Physician Orders: PT Evaluate and Treat  Medical Diagnosis: sciatica   Evaluation Date: 5/9/23  Authorization Period Expiration: 8/9/23  Plan of Care Certification Period: 6/27/23 to 9/27/23  Progress Note Due: 7/27/23  Visit # / Visits authorized: 13/32  FOTO: 3 / 3       Precautions: Standard    Time In: 0943  Time Out: 1100  Total Billable Time: 60 minutes      SUBJECTIVE     Pt reports: that she feeling continued pain in her R hip. States she feels as though she may have plateaued with therapy/her hip pain.     She was compliant with home exercise program.  Response to previous treatment: felt fine, no issues  Functional change: improved walking ability    Pain: 3/10  Location: N/A    OBJECTIVE       6/27/23    WNL=within normal limits  WFL=within functional limits  NT=not tested  *=pain     Posture: l/s left facing concavity / left rotation  Palpation: tenderness to palpation at right greater trochanter, posterior hip, proximal piriformis  Sensation: intact  Deep tendon reflexes: NT     Lumbar Active range of motion  Pain/dysfunction with movement:   Flexion 100     Extension 75    Right side bending 100     Left side bending 100     Right rotation 75     Left rotation 100              Right LE     Left LE        Iliopsoas:  L2 3+/5 Iliopsoas: L2 4/5    Quadriceps:  L3 - femoral nerve 5/5 Quadriceps: L3 - femoral nerve 5/5    Hip adduction:  L3 - obterator 5/5 Hip adduction: L3 - obterator 5/5    Hamstrings:  S2 5/5 Hamstrings: S2 5/5    Ankle DF/EV:  L4 5/5 Ankle DF/EV: L4 4/5    GT Ext:  L5 5/5 GT Ext L5 4/5    Hip Abduction:  L5-S1 - Superior gluteal nerve 4/5  Hip Abduction: L5-S1 - Superior gluteal nerve 4/5    Hip  "extension:  L5-S2 - Inferior gluteal nerve 4/5 Hip extension: L5-S2 - Inferior gluteal nerve 4/5    Ankle PF:   S1 - tibial nerve NT Ankle PF S1 - tibial nerve NT            Slump R: positive with right side LBP  Slump L: negative     SLR R: positive with right side LBP  SLR L: negative        Joint mobility:   Thoracic: NT  Lumbar: WNL        Gait analysis: Gait mechanics have normalized           CMS Impairment/Limitation/Restriction for FOTO Survey     Therapist reviewed FOTO scores for Daisha Mayo on 5/9/2023.   FOTO documents entered into [employer] - see Media section.     Limitation Score: 40%  Predicted Goal: 46%     Category: Mobility           TREATMENT     Charges based on 1:1 tx:  Daisha received the treatments listed below:      Patient received therapeutic exercises for 0 minutes for improved strength and AROM including:    R/L piriformis stretch 3x20" each  R/L hip ER stretch Figure 4 3x20" each       Patient received manual therapeutic technique for 5 minutes for improved soft tissue and joint mobility including:  Rolling stick to R hip  Right hip lateral glide with mobilization belt  Right LAD with mobilization belt and gentle oscillations  Right posterior hip release    Application of TDN: Pt educated on benefits and potential side effects of dry needling. Educated pt on benefits, precautions, side effects following TDN. Educated pt to use heat following treatment sessions if pt is experiencing pain or soreness. Pt verbalized good understanding of education.  Pt signed written consent to dry needling. Pt gave verbal consent for DN    Pt received dry needling to the below listed muscles using 75/100 mm needles.  Bilateral L2-5 S1 multifidi  Right proximal piriformis    With application of electrical stimulation treatment    Pt tolerated treatment well with no adverse events noted.    Soft tissue massage/trigger point release right posterior hip    Patient received neuromuscular reeducation for 40 " "minutes for improved proprioception and balance including:  Bridges on ball 3x10  Supine TrA contraction with 5" hold 30 repetitions  Supine TrA contraction with marches 20x   Supine TrA contraction with SLR 2x10  L ITB stretch 3x 30"  - performed by clinician   R/L SLR 3x10 repetitions  SL balance to failure 5x R/L   Side stepping and tandem walking on foam beam x2 minutes each with fingertips on windowsill PRN  Standing hip abduction with upper extremity on tball 3x10 repetitions R/L  R/L SL clams vs GTB 3x10 repetitions each   Rows with marching vs red thera-band 2x10  Step up 6" bilaterally with balance throughout movement x10      Patient received therapeutic activities for 15 minutes for improved tolerance to functional activities including:    Sit <> Stand with Pilates ring 3x10 repetitions  Hesitation marches on turf  60ftx2  TRX Squats with hip taps 3x10 repetitions  Recumbent bike level 2.0 x6 minutes      PATIENT EDUCATION AND HOME EXERCISES     Home Exercises Provided and Patient Education Provided     Education provided:   PT educated pt on importance of compliance with their HEP this visit.     Written Home Exercises Provided: Patient instructed to cont prior HEP. Exercises were reviewed and Daisha was able to demonstrate them prior to the end of the session.  Daisha demonstrated good  understanding of the education provided. See EMR under Patient Instructions for exercises provided during therapy sessions    ASSESSMENT   Daisha tolerated exercise well this visit. She continues to have R lateral hip pain localized to the femoral joint/ITB region with palpable tenderness. Continued with balance and global hip strengthening with emphasis on eccentrics with functional tasks to help improve endurance.     Daisha Is progressing well towards her goals.   Pt prognosis is Good.     Pt will continue to benefit from skilled outpatient physical therapy to address the deficits listed in the problem list " box on initial evaluation, provide pt/family education and to maximize pt's level of independence in the home and community environment.     Pt's spiritual, cultural and educational needs considered and pt agreeable to plan of care and goals.     Anticipated barriers to physical therapy: None    Goals:   Short Term Goals (4 Weeks):  1. Patient will be compliant with home exercise program to supplement therapy in restoring pain free function. (met)    2. Patient will improve impaired lower extremity manual muscle tests to >/= 4/5 to improve dynamic hip/knee support for functional tasks. (progressing, not met)    3. Pt will tolerate walking for 30 minutes with no increase in hip pain to return to PLOF. (met)    4. Pt will improve hip pain to 3/10 at worst for improved QOL. (met)          Long Term Goals (8 Weeks):  1. Patient will improve FOTO score by 10% limited to decrease perceived limitation with mobility. (met)    2. Patient will improve impaired lower extremity manual muscle tests to >/= 4+/5 to improve dynamic hip/knee support for functional tasks. (progressing, not met)    3. Patient will tolerate walking for 45 minutes with no increase in hip pain to return to PLOF. (progressing, not met)    4. Patient will deny pain waking her up at night for improved QOL.   (met)      PLAN   Plan of care Certification: 6/27/23 to 9/27/23       Focus on LE strength and endurance with emphasis on ADL performance.     Outpatient Physical Therapy 1 times weekly for 12 weeks to include the following interventions: Therapeutic Exercises, Manual Therapeutic Technique, Neuromuscular Re Education, Therapeutic Activities. Modalities, Kinesiotape prn, and Functional Dry Needling as needed.      Andrew Moe, PTA

## 2023-07-12 DIAGNOSIS — M54.12 CERVICAL RADICULOPATHY: ICD-10-CM

## 2023-07-12 RX ORDER — CELECOXIB 100 MG/1
CAPSULE ORAL
Qty: 180 CAPSULE | Refills: 1 | Status: SHIPPED | OUTPATIENT
Start: 2023-07-12 | End: 2023-09-26

## 2023-07-13 ENCOUNTER — CLINICAL SUPPORT (OUTPATIENT)
Dept: REHABILITATION | Facility: OTHER | Age: 78
End: 2023-07-13
Payer: MEDICARE

## 2023-07-13 DIAGNOSIS — R29.898 BILATERAL LEG WEAKNESS: Primary | ICD-10-CM

## 2023-07-13 PROCEDURE — 97112 NEUROMUSCULAR REEDUCATION: CPT | Mod: HCNC,PN

## 2023-07-13 PROCEDURE — 97530 THERAPEUTIC ACTIVITIES: CPT | Mod: HCNC,PN

## 2023-07-13 NOTE — PROGRESS NOTES
"  [facility] OUTPATIENT THERAPY AND WELLNESS   Physical Therapy Treatment Note    Name: Daisha Mayo  Clinic Number: [id number]    Therapy Diagnosis:   Encounter Diagnosis   Name Primary?    Bilateral leg weakness Yes         Physician: Juan Alberto Oakes MD    Visit Date: 7/13/2023    Physician Orders: PT Evaluate and Treat  Medical Diagnosis: sciatica   Evaluation Date: 5/9/23  Authorization Period Expiration: 8/9/23  Plan of Care Certification Period: 6/27/23 to 9/27/23  Progress Note Due: 7/27/23  Visit # / Visits authorized: 14/32  FOTO: 3 / 3       Precautions: Standard    Time In: 0900am  Time Out: 1000am  Total Billable Time: 60 minutes      SUBJECTIVE     Pt reports: that she is doing well today. "I am ready to ring that bell at my next visit!". Pt admits slow, but steady progress.     She was compliant with home exercise program.  Response to previous treatment: felt fine, no issues  Functional change: improved walking ability    Pain: 3/10  Location: N/A    OBJECTIVE       6/27/23    WNL=within normal limits  WFL=within functional limits  NT=not tested  *=pain     Posture: l/s left facing concavity / left rotation  Palpation: tenderness to palpation at right greater trochanter, posterior hip, proximal piriformis  Sensation: intact  Deep tendon reflexes: NT     Lumbar Active range of motion  Pain/dysfunction with movement:   Flexion 100     Extension 75    Right side bending 100     Left side bending 100     Right rotation 75     Left rotation 100              Right LE     Left LE        Iliopsoas:  L2 3+/5 Iliopsoas: L2 4/5    Quadriceps:  L3 - femoral nerve 5/5 Quadriceps: L3 - femoral nerve 5/5    Hip adduction:  L3 - obterator 5/5 Hip adduction: L3 - obterator 5/5    Hamstrings:  S2 5/5 Hamstrings: S2 5/5    Ankle DF/EV:  L4 5/5 Ankle DF/EV: L4 4/5    GT Ext:  L5 5/5 GT Ext L5 4/5    Hip Abduction:  L5-S1 - Superior gluteal nerve 4/5  Hip Abduction: L5-S1 - Superior gluteal nerve 4/5    Hip " "extension:  L5-S2 - Inferior gluteal nerve 4/5 Hip extension: L5-S2 - Inferior gluteal nerve 4/5    Ankle PF:   S1 - tibial nerve NT Ankle PF S1 - tibial nerve NT            Slump R: positive with right side LBP  Slump L: negative     SLR R: positive with right side LBP  SLR L: negative        Joint mobility:   Thoracic: NT  Lumbar: WNL        Gait analysis: Gait mechanics have normalized           CMS Impairment/Limitation/Restriction for FOTO Survey     Therapist reviewed FOTO scores for Daisha Mayo on 5/9/2023.   FOTO documents entered into [employer] - see Media section.     Limitation Score: 40%  Predicted Goal: 46%     Category: Mobility           TREATMENT     Charges based on 1:1 tx:  Daisha received the treatments listed below:      Patient received therapeutic exercises for 0 minutes for improved strength and AROM including:    R/L piriformis stretch 3x20" each  R/L hip ER stretch Figure 4 3x20" each       Patient received manual therapeutic technique for 0 minutes for improved soft tissue and joint mobility including:  Rolling stick to R hip  Right hip lateral glide with mobilization belt  Right LAD with mobilization belt and gentle oscillations  Right posterior hip release    Application of TDN: Pt educated on benefits and potential side effects of dry needling. Educated pt on benefits, precautions, side effects following TDN. Educated pt to use heat following treatment sessions if pt is experiencing pain or soreness. Pt verbalized good understanding of education.  Pt signed written consent to dry needling. Pt gave verbal consent for DN    Pt received dry needling to the below listed muscles using 75/100 mm needles.  Bilateral L2-5 S1 multifidi  Right proximal piriformis    With application of electrical stimulation treatment    Pt tolerated treatment well with no adverse events noted.    Soft tissue massage/trigger point release right posterior hip    Patient received neuromuscular reeducation for 45 " "minutes for improved proprioception and balance including:  Bridges on ball 3x10  Supine TrA contraction with 5" hold 30 repetitions  Supine TrA contraction with marches 20x   Supine TrA contraction with SLR 2x10  L ITB stretch 3x 30"  - performed by clinician   R/L SLR 3x10 repetitions  SL balance to failure 5x R/L   Side stepping and tandem walking on foam beam x2 minutes each with fingertips on windowsill PRN  Standing hip abduction with upper extremity on tball 3x10 repetitions R/L  R/L SL clams vs GTB 3x10 repetitions each   Rows with marching vs red thera-band 2x10  Step up 6" bilaterally with balance throughout movement x10      Patient received therapeutic activities for 15 minutes for improved tolerance to functional activities including:    Sit <> Stand with Pilates ring 3x10 repetitions  Hesitation marches on turf  60ftx2  TRX Squats with hip taps 3x10 repetitions  Recumbent bike level 2.0 x6 minutes      PATIENT EDUCATION AND HOME EXERCISES     Home Exercises Provided and Patient Education Provided     Education provided:   PT educated pt on importance of compliance with their HEP this visit.     Written Home Exercises Provided: Patient instructed to cont prior HEP. Exercises were reviewed and Daisha was able to demonstrate them prior to the end of the session.  Daisha demonstrated good  understanding of the education provided. See EMR under Patient Instructions for exercises provided during therapy sessions    ASSESSMENT   Pt tolerated treatment session well today. Provided pt with HEP today and plan to discharge at next scheduled visit next Thursday.    Daisha Is progressing well towards her goals.   Pt prognosis is Good.     Pt will continue to benefit from skilled outpatient physical therapy to address the deficits listed in the problem list box on initial evaluation, provide pt/family education and to maximize pt's level of independence in the home and community environment.     Pt's " spiritual, cultural and educational needs considered and pt agreeable to plan of care and goals.     Anticipated barriers to physical therapy: None    Goals:   Short Term Goals (4 Weeks):  1. Patient will be compliant with home exercise program to supplement therapy in restoring pain free function. (met)    2. Patient will improve impaired lower extremity manual muscle tests to >/= 4/5 to improve dynamic hip/knee support for functional tasks. (progressing, not met)    3. Pt will tolerate walking for 30 minutes with no increase in hip pain to return to PLOF. (met)    4. Pt will improve hip pain to 3/10 at worst for improved QOL. (met)          Long Term Goals (8 Weeks):  1. Patient will improve FOTO score by 10% limited to decrease perceived limitation with mobility. (met)    2. Patient will improve impaired lower extremity manual muscle tests to >/= 4+/5 to improve dynamic hip/knee support for functional tasks. (progressing, not met)    3. Patient will tolerate walking for 45 minutes with no increase in hip pain to return to PLOF. (progressing, not met)    4. Patient will deny pain waking her up at night for improved QOL.   (met)      PLAN   Plan of care Certification: 6/27/23 to 9/27/23       Focus on LE strength and endurance with emphasis on ADL performance.     Outpatient Physical Therapy 1 times weekly for 12 weeks to include the following interventions: Therapeutic Exercises, Manual Therapeutic Technique, Neuromuscular Re Education, Therapeutic Activities. Modalities, Kinesiotape prn, and Functional Dry Needling as needed.      Shabana Sneed, PT

## 2023-07-19 ENCOUNTER — CLINICAL SUPPORT (OUTPATIENT)
Dept: REHABILITATION | Facility: OTHER | Age: 78
End: 2023-07-19
Payer: MEDICARE

## 2023-07-19 DIAGNOSIS — R29.898 BILATERAL LEG WEAKNESS: Primary | ICD-10-CM

## 2023-07-19 PROCEDURE — 97164 PT RE-EVAL EST PLAN CARE: CPT | Mod: HCNC,PN

## 2023-07-19 PROCEDURE — 97112 NEUROMUSCULAR REEDUCATION: CPT | Mod: HCNC,PN

## 2023-07-19 PROCEDURE — 97530 THERAPEUTIC ACTIVITIES: CPT | Mod: HCNC,PN

## 2023-07-19 NOTE — PROGRESS NOTES
[facility]   Physical Therapy Discharge Note    Name: Daisha Mayo  Clinic Number: [id number]    Therapy Diagnosis:   Encounter Diagnosis   Name Primary?    Bilateral leg weakness Yes         Physician: Juan Alberto Oakes MD    Visit Date: 7/19/2023    Physician Orders: PT Evaluate and Treat  Medical Diagnosis: sciatica   Evaluation Date: 5/9/23  Authorization Period Expiration: 8/9/23  Plan of Care Certification Period: 6/27/23 to 9/27/23  Progress Note Due: 7/27/23  Visit # / Visits authorized: 14/32  FOTO: 3 / 3       Precautions: Standard    Time In: 1000am  Time Out: 1100am  Total Billable Time: 60 minutes      SUBJECTIVE     Pt reports: that she is doing well today. She denies any pain at bilateral hips. She stated that her sleeping has been improved.    She was compliant with home exercise program.  Response to previous treatment: felt fine, no issues  Functional change: improved walking ability    Pain: 0/10  Location: N/A    OBJECTIVE       7/19/23    WNL=within normal limits  WFL=within functional limits  NT=not tested  *=pain     Posture: l/s left facing concavity / left rotation  Palpation: tenderness to palpation at bilateral posterior hip, proximal piriformis  Sensation: intact  Deep tendon reflexes: NT     Lumbar Active range of motion  Pain/dysfunction with movement:   Flexion 100     Extension 75    Right side bending 100     Left side bending 100     Right rotation 100     Left rotation 100              Right LE     Left LE        Iliopsoas:  L2 4/5 Iliopsoas: L2 5/5    Quadriceps:  L3 - femoral nerve 5/5 Quadriceps: L3 - femoral nerve 5/5    Hip adduction:  L3 - obterator 5/5 Hip adduction: L3 - obterator 5/5    Hamstrings:  S2 5/5 Hamstrings: S2 5/5    Ankle DF/EV:  L4 5/5 Ankle DF/EV: L4 4/5    GT Ext:  L5 5/5 GT Ext L5 4/5    Hip Abduction:  L5-S1 - Superior gluteal nerve 4+/5  Hip Abduction: L5-S1 - Superior gluteal nerve 5/5    Hip extension:  L5-S2 -  "Inferior gluteal nerve 4+/5 Hip extension: L5-S2 - Inferior gluteal nerve 5/5    Ankle PF:   S1 - tibial nerve NT Ankle PF S1 - tibial nerve NT            Slump R: negative  Slump L: negative     SLR R: negative  SLR L: negative        Joint mobility:   Thoracic: NT  Lumbar: WNL        Gait analysis: Gait mechanics have normalized           CMS Impairment/Limitation/Restriction for FOTO Survey     Therapist reviewed FOTO scores for Daisha Mayo on 5/9/2023.   FOTO documents entered into [employer] - see Media section.     Limitation Score: 40%  Predicted Goal: 46%     Category: Mobility           TREATMENT     Charges based on 1:1 tx:  Daisha received the treatments listed below:      Patient received therapeutic exercises for 0 minutes for improved strength and AROM including:    R/L piriformis stretch 3x20" each  R/L hip ER stretch Figure 4 3x20" each       Patient received manual therapeutic technique for 0 minutes for improved soft tissue and joint mobility including:  Rolling stick to R hip  Right hip lateral glide with mobilization belt  Right LAD with mobilization belt and gentle oscillations  Right posterior hip release    Application of TDN: Pt educated on benefits and potential side effects of dry needling. Educated pt on benefits, precautions, side effects following TDN. Educated pt to use heat following treatment sessions if pt is experiencing pain or soreness. Pt verbalized good understanding of education.  Pt signed written consent to dry needling. Pt gave verbal consent for DN    Pt received dry needling to the below listed muscles using 75/100 mm needles.  Bilateral L2-5 S1 multifidi  Right proximal piriformis    With application of electrical stimulation treatment    Pt tolerated treatment well with no adverse events noted.    Soft tissue massage/trigger point release right posterior hip    Patient received neuromuscular reeducation for 10 minutes for improved proprioception and balance " "including:  Bridges on ball 3x10  Supine TrA contraction with 5" hold 30 repetitions  Supine TrA contraction with marches 20x   Supine TrA contraction with SLR 2x10  L ITB stretch 3x 30"  - performed by clinician   R/L SLR 3x10 repetitions  SL balance to failure 5x R/L   Side stepping and tandem walking on foam beam x2 minutes each with fingertips on windowsill PRN  Standing hip abduction with upper extremity on tball 3x10 repetitions R/L  R/L SL clams vs GTB 3x10 repetitions each   Rows with marching vs red thera-band 2x10  Step up 6" bilaterally with balance throughout movement x10      Patient received therapeutic activities for 15 minutes for improved tolerance to functional activities including:    Sit <> Stand with Pilates ring 3x10 repetitions  Wall squats with green tball 2x10  Hesitation marches on turf  60ftx2  TRX Squats with hip taps 3x10 repetitions  Recumbent bike level 2.0 x8 minutes      PATIENT EDUCATION AND HOME EXERCISES     Home Exercises Provided and Patient Education Provided     Education provided:   PT educated pt on importance of compliance with their HEP this visit.     Written Home Exercises Provided: Patient instructed to cont prior HEP. Exercises were reviewed and Daisha was able to demonstrate them prior to the end of the session.  Daisha demonstrated good  understanding of the education provided. See EMR under Patient Instructions for exercises provided during therapy sessions    ASSESSMENT     Mrs. Ashton has made significant improvement since starting physical therapy treatment. She reports resolution of bilateral hip pain. She stated that she continues to get occasional low back pain. Functionally she is no longer limited 2nd to lower extremity pain, and sleeping has normalized. She was given an updated home exercise program and she will be Dced from outpatient physical therapy.     Daisha Is progressing well towards her goals.   Pt prognosis is Good.     Pt will continue to " benefit from skilled outpatient physical therapy to address the deficits listed in the problem list box on initial evaluation, provide pt/family education and to maximize pt's level of independence in the home and community environment.     Pt's spiritual, cultural and educational needs considered and pt agreeable to plan of care and goals.     Anticipated barriers to physical therapy: None    Goals:   Short Term Goals (4 Weeks):  1. Patient will be compliant with home exercise program to supplement therapy in restoring pain free function. (met)    2. Patient will improve impaired lower extremity manual muscle tests to >/= 4/5 to improve dynamic hip/knee support for functional tasks. (met)    3. Pt will tolerate walking for 30 minutes with no increase in hip pain to return to PLOF. (met)    4. Pt will improve hip pain to 3/10 at worst for improved QOL. (met)          Long Term Goals (8 Weeks):  1. Patient will improve FOTO score by 10% limited to decrease perceived limitation with mobility. (met)    2. Patient will improve impaired lower extremity manual muscle tests to >/= 4+/5 to improve dynamic hip/knee support for functional tasks. (progressing, not met)    3. Patient will tolerate walking for 45 minutes with no increase in hip pain to return to PLOF. (met)    4. Patient will deny pain waking her up at night for improved QOL.   (met)      PLAN     DC from outpatient physical therapy with [facility]

## 2023-07-25 RX ORDER — HYDROCHLOROTHIAZIDE 25 MG/1
TABLET ORAL
Qty: 90 TABLET | Refills: 2 | Status: SHIPPED | OUTPATIENT
Start: 2023-07-25 | End: 2024-01-29

## 2023-07-25 NOTE — TELEPHONE ENCOUNTER
Refill Decision Note   Daisha Robertsbennytrinidad  is requesting a refill authorization.  Brief Assessment and Rationale for Refill:  Approve     Medication Therapy Plan:         Comments:     Note composed:12:01 PM 07/25/2023             Appointments     Last Visit   3/28/2023 Marisel Prasad MD   Next Visit   Visit date not found Marisel Prasad MD

## 2023-07-25 NOTE — TELEPHONE ENCOUNTER
No care due was identified.  [facility] Embedded Care Due Messages. Reference number: [id number].   7/25/2023 11:10:26 AM CDT

## 2023-08-15 ENCOUNTER — PATIENT MESSAGE (OUTPATIENT)
Dept: PRIMARY CARE CLINIC | Facility: CLINIC | Age: 78
End: 2023-08-15
Payer: MEDICARE

## 2023-08-15 DIAGNOSIS — Z12.31 SCREENING MAMMOGRAM, ENCOUNTER FOR: Primary | ICD-10-CM

## 2023-08-18 ENCOUNTER — HOSPITAL ENCOUNTER (OUTPATIENT)
Dept: RADIOLOGY | Facility: HOSPITAL | Age: 78
Discharge: HOME | End: 2023-08-18
Attending: FAMILY MEDICINE
Payer: MEDICARE

## 2023-08-18 DIAGNOSIS — Z12.31 SCREENING MAMMOGRAM, ENCOUNTER FOR: ICD-10-CM

## 2023-08-18 PROCEDURE — 77067 MAMMO DIGITAL SCREENING BILAT WITH TOMO: ICD-10-PCS | Mod: 26,HCNC,, | Performed by: RADIOLOGY

## 2023-08-18 PROCEDURE — 77063 MAMMO DIGITAL SCREENING BILAT WITH TOMO: ICD-10-PCS | Mod: 26,HCNC,, | Performed by: RADIOLOGY

## 2023-08-18 PROCEDURE — 77067 SCR MAMMO BI INCL CAD: CPT | Mod: TC,HCNC,PO

## 2023-08-18 PROCEDURE — 77067 SCR MAMMO BI INCL CAD: CPT | Mod: 26,HCNC,, | Performed by: RADIOLOGY

## 2023-08-18 PROCEDURE — 77063 BREAST TOMOSYNTHESIS BI: CPT | Mod: 26,HCNC,, | Performed by: RADIOLOGY

## 2023-09-26 ENCOUNTER — HOSPITAL ENCOUNTER (OUTPATIENT)
Dept: RADIOLOGY | Facility: HOSPITAL | Age: 78
Discharge: HOME | End: 2023-09-26
Attending: FAMILY MEDICINE
Payer: MEDICARE

## 2023-09-26 ENCOUNTER — OFFICE VISIT (OUTPATIENT)
Dept: INTERNAL MEDICINE | Facility: CLINIC | Age: 78
End: 2023-09-26
Attending: FAMILY MEDICINE
Payer: MEDICARE

## 2023-09-26 VITALS
BODY MASS INDEX: 32.38 KG/M2 | SYSTOLIC BLOOD PRESSURE: 120 MMHG | WEIGHT: 171.5 LBS | HEIGHT: 61 IN | OXYGEN SATURATION: 98 % | DIASTOLIC BLOOD PRESSURE: 80 MMHG | HEART RATE: 79 BPM

## 2023-09-26 DIAGNOSIS — R05.9 COUGH, UNSPECIFIED TYPE: ICD-10-CM

## 2023-09-26 DIAGNOSIS — R05.9 COUGH, UNSPECIFIED TYPE: Primary | ICD-10-CM

## 2023-09-26 DIAGNOSIS — K21.9 GASTROESOPHAGEAL REFLUX DISEASE, UNSPECIFIED WHETHER ESOPHAGITIS PRESENT: ICD-10-CM

## 2023-09-26 PROCEDURE — 1160F PR REVIEW ALL MEDS BY PRESCRIBER/CLIN PHARMACIST DOCUMENTED: ICD-10-PCS | Mod: HCNC,CPTII,S$GLB, | Performed by: FAMILY MEDICINE

## 2023-09-26 PROCEDURE — 3288F FALL RISK ASSESSMENT DOCD: CPT | Mod: HCNC,CPTII,S$GLB, | Performed by: FAMILY MEDICINE

## 2023-09-26 PROCEDURE — 3079F PR MOST RECENT DIASTOLIC BLOOD PRESSURE 80-89 MM HG: ICD-10-PCS | Mod: HCNC,CPTII,S$GLB, | Performed by: FAMILY MEDICINE

## 2023-09-26 PROCEDURE — 1101F PR PT FALLS ASSESS DOC 0-1 FALLS W/OUT INJ PAST YR: ICD-10-PCS | Mod: HCNC,CPTII,S$GLB, | Performed by: FAMILY MEDICINE

## 2023-09-26 PROCEDURE — 99999 PR PBB SHADOW E&M-EST. PATIENT-LVL V: CPT | Mod: PBBFAC,HCNC,, | Performed by: FAMILY MEDICINE

## 2023-09-26 PROCEDURE — 1159F PR MEDICATION LIST DOCUMENTED IN MEDICAL RECORD: ICD-10-PCS | Mod: HCNC,CPTII,S$GLB, | Performed by: FAMILY MEDICINE

## 2023-09-26 PROCEDURE — 3074F SYST BP LT 130 MM HG: CPT | Mod: HCNC,CPTII,S$GLB, | Performed by: FAMILY MEDICINE

## 2023-09-26 PROCEDURE — 99213 PR OFFICE/OUTPT VISIT, EST, LEVL III, 20-29 MIN: ICD-10-PCS | Mod: HCNC,S$GLB,, | Performed by: FAMILY MEDICINE

## 2023-09-26 PROCEDURE — 3074F PR MOST RECENT SYSTOLIC BLOOD PRESSURE < 130 MM HG: ICD-10-PCS | Mod: HCNC,CPTII,S$GLB, | Performed by: FAMILY MEDICINE

## 2023-09-26 PROCEDURE — 1159F MED LIST DOCD IN RCRD: CPT | Mod: HCNC,CPTII,S$GLB, | Performed by: FAMILY MEDICINE

## 2023-09-26 PROCEDURE — 99213 OFFICE O/P EST LOW 20 MIN: CPT | Mod: HCNC,S$GLB,, | Performed by: FAMILY MEDICINE

## 2023-09-26 PROCEDURE — 99999 PR PBB SHADOW E&M-EST. PATIENT-LVL V: ICD-10-PCS | Mod: PBBFAC,HCNC,, | Performed by: FAMILY MEDICINE

## 2023-09-26 PROCEDURE — 1101F PT FALLS ASSESS-DOCD LE1/YR: CPT | Mod: HCNC,CPTII,S$GLB, | Performed by: FAMILY MEDICINE

## 2023-09-26 PROCEDURE — 3288F PR FALLS RISK ASSESSMENT DOCUMENTED: ICD-10-PCS | Mod: HCNC,CPTII,S$GLB, | Performed by: FAMILY MEDICINE

## 2023-09-26 PROCEDURE — 71046 XR CHEST PA AND LATERAL: ICD-10-PCS | Mod: 26,HCNC,, | Performed by: RADIOLOGY

## 2023-09-26 PROCEDURE — 1126F PR PAIN SEVERITY QUANTIFIED, NO PAIN PRESENT: ICD-10-PCS | Mod: HCNC,CPTII,S$GLB, | Performed by: FAMILY MEDICINE

## 2023-09-26 PROCEDURE — 1160F RVW MEDS BY RX/DR IN RCRD: CPT | Mod: HCNC,CPTII,S$GLB, | Performed by: FAMILY MEDICINE

## 2023-09-26 PROCEDURE — 1126F AMNT PAIN NOTED NONE PRSNT: CPT | Mod: HCNC,CPTII,S$GLB, | Performed by: FAMILY MEDICINE

## 2023-09-26 PROCEDURE — 71046 X-RAY EXAM CHEST 2 VIEWS: CPT | Mod: 26,HCNC,, | Performed by: RADIOLOGY

## 2023-09-26 PROCEDURE — 3079F DIAST BP 80-89 MM HG: CPT | Mod: HCNC,CPTII,S$GLB, | Performed by: FAMILY MEDICINE

## 2023-09-26 PROCEDURE — 71046 X-RAY EXAM CHEST 2 VIEWS: CPT | Mod: TC,HCNC

## 2023-09-26 RX ORDER — PROMETHAZINE HYDROCHLORIDE AND DEXTROMETHORPHAN HYDROBROMIDE 6.25; 15 MG/5ML; MG/5ML
5 SYRUP ORAL 3 TIMES DAILY PRN
Qty: 118 ML | Refills: 0 | Status: SHIPPED | OUTPATIENT
Start: 2023-09-26 | End: 2023-10-06

## 2023-09-26 RX ORDER — ALBUTEROL SULFATE 90 UG/1
2 AEROSOL, METERED RESPIRATORY (INHALATION) EVERY 6 HOURS PRN
Qty: 18 G | Refills: 11 | Status: SHIPPED | OUTPATIENT
Start: 2023-09-26 | End: 2024-01-09 | Stop reason: ALTCHOICE

## 2023-09-26 NOTE — PROGRESS NOTES
Subjective:       Patient ID: Daisha Mayo is a 77 y.o. female.    Chief Complaint: Cough    Same day urgent care presents - cough since feb. Got better in early to mid sept, then on 16th got worse.  This followed a ST that is better now. 2 home negative COVID tests in past week. No fever, chills or significant dyspnea. Sputum production. OTC meds not effective. Some fatigue and malaise noted. Coughs after eating,    Unproven COIVD like illness March 2020.        Review of Systems   Constitutional:  Negative for appetite change, chills, diaphoresis, fatigue and fever.   HENT:  Negative for congestion, postnasal drip, rhinorrhea, sore throat and trouble swallowing.    Eyes:  Negative for visual disturbance.   Respiratory:  Positive for cough. Negative for choking, chest tightness, shortness of breath and wheezing.    Cardiovascular:  Negative for chest pain and leg swelling.   Gastrointestinal:  Negative for abdominal distention, abdominal pain, diarrhea, nausea and vomiting.   Genitourinary:  Negative for difficulty urinating and hematuria.   Musculoskeletal:  Negative for arthralgias and myalgias.   Skin:  Negative for rash.   Neurological:  Negative for weakness, light-headedness and headaches.   Hematological:  Does not bruise/bleed easily.   Psychiatric/Behavioral:  Negative for decreased concentration and dysphoric mood.        Objective:      Physical Exam  Vitals and nursing note reviewed.   Constitutional:       General: She is not in acute distress.     Appearance: She is well-developed. She is not diaphoretic.   HENT:      Head: Normocephalic.      Right Ear: Tympanic membrane, ear canal and external ear normal.      Left Ear: Tympanic membrane, ear canal and external ear normal.      Nose: Nose normal.      Mouth/Throat:      Pharynx: Oropharynx is clear. No oropharyngeal exudate.   Eyes:      General: No scleral icterus.        Right eye: No discharge.         Left eye: No discharge.       Conjunctiva/sclera: Conjunctivae normal.   Neck:      Thyroid: No thyromegaly.   Cardiovascular:      Rate and Rhythm: Normal rate and regular rhythm.      Heart sounds: Normal heart sounds. No murmur heard.     No friction rub. No gallop.   Pulmonary:      Effort: Pulmonary effort is normal. No respiratory distress.      Breath sounds: Normal breath sounds. No wheezing or rales.   Chest:      Chest wall: No tenderness.   Abdominal:      Palpations: Abdomen is soft.      Tenderness: There is no abdominal tenderness.   Musculoskeletal:      Cervical back: Normal range of motion and neck supple.   Lymphadenopathy:      Cervical: No cervical adenopathy.   Skin:     General: Skin is warm and dry.      Findings: No rash.   Neurological:      Mental Status: She is alert and oriented to person, place, and time.         Assessment:       1. Cough, unspecified type    2. Gastroesophageal reflux disease, unspecified whether esophagitis present        Plan:     Medication List with Changes/Refills   New Medications    ALBUTEROL (PROVENTIL/VENTOLIN HFA) 90 MCG/ACTUATION INHALER    Inhale 2 puffs into the lungs every 6 (six) hours as needed for Wheezing or Shortness of Breath. Rescue    PROMETHAZINE-DEXTROMETHORPHAN (PROMETHAZINE-DM) 6.25-15 MG/5 ML SYRP    Take 5 mLs by mouth 3 (three) times daily as needed (cough).   Current Medications    ASCORBIC ACID, VITAMIN C, (VITAMIN C) 100 MG TABLET    Take 100 mg by mouth once daily.    CALCIUM CITRATE-VITAMIN D3 200 MG-6.25 MCG (250 UNIT) TAB        CYANOCOBALAMIN (VITAMIN B-12) 1000 MCG TABLET        DORZOLAMIDE-TIMOLOL (COSOPT) 2-0.5 % OPHTHALMIC SOLUTION    Place 1 drop into both eyes 2 (two) times daily.    HYDROCHLOROTHIAZIDE (HYDRODIURIL) 25 MG TABLET    Take 1 tablet (25 mg total) by mouth once daily.    LATANOPROST 0.005 % OPHTHALMIC SOLUTION    Place 1 drop into both eyes every evening.    MULTIVITAMIN-MINERALS-LUTEIN TAB    Take 1 tablet by mouth Daily. 1 Tablet Oral Every  day    THIAMINE MONONITRATE, VIT B1, (VITAMIN B-1, MONONITRATE,) 100 MG TAB    Take 1 tablet (100 mg total) by mouth once daily.    VITAMIN D3 5,000 UNIT TAB       Discontinued Medications    CELECOXIB (CELEBREX) 100 MG CAPSULE    TAKE 1 CAPSULE TWICE DAILY    MULTIVITAMIN WITH MINERALS (HAIR,SKIN AND NAILS ORAL)    Take 1 tablet by mouth once daily.    PANTOPRAZOLE (PROTONIX) 40 MG TABLET    Take 1 tablet (40 mg total) by mouth once daily.     1. Cough, unspecified type  Comments:  if not better 1-2 weeks, then consider GI consult  Orders:  -     X-Ray Chest PA And Lateral; Future; Expected date: 09/26/2023  -     albuterol (PROVENTIL/VENTOLIN HFA) 90 mcg/actuation inhaler; Inhale 2 puffs into the lungs every 6 (six) hours as needed for Wheezing or Shortness of Breath. Rescue  Dispense: 18 g; Refill: 11  -     promethazine-dextromethorphan (PROMETHAZINE-DM) 6.25-15 mg/5 mL Syrp; Take 5 mLs by mouth 3 (three) times daily as needed (cough).  Dispense: 118 mL; Refill: 0  -     Ambulatory referral/consult to Gastroenterology; Future; Expected date: 10/03/2023    2. Gastroesophageal reflux disease, unspecified whether esophagitis present      See meds, orders, follow up, routing and instructions sections of encounter and AVS. Discussed with patient and provided on AVS.    Consider GERD. May try PPI OTC for few days. RTC prn for further persistence or worseing.

## 2023-10-26 ENCOUNTER — TELEPHONE (OUTPATIENT)
Dept: ENDOSCOPY | Facility: HOSPITAL | Age: 78
End: 2023-10-26
Payer: MEDICARE

## 2023-10-27 NOTE — TELEPHONE ENCOUNTER
----- Message from Terri Shah sent at 10/26/2023  4:53 PM CDT -----  Patient has a referral in the system and is ready to schedule, but there are no available appointments showing. Patient Is requesting to see Dr. Howe and can be contacted at [phone] (home)   To schedule accordingly.    TY

## 2023-11-16 RX ORDER — PANTOPRAZOLE SODIUM 40 MG/1
40 TABLET, DELAYED RELEASE ORAL
Qty: 90 TABLET | Refills: 10 | OUTPATIENT
Start: 2023-11-16

## 2023-11-16 NOTE — TELEPHONE ENCOUNTER
No care due was identified.  [facility] Embedded Care Due Messages. Reference number: [id number].   11/16/2023 2:09:20 PM CST   Vaccine status unknown

## 2023-11-16 NOTE — TELEPHONE ENCOUNTER
Refill Decision Note   Daisha Mayo  is requesting a refill authorization.  Brief Assessment and Rationale for Refill:  Quick Discontinue     Medication Therapy Plan:  The original prescription was discontinued on 9/26/2023 by Juan Alberto Oakes MD.      Comments:     Note composed:2:16 PM 11/16/2023             Appointments     Last Visit   3/28/2023 Marisel Prasad MD   Next Visit   3/28/2024 Marisel Prasad MD           Appointments     Last Visit   3/28/2023 Marisel Prasad MD   Next Visit   3/28/2024 Marisel Prasad MD

## 2023-11-28 ENCOUNTER — PATIENT MESSAGE (OUTPATIENT)
Dept: PRIMARY CARE CLINIC | Facility: CLINIC | Age: 78
End: 2023-11-28
Payer: MEDICARE

## 2023-11-28 DIAGNOSIS — Z12.11 COLON CANCER SCREENING: Primary | ICD-10-CM

## 2023-12-12 LAB — NONINV COLON CA DNA+OCC BLD SCRN STL QL: NEGATIVE

## 2023-12-12 NOTE — PROGRESS NOTES
Please let pt know that her    Your COLON CANCER SCREENING stool test shows NO BLOOD . We'll repeat this periodically

## 2024-01-09 ENCOUNTER — OFFICE VISIT (OUTPATIENT)
Dept: PRIMARY CARE CLINIC | Facility: CLINIC | Age: 79
End: 2024-01-09
Payer: MEDICARE

## 2024-01-09 ENCOUNTER — LAB VISIT (OUTPATIENT)
Dept: LAB | Facility: HOSPITAL | Age: 79
End: 2024-01-09
Attending: STUDENT IN AN ORGANIZED HEALTH CARE EDUCATION/TRAINING PROGRAM
Payer: MEDICARE

## 2024-01-09 VITALS
TEMPERATURE: 98 F | WEIGHT: 171.94 LBS | DIASTOLIC BLOOD PRESSURE: 75 MMHG | HEIGHT: 61 IN | BODY MASS INDEX: 32.46 KG/M2 | OXYGEN SATURATION: 99 % | HEART RATE: 62 BPM | SYSTOLIC BLOOD PRESSURE: 168 MMHG

## 2024-01-09 DIAGNOSIS — R42 DIZZINESS AND GIDDINESS: ICD-10-CM

## 2024-01-09 DIAGNOSIS — R42 DIZZINESS: Primary | ICD-10-CM

## 2024-01-09 DIAGNOSIS — G44.229 CHRONIC TENSION-TYPE HEADACHE, NOT INTRACTABLE: ICD-10-CM

## 2024-01-09 PROCEDURE — 85025 COMPLETE CBC W/AUTO DIFF WBC: CPT | Performed by: STUDENT IN AN ORGANIZED HEALTH CARE EDUCATION/TRAINING PROGRAM

## 2024-01-09 PROCEDURE — 84443 ASSAY THYROID STIM HORMONE: CPT | Performed by: STUDENT IN AN ORGANIZED HEALTH CARE EDUCATION/TRAINING PROGRAM

## 2024-01-09 PROCEDURE — 36415 COLL VENOUS BLD VENIPUNCTURE: CPT | Mod: PN | Performed by: STUDENT IN AN ORGANIZED HEALTH CARE EDUCATION/TRAINING PROGRAM

## 2024-01-09 PROCEDURE — 80053 COMPREHEN METABOLIC PANEL: CPT | Performed by: STUDENT IN AN ORGANIZED HEALTH CARE EDUCATION/TRAINING PROGRAM

## 2024-01-09 PROCEDURE — 99999 PR PBB SHADOW E&M-EST. PATIENT-LVL V: CPT | Mod: PBBFAC,,, | Performed by: STUDENT IN AN ORGANIZED HEALTH CARE EDUCATION/TRAINING PROGRAM

## 2024-01-09 PROCEDURE — 1101F PT FALLS ASSESS-DOCD LE1/YR: CPT | Mod: CPTII,S$GLB,, | Performed by: STUDENT IN AN ORGANIZED HEALTH CARE EDUCATION/TRAINING PROGRAM

## 2024-01-09 PROCEDURE — 3074F SYST BP LT 130 MM HG: CPT | Mod: CPTII,S$GLB,, | Performed by: STUDENT IN AN ORGANIZED HEALTH CARE EDUCATION/TRAINING PROGRAM

## 2024-01-09 PROCEDURE — 1159F MED LIST DOCD IN RCRD: CPT | Mod: CPTII,S$GLB,, | Performed by: STUDENT IN AN ORGANIZED HEALTH CARE EDUCATION/TRAINING PROGRAM

## 2024-01-09 PROCEDURE — 99215 OFFICE O/P EST HI 40 MIN: CPT | Mod: S$GLB,,, | Performed by: STUDENT IN AN ORGANIZED HEALTH CARE EDUCATION/TRAINING PROGRAM

## 2024-01-09 PROCEDURE — 3288F FALL RISK ASSESSMENT DOCD: CPT | Mod: CPTII,S$GLB,, | Performed by: STUDENT IN AN ORGANIZED HEALTH CARE EDUCATION/TRAINING PROGRAM

## 2024-01-09 PROCEDURE — 1125F AMNT PAIN NOTED PAIN PRSNT: CPT | Mod: CPTII,S$GLB,, | Performed by: STUDENT IN AN ORGANIZED HEALTH CARE EDUCATION/TRAINING PROGRAM

## 2024-01-09 PROCEDURE — 3078F DIAST BP <80 MM HG: CPT | Mod: CPTII,S$GLB,, | Performed by: STUDENT IN AN ORGANIZED HEALTH CARE EDUCATION/TRAINING PROGRAM

## 2024-01-09 NOTE — PROGRESS NOTES
Primary Care  Office Visit - In Person  1/9/2024      HPI    Patient is a 78 y.o.   Daisha Mayo  has a past medical history of Arthritis, Benign hypertension, Early cataracts, bilateral, Encephalopathy due to COVID-19 virus (04/03/2020), SAMSON (generalized anxiety disorder) (02/05/2019), GERD (gastroesophageal reflux disease), Glaucoma, History of depression, Hyperprolactinemia, Juvenile idiopathic scoliosis of lumbar region (11/23/2022), Osteopenia, Postmenopause atrophic vaginitis (04/28/2021), Primary osteoarthritis of left knee (04/28/2021), Recurrent UTI, Right ankle swelling (04/28/2021), Sciatica neuralgia, Temporal lobe seizure (2006), Trigeminal neuralgia, and Vitamin D deficiency disease.    Patient presents with headaches in back and front of head with band-like distribution  No runny nose or sensitivity to light. She does have sensitivity to loud noises. No tinnitus    Symptoms associated with gait imbalance and dizziness. Suspects she may be leaning to her right side   Symptoms present intermittently over several months   Reports BP has been labile   She denies any unintentional weight loss   She has been taking tylenol for headaches   She states that her vision has also been blurry over same time frame   ROS positive for occasional dysphagia and nasal congestion       Active Medications:  Current Outpatient Medications   Medication Instructions    ascorbic acid (vitamin C) (VITAMIN C) 100 mg, Oral, Daily    calcium citrate-vitamin D3 200 mg-6.25 mcg (250 unit) Tab No dose, route, or frequency recorded.    cyanocobalamin (VITAMIN B-12) 1000 MCG tablet No dose, route, or frequency recorded.    dorzolamide-timolol (COSOPT) 2-0.5 % ophthalmic solution 1 drop, Both Eyes, 2 times daily,      hydroCHLOROthiazide (HYDRODIURIL) 25 MG tablet Take 1 tablet (25 mg total) by mouth once daily.    latanoprost 0.005 % ophthalmic solution 1 drop, Nightly    multivitamin-minerals-lutein Tab 1 tablet, Daily     "thiamine mononitrate (vit B1) (VITAMIN B-1 (MONONITRATE)) 100 mg, Oral, Daily    VITAMIN D3 5,000 unit Tab No dose, route, or frequency recorded.       Vitals:    01/09/24 1349 01/09/24 1411 01/09/24 1412   BP: 126/80 (!) 180/74 (!) 168/75   BP Location: Right arm Left arm Left arm   Patient Position: Sitting Standing Lying   BP Method: Large (Manual) Large (Automatic) Large (Automatic)   Pulse: 66 62    Temp: 98.1 °F (36.7 °C)     SpO2: 99%     Weight: 78 kg (171 lb 15.3 oz)     Height: 5' 1" (1.549 m)         Physical Exam  Vitals reviewed.   Constitutional:       General: She is not in acute distress.  Eyes:      General: No visual field deficit.  Cardiovascular:      Rate and Rhythm: Normal rate and regular rhythm.      Pulses: Normal pulses.      Heart sounds: Normal heart sounds.   Pulmonary:      Effort: Pulmonary effort is normal.      Breath sounds: Normal breath sounds.   Abdominal:      General: Abdomen is flat. Bowel sounds are normal.      Palpations: Abdomen is soft.   Musculoskeletal:      Right lower leg: No edema.      Left lower leg: No edema.   Neurological:      General: No focal deficit present.      Mental Status: She is oriented to person, place, and time.      Cranial Nerves: Cranial nerves 2-12 are intact. No cranial nerve deficit or facial asymmetry.      Sensory: Sensation is intact.      Motor: Motor function is intact. No weakness, abnormal muscle tone or pronator drift.      Coordination: Coordination is intact. Finger-Nose-Finger Test and Heel to Shin Test normal.      Gait: Gait is intact.          Assessment and Plan     1. Dizziness  Comments:  Orthostatic vitals negative   B12 historically within normal range   F/u CT head  Orders:  -     CBC W/ AUTO DIFFERENTIAL; Future; Expected date: 01/09/2024  -     COMPREHENSIVE METABOLIC PANEL; Future; Expected date: 01/09/2024  -     TSH; Future; Expected date: 01/09/2024  -     CT Head Without Contrast; Future; Expected date: " 01/09/2024    2. Chronic tension-type headache, not intractable  Comments:  Possible cluster or cervicogenic headache.  Will consider referral to Neurology  Orders:  -     CBC W/ AUTO DIFFERENTIAL; Future; Expected date: 01/09/2024  -     COMPREHENSIVE METABOLIC PANEL; Future; Expected date: 01/09/2024  -     TSH; Future; Expected date: 01/09/2024                   Upcoming Scheduled Appointments and Follow Up:    Future Appointments   Date Time Provider Department Center   1/12/2024  2:00 PM Juan Alberto Oakes MD [facility] [address] [facility]   1/16/2024  9:00 AM Juan Alberto Oakes MD [facility] [address] [facility]   1/29/2024 11:30 AM [facility]aldo [facility] MD [facility] [address]   3/27/2024  8:00 AM Odilia Eckert MD [facility]   3/28/2024 11:20 AM Marisel Prasad MD [facility]       Follow Up [facility] (with who? when?): No follow-ups on file.      Extended Emergency Contact Information  Primary Emergency Contact: Castillo Mayo  Address: [address] [place] [id number] [facility]  Home Phone: [phone]  Mobile Phone: [phone]  Relation: Spouse  Secondary Emergency Contact: Zayra Mayo   [place]  Mobile Phone: [phone]  Relation: Daughter  Preferred language: English   needed? No      Chhaya Brooks MD   Internal Medicine  1/9/2024 - 2:22 PM    I spent a total of 40 minutes on the day of the visit.This includes face to face time and non-face to face time preparing to see the patient (eg, review of tests), obtaining and/or reviewing separately obtained history, documenting clinical information in the electronic or other health record, independently interpreting results and communicating results to the patient/family/caregiver, or care coordinator.    While patients have the right to access their medical record, it is essential to recognize that progress notes primarily serve as a means of communication among  healthcare professionals.

## 2024-01-10 ENCOUNTER — PATIENT MESSAGE (OUTPATIENT)
Dept: PRIMARY CARE CLINIC | Facility: CLINIC | Age: 79
End: 2024-01-10
Payer: MEDICARE

## 2024-01-10 LAB
ALBUMIN SERPL BCP-MCNC: 3.9 G/DL (ref 3.5–5.2)
ALP SERPL-CCNC: 90 U/L (ref 55–135)
ALT SERPL W/O P-5'-P-CCNC: 15 U/L (ref 10–44)
ANION GAP SERPL CALC-SCNC: 11 MMOL/L (ref 8–16)
AST SERPL-CCNC: 17 U/L (ref 10–40)
BASOPHILS # BLD AUTO: 0.05 K/UL (ref 0–0.2)
BASOPHILS NFR BLD: 0.8 % (ref 0–1.9)
BILIRUB SERPL-MCNC: 0.5 MG/DL (ref 0.1–1)
BUN SERPL-MCNC: 16 MG/DL (ref 8–23)
CALCIUM SERPL-MCNC: 9.6 MG/DL (ref 8.7–10.5)
CHLORIDE SERPL-SCNC: 94 MMOL/L (ref 95–110)
CO2 SERPL-SCNC: 28 MMOL/L (ref 23–29)
CREAT SERPL-MCNC: 0.7 MG/DL (ref 0.5–1.4)
DIFFERENTIAL METHOD BLD: ABNORMAL
EOSINOPHIL # BLD AUTO: 0.1 K/UL (ref 0–0.5)
EOSINOPHIL NFR BLD: 1.6 % (ref 0–8)
ERYTHROCYTE [DISTWIDTH] IN BLOOD BY AUTOMATED COUNT: 12.3 % (ref 11.5–14.5)
EST. GFR  (NO RACE VARIABLE): >60 ML/MIN/1.73 M^2
GLUCOSE SERPL-MCNC: 89 MG/DL (ref 70–110)
HCT VFR BLD AUTO: 42.3 % (ref 37–48.5)
HGB BLD-MCNC: 14 G/DL (ref 12–16)
IMM GRANULOCYTES # BLD AUTO: 0.02 K/UL (ref 0–0.04)
IMM GRANULOCYTES NFR BLD AUTO: 0.3 % (ref 0–0.5)
LYMPHOCYTES # BLD AUTO: 2.4 K/UL (ref 1–4.8)
LYMPHOCYTES NFR BLD: 37.9 % (ref 18–48)
MCH RBC QN AUTO: 32.2 PG (ref 27–31)
MCHC RBC AUTO-ENTMCNC: 33.1 G/DL (ref 32–36)
MCV RBC AUTO: 97 FL (ref 82–98)
MONOCYTES # BLD AUTO: 0.5 K/UL (ref 0.3–1)
MONOCYTES NFR BLD: 8 % (ref 4–15)
NEUTROPHILS # BLD AUTO: 3.3 K/UL (ref 1.8–7.7)
NEUTROPHILS NFR BLD: 51.4 % (ref 38–73)
NRBC BLD-RTO: 0 /100 WBC
PLATELET # BLD AUTO: 301 K/UL (ref 150–450)
PMV BLD AUTO: 9.8 FL (ref 9.2–12.9)
POTASSIUM SERPL-SCNC: 3.5 MMOL/L (ref 3.5–5.1)
PROT SERPL-MCNC: 7.3 G/DL (ref 6–8.4)
RBC # BLD AUTO: 4.35 M/UL (ref 4–5.4)
SODIUM SERPL-SCNC: 133 MMOL/L (ref 136–145)
TSH SERPL DL<=0.005 MIU/L-ACNC: 3.19 UIU/ML (ref 0.4–4)
WBC # BLD AUTO: 6.34 K/UL (ref 3.9–12.7)

## 2024-01-11 ENCOUNTER — OFFICE VISIT (OUTPATIENT)
Dept: PRIMARY CARE CLINIC | Facility: CLINIC | Age: 79
End: 2024-01-11
Payer: MEDICARE

## 2024-01-11 ENCOUNTER — PATIENT OUTREACH (OUTPATIENT)
Dept: ADMINISTRATIVE | Facility: HOSPITAL | Age: 79
End: 2024-01-11
Payer: MEDICARE

## 2024-01-11 VITALS — SYSTOLIC BLOOD PRESSURE: 124 MMHG | DIASTOLIC BLOOD PRESSURE: 87 MMHG

## 2024-01-11 DIAGNOSIS — G44.229 CHRONIC TENSION-TYPE HEADACHE, NOT INTRACTABLE: Primary | ICD-10-CM

## 2024-01-11 DIAGNOSIS — I10 BENIGN HYPERTENSION: ICD-10-CM

## 2024-01-11 DIAGNOSIS — F02.80 MAJOR NEUROCOGNITIVE DISORDER DUE TO ANOTHER MEDICAL CONDITION: ICD-10-CM

## 2024-01-11 DIAGNOSIS — R42 DIZZINESS: ICD-10-CM

## 2024-01-11 DIAGNOSIS — E87.1 HYPONATREMIA: ICD-10-CM

## 2024-01-11 PROCEDURE — 3079F DIAST BP 80-89 MM HG: CPT | Mod: CPTII,95,, | Performed by: FAMILY MEDICINE

## 2024-01-11 PROCEDURE — 3074F SYST BP LT 130 MM HG: CPT | Mod: CPTII,95,, | Performed by: FAMILY MEDICINE

## 2024-01-11 PROCEDURE — 1159F MED LIST DOCD IN RCRD: CPT | Mod: CPTII,95,, | Performed by: FAMILY MEDICINE

## 2024-01-11 PROCEDURE — 1160F RVW MEDS BY RX/DR IN RCRD: CPT | Mod: CPTII,95,, | Performed by: FAMILY MEDICINE

## 2024-01-11 PROCEDURE — 99213 OFFICE O/P EST LOW 20 MIN: CPT | Mod: 95,,, | Performed by: FAMILY MEDICINE

## 2024-01-11 NOTE — PROGRESS NOTES
Assessment:     1. Chronic tension-type headache, not intractable    2. Dizziness    3. Hyponatremia    4. 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 Benign hypertension      Plan:     Benign hypertension  She takes HCTZ 25 daily for years & for ankle swelling  I recommend compression hose  She is open to stop HCTZ & see me in 1 mo    Hyponatremia  Stop HCTZ  She will see me in 1 mo  She declines CT head now & we'll review her symptoms in one month    Dizziness  Stop HCTZ, see me in 1 mo    Major neurocognitive disorder due to another medical condition  Post COVID encephalopathy , saw Psych [facility]  Stable now, see me 1 mo follow up    Chronic tension-type headache, not intractable  Stop HCTZ, see me 1 mo, sooner if needed          CHIEF COMPLAINT: labs    HPI: Daisha Mayo is a 78 y.o. female with is here today for labs    The patient location is: home w   The chief complaint leading to consultation is: labs    Visit type: audiovisual    Face to Face time with patient: 22  25 minutes of total time spent on the encounter, which includes face to face time and non-face to face time preparing to see the patient (eg, review of tests), Obtaining and/or reviewing separately obtained history, Documenting clinical information in the electronic or other health record, Independently interpreting results (not separately reported) and communicating results to the patient/family/caregiver, or Care coordination (not separately reported).         Each patient to whom he or she provides medical services by telemedicine is:  (1) informed of the relationship between the physician and patient and the respective role of any other health care provider with respect to management of the patient; and (2) notified that he or she may decline to receive medical services by telemedicine and may withdraw from such care at any time.    Notes:     She came in to see my partner 2d ago 1/9 for dizziness  "& headache. Last night ate tacos , highest salt intake. After 30 min, I felt better.     When I'm under stress on Sunday, the next day,  I have some blurred vision, the back of my head near neck, or frontal area & I start to get depressed. No nausea. I have glaucoma, but when I have a headache, everything is off. My eyes feel "off". No serious vision change. This is the first time my headache lasts a few days. Usually only lasts less than 1 day. I take Tylenol & headache resolves in one hour.  But time makes it go away.     Also, I feel off balance, or dizzy. Vision plays a part in it. I have to be careful how I walk so I don't fall. Not like Vertigo (I had this years ago). Symptoms resolve when my head symptoms go away & I feel like myself again. Anxiety may be a part of this. I dont' want CT head    Lowest 108 systolic, highest 138  Current Outpatient Medications   Medication Instructions    ascorbic acid (vitamin C) (VITAMIN C) 100 mg, Oral, Daily    calcium citrate-vitamin D3 200 mg-6.25 mcg (250 unit) Tab No dose, route, or frequency recorded.    cyanocobalamin (VITAMIN B-12) 1000 MCG tablet No dose, route, or frequency recorded.    dorzolamide-timolol (COSOPT) 2-0.5 % ophthalmic solution 1 drop, Both Eyes, 2 times daily,      hydroCHLOROthiazide (HYDRODIURIL) 25 MG tablet Take 1 tablet (25 mg total) by mouth once daily.    latanoprost 0.005 % ophthalmic solution 1 drop, Nightly    multivitamin-minerals-lutein Tab 1 tablet, Daily    thiamine mononitrate (vit B1) (VITAMIN B-1 (MONONITRATE)) 100 mg, Oral, Daily    VITAMIN D3 5,000 unit Tab No dose, route, or frequency recorded.       Lab Results   Component Value Date    HGBA1C 5.1 11/26/2006     No results found for: "MICALBCREAT"  Lab Results   Component Value Date    LDLCALC 155.4 03/23/2023    LDLCALC 152.8 02/09/2022    CHOL 224 (H) 03/23/2023    HDL 52 03/23/2023    TRIG 83 03/23/2023       Lab Results   Component Value Date     (L) 01/09/2024    K " 3.5 01/09/2024    CL 94 (L) 01/09/2024    CO2 28 01/09/2024    GLU 89 01/09/2024    BUN 16 01/09/2024    CREATININE 0.7 01/09/2024    CALCIUM 9.6 01/09/2024    PROT 7.3 01/09/2024    ALBUMIN 3.9 01/09/2024    BILITOT 0.5 01/09/2024    ALKPHOS 90 01/09/2024    AST 17 01/09/2024    ALT 15 01/09/2024    ANIONGAP 11 01/09/2024    ESTGFRAFRICA >60.0 02/09/2022    EGFRNONAA >60.0 02/09/2022    WBC 6.34 01/09/2024    HGB 14.0 01/09/2024    HGB 13.0 03/23/2023    HCT 42.3 01/09/2024    MCV 97 01/09/2024     01/09/2024    TSH 3.185 01/09/2024    HEPCAB Negative 10/14/2015       Lab Results   Component Value Date    FSH 67.40 01/10/2020    EYVFDDGJ42AG 71 11/27/2019    JAHDNJLF72OY 73 03/28/2019    PDBFVYFW61 867 03/23/2023    FERRITIN 499 (H) 04/03/2020         Past Medical History:   Diagnosis Date    Arthritis     Benign hypertension     Early cataracts, bilateral     Encephalopathy due to COVID-19 virus 04/03/2020    resoved 1 year after COVID infection, 2/21, Psych [facility]    SAMSON (generalized anxiety disorder) 02/05/2019    GERD (gastroesophageal reflux disease)     Glaucoma     History of depression     Around [place]; treated with Wellbutrin    Hyperprolactinemia     Juvenile idiopathic scoliosis of lumbar region 11/23/2022    Osteopenia     Postmenopause atrophic vaginitis 04/28/2021    Primary osteoarthritis of left knee 04/28/2021    PT [facility] 2021    Recurrent UTI     Every few months    Right ankle swelling 04/28/2021    Sciatica neuralgia     Temporal lobe seizure 2006    Trigeminal neuralgia     Vitamin D deficiency disease      Past Surgical History:   Procedure Laterality Date    BREAST BIOPSY      duct excision    CHOLECYSTECTOMY  1994    laporascopic    COLONOSCOPY      EYE SURGERY      LASIK surgery to one eye; cannot remember which one    TOTAL ABDOMINAL HYSTERECTOMY W/ BILATERAL SALPINGOOPHORECTOMY      1985; diffinitive treatment for menorrhagia     Vitals:    01/11/24 1445   BP: 124/87     Wt  Readings from Last 5 Encounters:   01/09/24 78 kg (171 lb 15.3 oz)   09/26/23 77.8 kg (171 lb 8.3 oz)   06/29/23 76.2 kg (168 lb)   04/13/23 74.8 kg (165 lb)   03/28/23 76 kg (167 lb 8.8 oz)     Objective:   Physical Exam

## 2024-01-11 NOTE — ASSESSMENT & PLAN NOTE
She takes HCTZ 25 daily for years & for ankle swelling  I recommend compression hose  She is open to stop HCTZ & see me in 1 mo

## 2024-01-11 NOTE — ASSESSMENT & PLAN NOTE
Stop HCTZ  She will see me in 1 mo  She declines CT head now & we'll review her symptoms in one month

## 2024-01-11 NOTE — PROGRESS NOTES

## 2024-01-24 NOTE — TELEPHONE ENCOUNTER
Physical Therapy Lymphedema Initial Evaluation  [facility]     Patient Name: Riky Moon  : 1938  MRN: [id number]  Today's Date: 2024      Visit Date: 2024    Visit Dx:    ICD-10-CM ICD-9-CM   1. Lymphedema  I89.0 457.1       Patient Active Problem List   Diagnosis    OA (osteoarthritis) of hip    KINDRA treated with auto BiPAP    Chest pain    Diabetes mellitus    Hypertension    Hyperlipidemia    Hypothyroidism    Asthma    Overactive bladder    CAP (community acquired pneumonia)    Cellulitis of left lower extremity    COPD (chronic obstructive pulmonary disease)    Dyslipidemia    Gastroesophageal reflux disease    Leukocytosis    Peripheral nerve disease    Constipation    Oropharyngeal dysphagia    Bronchitis    Class 3 severe obesity due to excess calories with serious comorbidity and body mass index (BMI) of 40.0 to 44.9 in adult    Left leg swelling    Anemia    Leukocytosis    Circadian rhythm sleep disorder, delayed sleep phase type    Atrial flutter with controlled response        Past Medical History:   Diagnosis Date    Acid reflux     Anemia     Arthritis     OSTEO    Asthma     Chest discomfort     Colon polyp     COPD (chronic obstructive pulmonary disease)     Depression     Diabetes mellitus     type 2    Disease of thyroid gland     Emphysema lung     High cholesterol     Hypertension     Morbid obesity     Neuropathy     BOTH FEET    Obesity, Class III, BMI 40-49.9 (morbid obesity)     PAD (peripheral artery disease)     Poor circulation of extremity     LEFT LEG    Sleep apnea     Vitamin D deficiency         Past Surgical History:   Procedure Laterality Date    APPENDECTOMY      CATARACT EXTRACTION W/ INTRAOCULAR LENS IMPLANT Bilateral     CHOLECYSTECTOMY      COLONOSCOPY  2014    COLONOSCOPY W/ POLYPECTOMY      BENIGN    HERNIA REPAIR      INTERSTIM PLACEMENT Left 2016    BLADDER CONTROL    KNEE ARTHROPLASTY Right     MOUTH SURGERY  2018    NOSE  ----- Message from Fela Rocha sent at 7/18/2022  1:22 PM CDT -----  Contact: pt [phone]  States that her BP has been elevated and is requesting a call to discuss possibel medications.    Please call and advise.    Thank You       SURGERY      REMOVED BLOCKAGE     ROTATOR CUFF REPAIR Right     TOTAL HIP ARTHROPLASTY Right 11/9/2017    Procedure: RIGHT TOTAL HIP ARTHROPLASTY;  Surgeon: Riky Fernando MD;  Location: [facility] OR;  Service:        Visit Dx:    ICD-10-CM ICD-9-CM   1. Lymphedema  I89.0 457.1        Patient History       Row Name 01/24/24 1500             History    Chief Complaint Swelling  -KA      Date Current Problem(s) Began 11/07/19  -KA      Brief Description of Current Complaint Pt has been seen in our clinic in the past for lymphedema treatment, had been using Juxtalite HD garments.  He has replaced velcro garments once or twice since last seen at this clinic, but cannot remember when they were last replaced.  He always needed help with application from wife or a caregiver, wife has alzheimer's and has limited  strength to help.  [facility], and they wanted bandaging instructions to see if they can get home health set up to do bandaging if he is not able to come to clinic often enough.  -KA      Patient/Caregiver Goals Decrease swelling;Relieve pain;Improve mobility  -KA      How has patient tried to help current problem? compression pump, compression stockings.  -KA         Pain     Pain Location Leg  -KA      Pain at Present 0  -KA      Pain at Best 0  -KA      Pain at Worst 4  -KA      Pain Frequency Intermittent  -KA      Pain Description Heaviness;Tiring  -KA      What Performance Factors Make the Current Problem(s) WORSE? walking, standing  -KA      What Performance Factors Make the Current Problem(s) BETTER? elevating legs  -KA      Difficulties with ADL's? Difficulty with donning socks and shoes, difficulty with walking, prolonged standing, and household chores  -KA         Fall Risk Assessment    Any falls in the past year: No  -KA         Services    Are you currently receiving Home Health services No  -KA      Do you plan to receive Home Health services in the near future --  Maybe  -KA          Daily Activities    Primary Language English  -KA      Are you able to read Yes  -KA      Are you able to write Yes  -KA      How does patient learn best? Listening  -KA      Teaching needs identified Management of Condition;Home Exercise Program  -KA      Patient is concerned about/has problems with Difficulty with self care (i.e. bathing, dressing, toileting:;Performing home management (household chores, shopping, care of dependents);Walking  -KA      Does patient have problems with the following? Depression  -KA      Barriers to learning None  -KA      Functional Status dressing;mobility issues preventing performance of daily activities  -KA      Pt Participated in POC and Goals Yes  -KA         Safety    Are you being hurt, hit, or frightened by anyone at home or in your life? No  -KA      Are you being neglected by a caregiver No  -KA      Have you had any of the following issues with Depression  -KA                User Key  (r) = Recorded By, (t) = Taken By, (c) = Cosigned By      Initials Name Provider Type    Svetlana Couch, PT Physical Therapist                     Lymphedema       Row Name 01/24/24 1600 01/24/24 1500          Subjective Pain    Able to rate subjective pain? yes  -KA --     Pre-Treatment Pain Level 0  -KA --        Subjective    Subjective Comments See initial evaluation  -KA --        Lymphedema Assessment    Lymphedema Classification RLE:;LLE:;stage 2 (Spontaneously Irreversible);secondary  -KA --     Infections or Cellulitis? yes  none recently  -KA --     Infection/Cellulitis Treatment antibiotics  -KA --     Lymphedema Precautions asthma;anemia;other (comment)  Neuropathy  -KA --        LLIS - Physical Concerns    The amount of pain associated with my lymphedema is: 2  -KA --     The amount of limb heaviness associated with my lymphedema is: 2  -KA --     The amount of skin tightness associated with my lymphedema is: 2  -KA --     The size of my swollen limb(s) seems: 3   "-KA --     Lymphedema affects the movement of my swollen limb(s): 2  -KA --     The strength in my swollen limb(s) is: 2  -KA --        LLIS - Psychosocial Concerns    Lymphedema affects my body image (i.e., \"how I think I look\"). 2  -KA --     Lymphedema affects my socializing with others. 0  -KA --     Lymphedema affects my intimate relations with spouse or partner (rate 0 if not applicable 0  -KA --     Lymphedema \"gets me down\" (i.e., depression, frustration, or anger) 1  -KA --     I must rely on others for help due to my lymphedema. 3  -KA --     I know what to do to manage my lymphedema 2  -KA --        LLIS - Functional Concerns    Lymphedema affects my ability to perform self-care activities (i.e. eating, dressing, hygiene) 1  -KA --     Lymphedema affects my ability to perform routine home or work-related activities. 1  -KA --     Lymphedema affects my performance of preferred leisure activities. 1  -KA --     Lymphedema affects proper fit of clothing/shoes 3  -KA --     Lymphedema affects my sleep 1  -KA --        Posture/Observations    Posture/Observations Comments Decreased gait speed, decreased heel-toe gait mechanics, no AD this date  -KA --        General ROM    GENERAL ROM COMMENTS Pt is unable to reach legs to put on socks and shoes (or compression garments), B ankle dorsiflexion limited to neutral  -KA --        MMT (Manual Muscle Testing)    General MMT Comments BLE strength at least 3/5  -KA --        Lymphedema Edema Assessment    Ptting Edema Category By grade out of 4  -KA --     Pitting Edema + 3/4  -KA --     Stemmer Sign bilateral:;positive  -KA --     [place] Hump left:;positive  -KA --     Edema Assessment Comment Moderate-severe lymphedema affecting LLE foot to knee, Mild lymphedema affecting RLE foot to knee; Abnormal contours of L foot and ankle  -KA --        Skin Changes/Observations    Location/Assessment Lower Extremity  -KA --     Lower Extremity Conditions " bilateral:;intact;clean;dry;shiny;crust;scab(s);inflamed  -KA --     Lower Extremity Color/Pigment bilateral:;red;fibrosis  -KA --     Skin Observations Comment Has several scabs present on BLE, no warmth to touch, Doppler performed yesterday to rule out DVT; no open wounds or weeping at this time  -KA --        Lymphedema Measurements    Measurement Type(s) -- Circumferential  -KA     Circumferential Areas -- Lower extremities  -KA        BLE Circumferential (cm)    Measurement Location 1 -- Knee (popliteal crease)  -KA     Left 1 -- 42.3 cm  -KA     Right 1 -- 41.8 cm  -KA     Measurement Location 2 -- 10cm below knee  -KA     Left 2 -- 45 cm  -KA     Right 2 -- 42.2 cm  -KA     Measurement Location 3 -- 20cm below knee  -KA     Left 3 -- 43.8 cm  -KA     Right 3 -- 37.5 cm  -KA     Measurement Location 4 -- 30cm below knee  -KA     Left 4 -- 37 cm  -KA     Right 4 -- 25.8 cm  -KA     Measurement Location 5 -- Ankle  -KA     Left 5 -- 36.9 cm  -KA     Right 5 -- 28 cm  -KA     Measurement Location 6 -- Midfoot  -KA     Left 6 -- 31 cm  -KA     Right 6 -- 26.6 cm  -KA     LLE Circumferential Total -- 236 cm  -KA     RLE Circumferential Total -- 201.9 cm  -KA        Lymphedema Life Impact Scale Totals    A.  Total Q1 - Q17 (Do not include Q18) 28  -KA --     B.  Total number of questions answered (Q1-Q17) 17  -KA --     C. Divide A by B 1.65  -KA --     D. Multiple C by 25 41.25  -KA --               User Key  (r) = Recorded By, (t) = Taken By, (c) = Cosigned By      Initials Name Provider Type    Svetlana Couch PT Physical Therapist                                    Therapy Education  Education Details: Discussed girth measurements.  Pt is still within top range of his velcro compression garments if he can get help to apply them and if they are not fully worn out, can try to elevate legs overnight and don first thing in the morning.  Needs to be applying Eucerin lotion daily as skin breakdown/infection risk  is most concerning element of presentation at this time.  Will need compression bandaging at least for LLE to reduce foot and ankle to improve fit of shoes and to improve ease of using long term compression garments.  Discussed impact of Lymphedema Act on treatment, he is to call back to let us know what bandaging he has so order can be placed if needed.  [facility] as she was trying to find Home Health Agency that could perform bandaging.  Given: Edema management, HEP, Symptoms/condition management  Program: New  How Provided: Verbal  Provided to: Patient  Level of Understanding: Verbalized       OP Exercises       Row Name 01/24/24 1600             Subjective    Subjective Comments See initial evaluation  -JOHNIE         Subjective Pain    Able to rate subjective pain? yes  -KA      Pre-Treatment Pain Level 0  -KA                User Key  (r) = Recorded By, (t) = Taken By, (c) = Cosigned By      Initials Name Provider Type    Svetlana Couch, PT Physical Therapist                                 PT OP Goals       Row Name 01/24/24 1600          PT Short Term Goals    STG Date to Achieve 02/07/24  -KA     STG 1 Patient to demonstrate awareness of condition and precautions for improved prevention, management, care of symptoms, and ease of transition to self care of condition.  -KA     STG 1 Progress New  -     STG 2 Patient demonstrates decreased net edema of Left Lower Extremity>/= 5-10 cm for decreased edema symptoms, decreased risk of infection, and improved skin care.  -KA     STG 2 Progress New  -        Long Term Goals    LTG Date to Achieve 03/06/24  -KA     LTG 1 Patient/family/caregiver independent with self-care techniques for self-management of condition.  -     LTG 1 Progress New  -     LTG 2 Patient demonstrates decreased net edema of Left Lower Extremity>/= 10-20 cm for decreased edema symptoms, decreased risk of infection, and improved  skin care.  -     LTG 2 Progress New  -     LTG 3 Patient/family independent with compression garments as indicated for self-management of condition.  -     LTG 3 Progress New  -        Time Calculation    PT Goal Re-Cert Due Date 04/23/24  -               User Key  (r) = Recorded By, (t) = Taken By, (c) = Cosigned By      Initials Name Provider Type    Svetlana Couch, PT Physical Therapist                     PT Assessment/Plan       Row Name 01/24/24 7111          PT Assessment    Functional Limitations Impaired gait;Limitation in home management;Limitations in community activities;Limitations in functional capacity and performance;Performance in self-care ADL  -     Impairments Balance;Edema;Endurance;Gait;Impaired lymphatic circulation;Joint mobility;Muscle strength;Posture;Sensation  -     Assessment Comments 85 y.o. male who presents with bilateral Lower Extremity lymphedema.  Presentation is consistent with secondary Stage 2. Lymphedema is present from foot to knee, significantly worse on LLE especially foot and ankle, and is characterized by +B stemmer sign, + L buffalo hump, dry skin, redness, fibrotic skin changes, scabbing, loss of normal contours L ankle/foot, and 3+ pitting edema LLE (2+ on RLE).  Impairments include gait deviations, decreased sensation in lower legs and feet, decreased ROM, LE weakness, edema, and decreased skin integrity.  Pt is limited with ability to walk, don/doff socks and shoes, stand prolonged, and perform household chores due to lymphedema.  Score on Lymphedema Life Impact Scale is 41.25.  Patient is a good candidate for complete decongestive therapy to reduce infection risk, improve skin condition, and promote self-management of condition.  Following education, patient agrees to participate in Phase I (skilled care) and Phase II (self-management) of CDT.  Pt has been seen for CDT in 2022 with good results, but his wife has alzheimer's and has limited  ability to assist with compression garments. He is trying to see if he can get help to come to his home and may be a candidate for home health therapy if there is an agency that can provide compression bandaging.  Stressed importance of consistent compression once he completes care to maintain reduction, he is agreeable to this plan.  -KA     Rehab Potential Good  -KA     Patient/caregiver participated in establishment of treatment plan and goals Yes  -KA     Patient would benefit from skilled therapy intervention Yes  -KA        PT Plan    PT Frequency 3x/week  -KA     Predicted Duration of Therapy Intervention (PT) 4-6 weeks  -KA     Planned CPT's? PT RE-EVAL: 03683;PT THER PROC EA 15 MIN: 17642;PT THER ACT EA 15 MIN: 16601;PT MANUAL THERAPY EA 15 MIN: 79336;PT NEUROMUSC RE-EDUCATION EA 15 MIN: 14859;PT GAIT TRAINING EA 15 MIN: 96691;PT SELF CARE/HOME MGMT/TRAIN EA 15: 52995;PT EVAL MOD COMPLELITY: 38896  -JOHNIE        Therapy Assessment/Plan (OT)    Predicted Duration of Therapy Intervention (OT) Begin CDT  -KA               User Key  (r) = Recorded By, (t) = Taken By, (c) = Cosigned By      Initials Name Provider Type    Svetlana Couch, PT Physical Therapist                                 Time Calculation:   Start Time: 1520  Stop Time: 1615  Time Calculation (min): 55 min  Untimed Charges  PT Eval/Re-eval Minutes: 55  Total Minutes  Untimed Charges Total Minutes: 55   Total Minutes: 55   Therapy Charges for Today       Code Description Service Date Service Provider Modifiers Qty    54826090732 HC PT EVAL MOD COMPLEXITY 4 1/24/2024 Svetlana French, PT GP 1                      Svetlana French PT  1/24/2024

## 2024-01-26 ENCOUNTER — PATIENT MESSAGE (OUTPATIENT)
Dept: PRIMARY CARE CLINIC | Facility: CLINIC | Age: 79
End: 2024-01-26
Payer: MEDICARE

## 2024-01-29 ENCOUNTER — OFFICE VISIT (OUTPATIENT)
Dept: GASTROENTEROLOGY | Facility: CLINIC | Age: 79
End: 2024-01-29
Attending: FAMILY MEDICINE
Payer: MEDICARE

## 2024-01-29 ENCOUNTER — TELEPHONE (OUTPATIENT)
Dept: ENDOSCOPY | Facility: HOSPITAL | Age: 79
End: 2024-01-29
Payer: MEDICARE

## 2024-01-29 ENCOUNTER — PATIENT MESSAGE (OUTPATIENT)
Dept: GASTROENTEROLOGY | Facility: CLINIC | Age: 79
End: 2024-01-29

## 2024-01-29 VITALS — BODY MASS INDEX: 32.3 KG/M2 | WEIGHT: 171.06 LBS | HEIGHT: 61 IN

## 2024-01-29 VITALS — BODY MASS INDEX: 32.28 KG/M2 | HEIGHT: 61 IN | WEIGHT: 171 LBS

## 2024-01-29 DIAGNOSIS — E66.9 OBESITY (BMI 30.0-34.9): ICD-10-CM

## 2024-01-29 DIAGNOSIS — R13.10 DYSPHAGIA, UNSPECIFIED TYPE: ICD-10-CM

## 2024-01-29 DIAGNOSIS — K21.9 GASTROESOPHAGEAL REFLUX DISEASE, UNSPECIFIED WHETHER ESOPHAGITIS PRESENT: Primary | ICD-10-CM

## 2024-01-29 DIAGNOSIS — Z79.899 ENCOUNTER FOR MONITORING LONG-TERM PROTON PUMP INHIBITOR THERAPY: ICD-10-CM

## 2024-01-29 DIAGNOSIS — R05.9 COUGH, UNSPECIFIED TYPE: ICD-10-CM

## 2024-01-29 DIAGNOSIS — Z51.81 ENCOUNTER FOR MONITORING LONG-TERM PROTON PUMP INHIBITOR THERAPY: ICD-10-CM

## 2024-01-29 PROCEDURE — 99204 OFFICE O/P NEW MOD 45 MIN: CPT | Mod: S$GLB,,, | Performed by: INTERNAL MEDICINE

## 2024-01-29 PROCEDURE — 1101F PT FALLS ASSESS-DOCD LE1/YR: CPT | Mod: CPTII,S$GLB,, | Performed by: INTERNAL MEDICINE

## 2024-01-29 PROCEDURE — 99999 PR PBB SHADOW E&M-EST. PATIENT-LVL III: CPT | Mod: PBBFAC,,, | Performed by: INTERNAL MEDICINE

## 2024-01-29 PROCEDURE — 1159F MED LIST DOCD IN RCRD: CPT | Mod: CPTII,S$GLB,, | Performed by: INTERNAL MEDICINE

## 2024-01-29 PROCEDURE — 1126F AMNT PAIN NOTED NONE PRSNT: CPT | Mod: CPTII,S$GLB,, | Performed by: INTERNAL MEDICINE

## 2024-01-29 PROCEDURE — 3288F FALL RISK ASSESSMENT DOCD: CPT | Mod: CPTII,S$GLB,, | Performed by: INTERNAL MEDICINE

## 2024-01-29 RX ORDER — OMEPRAZOLE 40 MG/1
40 CAPSULE, DELAYED RELEASE ORAL DAILY
COMMUNITY
End: 2024-01-29

## 2024-01-29 RX ORDER — OMEPRAZOLE 40 MG/1
40 CAPSULE, DELAYED RELEASE ORAL
Qty: 90 CAPSULE | Refills: 3 | Status: SHIPPED | OUTPATIENT
Start: 2024-01-29 | End: 2024-02-15

## 2024-01-29 NOTE — TELEPHONE ENCOUNTER
"----- Message from Reynaldo Howe MD sent at 2024 12:17 PM CST -----  Procedure: EGD with 96 hour esophageal Bravo pH probe study done on omeprazole 40 mg once daily    Diagnosis: GERD    Procedure Timin-12 weeks    #If within 4 weeks selected, please carolina as high priority#    #If greater than 12 weeks, please select "5-12 weeks" and delay sending until 3 months prior to requested date#    Provider: Myself    Location: [address]    Additional Scheduling Information: No scheduling concerns    Prep Specifications:Standard prep    Is the patient taking a GLP-1 Agonist:no    Have you attached a patient to this message: yes   "

## 2024-01-29 NOTE — Clinical Note
"Procedure: EGD with 96 hour esophageal Bravo pH probe study done on omeprazole 40 mg once daily  Diagnosis: GERD  Procedure Timin-12 weeks  *If within 4 weeks selected, please carolina as high priority*  *If greater than 12 weeks, please select "5-12 weeks" and delay sending until 3 months prior to requested date*  Provider: Myself  Location: [address]  Additional Scheduling Information: No scheduling concerns  Prep Specifications:Standard prep  Is the patient taking a GLP-1 Agonist:no  Have you attached a patient to this message: yes "

## 2024-01-29 NOTE — PROGRESS NOTES
[facility] Consultation Note    Reason for Consult:  The primary encounter diagnosis was Gastroesophageal reflux disease, unspecified whether esophagitis present. Diagnoses of Cough, unspecified type, Obesity (BMI 30.0-34.9), Encounter for monitoring long-term proton pump inhibitor therapy, and Dysphagia, unspecified type were also pertinent to this visit.    PCP:   Marisel Prasad   [address] [facility] [place] [id number]    Referring MD:  Juan Alberto Vieyra Md  [address] [facility] [place] [address]    Initial History of Present Illness (HPI):  This is a 78 y.o. female here for evaluation of chronic cough she says she had a severe case of COVID almost  she has a nonproductive cough chest x-ray has been unrevealing she does suffer from GERD symptoms but is a person who does not like to take a PPI on a daily basis so she has not been on it long-term we do recommend she take omeprazole 40 mg once daily best taken 45-60 minutes before 1st protein meal of the day breakfast and an 8 weeks or so we can do an EGD with a 96 hour esophageal Bravo pH probe study on her omeprazole 40 mg once daily to see if she has any abnormal esophageal acid exposure if she does at that time then we can refer her to impedance pH study on her omeprazole 40 mg once daily looking for acid versus non acid reflux as a possible cause of her cough she is going to follow-up with Psychiatry for her depression she is being followed by her primary care doctor for headache and high blood pressure and some electrolyte abnormalities she had on her hypertensive meds.  No change in bowel habits no blood in her stool her mom did have colon cancer at 95 nobody else lived 105 she has not really inclined right now to have an optical colonoscopy she has been doing Cologuard she says and they been negative she will let us know if she would like to go to optical colonoscopy in light of her mom's colon cancer which was granted  at a very advanced age at diagnosis.  No blood in her stool no abdominal pain no unintentional weight loss no early satiety no chest pain no shortness of breath no fever chills.    Abdominal pain - no  Reflux as above  Dysphagia -as above  Bowel habits - normal  GI bleeding - none  NSAID usage - occasional    Interval HPI 01/29/2024:  The patient's last visit with me was on Visit date not found.      ROS:  Constitutional: No fevers, chills, No weight loss  ENT:  As above heartburn as above dysphagia no odynophagia no hoarseness  CV: No chest pain, no palpitation  Pulm:  Nonproductive cough cough, No shortness of breath, no wheezing  Ophtho: No vision changes  GI: see HPI  Derm: No rash, no itching  Heme: No lymphadenopathy, No easy bruising  MSK:  History of arthritis  : No dysuria, No hematuria  Endo: No hot or cold intolerance  Neuro: No syncope, No seizure, no strokes  Psych: No uncontrolled anxiety, going to see Psychiatry for depression  Medical History:  has a past medical history of Arthritis, Benign hypertension, Early cataracts, bilateral, Encephalopathy due to COVID-19 virus (04/03/2020), SAMSON (generalized anxiety disorder) (02/05/2019), GERD (gastroesophageal reflux disease), Glaucoma, History of depression, Hyperprolactinemia, Juvenile idiopathic scoliosis of lumbar region (11/23/2022), Osteopenia, Postmenopause atrophic vaginitis (04/28/2021), Primary osteoarthritis of left knee (04/28/2021), Recurrent UTI, Right ankle swelling (04/28/2021), Sciatica neuralgia, Temporal lobe seizure (2006), Trigeminal neuralgia, and Vitamin D deficiency disease.    Surgical History:  has a past surgical history that includes Cholecystectomy (1994); Colonoscopy; Eye surgery; Total abdominal hysterectomy w/ bilateral salpingoophorectomy; and Breast biopsy.    Family History: family history includes Breast cancer in her cousin and maternal aunt; Breast cancer (age of onset: 104) in her mother; Cancer in her brother;  Cancer (age of onset: 103) in her mother; Cancer (age of onset: 31) in her cousin; Colon cancer (age of onset: 90) in her mother; Fibromyalgia in her brother; Glaucoma in her brother, paternal aunt, paternal grandfather, paternal uncle, and sister; Heart disease in her brother, mother, and sister; Hypertension in her brother, mother, and sister; Kidney disease in her brother, brother, father, and sister; Sleep apnea in her brother; Thyroid disease in her mother and sister..     Social History:  reports that she has never smoked. She has never used smokeless tobacco. She reports that she does not drink alcohol and does not use drugs.    Review of patient's allergies indicates:   Allergen Reactions    Latex      Other reaction(s): Rash  Other reaction(s): Rash       Medication List with Changes/Refills   New Medications    OMEPRAZOLE (PRILOSEC) 40 MG CAPSULE    Take 1 capsule (40 mg total) by mouth before breakfast. Best taken 45-60 minutes before your 1st protein meal of the day breakfast   Current Medications    ASCORBIC ACID, VITAMIN C, (VITAMIN C) 100 MG TABLET    Take 100 mg by mouth once daily.    CALCIUM CITRATE-VITAMIN D3 200 MG-6.25 MCG (250 UNIT) TAB        CYANOCOBALAMIN (VITAMIN B-12) 1000 MCG TABLET        DORZOLAMIDE-TIMOLOL (COSOPT) 2-0.5 % OPHTHALMIC SOLUTION    Place 1 drop into both eyes 2 (two) times daily.    LATANOPROST 0.005 % OPHTHALMIC SOLUTION    Place 1 drop into both eyes every evening.    MULTIVITAMIN-MINERALS-LUTEIN TAB    Take 1 tablet by mouth Daily. 1 Tablet Oral Every day    THIAMINE MONONITRATE, VIT B1, (VITAMIN B-1, MONONITRATE,) 100 MG TAB    Take 1 tablet (100 mg total) by mouth once daily.    VITAMIN D3 5,000 UNIT TAB       Discontinued Medications    HYDROCHLOROTHIAZIDE (HYDRODIURIL) 25 MG TABLET    Take 1 tablet (25 mg total) by mouth once daily.    OMEPRAZOLE (PRILOSEC) 40 MG CAPSULE    Take 40 mg by mouth once daily. 1/2 Tablet daily         Objective Findings:    Vital  "Signs:  Ht 5' 1" (1.549 m)   Wt 77.6 kg (171 lb 1.2 oz)   LMP  (LMP Unknown)   BMI 32.32 kg/m²   Body mass index is 32.32 kg/m².    Physical Exam:  General Appearance: Well appearing in no acute distress  Eyes:    No scleral icterus  ENT:  No lesions or masses   Lungs: CTA bilaterally, no wheezes, no rhonchi, no rales  Heart:  S1, S2 normal, no murmurs heard  Abdomen:  Non distended, soft, no guarding, no rebound, no tenderness, no appreciated ascites, no bruits, no hepatosplenomegaly,  No CVA tenderness, no appreciated hernias, no Hassan sign, no McBurney point tenderness  Musculoskeletal:  No major joint deformities  Skin: No petechiae or rash on exposed skin areas  Neurologic:  Alert and oriented x4  Psychiatric:  Normal speech mentation and affect    Labs:  Lab Results   Component Value Date    WBC 6.34 01/09/2024    HGB 14.0 01/09/2024    HCT 42.3 01/09/2024     01/09/2024    CHOL 224 (H) 03/23/2023    TRIG 83 03/23/2023    HDL 52 03/23/2023    ALT 15 01/09/2024    AST 17 01/09/2024     (L) 01/09/2024    K 3.5 01/09/2024    CL 94 (L) 01/09/2024    CREATININE 0.7 01/09/2024    BUN 16 01/09/2024    CO2 28 01/09/2024    TSH 3.185 01/09/2024    INR 1.0 11/26/2006    HGBA1C 5.1 11/26/2006       Medical Decision Making:  Lab work reviewed  Screening colonoscopy talk given  PPI talk given  EGD with esophageal Bravo pH probe talk given  Lizz and a P talk given  From patient patient's daughter in the room today  Chest x-ray images and report personally reviewed by myself  Prior colonoscopy reports reviewed  EXAMINATION:  XR CHEST PA AND LATERAL     CLINICAL HISTORY:  Cough, unspecified     TECHNIQUE:  PA and lateral views of the chest were performed.     COMPARISON:  04/03/2020     FINDINGS:  Cardiac size is normal and aorta is tortuous.  Lungs are clear with no infiltrate or vascular congestion.  Thoracolumbar scoliosis is present.        Electronically signed by: Amos Walker MD  Date:         "                                    09/26/2023  Time:                                           15:06    Assessment:  1. Gastroesophageal reflux disease, unspecified whether esophagitis present    2. Cough, unspecified type    3. Obesity (BMI 30.0-34.9)    4. Encounter for monitoring long-term proton pump inhibitor therapy    5. Dysphagia, unspecified type         Recommendations:  1. Start omeprazole 40 mg once daily best taken 45-60 minutes before your 1st protein meal of the day breakfast  2. Referral to radiology for timed barium esophagram with barium tablet for further evaluation of intermittent dysphagia  3. Referral to endoscopy schedulers for EGD with 96 hour esophageal Bravo pH probe study done on her omeprazole 40 mg once daily she needs to be on this medicine for least 8 weeks before the study and she needs to take it the morning of the study with a sip of water even know she has not going to be eating that morning or we will have to reschedule her.  4. Patient has elected for screening for colon rectal cancer with non optical colonoscopy she says she is doing Cologuard with her primary care doctor if she changes her mind she will let us know will be happy to set her up for a screening colonoscopy.  She did have a first-degree relative mom who was diagnosed with colon cancer at 95 who live to 105 years of age.  No other family members with colon cancer.  5. Return GI clinic 3-4 months for follow-up sooner if any concerns symptoms or if she elects to have screening colonoscopy      Follow up in about 4 months (around 5/29/2024).      Order summary:  Orders Placed This Encounter    FL Esophagram With Barium Tablet    omeprazole (PRILOSEC) 40 MG capsule         Thank you so much for allowing me to participate in the care of Daisha Howe MD    DISCLAIMER: This note was prepared with [employer] voice recognition transcription software. Garbled syntax, mangled or inadvertent pronouns, and  other bizarre constructions may be attributed to that software system. While efforts were made to correct any mistakes made by this voice recognition program, some errors and/or omissions may remain in the note that were missed when the note was originally created.

## 2024-01-29 NOTE — PATIENT INSTRUCTIONS
"Procedure: EGD with 96 hour esophageal Bravo pH probe study done on omeprazole 40 mg once daily    Diagnosis: GERD    Procedure Timin-12 weeks    *If within 4 weeks selected, please carolina as high priority*    *If greater than 12 weeks, please select "5-12 weeks" and delay sending until 3 months prior to requested date*    Provider: Myself    Location: [address]    Additional Scheduling Information: No scheduling concerns    Prep Specifications:Standard prep    Is the patient taking a GLP-1 Agonist:no    Have you attached a patient to this message: yes   "

## 2024-01-29 NOTE — PROGRESS NOTES
"GENERAL GI PATIENT INTAKE:    COVID symptoms in the last 7 days (runny nose, sore throat, congestion, cough, fever): No  PCP: Marisel Prasad  If not PCP-  number given to establish [phone]: N/A    ALLERGIES REVIEWED:  Yes    CHIEF COMPLAINT:    Chief Complaint   Patient presents with    Gastroesophageal Reflux       VITAL SIGNS:  Ht 5' 1" (1.549 m)   Wt 77.6 kg (171 lb 1.2 oz)   LMP  (LMP Unknown)   BMI 32.32 kg/m²      Change in medical, surgical, family or social history: No      REVIEWED MEDICATION LIST RECONCILED INCLUDING ABOVE MEDS:  Yes     "

## 2024-01-29 NOTE — TELEPHONE ENCOUNTER
"----- Message from Reynaldo Howe MD sent at 2024 12:17 PM CST -----  Procedure: EGD with 96 hour esophageal Bravo pH probe study done on omeprazole 40 mg once daily    Diagnosis: GERD    Procedure Timin-12 weeks    #If within 4 weeks selected, please carolina as high priority#    #If greater than 12 weeks, please select "5-12 weeks" and delay sending until 3 months prior to requested date#    Provider: Myself    Location: [address]    Additional Scheduling Information: No scheduling concerns    Prep Specifications:Standard prep    Is the patient taking a GLP-1 Agonist:no    Have you attached a patient to this message: yes   "

## 2024-01-29 NOTE — TELEPHONE ENCOUNTER
Contacted the patient to schedule an endoscopy procedure(s) egd with 96 hr bravo. The patient did not answer the call and left a voice message requesting a call back.

## 2024-01-30 NOTE — TELEPHONE ENCOUNTER
Spoke to patient to schedule procedure(s) Upper Endoscopy (EGD) with Bravo Placement       Physician to perform procedure(s) Dr. VIVEK Howe  Date of Procedure (s) 04/5/2024  Arrival Time 12:30 pm   Time of Procedure(s) 1:30 pm    Location of Procedure(s) [address]  Type of Rx Prep sent to patient: N/A  Instructions provided to patient via [facility]    Patient was informed on the following information and verbalized understanding. Screening questionnaire reviewed with patient and complete. If procedure requires anesthesia, a responsible adult needs to be present to accompany the patient home, patient cannot drive after receiving anesthesia. Appointment details are tentative, especially check-in time. Patient will receive a prep-op call 7 days prior to confirm check-in time for procedure. If applicable the patient should contact their pharmacy to verify Rx for procedure prep is ready for pick-up. Patient was advised to call the scheduling department at [phone] if pharmacy states no Rx is available. Patient was advised to call the endoscopy scheduling department if any questions or concerns arise.      [facility] Scheduling Department

## 2024-02-01 ENCOUNTER — PATIENT MESSAGE (OUTPATIENT)
Dept: PRIMARY CARE CLINIC | Facility: CLINIC | Age: 79
End: 2024-02-01
Payer: MEDICARE

## 2024-02-01 ENCOUNTER — TELEPHONE (OUTPATIENT)
Dept: PRIMARY CARE CLINIC | Facility: CLINIC | Age: 79
End: 2024-02-01
Payer: MEDICARE

## 2024-02-06 ENCOUNTER — LAB VISIT (OUTPATIENT)
Dept: LAB | Facility: HOSPITAL | Age: 79
End: 2024-02-06
Attending: FAMILY MEDICINE
Payer: MEDICARE

## 2024-02-06 DIAGNOSIS — E87.1 HYPONATREMIA: ICD-10-CM

## 2024-02-06 LAB
ANION GAP SERPL CALC-SCNC: 4 MMOL/L (ref 8–16)
BUN SERPL-MCNC: 17 MG/DL (ref 8–23)
CALCIUM SERPL-MCNC: 9.1 MG/DL (ref 8.7–10.5)
CHLORIDE SERPL-SCNC: 105 MMOL/L (ref 95–110)
CO2 SERPL-SCNC: 27 MMOL/L (ref 23–29)
CREAT SERPL-MCNC: 0.8 MG/DL (ref 0.5–1.4)
EST. GFR  (NO RACE VARIABLE): >60 ML/MIN/1.73 M^2
GLUCOSE SERPL-MCNC: 88 MG/DL (ref 70–110)
POTASSIUM SERPL-SCNC: 4.1 MMOL/L (ref 3.5–5.1)
SODIUM SERPL-SCNC: 136 MMOL/L (ref 136–145)

## 2024-02-06 PROCEDURE — 80048 BASIC METABOLIC PNL TOTAL CA: CPT | Performed by: FAMILY MEDICINE

## 2024-02-06 PROCEDURE — 36415 COLL VENOUS BLD VENIPUNCTURE: CPT | Mod: PN | Performed by: FAMILY MEDICINE

## 2024-02-15 ENCOUNTER — OFFICE VISIT (OUTPATIENT)
Dept: PRIMARY CARE CLINIC | Facility: CLINIC | Age: 79
End: 2024-02-15
Payer: MEDICARE

## 2024-02-15 VITALS
DIASTOLIC BLOOD PRESSURE: 80 MMHG | WEIGHT: 173.06 LBS | BODY MASS INDEX: 32.67 KG/M2 | SYSTOLIC BLOOD PRESSURE: 126 MMHG | HEIGHT: 61 IN | HEART RATE: 62 BPM | TEMPERATURE: 98 F | OXYGEN SATURATION: 98 %

## 2024-02-15 DIAGNOSIS — H53.9 VISION CHANGES: ICD-10-CM

## 2024-02-15 DIAGNOSIS — E87.1 HYPONATREMIA: ICD-10-CM

## 2024-02-15 DIAGNOSIS — G44.229 CHRONIC TENSION-TYPE HEADACHE, NOT INTRACTABLE: ICD-10-CM

## 2024-02-15 DIAGNOSIS — U07.1 ENCEPHALOPATHY DUE TO COVID-19 VIRUS: ICD-10-CM

## 2024-02-15 DIAGNOSIS — Z87.898 HISTORY OF SEIZURE: ICD-10-CM

## 2024-02-15 DIAGNOSIS — Z86.69 HISTORY OF SEIZURE DISORDER: ICD-10-CM

## 2024-02-15 DIAGNOSIS — F41.1 GAD (GENERALIZED ANXIETY DISORDER): ICD-10-CM

## 2024-02-15 DIAGNOSIS — G93.49 ENCEPHALOPATHY DUE TO COVID-19 VIRUS: ICD-10-CM

## 2024-02-15 DIAGNOSIS — R42 DIZZINESS AND GIDDINESS: Primary | ICD-10-CM

## 2024-02-15 PROCEDURE — 1101F PT FALLS ASSESS-DOCD LE1/YR: CPT | Mod: CPTII,S$GLB,, | Performed by: FAMILY MEDICINE

## 2024-02-15 PROCEDURE — 1160F RVW MEDS BY RX/DR IN RCRD: CPT | Mod: CPTII,S$GLB,, | Performed by: FAMILY MEDICINE

## 2024-02-15 PROCEDURE — 99214 OFFICE O/P EST MOD 30 MIN: CPT | Mod: S$GLB,,, | Performed by: FAMILY MEDICINE

## 2024-02-15 PROCEDURE — 3288F FALL RISK ASSESSMENT DOCD: CPT | Mod: CPTII,S$GLB,, | Performed by: FAMILY MEDICINE

## 2024-02-15 PROCEDURE — 3079F DIAST BP 80-89 MM HG: CPT | Mod: CPTII,S$GLB,, | Performed by: FAMILY MEDICINE

## 2024-02-15 PROCEDURE — 1159F MED LIST DOCD IN RCRD: CPT | Mod: CPTII,S$GLB,, | Performed by: FAMILY MEDICINE

## 2024-02-15 PROCEDURE — 1126F AMNT PAIN NOTED NONE PRSNT: CPT | Mod: CPTII,S$GLB,, | Performed by: FAMILY MEDICINE

## 2024-02-15 PROCEDURE — 99999 PR PBB SHADOW E&M-EST. PATIENT-LVL V: CPT | Mod: PBBFAC,,, | Performed by: FAMILY MEDICINE

## 2024-02-15 PROCEDURE — 3074F SYST BP LT 130 MM HG: CPT | Mod: CPTII,S$GLB,, | Performed by: FAMILY MEDICINE

## 2024-02-15 RX ORDER — PANTOPRAZOLE SODIUM 40 MG/1
40 TABLET, DELAYED RELEASE ORAL DAILY
COMMUNITY
End: 2024-03-28
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
## 2024-02-15 NOTE — ASSESSMENT & PLAN NOTE
OPthalmologist stopping Timolol drops which can cause dizziness & headaches. She has not received the drops without Timolol yet   Follow w OPthalmologist

## 2024-02-15 NOTE — ASSESSMENT & PLAN NOTE
Intermittent, flared by anxiety.   Declines medicine for anxiety   Psychiatrist DR Peters, retired prescribed Lexapro but caused weight gain. She doesn't like taking medication & stopped it.   She is willing to see new Psychiatrist. I also offered to see therapist
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
## 2024-02-15 NOTE — ASSESSMENT & PLAN NOTE
Declines medicine for anxiety  Psychiatrist DR Peters, retired prescribed Lexapro but caused weight gain. She doesn't like taking medication & stopped it.   She is willing to see new Psychiatrist. I also offered to see therapist

## 2024-03-06 ENCOUNTER — PATIENT MESSAGE (OUTPATIENT)
Dept: PRIMARY CARE CLINIC | Facility: CLINIC | Age: 79
End: 2024-03-06
Payer: MEDICARE

## 2024-03-06 DIAGNOSIS — I10 BENIGN HYPERTENSION: Primary | ICD-10-CM

## 2024-03-07 NOTE — PROGRESS NOTES
[facility] - NEUROLOGY [address]TH [address] [facility], [place]    Date: 3/8/24  Patient Name: Daisha Mayo   MRN: [id number]   PCP: Marisel Prasad  Referring Provider: Marisel Prasad MD    Assessment:   History of COVID-19 encephalopathy.  Symptoms at that time resolved gradually.  However, since the onset of COVID the patient continues to experience some intermittent symptoms including vague feelings of weakness/lightheadedness, headaches, gait instability, short-term memory problems.  We will refer to neuropsychology for cognitive testing although based on evaluation today I have a low suspicion for dementia.  Repeat MRI of the brain.  Prior EEG was normal and patient is not describing any seizure-like episodes, however if she does develop staring spells we could repeat this study.  She did have 1 episode of vertigo this morning but reports that that is the 1st episode she has had a long time.  If recurrent episodes, we will refer to ENT.  If ENT evaluation is normal then it is likely vestibular migraine because she does have a history of migraine and she is currently having headaches that have migrainous features.    Plan:     Problem List Items Addressed This Visit          Neuro    Encephalopathy due to COVID-19 virus     Other Visit Diagnoses       Other amnesia    -  Primary    Relevant Orders    MRI Brain Without Contrast    Ambulatory referral/consult to Neuropsychology    History of seizure disorder                Beena Easley MD    Patient note was created using MModal Dictation.  Any errors in syntax or even information may not have been identified and edited on initial review prior to signing this note.  Subjective:   Patient seen in consultation at the request of Marisel Prasad MD for the evaluation of multiple neurological concerns. A copy of this note will be sent to the referring physician.        HPI:   Ms. Daisha Mayo is a 78 y.o. female presenting  "for evaluation of possible seizures.    2006 had a seizure, staring off into space, confused afterwards.  This was unprovoked.  Had COVID 2020 she had another seizure.  She also had generalized weakness/inability to walk and significant encephalopathy.    Tested positive in march 2020, reports she was sleeping and she felt something in her knee then she couldn't walk.   Hospitalized, was hallucinating - saw the devil in her room,   Still symptomatic after discharge for about 1 month, slowly improved.       Per last neurology note at that time:  "Daisha Mayo is a 74 y.o. female with PMHx of HTN,depression/anxiety who was admitted to hospital two times for COVID-19 (3/18-3/31) and CAP (4/3-4/7). During her admission he experienced some visual hallucination as seeing demon, confusion, sparing spells, which neurology and psychiatry jose consulted for that raeson. It seem sthat this symptoms were attributed to anxiety and hospital delirium, patient was prescribed with Xanax as prn and was discharged with HH>  Patient and daughter on video visit today give me the history. Per both, she has been doing much better after discharge.  She denies any visual hallucinations at home. She still feels weak in lower extremities and shaky when walks. She was using a WC after discharge and since yesterday she is able to walk with a walker. She is able to move BLE against gravity. No weakness in upper extremities. No dizziness, no sensory loss,  No dysarthria, aphasia, or vision change."    Now she reports:  Having headaches 1x per week.  Since January she has been getting them intermittently.  Reports that the headache feels like a persistent ache, bifrontal, light sensitivity.  She also gets dizziness that she describes as vertigo.  Had an episode of vertigo this morning after she laid down in bed.  Episode was brief.  She reports that she also gets a dizziness that she describes as a feeling of lightheadedness and being off " "balance.   Also reports that she is starting to feel "withdrawn".  Her head does not feel right, gets overwhelmed.  Reports that trying to talk is difficult.    Daughter reports she is a little more forgetful or confused.      Of note, she reports that she used to get optical migraines in the past, years ago.      Diagnostics:  There is generalized cerebral volume loss with compensatory enlargement ventricles sulci and cisterns without hydrocephalus. Brain parenchyma is normal in signal.. No abnormal parenchymal susceptibility to suggest parenchymal hemorrhage. There is no restricted diffusion to suggest acute infarction. 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 Continued absent left distal vertebral artery T2 flow void. There is trace partial fluid opacification left mastoid air cells. Remote operative change from bilateral cataract repair.     EEG 3/15/2020:  This is a normal awake and drowsy routine EEG.  There are no focal findings, no epileptiform discharges, and no electrographic seizures.   Of note, a normal EEG does not rule out the diagnosis of epilepsy.  Clinical correlation is advised.     PAST MEDICAL HISTORY:  Past Medical History:   Diagnosis Date    Arthritis     Benign hypertension     Early cataracts, bilateral     Encephalopathy due to COVID-19 virus 04/03/2020    resoved 1 year after COVID infection, 2/21, Psych [facility]    SAMSON (generalized anxiety disorder) 02/05/2019    GERD (gastroesophageal reflux disease)     Glaucoma     History of depression     Around Geovanna; treated with Wellbutrin    Hyperprolactinemia     Juvenile idiopathic scoliosis of lumbar region 11/23/2022    Osteopenia     Postmenopause atrophic vaginitis 04/28/2021    Primary osteoarthritis of left knee 04/28/2021    PT MSC 2021    Recurrent UTI     Every few months    Right ankle swelling 04/28/2021    Sciatica neuralgia     Temporal lobe " seizure 2006    Trigeminal neuralgia     Vitamin D deficiency disease        PAST SURGICAL HISTORY:  Past Surgical History:   Procedure Laterality Date    BREAST BIOPSY      duct excision    CHOLECYSTECTOMY  1994    laporascopic    COLONOSCOPY      EYE SURGERY      LASIK surgery to one eye; cannot remember which one    TOTAL ABDOMINAL HYSTERECTOMY W/ BILATERAL SALPINGOOPHORECTOMY      1985; diffinitive treatment for menorrhagia       CURRENT MEDS:  Current Outpatient Medications   Medication Sig Dispense Refill    ascorbic acid, vitamin C, (VITAMIN C) 100 MG tablet Take 100 mg by mouth once daily.      calcium citrate-vitamin D3 200 mg-6.25 mcg (250 unit) Tab       cyanocobalamin (VITAMIN B-12) 1000 MCG tablet       dorzolamide-timolol (COSOPT) 2-0.5 % ophthalmic solution Place 1 drop into both eyes 2 (two) times daily.      latanoprost 0.005 % ophthalmic solution Place 1 drop into both eyes every evening.      multivitamin-minerals-lutein Tab Take 1 tablet by mouth Daily. 1 Tablet Oral Every day      pantoprazole (PROTONIX) 40 MG tablet Take 40 mg by mouth once daily.      thiamine mononitrate, vit B1, (VITAMIN B-1, MONONITRATE,) 100 mg Tab Take 1 tablet (100 mg total) by mouth once daily. 90 tablet 3    VITAMIN D3 5,000 unit Tab        No current facility-administered medications for this visit.       ALLERGIES:  Review of patient's allergies indicates:   Allergen Reactions    Latex      Other reaction(s): Rash  Other reaction(s): Rash       FAMILY HISTORY:  Family History   Problem Relation Age of Onset    Heart disease Mother     Hypertension Mother     Thyroid disease Mother     Colon cancer Mother 90    Breast cancer Mother 104    Cancer Mother 103        colon and breast    Kidney disease Father     Heart disease Sister         pacemaker    Hypertension Sister     Thyroid disease Sister     Glaucoma Sister     Kidney disease Sister         stage 4 ckd - refuses dialysis if worsens    Fibromyalgia Brother      "Kidney disease Brother     Sleep apnea Brother     Hypertension Brother         orthostatic hypotension    Glaucoma Brother     Cancer Brother         prostate    Kidney disease Brother     Heart disease Brother         sudden death at age 67    Glaucoma Paternal Grandfather         Went blind from Glaucoma    Breast cancer Maternal Aunt     Glaucoma Paternal Aunt     Glaucoma Paternal Uncle     Cancer Cousin 31         from breast cancer at age 31    Breast cancer Cousin     Celiac disease Neg Hx     Cirrhosis Neg Hx     Colon polyps Neg Hx     Crohn's disease Neg Hx     Cystic fibrosis Neg Hx     Esophageal cancer Neg Hx     Hemochromatosis Neg Hx     Inflammatory bowel disease Neg Hx     Irritable bowel syndrome Neg Hx     Liver cancer Neg Hx     Liver disease Neg Hx     Rectal cancer Neg Hx     Stomach cancer Neg Hx     Ulcerative colitis Neg Hx     Moses's disease Neg Hx        SOCIAL HISTORY:  Social History     Tobacco Use    Smoking status: Never    Smokeless tobacco: Never   Substance Use Topics    Alcohol use: No    Drug use: No       Review of Systems:  12 system review of systems is negative except for the symptoms mentioned in HPI.      Objective:     Vitals:    24 0812   BP: (!) 187/85   Pulse: 64   Weight: 78.7 kg (173 lb 6.3 oz)   Height: 5' 1" (1.549 m)     General: NAD, well nourished   Eyes: no tearing, discharge, no erythema   ENT: moist mucous membranes of the oral cavity, nares patent    Neck: Supple, full range of motion  Cardiovascular: Warm and well perfused, pulses equal and symmetrical  Lungs: Normal work of breathing, normal chest wall excursions  Skin: No rash, lesions, or breakdown on exposed skin  Psychiatry: Mood and affect are appropriate   Abdomen: soft, non tender, non distended  Extremeties: No cyanosis, clubbing or edema.    Neurological   MENTAL STATUS: Alert and oriented to person, place, and time. Attention and concentration within normal limits. Speech without " dysarthria, able to name and repeat without difficulty. Recent and remote memory within normal limits   Recalls 3/3 words, able to perform serial 7s, abstraction intact (similarity watch/ruler - measurements), no ideomotor apraxia  CRANIAL NERVES: Visual fields intact. PERRL. EOMI. Facial sensation intact. Face symmetrical. Hearing grossly intact. Full shoulder shrug bilaterally. Tongue protrudes midline   SENSORY: Sensation is intact to light touch throughout.  Joint position perception intact. Negative Romberg.   MOTOR: Normal bulk and tone. No pronator drift.  5/5 deltoid, biceps, triceps, interosseous, hand  bilaterally. 5/5 iliopsoas, knee extension/flexion, foot dorsi/plantarflexion bilaterally.    CEREBELLAR/COORDINATION/GAIT: Gait steady with normal arm swing and stride length.  Heel to shin intact. Finger to nose intact. Normal rapid alternating movements.

## 2024-03-08 ENCOUNTER — OFFICE VISIT (OUTPATIENT)
Dept: NEUROLOGY | Facility: CLINIC | Age: 79
End: 2024-03-08
Payer: MEDICARE

## 2024-03-08 VITALS
HEIGHT: 61 IN | WEIGHT: 173.38 LBS | BODY MASS INDEX: 32.73 KG/M2 | DIASTOLIC BLOOD PRESSURE: 85 MMHG | HEART RATE: 64 BPM | SYSTOLIC BLOOD PRESSURE: 187 MMHG

## 2024-03-08 DIAGNOSIS — G93.49 ENCEPHALOPATHY DUE TO COVID-19 VIRUS: ICD-10-CM

## 2024-03-08 DIAGNOSIS — U07.1 ENCEPHALOPATHY DUE TO COVID-19 VIRUS: ICD-10-CM

## 2024-03-08 DIAGNOSIS — R41.3 OTHER AMNESIA: Primary | ICD-10-CM

## 2024-03-08 DIAGNOSIS — Z86.69 HISTORY OF SEIZURE DISORDER: ICD-10-CM

## 2024-03-08 PROCEDURE — 99204 OFFICE O/P NEW MOD 45 MIN: CPT | Mod: S$GLB,,, | Performed by: STUDENT IN AN ORGANIZED HEALTH CARE EDUCATION/TRAINING PROGRAM

## 2024-03-08 PROCEDURE — 3079F DIAST BP 80-89 MM HG: CPT | Mod: CPTII,S$GLB,, | Performed by: STUDENT IN AN ORGANIZED HEALTH CARE EDUCATION/TRAINING PROGRAM

## 2024-03-08 PROCEDURE — 1159F MED LIST DOCD IN RCRD: CPT | Mod: CPTII,S$GLB,, | Performed by: STUDENT IN AN ORGANIZED HEALTH CARE EDUCATION/TRAINING PROGRAM

## 2024-03-08 PROCEDURE — 3077F SYST BP >= 140 MM HG: CPT | Mod: CPTII,S$GLB,, | Performed by: STUDENT IN AN ORGANIZED HEALTH CARE EDUCATION/TRAINING PROGRAM

## 2024-03-08 PROCEDURE — 1126F AMNT PAIN NOTED NONE PRSNT: CPT | Mod: CPTII,S$GLB,, | Performed by: STUDENT IN AN ORGANIZED HEALTH CARE EDUCATION/TRAINING PROGRAM

## 2024-03-08 PROCEDURE — 99999 PR PBB SHADOW E&M-EST. PATIENT-LVL IV: CPT | Mod: PBBFAC,,, | Performed by: STUDENT IN AN ORGANIZED HEALTH CARE EDUCATION/TRAINING PROGRAM

## 2024-03-08 RX ORDER — LORAZEPAM 1 MG/1
TABLET ORAL
Qty: 2 TABLET | Refills: 0 | Status: SHIPPED | OUTPATIENT
Start: 2024-03-08 | End: 2024-03-28

## 2024-03-14 ENCOUNTER — PATIENT MESSAGE (OUTPATIENT)
Dept: GASTROENTEROLOGY | Facility: CLINIC | Age: 79
End: 2024-03-14
Payer: MEDICARE

## 2024-03-14 ENCOUNTER — TELEPHONE (OUTPATIENT)
Dept: ENDOSCOPY | Facility: HOSPITAL | Age: 79
End: 2024-03-14
Payer: MEDICARE

## 2024-03-14 DIAGNOSIS — K21.9 GASTROESOPHAGEAL REFLUX DISEASE, UNSPECIFIED WHETHER ESOPHAGITIS PRESENT: Primary | ICD-10-CM

## 2024-03-14 RX ORDER — OMEPRAZOLE 40 MG/1
40 CAPSULE, DELAYED RELEASE ORAL
Qty: 90 CAPSULE | Refills: 3 | Status: SHIPPED | OUTPATIENT
Start: 2024-03-14 | End: 2025-03-14

## 2024-03-14 NOTE — TELEPHONE ENCOUNTER
Patient calling with questions and concerns about EGD/Bravo procedure that was ordered by Dr. Howe and scheduled on 4/5/24. Patient also states she was told she would be started on omeprazole and that prescription was not sent to her pharmacy. Patient states she is still taking pantoprazole in which she has an abundance of and wants to know if it okay to continue on the pantoprazole. Patient would like to speak with Dr. Howe regarding her concerns.    Thank you,    Sintia

## 2024-03-18 ENCOUNTER — TELEPHONE (OUTPATIENT)
Dept: ENDOSCOPY | Facility: HOSPITAL | Age: 79
End: 2024-03-18
Payer: MEDICARE

## 2024-03-18 NOTE — TELEPHONE ENCOUNTER
Returned patient call that was left on voicemail no answer left voicemail asking patient to call us back @ [phone]

## 2024-03-19 ENCOUNTER — TELEPHONE (OUTPATIENT)
Dept: ENDOSCOPY | Facility: HOSPITAL | Age: 79
End: 2024-03-19
Payer: MEDICARE

## 2024-03-19 NOTE — TELEPHONE ENCOUNTER
Pt wants to remove procedure from schedule, and will call back to reschedule. Taking care of sick brother.

## 2024-03-25 ENCOUNTER — TELEPHONE (OUTPATIENT)
Dept: PRIMARY CARE CLINIC | Facility: CLINIC | Age: 79
End: 2024-03-25
Payer: MEDICARE

## 2024-03-25 NOTE — TELEPHONE ENCOUNTER
----- Message from Harrietdenisse Padilla sent at 3/25/2024  8:19 AM CDT -----  Contact: Self [phone]  Would like to receive medical advice.    Would they like a call back or a response via [facility]:  Call back     Additional information:  Calling to get orders placed for labs for annual on the 3/28.

## 2024-03-26 ENCOUNTER — LAB VISIT (OUTPATIENT)
Dept: LAB | Facility: HOSPITAL | Age: 79
End: 2024-03-26
Attending: FAMILY MEDICINE
Payer: MEDICARE

## 2024-03-26 DIAGNOSIS — I10 BENIGN HYPERTENSION: ICD-10-CM

## 2024-03-26 LAB
CHOLEST SERPL-MCNC: 228 MG/DL (ref 120–199)
CHOLEST/HDLC SERPL: 4.7 {RATIO} (ref 2–5)
HDLC SERPL-MCNC: 49 MG/DL (ref 40–75)
HDLC SERPL: 21.5 % (ref 20–50)
LDLC SERPL CALC-MCNC: 159.8 MG/DL (ref 63–159)
NONHDLC SERPL-MCNC: 179 MG/DL
TRIGL SERPL-MCNC: 96 MG/DL (ref 30–150)

## 2024-03-26 PROCEDURE — 80061 LIPID PANEL: CPT | Performed by: FAMILY MEDICINE

## 2024-03-26 PROCEDURE — 36415 COLL VENOUS BLD VENIPUNCTURE: CPT | Mod: PN | Performed by: FAMILY MEDICINE

## 2024-03-28 ENCOUNTER — OFFICE VISIT (OUTPATIENT)
Dept: PRIMARY CARE CLINIC | Facility: CLINIC | Age: 79
End: 2024-03-28
Payer: MEDICARE

## 2024-03-28 VITALS
DIASTOLIC BLOOD PRESSURE: 84 MMHG | BODY MASS INDEX: 32.59 KG/M2 | WEIGHT: 172.63 LBS | TEMPERATURE: 99 F | SYSTOLIC BLOOD PRESSURE: 135 MMHG | OXYGEN SATURATION: 98 % | HEART RATE: 66 BPM | HEIGHT: 61 IN

## 2024-03-28 DIAGNOSIS — R41.3 AMNESIA: ICD-10-CM

## 2024-03-28 DIAGNOSIS — I10 HTN (HYPERTENSION), BENIGN: ICD-10-CM

## 2024-03-28 DIAGNOSIS — E78.2 MIXED HYPERLIPIDEMIA: ICD-10-CM

## 2024-03-28 DIAGNOSIS — K21.9 GASTROESOPHAGEAL REFLUX DISEASE, UNSPECIFIED WHETHER ESOPHAGITIS PRESENT: ICD-10-CM

## 2024-03-28 DIAGNOSIS — F41.1 GAD (GENERALIZED ANXIETY DISORDER): ICD-10-CM

## 2024-03-28 DIAGNOSIS — Z00.00 ROUTINE GENERAL MEDICAL EXAMINATION AT A HEALTH CARE FACILITY: Primary | ICD-10-CM

## 2024-03-28 PROCEDURE — 99999 PR PBB SHADOW E&M-EST. PATIENT-LVL IV: CPT | Mod: PBBFAC,,, | Performed by: FAMILY MEDICINE

## 2024-03-28 PROCEDURE — 99397 PER PM REEVAL EST PAT 65+ YR: CPT | Mod: S$GLB,,, | Performed by: FAMILY MEDICINE

## 2024-03-28 PROCEDURE — 3079F DIAST BP 80-89 MM HG: CPT | Mod: CPTII,S$GLB,, | Performed by: FAMILY MEDICINE

## 2024-03-28 PROCEDURE — 1160F RVW MEDS BY RX/DR IN RCRD: CPT | Mod: CPTII,S$GLB,, | Performed by: FAMILY MEDICINE

## 2024-03-28 PROCEDURE — 1159F MED LIST DOCD IN RCRD: CPT | Mod: CPTII,S$GLB,, | Performed by: FAMILY MEDICINE

## 2024-03-28 PROCEDURE — 1126F AMNT PAIN NOTED NONE PRSNT: CPT | Mod: CPTII,S$GLB,, | Performed by: FAMILY MEDICINE

## 2024-03-28 PROCEDURE — 3288F FALL RISK ASSESSMENT DOCD: CPT | Mod: CPTII,S$GLB,, | Performed by: FAMILY MEDICINE

## 2024-03-28 PROCEDURE — 3075F SYST BP GE 130 - 139MM HG: CPT | Mod: CPTII,S$GLB,, | Performed by: FAMILY MEDICINE

## 2024-03-28 PROCEDURE — 1101F PT FALLS ASSESS-DOCD LE1/YR: CPT | Mod: CPTII,S$GLB,, | Performed by: FAMILY MEDICINE

## 2024-03-28 RX ORDER — AMOXICILLIN 500 MG
1 CAPSULE ORAL DAILY
COMMUNITY

## 2024-03-28 RX ORDER — LOSARTAN POTASSIUM 25 MG/1
25 TABLET ORAL DAILY
Qty: 90 TABLET | Refills: 3 | Status: SHIPPED | OUTPATIENT
Start: 2024-03-28 | End: 2025-03-28

## 2024-03-28 RX ORDER — DORZOLAMIDE HCL 20 MG/ML
SOLUTION/ DROPS OPHTHALMIC
COMMUNITY
Start: 2024-02-29

## 2024-03-28 NOTE — ASSESSMENT & PLAN NOTE
Declines statin    Move more, sit less  High fiber, good fat (avocado, olive oil, nuts) foods  Eat less processed foods (if you can't read the ingredients or if you don't have that ingredient in your pantry, it's processed)  Try to eat 5 fresh COLORS a day

## 2024-03-28 NOTE — PROGRESS NOTES
Assessment:     1. Routine general medical examination at a health care facility    2. Amnesia    3. Gastroesophageal reflux disease, unspecified whether esophagitis present    4. HTN (hypertension), benign    5. Mixed hyperlipidemia    6. SAMSON (generalized anxiety disorder)      Plan:     Routine general medical examination at a health care facility  Follow with GYN for female health & cancer prevention  Move more, High fiber, good fat (avocado, olive oil, nuts) foods  Eat more food grown from the earth (picked from trees or out of the ground)  Colon cancer screening at 46 yo  Follow up yearly with LABS ONE WEEK PRIOR so we can discuss at your visit      Shefali  She was evaluated by Neuro & Order in for MRI & referred to neuropsych    GERD (gastroesophageal reflux disease)  Omeprazole 40 helps a little more than pantoprazole 40. Gi Dr Calles recommended EGD, but postponing due to visiting son in [place]. Trying to reduce triggers of tomato sauce    HTN (hypertension), benign  Avoid HCTZ since symptomatic hyponatremia N 133    Home readings avg 140/100  Losartan 25 daily      Mixed hyperlipidemia  Declines statin    Move more, sit less  High fiber, good fat (avocado, olive oil, nuts) foods  Eat less processed foods (if you can't read the ingredients or if you don't have that ingredient in your pantry, it's processed)  Try to eat 5 fresh COLORS a day      SAMSON (generalized anxiety disorder)    Psychiatrist DR Peters, retired prescribed Lexapro but caused weight gain. She doesn't like taking medication & stopped it.   She is willing to see new Psychiatrist.  Refer to Lifestyle Medicine doctor Dr Simon Andrade & wt loss          HPI: Daisha Mayo is a 78 y.o. female with is here today for general exam.     The 10-year ASCVD risk score (Joann DK, et al., 2019) is: 19%      Denies chest pain, shortness of breath    Current Outpatient Medications   Medication Instructions    ascorbic acid (vitamin C) (VITAMIN  "C) 100 mg, Oral, Daily    calcium citrate-vitamin D3 200 mg-6.25 mcg (250 unit) Tab No dose, route, or frequency recorded.    cyanocobalamin (VITAMIN B-12) 1000 MCG tablet No dose, route, or frequency recorded.    dorzolamide (TRUSOPT) 2 % ophthalmic solution Both Eyes    latanoprost 0.005 % ophthalmic solution 1 drop, Nightly    losartan (COZAAR) 25 mg, Oral, Daily    multivitamin-minerals-lutein Tab 1 tablet, Daily    omega-3 fatty acids/fish oil (FISH OIL-OMEGA-3 FATTY ACIDS) 300-1,000 mg capsule 1 capsule, Oral, Daily    omeprazole (PRILOSEC) 40 mg, Oral, Before breakfast    thiamine mononitrate (vit B1) (VITAMIN B-1 (MONONITRATE)) 100 mg, Oral, Daily    TURMERIC ORAL 1 capsule, Oral, Daily    VITAMIN D3 5,000 unit Tab No dose, route, or frequency recorded.    vitamin E 100 Units, Oral, Daily       Lab Results   Component Value Date    HGBA1C 5.1 11/26/2006     No results found for: "MICALBCREAT"  Lab Results   Component Value Date    LDLCALC 159.8 (H) 03/26/2024    LDLCALC 155.4 03/23/2023    CHOL 228 (H) 03/26/2024    HDL 49 03/26/2024    TRIG 96 03/26/2024       Lab Results   Component Value Date     02/06/2024    K 4.1 02/06/2024     02/06/2024    CO2 27 02/06/2024    GLU 88 02/06/2024    BUN 17 02/06/2024    CREATININE 0.8 02/06/2024    CALCIUM 9.1 02/06/2024    PROT 7.3 01/09/2024    ALBUMIN 3.9 01/09/2024    BILITOT 0.5 01/09/2024    ALKPHOS 90 01/09/2024    AST 17 01/09/2024    ALT 15 01/09/2024    ANIONGAP 4 (L) 02/06/2024    ESTGFRAFRICA >60.0 02/09/2022    EGFRNONAA >60.0 02/09/2022    WBC 6.34 01/09/2024    HGB 14.0 01/09/2024    HGB 13.0 03/23/2023    HCT 42.3 01/09/2024    MCV 97 01/09/2024     01/09/2024    TSH 3.185 01/09/2024    HEPCAB Negative 10/14/2015       Lab Results   Component Value Date    FSH 67.40 01/10/2020    YZQCTQLV37RJ 71 11/27/2019    DMMPXYAK05MX 73 03/28/2019    WNLISUYB15 867 03/23/2023    FERRITIN 499 (H) 04/03/2020         Past Medical History: " "  Diagnosis Date    Arthritis     Benign hypertension     Early cataracts, bilateral     Encephalopathy due to COVID-19 virus 04/03/2020    resoved 1 year after COVID infection, 2/21, [facility]    SAMSON (generalized anxiety disorder) 02/05/2019    GERD (gastroesophageal reflux disease)     Glaucoma     History of depression     Around [place]; treated with Wellbutrin    Hyperprolactinemia     Juvenile idiopathic scoliosis of lumbar region 11/23/2022    Osteopenia     Postmenopause atrophic vaginitis 04/28/2021    Primary osteoarthritis of left knee 04/28/2021    PT MSC 2021    Recurrent UTI     Every few months    Right ankle swelling 04/28/2021    Sciatica neuralgia     Temporal lobe seizure 2006    Trigeminal neuralgia     Vitamin D deficiency disease      Past Surgical History:   Procedure Laterality Date    BREAST BIOPSY      duct excision    CHOLECYSTECTOMY  1994    laporascopic    COLONOSCOPY      EYE SURGERY      LASIK surgery to one eye; cannot remember which one    TOTAL ABDOMINAL HYSTERECTOMY W/ BILATERAL SALPINGOOPHORECTOMY      1985; diffinitive treatment for menorrhagia     Vitals:    03/28/24 1124 03/28/24 1157   BP: (!) 156/84 135/84   Pulse: 66    Temp: 99.1 °F (37.3 °C)    TempSrc: Oral    SpO2: 98%    Weight: 78.3 kg (172 lb 9.9 oz)    Height: 5' 1" (1.549 m)    PainSc: 0-No pain      Wt Readings from Last 5 Encounters:   03/28/24 78.3 kg (172 lb 9.9 oz)   03/08/24 78.7 kg (173 lb 6.3 oz)   02/15/24 78.5 kg (173 lb 1 oz)   01/29/24 77.6 kg (171 lb)   01/29/24 77.6 kg (171 lb 1.2 oz)     Objective:   Physical Exam  Constitutional:       Appearance: She is well-developed.   Eyes:      Pupils: Pupils are equal, round, and reactive to light.   Cardiovascular:      Rate and Rhythm: Normal rate and regular rhythm.      Heart sounds: Normal heart sounds. No murmur heard.  Pulmonary:      Effort: Pulmonary effort is normal.      Breath sounds: Normal breath sounds. No wheezing.   Abdominal:      General: " Bowel sounds are normal. There is no distension.      Palpations: Abdomen is soft. There is no mass.      Tenderness: There is no abdominal tenderness. There is no guarding or rebound.   Musculoskeletal:      Cervical back: Neck supple.   Skin:     General: Skin is warm and dry.   Neurological:      Mental Status: She is alert.   Psychiatric:         Behavior: Behavior normal.

## 2024-03-28 NOTE — ASSESSMENT & PLAN NOTE
Psychiatrist DR Peters, retired prescribed Lexapro but caused weight gain. She doesn't like taking medication & stopped it.   She is willing to see new Psychiatrist.  Refer to Lifestyle Medicine doctor Dr Simon Andrade & wt loss

## 2024-03-28 NOTE — ASSESSMENT & PLAN NOTE
Omeprazole 40 helps a little more than pantoprazole 40. Gi Dr Calles recommended EGD, but postponing due to visiting son in [place]. Trying to reduce triggers of tomato sauce

## 2024-04-04 ENCOUNTER — HOSPITAL ENCOUNTER (OUTPATIENT)
Dept: RADIOLOGY | Facility: HOSPITAL | Age: 79
Discharge: HOME | End: 2024-04-04
Attending: STUDENT IN AN ORGANIZED HEALTH CARE EDUCATION/TRAINING PROGRAM
Payer: MEDICARE

## 2024-04-04 DIAGNOSIS — R41.3 OTHER AMNESIA: ICD-10-CM

## 2024-04-04 PROCEDURE — 70551 MRI BRAIN STEM W/O DYE: CPT | Mod: 26,,, | Performed by: STUDENT IN AN ORGANIZED HEALTH CARE EDUCATION/TRAINING PROGRAM

## 2024-04-04 PROCEDURE — 70551 MRI BRAIN STEM W/O DYE: CPT | Mod: TC

## 2024-07-01 ENCOUNTER — OFFICE VISIT (OUTPATIENT)
Dept: INTERNAL MEDICINE | Facility: CLINIC | Age: 79
End: 2024-07-01
Payer: MEDICARE

## 2024-07-01 VITALS
OXYGEN SATURATION: 100 % | HEIGHT: 61 IN | BODY MASS INDEX: 33.22 KG/M2 | WEIGHT: 175.94 LBS | HEART RATE: 65 BPM | SYSTOLIC BLOOD PRESSURE: 152 MMHG | TEMPERATURE: 97 F | DIASTOLIC BLOOD PRESSURE: 80 MMHG

## 2024-07-01 DIAGNOSIS — M85.80 OSTEOPENIA, UNSPECIFIED LOCATION: ICD-10-CM

## 2024-07-01 DIAGNOSIS — I10 HYPERTENSION, UNSPECIFIED TYPE: ICD-10-CM

## 2024-07-01 DIAGNOSIS — R41.3 AMNESIA: Primary | ICD-10-CM

## 2024-07-01 PROBLEM — Z00.00 ROUTINE GENERAL MEDICAL EXAMINATION AT A HEALTH CARE FACILITY: Status: RESOLVED | Noted: 2024-03-28 | Resolved: 2024-07-01

## 2024-07-01 PROCEDURE — 1159F MED LIST DOCD IN RCRD: CPT | Mod: CPTII,S$GLB,, | Performed by: INTERNAL MEDICINE

## 2024-07-01 PROCEDURE — 3079F DIAST BP 80-89 MM HG: CPT | Mod: CPTII,S$GLB,, | Performed by: INTERNAL MEDICINE

## 2024-07-01 PROCEDURE — 3077F SYST BP >= 140 MM HG: CPT | Mod: CPTII,S$GLB,, | Performed by: INTERNAL MEDICINE

## 2024-07-01 PROCEDURE — 3288F FALL RISK ASSESSMENT DOCD: CPT | Mod: CPTII,S$GLB,, | Performed by: INTERNAL MEDICINE

## 2024-07-01 PROCEDURE — 1126F AMNT PAIN NOTED NONE PRSNT: CPT | Mod: CPTII,S$GLB,, | Performed by: INTERNAL MEDICINE

## 2024-07-01 PROCEDURE — 1101F PT FALLS ASSESS-DOCD LE1/YR: CPT | Mod: CPTII,S$GLB,, | Performed by: INTERNAL MEDICINE

## 2024-07-01 PROCEDURE — 99214 OFFICE O/P EST MOD 30 MIN: CPT | Mod: S$GLB,,, | Performed by: INTERNAL MEDICINE

## 2024-07-01 PROCEDURE — 1160F RVW MEDS BY RX/DR IN RCRD: CPT | Mod: CPTII,S$GLB,, | Performed by: INTERNAL MEDICINE

## 2024-07-01 PROCEDURE — 99999 PR PBB SHADOW E&M-EST. PATIENT-LVL III: CPT | Mod: PBBFAC,,, | Performed by: INTERNAL MEDICINE

## 2024-07-01 RX ORDER — LOSARTAN POTASSIUM 50 MG/1
50 TABLET ORAL DAILY
Start: 2024-07-01 | End: 2025-07-01

## 2024-07-01 RX ORDER — DORZOLAMIDE HYDROCHLORIDE AND TIMOLOL MALEATE PRESERVATIVE FREE 20; 5 MG/ML; MG/ML
1 SOLUTION/ DROPS OPHTHALMIC 2 TIMES DAILY
Start: 2024-07-01

## 2024-07-01 NOTE — PROGRESS NOTES
CC: followup of hypertension  HPI:  The patient is a 78 y.o. year old female who presents to the office for followup of hypertension.  The patient denies any chest pain, shortness of breath, nausea or vomiting, but reports intertmittent fatigue and blurred vision.  She reports ankle swelling and recent weight gain.  HCTZ was discontinued due to low sodium and potassium.  She reports headaches daily.  She states headaches are anaching sensation., primarily on the right side.  Tylenol propvides some relief.  She reports intermittent  brain fog.    PAST MEDICAL HISTORY:  Past Medical History:   Diagnosis Date    Arthritis     Benign hypertension     Early cataracts, bilateral     Encephalopathy due to COVID-19 virus 04/03/2020    resoved 1 year after COVID infection, 2/21, [facility]    SAMSON (generalized anxiety disorder) 02/05/2019    GERD (gastroesophageal reflux disease)     Glaucoma     History of depression     Around Geov[place]; treated with Wellbutrin    Hyperprolactinemia     Juvenile idiopathic scoliosis of lumbar region 11/23/2022    Osteopenia     Postmenopause atrophic vaginitis 04/28/2021    Primary osteoarthritis of left knee 04/28/2021    PT MSC 2021    Recurrent UTI     Every few months    Right ankle swelling 04/28/2021    Sciatica neuralgia     Temporal lobe seizure 2006    Trigeminal neuralgia     Vitamin D deficiency disease        SURGICAL HISTORY:  Past Surgical History:   Procedure Laterality Date    BREAST BIOPSY      duct excision    CHOLECYSTECTOMY  1994    laporascopic    COLONOSCOPY      EYE SURGERY      LASIK surgery to one eye; cannot remember which one    TOTAL ABDOMINAL HYSTERECTOMY W/ BILATERAL SALPINGOOPHORECTOMY      1985; diffinitive treatment for menorrhagia       MEDS:  Medcard reviewed and updated    ALLERGIES: Allergy Card reviewed and updated    SOCIAL HISTORY:   The patient is a nonsmoker.    PE:   APPEARANCE: Well nourished, well developed, in no acute distress.    CHEST: Lungs  clear to auscultation with unlabored respirations.  CARDIOVASCULAR: Normal S1, S2. No murmurs. No carotid bruits. No pedal edema.  ABDOMEN: Bowel sounds normal. Not distended. Soft. No tenderness or masses.   PSYCHIATRIC: The patient is oriented to person, place, and time and has a pleasant affect.        ASSESSMENT/PLAN:  Daisha was seen today for establish care, hypertension and joint swelling.    Diagnoses and all orders for this visit:    Amnesia  -     VITAMIN B1; Future    Hypertension, unspecified type  -     losartan (COZAAR) 50 MG tablet; Take 1 tablet (50 mg total) by mouth once daily.  -     Comprehensive Metabolic Panel; Future  -     Lipid Panel; Future  -     blood pressure is elevated, increase losartan to 50 mg daily    Osteopenia, unspecified location  -     Vitamin D; Future    Other orders  -     dorzolamide-timolol, PF, (COSOPT, PF,) 2-0.5 % Dpet ophthalmic solution; Place 1 drop into both eyes 2 (two) times daily.

## 2024-07-15 ENCOUNTER — PATIENT MESSAGE (OUTPATIENT)
Dept: ADMINISTRATIVE | Facility: HOSPITAL | Age: 79
End: 2024-07-15
Payer: MEDICARE

## 2024-07-16 ENCOUNTER — TELEPHONE (OUTPATIENT)
Dept: ENDOSCOPY | Facility: HOSPITAL | Age: 79
End: 2024-07-16
Payer: MEDICARE

## 2024-07-16 ENCOUNTER — PATIENT OUTREACH (OUTPATIENT)
Dept: ADMINISTRATIVE | Facility: HOSPITAL | Age: 79
End: 2024-07-16
Payer: MEDICARE

## 2024-07-16 DIAGNOSIS — Z78.0 POSTMENOPAUSAL: Primary | ICD-10-CM

## 2024-07-16 NOTE — TELEPHONE ENCOUNTER
MA spoke with pt , pt declined to schedule procedure , pt states she needs to think about if she wants to proceed with procedure

## 2024-07-16 NOTE — TELEPHONE ENCOUNTER
"----- Message from Violet Faulkner sent at 7/15/2024  9:22 AM CDT -----    ----- Message -----  From: Reynaldo Howe MD  Sent: 2024   3:10 PM CDT  To: [facility]    Procedure: EGD with 96 hour esophageal Bravo pH probe study done on omeprazole 40 mg once daily     Diagnosis: GERD     Procedure Timin-12 weeks     #If within 4 weeks selected, please [place] as high priority#     #If greater than 12 weeks, please select "5-12 weeks" and delay sending until 3 months prior to requested date#     Provider: Myself     Location: [address]     Additional Scheduling Information: No scheduling concerns     Prep Specifications:Standard prep     Is the patient taking a GLP-1 Agonist:no     Have you attached a patient to this message: yes  "

## 2024-07-16 NOTE — PROGRESS NOTES
Patient due for the following    RSV Vaccine (Age 60+ and Pregnant patients) (1 - 1-dose 60+ series)    Shingles Vaccine (2 of 3)    COVID-19 Vaccine (7 - 2023-24 season)    DEXA Scan       Immunizations: reviewed and updated  Care Everywhere: triggered  Care Teams: up to date  Outreach: completed    Dexa ordered.
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
## 2024-07-23 ENCOUNTER — PATIENT MESSAGE (OUTPATIENT)
Dept: NEUROLOGY | Facility: CLINIC | Age: 79
End: 2024-07-23

## 2024-07-23 ENCOUNTER — OFFICE VISIT (OUTPATIENT)
Dept: NEUROLOGY | Facility: CLINIC | Age: 79
End: 2024-07-23
Payer: MEDICARE

## 2024-07-23 DIAGNOSIS — F41.1 GAD (GENERALIZED ANXIETY DISORDER): ICD-10-CM

## 2024-07-23 DIAGNOSIS — R41.89 COGNITIVE CHANGES: Primary | ICD-10-CM

## 2024-07-23 PROCEDURE — 99499 UNLISTED E&M SERVICE: CPT | Mod: FQ,,, | Performed by: CLINICAL NEUROPSYCHOLOGIST

## 2024-07-23 PROCEDURE — 96116 NUBHVL XM PHYS/QHP 1ST HR: CPT | Mod: FQ,,, | Performed by: CLINICAL NEUROPSYCHOLOGIST

## 2024-07-25 ENCOUNTER — OFFICE VISIT (OUTPATIENT)
Dept: NEUROLOGY | Facility: CLINIC | Age: 79
End: 2024-07-25
Payer: MEDICARE

## 2024-07-25 DIAGNOSIS — R41.3 OTHER AMNESIA: ICD-10-CM

## 2024-07-25 DIAGNOSIS — G31.84 MILD NEUROCOGNITIVE DISORDER: Primary | ICD-10-CM

## 2024-07-25 PROCEDURE — 96133 NRPSYC TST EVAL PHYS/QHP EA: CPT | Mod: S$GLB,,, | Performed by: CLINICAL NEUROPSYCHOLOGIST

## 2024-07-25 PROCEDURE — 96139 PSYCL/NRPSYC TST TECH EA: CPT | Mod: S$GLB,,, | Performed by: CLINICAL NEUROPSYCHOLOGIST

## 2024-07-25 PROCEDURE — 99999 PR PBB SHADOW E&M-EST. PATIENT-LVL II: CPT | Mod: PBBFAC,,,

## 2024-07-25 PROCEDURE — 96132 NRPSYC TST EVAL PHYS/QHP 1ST: CPT | Mod: S$GLB,,, | Performed by: CLINICAL NEUROPSYCHOLOGIST

## 2024-07-25 PROCEDURE — 99499 UNLISTED E&M SERVICE: CPT | Mod: S$GLB,,, | Performed by: CLINICAL NEUROPSYCHOLOGIST

## 2024-07-25 PROCEDURE — 96138 PSYCL/NRPSYC TECH 1ST: CPT | Mod: S$GLB,,, | Performed by: CLINICAL NEUROPSYCHOLOGIST

## 2024-08-02 ENCOUNTER — PATIENT MESSAGE (OUTPATIENT)
Dept: INTERNAL MEDICINE | Facility: CLINIC | Age: 79
End: 2024-08-02
Payer: MEDICARE

## 2024-08-02 DIAGNOSIS — Z12.31 SCREENING MAMMOGRAM FOR BREAST CANCER: Primary | ICD-10-CM

## 2024-08-05 ENCOUNTER — PATIENT MESSAGE (OUTPATIENT)
Dept: NEUROLOGY | Facility: CLINIC | Age: 79
End: 2024-08-05
Payer: MEDICARE

## 2024-08-05 PROBLEM — G31.84 MILD NEUROCOGNITIVE DISORDER: Status: ACTIVE | Noted: 2022-01-12

## 2024-08-09 DIAGNOSIS — I10 HYPERTENSION, UNSPECIFIED TYPE: ICD-10-CM

## 2024-08-09 RX ORDER — LOSARTAN POTASSIUM 50 MG/1
50 TABLET ORAL DAILY
Qty: 90 TABLET | Refills: 0 | Status: SHIPPED | OUTPATIENT
Start: 2024-08-09

## 2024-08-19 ENCOUNTER — HOSPITAL ENCOUNTER (OUTPATIENT)
Dept: RADIOLOGY | Facility: HOSPITAL | Age: 79
Discharge: HOME | End: 2024-08-19
Attending: INTERNAL MEDICINE
Payer: MEDICARE

## 2024-08-19 VITALS — WEIGHT: 175 LBS | BODY MASS INDEX: 33.04 KG/M2 | HEIGHT: 61 IN

## 2024-08-19 DIAGNOSIS — Z12.31 SCREENING MAMMOGRAM FOR BREAST CANCER: ICD-10-CM

## 2024-08-19 PROCEDURE — 77063 BREAST TOMOSYNTHESIS BI: CPT | Mod: TC

## 2024-08-19 PROCEDURE — 77067 SCR MAMMO BI INCL CAD: CPT | Mod: TC

## 2024-08-22 ENCOUNTER — OFFICE VISIT (OUTPATIENT)
Dept: NEUROLOGY | Facility: CLINIC | Age: 79
End: 2024-08-22
Payer: MEDICARE

## 2024-08-22 DIAGNOSIS — G31.84 MILD NEUROCOGNITIVE DISORDER: Primary | ICD-10-CM

## 2024-08-22 PROCEDURE — 99499 UNLISTED E&M SERVICE: CPT | Mod: 95,,, | Performed by: CLINICAL NEUROPSYCHOLOGIST

## 2024-08-22 NOTE — PROGRESS NOTES
NEUROPSYCHOLOGICAL EVALUATION FEEDBACK    TELEMEDICINE DETAILS:   The patient location is: [place]  The chief complaint leading to consultation is: feedback regarding neuropsychological test results  Visit type: Virtual visit with synchronous audio and video  Total time spent with patient: 35 minutes  Each patient to whom he or she provides medical services by telemedicine is: (1) informed of the relationship between the physician and patient and the respective role of any other health care provider with respect to management of the patient; and (2) notified that he or she may decline to receive medical services by telemedicine and may withdraw from such care at any time.  Notes: See below.    Daisha Mayo attended a feedback session today and was accompanied by her daughter, Zayra. We discussed the results of the neuropsychological evaluation and I gave time to discuss questions and concerns. For full evaluation details, please see the note from this provider dated 07/25/2024. A copy of the report was provided via [employer] but a second copy was printed and mailed today per her request.     Problem List Items Addressed This Visit          Neuro    Mild neurocognitive disorder - Primary         Radha Harry, PhD, ABPP  Board Certified in Clinical Neuropsychology   [facility] - Department of Neurology

## 2024-08-29 ENCOUNTER — PATIENT MESSAGE (OUTPATIENT)
Dept: INTERNAL MEDICINE | Facility: CLINIC | Age: 79
End: 2024-08-29
Payer: MEDICARE

## 2024-09-09 ENCOUNTER — HOSPITAL ENCOUNTER (OUTPATIENT)
Dept: RADIOLOGY | Facility: HOSPITAL | Age: 79
Discharge: HOME | End: 2024-09-09
Attending: INTERNAL MEDICINE
Payer: MEDICARE

## 2024-09-09 DIAGNOSIS — R92.8 ABNORMAL MAMMOGRAM: ICD-10-CM

## 2024-09-09 PROCEDURE — 77065 DX MAMMO INCL CAD UNI: CPT | Mod: TC,RT

## 2024-09-09 PROCEDURE — 77061 BREAST TOMOSYNTHESIS UNI: CPT | Mod: 26,RT,, | Performed by: RADIOLOGY

## 2024-09-09 PROCEDURE — 77065 DX MAMMO INCL CAD UNI: CPT | Mod: 26,RT,, | Performed by: RADIOLOGY

## 2024-09-09 PROCEDURE — 77061 BREAST TOMOSYNTHESIS UNI: CPT | Mod: TC,RT

## 2024-09-24 ENCOUNTER — LAB VISIT (OUTPATIENT)
Dept: LAB | Facility: HOSPITAL | Age: 79
End: 2024-09-24
Attending: INTERNAL MEDICINE
Payer: MEDICARE

## 2024-09-24 DIAGNOSIS — M85.80 OSTEOPENIA, UNSPECIFIED LOCATION: ICD-10-CM

## 2024-09-24 DIAGNOSIS — I10 HYPERTENSION, UNSPECIFIED TYPE: ICD-10-CM

## 2024-09-24 DIAGNOSIS — R41.3 AMNESIA: ICD-10-CM

## 2024-09-24 LAB
25(OH)D3+25(OH)D2 SERPL-MCNC: 85 NG/ML (ref 30–96)
ALBUMIN SERPL BCP-MCNC: 3.7 G/DL (ref 3.5–5.2)
ALP SERPL-CCNC: 115 U/L (ref 55–135)
ALT SERPL W/O P-5'-P-CCNC: 14 U/L (ref 10–44)
ANION GAP SERPL CALC-SCNC: 6 MMOL/L (ref 8–16)
AST SERPL-CCNC: 14 U/L (ref 10–40)
BILIRUB SERPL-MCNC: 0.6 MG/DL (ref 0.1–1)
BUN SERPL-MCNC: 15 MG/DL (ref 8–23)
CALCIUM SERPL-MCNC: 9.1 MG/DL (ref 8.7–10.5)
CHLORIDE SERPL-SCNC: 106 MMOL/L (ref 95–110)
CHOLEST SERPL-MCNC: 248 MG/DL (ref 120–199)
CHOLEST/HDLC SERPL: 4.4 {RATIO} (ref 2–5)
CO2 SERPL-SCNC: 29 MMOL/L (ref 23–29)
CREAT SERPL-MCNC: 0.8 MG/DL (ref 0.5–1.4)
EST. GFR  (NO RACE VARIABLE): >60 ML/MIN/1.73 M^2
GLUCOSE SERPL-MCNC: 97 MG/DL (ref 70–110)
HDLC SERPL-MCNC: 56 MG/DL (ref 40–75)
HDLC SERPL: 22.6 % (ref 20–50)
LDLC SERPL CALC-MCNC: 173.2 MG/DL (ref 63–159)
NONHDLC SERPL-MCNC: 192 MG/DL
POTASSIUM SERPL-SCNC: 4.7 MMOL/L (ref 3.5–5.1)
PROT SERPL-MCNC: 7 G/DL (ref 6–8.4)
SODIUM SERPL-SCNC: 141 MMOL/L (ref 136–145)
TRIGL SERPL-MCNC: 94 MG/DL (ref 30–150)

## 2024-09-24 PROCEDURE — 36415 COLL VENOUS BLD VENIPUNCTURE: CPT | Mod: PN | Performed by: INTERNAL MEDICINE

## 2024-09-24 PROCEDURE — 80061 LIPID PANEL: CPT | Performed by: INTERNAL MEDICINE

## 2024-09-24 PROCEDURE — 84425 ASSAY OF VITAMIN B-1: CPT | Performed by: INTERNAL MEDICINE

## 2024-09-24 PROCEDURE — 82306 VITAMIN D 25 HYDROXY: CPT | Performed by: INTERNAL MEDICINE

## 2024-09-24 PROCEDURE — 80053 COMPREHEN METABOLIC PANEL: CPT | Performed by: INTERNAL MEDICINE

## 2024-10-01 ENCOUNTER — PATIENT MESSAGE (OUTPATIENT)
Dept: INTERNAL MEDICINE | Facility: CLINIC | Age: 79
End: 2024-10-01
Payer: MEDICARE

## 2024-10-01 LAB — VIT B1 BLD-MCNC: 72 UG/L (ref 38–122)

## 2024-10-11 ENCOUNTER — OFFICE VISIT (OUTPATIENT)
Dept: INTERNAL MEDICINE | Facility: CLINIC | Age: 79
End: 2024-10-11
Payer: MEDICARE

## 2024-10-11 VITALS
SYSTOLIC BLOOD PRESSURE: 130 MMHG | HEIGHT: 61 IN | WEIGHT: 175.5 LBS | HEART RATE: 69 BPM | TEMPERATURE: 97 F | BODY MASS INDEX: 33.14 KG/M2 | DIASTOLIC BLOOD PRESSURE: 86 MMHG | OXYGEN SATURATION: 98 %

## 2024-10-11 DIAGNOSIS — E78.5 HYPERLIPIDEMIA, UNSPECIFIED HYPERLIPIDEMIA TYPE: ICD-10-CM

## 2024-10-11 DIAGNOSIS — I10 HYPERTENSION, UNSPECIFIED TYPE: Primary | ICD-10-CM

## 2024-10-11 PROCEDURE — 3079F DIAST BP 80-89 MM HG: CPT | Mod: CPTII,S$GLB,, | Performed by: INTERNAL MEDICINE

## 2024-10-11 PROCEDURE — 3075F SYST BP GE 130 - 139MM HG: CPT | Mod: CPTII,S$GLB,, | Performed by: INTERNAL MEDICINE

## 2024-10-11 PROCEDURE — 99999 PR PBB SHADOW E&M-EST. PATIENT-LVL IV: CPT | Mod: PBBFAC,,, | Performed by: INTERNAL MEDICINE

## 2024-10-11 PROCEDURE — 1159F MED LIST DOCD IN RCRD: CPT | Mod: CPTII,S$GLB,, | Performed by: INTERNAL MEDICINE

## 2024-10-11 PROCEDURE — 1126F AMNT PAIN NOTED NONE PRSNT: CPT | Mod: CPTII,S$GLB,, | Performed by: INTERNAL MEDICINE

## 2024-10-11 PROCEDURE — 1101F PT FALLS ASSESS-DOCD LE1/YR: CPT | Mod: CPTII,S$GLB,, | Performed by: INTERNAL MEDICINE

## 2024-10-11 PROCEDURE — 99214 OFFICE O/P EST MOD 30 MIN: CPT | Mod: S$GLB,,, | Performed by: INTERNAL MEDICINE

## 2024-10-11 PROCEDURE — 3288F FALL RISK ASSESSMENT DOCD: CPT | Mod: CPTII,S$GLB,, | Performed by: INTERNAL MEDICINE

## 2024-10-11 PROCEDURE — G2211 COMPLEX E/M VISIT ADD ON: HCPCS | Mod: S$GLB,,, | Performed by: INTERNAL MEDICINE

## 2024-10-11 PROCEDURE — 1160F RVW MEDS BY RX/DR IN RCRD: CPT | Mod: CPTII,S$GLB,, | Performed by: INTERNAL MEDICINE

## 2024-10-11 RX ORDER — HYDROCHLOROTHIAZIDE 12.5 MG/1
12.5 TABLET ORAL DAILY PRN
Start: 2024-10-11 | End: 2025-10-11

## 2024-10-11 NOTE — PROGRESS NOTES
History of Present Illness    CHIEF COMPLAINT:  Daisha presents today for follow up.    CARDIOVASCULAR:  She reports a history of high cholesterol, with current total cholesterol at 248 and LDL at 173. She was previously on cholesterol medication prior to 2019, initially prescribed by a cardiologist. Her ASCVD risk calculation is 24.9%, significantly higher than the normal range of less than 5%. She expresses concern about the worsening cholesterol levels, noting they have not been this elevated before. She reports a family history significant for cholesterol issues, suggesting potential hereditary factors. Her blood pressure readings have been fluctuating. She is currently taking losartan 50 mg for blood pressure management. She previously took hydrochlorothiazide but discontinued it in January due to sodium depletion concerns. She denies that her current blood pressure readings are high. She reports shortness of breath with exertion, particularly when rushing around, but denies shortness of breath at rest or chest pain.    FLUID RETENTION:  She reports increased fluid retention since changing blood pressure medication, noting swelling particularly in one leg. She has attempted to use compression socks but finds them challenging, especially during summer months.    WEIGHT MANAGEMENT AND LIFESTYLE:  She reports a history of weight loss, having lost 25 lbs in 2019 prior to fuad COVID-19. Her current dietary habits include eating smaller portions at home and feeling full after only a few bites when dining out. She acknowledges limited physical activity, stating she is often tired by the end of the day and spends about two hours watching movies before bedtime. She expresses concern about weight gain and recognizes the need to make lifestyle changes, including increasing exercise.    MEDICAL HISTORY:  She has a history of COVID-19 infection and previously low thiamine levels, which have since normalized with  supplementation. She reports normal vitamin D levels. She continues to take vitamin B1 supplementation.    FATIGUE:  She reports longstanding fatigue but denies experiencing nausea, vomiting, headaches, or excessive fatigue.    VACCINATION CONSIDERATIONS:  She plans to receive the flu vaccine in November. She expresses uncertainty about receiving the COVID-19 booster vaccine and has not made a decision yet. If she decides to receive it, she would not get the COVID-19 booster at the same time as the flu vaccine.      ROS:  Constitutional: +fatigue, -change in weight  Head: -headaches  Respiratory: +shortness of breath  Cardiovascular: -chest pain  Gastrointestinal: -nausea, -vomiting       PAST MEDICAL HISTORY:  Past Medical History:   Diagnosis Date    Arthritis     Benign hypertension     Early cataracts, bilateral     Encephalopathy due to COVID-19 virus 04/03/2020    resoved 1 year after COVID infection, 2/21, [facility]    SAMSON (generalized anxiety disorder) 02/05/2019    GERD (gastroesophageal reflux disease)     Glaucoma     History of depression     Around [place]; treated with Wellbutrin    Hyperprolactinemia     Juvenile idiopathic scoliosis of lumbar region 11/23/2022    Osteopenia     Postmenopause atrophic vaginitis 04/28/2021    Primary osteoarthritis of left knee 04/28/2021    PT [facility] 2021    Recurrent UTI     Every few months    Right ankle swelling 04/28/2021    Sciatica neuralgia     Temporal lobe seizure 2006    Trigeminal neuralgia     Vitamin D deficiency disease        SURGICAL HISTORY:  Past Surgical History:   Procedure Laterality Date    BREAST BIOPSY      duct excision    CHOLECYSTECTOMY  1994    laporascopic    COLONOSCOPY      EYE SURGERY      LASIK surgery to one eye; cannot remember which one    TOTAL ABDOMINAL HYSTERECTOMY W/ BILATERAL SALPINGOOPHORECTOMY      1985; diffinitive treatment for menorrhagia       MEDS:  Medcard reviewed and updated    ALLERGIES: Allergy Card reviewed and  updated    SOCIAL HISTORY:   The patient is a nonsmoker.    PE:   APPEARANCE: Well nourished, well developed, in no acute distress.    CHEST: Lungs clear to auscultation with unlabored respirations.  CARDIOVASCULAR: Normal S1, S2. No murmurs. No carotid bruits. Trace pedal edema.  ABDOMEN: Bowel sounds normal. Not distended. Soft. No tenderness or masses. PSYCHIATRIC: The patient is oriented to person, place, and time and has a pleasant affect.        ASSESSMENT/PLAN:  Daisha was seen today for follow-up.    Diagnoses and all orders for this visit:    Hypertension, unspecified type  -     blood pressure is controlled, continue current therapy    Hyperlipidemia, unspecified hyperlipidemia type  -     Comprehensive Metabolic Panel; Future  -     Lipid Panel; Future    Other orders  -     hydroCHLOROthiazide (HYDRODIURIL) 12.5 MG Tab; Take 1 tablet (12.5 mg total) by mouth daily as needed (swelling).        Assessed cholesterol levels: significantly elevated LDL (173) and total cholesterol (248)  Calculated ASCVD risk score of 24.9%, significantly above normal range of <5%  Considered potential lab error due to unexpected elevation from previous results  Evaluated blood pressure, noting good control on current regimen  Assessed fluid retention, determining it is not likely due to blood pressure or medication side effects  Considered low-dose diuretic for fluid retention management    HYPERLIPIDEMIA:  - Explained that 1 egg per day is not likely causing cholesterol elevation.  - Discussed genetic factors potentially contributing to elevated cholesterol levels.  - Daisha to increase physical activity, even if it is indoor activities like using a pedal exerciser.  - Daisha to implement dietary modifications to support cholesterol management.  - Lipid panel ordered for repeat in 3 months.  - Follow up in 3 months (January) to reassess cholesterol levels.    EDEMA:  - Started hydrochlorothiazide 12.5 mg (half of 25 mg  tablet) daily as needed for swelling, not to exceed 3 consecutive days.    HYPERTENSION:  - Continued losartan 50 mg daily.

## 2024-10-15 ENCOUNTER — HOSPITAL ENCOUNTER (OUTPATIENT)
Dept: RADIOLOGY | Facility: HOSPITAL | Age: 79
Discharge: HOME | End: 2024-10-15
Attending: FAMILY MEDICINE
Payer: MEDICARE

## 2024-10-15 DIAGNOSIS — Z78.0 POSTMENOPAUSAL: ICD-10-CM

## 2024-10-15 PROCEDURE — 77080 DXA BONE DENSITY AXIAL: CPT | Mod: 26,,, | Performed by: INTERNAL MEDICINE

## 2024-10-15 PROCEDURE — 77080 DXA BONE DENSITY AXIAL: CPT | Mod: TC

## 2024-10-16 DIAGNOSIS — I10 HYPERTENSION, UNSPECIFIED TYPE: ICD-10-CM

## 2024-10-16 RX ORDER — LOSARTAN POTASSIUM 50 MG/1
50 TABLET ORAL
Qty: 90 TABLET | Refills: 1 | Status: SHIPPED | OUTPATIENT
Start: 2024-10-16

## 2024-10-16 NOTE — TELEPHONE ENCOUNTER
Refill Decision Note   Daisha Mayo  is requesting a refill authorization.  Brief Assessment and Rationale for Refill:  Approve     Medication Therapy Plan:         Comments:     Note composed:12:38 PM 10/16/2024

## 2024-10-16 NOTE — TELEPHONE ENCOUNTER
No care due was identified.  [facility] Embedded Care Due Messages. Reference number: [id number].   10/16/2024 4:53:34 AM CDT

## 2024-10-16 NOTE — PROGRESS NOTES
Your Bone density test shows OSTEOPENIA - but not osteoporosis.   =============  DAILY EXERCISE (walking, carrying light weights) & STRETCHING  is the BEST way to strengthen the bone where your muscles insert & prevent future fractures.   Intake 1200mg of  CALCIUM daily  - 3 servings of dairy ( milk, cheese, yogurt, foods w calcium) , TUMS or Viactiv  Plus 800 units of VITAMIN D daily - 10 minutes of direct sunlight or a supplement .

## 2025-01-05 DIAGNOSIS — K21.9 GASTROESOPHAGEAL REFLUX DISEASE, UNSPECIFIED WHETHER ESOPHAGITIS PRESENT: ICD-10-CM

## 2025-01-05 RX ORDER — OMEPRAZOLE 40 MG/1
40 CAPSULE, DELAYED RELEASE ORAL
Qty: 90 CAPSULE | Refills: 3 | Status: SHIPPED | OUTPATIENT
Start: 2025-01-05

## 2025-01-10 ENCOUNTER — LAB VISIT (OUTPATIENT)
Dept: LAB | Facility: HOSPITAL | Age: 80
End: 2025-01-10
Attending: INTERNAL MEDICINE
Payer: MEDICARE

## 2025-01-10 DIAGNOSIS — E78.5 HYPERLIPIDEMIA, UNSPECIFIED HYPERLIPIDEMIA TYPE: ICD-10-CM

## 2025-01-10 LAB
ALBUMIN SERPL BCP-MCNC: 3.6 G/DL (ref 3.5–5.2)
ALP SERPL-CCNC: 116 U/L (ref 40–150)
ALT SERPL W/O P-5'-P-CCNC: 10 U/L (ref 10–44)
ANION GAP SERPL CALC-SCNC: 6 MMOL/L (ref 8–16)
AST SERPL-CCNC: 12 U/L (ref 10–40)
BILIRUB SERPL-MCNC: 0.6 MG/DL (ref 0.1–1)
BUN SERPL-MCNC: 17 MG/DL (ref 8–23)
CALCIUM SERPL-MCNC: 8.7 MG/DL (ref 8.7–10.5)
CHLORIDE SERPL-SCNC: 107 MMOL/L (ref 95–110)
CHOLEST SERPL-MCNC: 214 MG/DL (ref 120–199)
CHOLEST/HDLC SERPL: 4.6 {RATIO} (ref 2–5)
CO2 SERPL-SCNC: 28 MMOL/L (ref 23–29)
CREAT SERPL-MCNC: 0.7 MG/DL (ref 0.5–1.4)
EST. GFR  (NO RACE VARIABLE): >60 ML/MIN/1.73 M^2
GLUCOSE SERPL-MCNC: 92 MG/DL (ref 70–110)
HDLC SERPL-MCNC: 47 MG/DL (ref 40–75)
HDLC SERPL: 22 % (ref 20–50)
LDLC SERPL CALC-MCNC: 143.4 MG/DL (ref 63–159)
NONHDLC SERPL-MCNC: 167 MG/DL
POTASSIUM SERPL-SCNC: 4.1 MMOL/L (ref 3.5–5.1)
PROT SERPL-MCNC: 6.9 G/DL (ref 6–8.4)
SODIUM SERPL-SCNC: 141 MMOL/L (ref 136–145)
TRIGL SERPL-MCNC: 118 MG/DL (ref 30–150)

## 2025-01-10 PROCEDURE — 36415 COLL VENOUS BLD VENIPUNCTURE: CPT | Mod: HCNC,PN | Performed by: INTERNAL MEDICINE

## 2025-01-10 PROCEDURE — 80061 LIPID PANEL: CPT | Mod: HCNC | Performed by: INTERNAL MEDICINE

## 2025-01-10 PROCEDURE — 80053 COMPREHEN METABOLIC PANEL: CPT | Mod: HCNC | Performed by: INTERNAL MEDICINE

## 2025-01-14 ENCOUNTER — HOSPITAL ENCOUNTER (OUTPATIENT)
Dept: RADIOLOGY | Facility: HOSPITAL | Age: 80
Discharge: HOME | End: 2025-01-14
Attending: INTERNAL MEDICINE
Payer: MEDICARE

## 2025-01-14 ENCOUNTER — OFFICE VISIT (OUTPATIENT)
Dept: INTERNAL MEDICINE | Facility: CLINIC | Age: 80
End: 2025-01-14
Payer: MEDICARE

## 2025-01-14 ENCOUNTER — TELEPHONE (OUTPATIENT)
Dept: INTERNAL MEDICINE | Facility: CLINIC | Age: 80
End: 2025-01-14
Payer: MEDICARE

## 2025-01-14 VITALS
DIASTOLIC BLOOD PRESSURE: 64 MMHG | BODY MASS INDEX: 32.89 KG/M2 | HEART RATE: 68 BPM | TEMPERATURE: 97 F | WEIGHT: 174.19 LBS | SYSTOLIC BLOOD PRESSURE: 112 MMHG | HEIGHT: 61 IN | OXYGEN SATURATION: 98 %

## 2025-01-14 DIAGNOSIS — I10 HYPERTENSION, UNSPECIFIED TYPE: Primary | ICD-10-CM

## 2025-01-14 DIAGNOSIS — M25.561 RIGHT KNEE PAIN, UNSPECIFIED CHRONICITY: ICD-10-CM

## 2025-01-14 DIAGNOSIS — E78.5 HYPERLIPIDEMIA, UNSPECIFIED HYPERLIPIDEMIA TYPE: ICD-10-CM

## 2025-01-14 PROCEDURE — 3074F SYST BP LT 130 MM HG: CPT | Mod: HCNC,CPTII,S$GLB, | Performed by: INTERNAL MEDICINE

## 2025-01-14 PROCEDURE — 99214 OFFICE O/P EST MOD 30 MIN: CPT | Mod: HCNC,S$GLB,, | Performed by: INTERNAL MEDICINE

## 2025-01-14 PROCEDURE — 1126F AMNT PAIN NOTED NONE PRSNT: CPT | Mod: HCNC,CPTII,S$GLB, | Performed by: INTERNAL MEDICINE

## 2025-01-14 PROCEDURE — 1160F RVW MEDS BY RX/DR IN RCRD: CPT | Mod: HCNC,CPTII,S$GLB, | Performed by: INTERNAL MEDICINE

## 2025-01-14 PROCEDURE — G2211 COMPLEX E/M VISIT ADD ON: HCPCS | Mod: HCNC,S$GLB,, | Performed by: INTERNAL MEDICINE

## 2025-01-14 PROCEDURE — 73562 X-RAY EXAM OF KNEE 3: CPT | Mod: 26,HCNC,RT, | Performed by: STUDENT IN AN ORGANIZED HEALTH CARE EDUCATION/TRAINING PROGRAM

## 2025-01-14 PROCEDURE — 1159F MED LIST DOCD IN RCRD: CPT | Mod: HCNC,CPTII,S$GLB, | Performed by: INTERNAL MEDICINE

## 2025-01-14 PROCEDURE — 99999 PR PBB SHADOW E&M-EST. PATIENT-LVL III: CPT | Mod: PBBFAC,HCNC,, | Performed by: INTERNAL MEDICINE

## 2025-01-14 PROCEDURE — 1101F PT FALLS ASSESS-DOCD LE1/YR: CPT | Mod: HCNC,CPTII,S$GLB, | Performed by: INTERNAL MEDICINE

## 2025-01-14 PROCEDURE — 3288F FALL RISK ASSESSMENT DOCD: CPT | Mod: HCNC,CPTII,S$GLB, | Performed by: INTERNAL MEDICINE

## 2025-01-14 PROCEDURE — 3078F DIAST BP <80 MM HG: CPT | Mod: HCNC,CPTII,S$GLB, | Performed by: INTERNAL MEDICINE

## 2025-01-14 PROCEDURE — 73562 X-RAY EXAM OF KNEE 3: CPT | Mod: TC,HCNC,PO,RT

## 2025-01-14 RX ORDER — LOSARTAN POTASSIUM 50 MG/1
50 TABLET ORAL DAILY
Qty: 90 TABLET | Refills: 3 | Status: SHIPPED | OUTPATIENT
Start: 2025-01-14

## 2025-01-14 RX ORDER — DICLOFENAC SODIUM 10 MG/G
2 GEL TOPICAL 2 TIMES DAILY
Qty: 100 G | Refills: 2 | Status: SHIPPED | OUTPATIENT
Start: 2025-01-14

## 2025-01-14 NOTE — TELEPHONE ENCOUNTER
Please inform patient that x-ray shows mild joint space narrowing.  Recommend trial of Voltaren gel.  Also, recommend physical therapy.  Please advise if patient is amenable to therapy.

## 2025-01-14 NOTE — PROGRESS NOTES
CC: followup of hypertension  HPI:  The patient is a 79 y.o. year old female who presents to the office for followup of hypertension.  The patient denies any chest pain, shortness of breath, headache, blurred vision, excessive fatigue, nausea or vomiting.  She reports right knee pain.  Pain originates from the side of her right thigh.  She reports pain is exacerbated with direct palpation.  She denies any effect from prolonged standing, but does report pain with walking.  Knee pain started in November.  She has taken aleve and used voltaren gel.  Review of labs reveals an improved cholesterol.    PAST MEDICAL HISTORY:  Past Medical History:   Diagnosis Date    Arthritis     Benign hypertension     Early cataracts, bilateral     Encephalopathy due to COVID-19 virus 04/03/2020    resoved 1 year after COVID infection, 2/21, [facility]    SAMSON (generalized anxiety disorder) 02/05/2019    GERD (gastroesophageal reflux disease)     Glaucoma     History of depression     Around [place]; treated with Wellbutrin    Hyperprolactinemia     Juvenile idiopathic scoliosis of lumbar region 11/23/2022    Osteopenia     Postmenopause atrophic vaginitis 04/28/2021    Primary osteoarthritis of left knee 04/28/2021    PT [facility] 2021    Recurrent UTI     Every few months    Right ankle swelling 04/28/2021    Sciatica neuralgia     Temporal lobe seizure 2006    Trigeminal neuralgia     Vitamin D deficiency disease        SURGICAL HISTORY:  Past Surgical History:   Procedure Laterality Date    BREAST BIOPSY      duct excision    CHOLECYSTECTOMY  1994    laporascopic    COLONOSCOPY      EYE SURGERY      LASIK surgery to one eye; cannot remember which one    TOTAL ABDOMINAL HYSTERECTOMY W/ BILATERAL SALPINGOOPHORECTOMY      1985; diffinitive treatment for menorrhagia       MEDS:  Medcard reviewed and updated    ALLERGIES: Allergy Card reviewed and updated    SOCIAL HISTORY:   The patient is a nonsmoker.    PE:   APPEARANCE: Well nourished,  well developed, in no acute distress.    CHEST: Lungs clear to auscultation with unlabored respirations.  CARDIOVASCULAR: Normal S1, S2. No murmurs. No carotid bruits. No pedal edema.  ABDOMEN: Bowel sounds normal. Not distended. Soft. No tenderness or masses.   PSYCHIATRIC: The patient is oriented to person, place, and time and has a pleasant affect.        ASSESSMENT/PLAN:  Daisha was seen today for follow-up.    Diagnoses and all orders for this visit:    Hypertension, unspecified type  -     losartan (COZAAR) 50 MG tablet; Take 1 tablet (50 mg total) by mouth once daily.  -     blood pressure is well controlled, continue current therapy    Right knee pain, unspecified chronicity  -     X-Ray Knee 3 View Right; Future    Hyperlipidemia, unspecified hyperlipidemia type  -     cholesterol significantly improved  -     continue healthy diet and regular exercise    Other orders  -     diclofenac sodium (VOLTAREN) 1 % Gel; Apply 2 g topically 2 (two) times daily.

## 2025-01-16 NOTE — TELEPHONE ENCOUNTER
Patient advised that x-ray shows mild joint space narrowing. Dr. Eckert recommends trial of Voltaren gel. Also, recommend physical therapy. Pt stated that she will reach out to us when she is ready to start PT.

## 2025-03-11 NOTE — PROGRESS NOTES
Subjective:      Patient ID: Daisha Mayo is a 77 y.o. female.    Chief Complaint: Leg Pain    Here today for leg pain. Initially was like sciatica in R, but I feel more lipomas  on my thighs. In both legs. Those lipomas are sore. No redness, no drainage. They feel stiff & aching. Sometimes hurts to walk. More tingling in feet  & lower legs. No new medicines or supplements. Didn't stop meds. She tried getting back on bicycle at home x 30min this week.  But my legs were hurting too much.     When I had neck  problems, PT did help. It's back again since I stopped PT. Haivng discomfort below my L collarbone for a while. Occurs on & off. No heavy lifting, moving.      CERVICAL SPINE XRAY 1/13/22  1. Multilevel degenerative changes as discussed above, overall quite similar to the 07/07/2014 examination.  The uncovertebral spurring at C4-5 appears somewhat more pronounced on the current AP projection, and the degree of disc narrowing at C4-5 is slightly greater than at that time.  2. Osseous fusion across the C6-7 disc is again seen, the configuration of the vertebral bodies at this level suggesting that this is developmental in nature.       MRI C SPINE 2/1/22  Cervical spondylosis.  Mild spinal canal stenosis and mild to moderate neural foraminal narrowing at C3-C5.         Shoulder three views left: No fracture dislocation bone destruction seen.        Electronically signed by: Hermilo Sanchez MD  Date:                                            01/24/2022    EMAIL TO ME 11/14/22===========  Per patient --   Thanks for your response . The problem with my pain in both my legs is that I have what I believe to be Lipomo(fatty tissue). (Another problem)On Friday, I was having a problem with getting the right words for what I wanted to say and again for what I wanted to write. I just did not feel right like I had brain fog and just a weird feeling. My head also hurts when I get like that. I have had the brain fog off  Transfer Note and on since I had COVID back in March 2020 . Also I have a pain that comes and go in my chest on the left side. The left side is where my neck , shoulder and head hurts. I know this is a lot .    Praying I can get some answers .    Thanks Sincerely,   Milvia Mayo      I have indentions in my ankles from swelling when I cross over my leg on my knee to clip my nails. I drink one cup coffee a day. I drink 6 cups water a day. Urine is clear. Taking HCTZ 25 mg daily for HTN.    I tried getting off of Protonix, but my GERD sx are bad. Would like refills of 40 mg    Not taking Celebrex or advil. I take tylenol as needed for pain, but not daily. I stopped Fish Oil a while back.     I've tried hair , skin & nails vitamins  & biotin, collagen powder (helps my bowels), but hair & nails don't grow fast .     Iw as started on Thiamine B1 by Psychiatrist Dr Peters , 2 yrs ago it was very low 37. Now 80. Do I continue?    Current Outpatient Medications   Medication Instructions    ascorbic acid (vitamin C) (VITAMIN C) 100 mg, Oral, Daily    calcium citrate-vitamin D3 200 mg-6.25 mcg (250 unit) Tab No dose, route, or frequency recorded.    celecoxib (CELEBREX) 100 mg, Oral, 2 times daily    cyanocobalamin (VITAMIN B-12) 1000 MCG tablet No dose, route, or frequency recorded.    dorzolamide-timolol (COSOPT) 2-0.5 % ophthalmic solution 1 drop, Both Eyes, 2 times daily,      hydroCHLOROthiazide (HYDRODIURIL) 25 mg, Oral, Daily    latanoprost 0.005 % ophthalmic solution 1 drop, Nightly    multivitamin with minerals (HAIR,SKIN AND NAILS ORAL) 1 tablet, Oral, Daily    multivitamin-minerals-lutein Tab 1 tablet, Daily    pantoprazole (PROTONIX) 40 mg, Oral, Daily    thiamine mononitrate (vit B1) (VITAMIN B-1 (MONONITRATE)) 100 mg, Oral, Daily    VITAMIN D3 5,000 unit Tab No dose, route, or frequency recorded.       Lab Results   Component Value Date    HGBA1C 5.1 11/26/2006     No results found for: MICALBCREAT  Lab Results   Component  Value Date    LDLCALC 152.8 02/09/2022    LDLCALC 188.2 (H) 01/13/2022    CHOL 225 (H) 02/09/2022    HDL 55 02/09/2022    TRIG 86 02/09/2022       Lab Results   Component Value Date     02/09/2022    K 4.7 02/09/2022     02/09/2022    CO2 31 (H) 02/09/2022    GLU 92 02/09/2022    BUN 16 02/09/2022    CREATININE 0.7 02/09/2022    CALCIUM 8.9 02/09/2022    PROT 6.9 02/09/2022    ALBUMIN 3.7 02/09/2022    BILITOT 0.8 02/09/2022    ALKPHOS 100 02/09/2022    AST 14 02/09/2022    ALT 9 (L) 02/09/2022    ANIONGAP 5 (L) 02/09/2022    ESTGFRAFRICA >60.0 02/09/2022    EGFRNONAA >60.0 02/09/2022    WBC 5.10 01/13/2022    HGB 14.5 01/13/2022    HGB 13.8 02/02/2021    HCT 44.4 01/13/2022    MCV 98 01/13/2022     01/13/2022    TSH 3.347 01/13/2022    HEPCAB Negative 10/14/2015       Lab Results   Component Value Date    FSH 67.40 01/10/2020    LUUQKHLF28KH 71 11/27/2019    NAVTVQLV89OB 73 03/28/2019    AKONREBP41 528 01/13/2022    FERRITIN 499 (H) 04/03/2020         Past Medical History:   Diagnosis Date    Arthritis     Benign hypertension     Early cataracts, bilateral     Encephalopathy due to COVID-19 virus 04/03/2020    resoved 1 year after COVID infection, 2/21, [facility]    SAMSON (generalized anxiety disorder) 02/05/2019    GERD (gastroesophageal reflux disease)     Glaucoma     History of depression     Around [place]; treated with Wellbutrin    Hyperprolactinemia     Osteopenia     Postmenopause atrophic vaginitis 04/28/2021    Primary osteoarthritis of left knee 04/28/2021    PT [facility] 2021    Recurrent UTI     Every few months    Right ankle swelling 04/28/2021    Sciatica neuralgia     Temporal lobe seizure 2006    Trigeminal neuralgia     Vitamin D deficiency disease      Past Surgical History:   Procedure Laterality Date    BREAST BIOPSY      duct excision    CHOLECYSTECTOMY  1994    laporascopic    COLONOSCOPY      EYE SURGERY      LASIK surgery to one eye; cannot remember which one    TOTAL  "ABDOMINAL HYSTERECTOMY W/ BILATERAL SALPINGOOPHORECTOMY      ; diffinitive treatment for menorrhagia     Social History     Social History Narrative    dtr Zayra moved home from [place] to help         Family History   Problem Relation Age of Onset    Heart disease Mother     Hypertension Mother     Thyroid disease Mother     Colon cancer Mother 90    Breast cancer Mother 104    Cancer Mother 103        colon and breast    Kidney disease Father     Heart disease Sister         pacemaker    Hypertension Sister     Thyroid disease Sister     Glaucoma Sister     Kidney disease Sister         stage 4 ckd - refuses dialysis if worsens    Fibromyalgia Brother     Kidney disease Brother     Sleep apnea Brother     Hypertension Brother         orthostatic hypotension    Glaucoma Brother     Cancer Brother         prostate    Kidney disease Brother     Heart disease Brother         sudden death at age 67    Glaucoma Paternal Grandfather         Went blind from Glaucoma    Breast cancer Maternal Aunt     Glaucoma Paternal Aunt     Glaucoma Paternal Uncle     Cancer Cousin 31         from breast cancer at age 31    Breast cancer Cousin     Celiac disease Neg Hx     Cirrhosis Neg Hx     Colon polyps Neg Hx     Crohn's disease Neg Hx     Cystic fibrosis Neg Hx     Esophageal cancer Neg Hx     Hemochromatosis Neg Hx     Inflammatory bowel disease Neg Hx     Irritable bowel syndrome Neg Hx     Liver cancer Neg Hx     Liver disease Neg Hx     Rectal cancer Neg Hx     Stomach cancer Neg Hx     Ulcerative colitis Neg Hx     Moses's disease Neg Hx      Vitals:    22 1000   BP: 128/74   Pulse: 65   SpO2: 98%   Weight: 76.5 kg (168 lb 10.4 oz)   Height: 5' 1" (1.549 m)   PainSc:   6     Objective:   Physical Exam  Neck:      Comments: Neck tight & tender bilateral trapezius, decreased range of motion of cervical spine. Decreased range of motion of the shoulder joints,nontender a.c. Joint, or deltoids, 4/5 bilateral " strength of biceps and tricep bilateral,  5/5 hand , no pain with supination or pronation, no atrophy, 2+ pulses, less than 2 second capillary refill, no swelling, nontender L clavicle    Cardiovascular:      Rate and Rhythm: Normal rate and regular rhythm.      Heart sounds: Normal heart sounds.   Pulmonary:      Effort: Pulmonary effort is normal. No respiratory distress.      Breath sounds: Normal breath sounds.   Musculoskeletal:         General: Swelling present.      Comments: Normal gait, no tenderness w pressure on legs, scattered lipomas both upper legs,  scoliosis, decreased range of motion, nontender lumbar spine, nontender paraspinous musculature, nontender sacroiliacs, negative straight leg test, 2+ pulses, 1 + pitting edema both ankles     Psychiatric:         Behavior: Behavior normal.         Thought Content: Thought content normal.         Judgment: Judgment normal.     Assessment:     1. Primary osteoarthritis of left knee    2. Sciatica, unspecified laterality    3. History of depression    4. 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 Neck muscle spasm      Plan:     Orders Placed This Encounter    pantoprazole (PROTONIX) 40 MG tablet     Tylenol 2 daily & see how you feel  Continue B1 supplement  I can refer to Pt whenever you like.   Compression hose  Walk more  See me in 6 mo, sooner if needed  There are no Patient Instructions on file for this visit.

## 2025-03-25 ENCOUNTER — TELEPHONE (OUTPATIENT)
Dept: RADIOLOGY | Facility: HOSPITAL | Age: 80
End: 2025-03-25
Payer: MEDICARE

## 2025-03-25 NOTE — TELEPHONE ENCOUNTER
----- Message from Shari sent at 3/24/2025  1:12 PM CDT -----  Contact: pt @ [phone]  Daisha Mayo calling regarding Appointment Access  (message) for #pt is calling to get mammo, asking for call back

## 2025-04-02 ENCOUNTER — HOSPITAL ENCOUNTER (OUTPATIENT)
Dept: RADIOLOGY | Facility: HOSPITAL | Age: 80
Discharge: HOME | End: 2025-04-02
Attending: INTERNAL MEDICINE
Payer: MEDICARE

## 2025-04-02 DIAGNOSIS — R92.8 ABNORMAL FINDING ON BREAST IMAGING: ICD-10-CM

## 2025-04-02 PROCEDURE — 77065 DX MAMMO INCL CAD UNI: CPT | Mod: 26,HCNC,RT, | Performed by: RADIOLOGY

## 2025-04-02 PROCEDURE — 77061 BREAST TOMOSYNTHESIS UNI: CPT | Mod: 26,HCNC,RT, | Performed by: RADIOLOGY

## 2025-04-02 PROCEDURE — 77065 DX MAMMO INCL CAD UNI: CPT | Mod: TC,HCNC,RT

## 2025-04-17 ENCOUNTER — OFFICE VISIT (OUTPATIENT)
Dept: INTERNAL MEDICINE | Facility: CLINIC | Age: 80
End: 2025-04-17
Payer: MEDICARE

## 2025-04-17 VITALS
DIASTOLIC BLOOD PRESSURE: 64 MMHG | HEIGHT: 61 IN | RESPIRATION RATE: 14 BRPM | OXYGEN SATURATION: 98 % | HEART RATE: 65 BPM | BODY MASS INDEX: 32.91 KG/M2 | SYSTOLIC BLOOD PRESSURE: 112 MMHG | TEMPERATURE: 97 F

## 2025-04-17 DIAGNOSIS — G89.29 CHRONIC PAIN OF RIGHT KNEE: ICD-10-CM

## 2025-04-17 DIAGNOSIS — Z76.89 ENCOUNTER FOR WEIGHT MANAGEMENT: ICD-10-CM

## 2025-04-17 DIAGNOSIS — M17.11 OSTEOARTHRITIS OF RIGHT KNEE, UNSPECIFIED OSTEOARTHRITIS TYPE: Primary | ICD-10-CM

## 2025-04-17 DIAGNOSIS — M25.561 CHRONIC PAIN OF RIGHT KNEE: ICD-10-CM

## 2025-04-17 DIAGNOSIS — K21.9 GASTROESOPHAGEAL REFLUX DISEASE, UNSPECIFIED WHETHER ESOPHAGITIS PRESENT: ICD-10-CM

## 2025-04-17 DIAGNOSIS — I10 HTN (HYPERTENSION), BENIGN: ICD-10-CM

## 2025-04-17 PROCEDURE — 99214 OFFICE O/P EST MOD 30 MIN: CPT | Mod: HCNC,S$GLB,,

## 2025-04-17 PROCEDURE — 1160F RVW MEDS BY RX/DR IN RCRD: CPT | Mod: HCNC,CPTII,S$GLB,

## 2025-04-17 PROCEDURE — 3288F FALL RISK ASSESSMENT DOCD: CPT | Mod: HCNC,CPTII,S$GLB,

## 2025-04-17 PROCEDURE — 99999 PR PBB SHADOW E&M-EST. PATIENT-LVL V: CPT | Mod: PBBFAC,HCNC,,

## 2025-04-17 PROCEDURE — 1125F AMNT PAIN NOTED PAIN PRSNT: CPT | Mod: HCNC,CPTII,S$GLB,

## 2025-04-17 PROCEDURE — 1159F MED LIST DOCD IN RCRD: CPT | Mod: HCNC,CPTII,S$GLB,

## 2025-04-17 PROCEDURE — 1101F PT FALLS ASSESS-DOCD LE1/YR: CPT | Mod: HCNC,CPTII,S$GLB,

## 2025-04-17 PROCEDURE — 3078F DIAST BP <80 MM HG: CPT | Mod: HCNC,CPTII,S$GLB,

## 2025-04-17 PROCEDURE — 3074F SYST BP LT 130 MM HG: CPT | Mod: HCNC,CPTII,S$GLB,

## 2025-04-17 RX ORDER — DICLOFENAC SODIUM 10 MG/G
2 GEL TOPICAL 2 TIMES DAILY
Qty: 100 G | Refills: 2 | Status: SHIPPED | OUTPATIENT
Start: 2025-04-17

## 2025-04-17 NOTE — PROGRESS NOTES
"Subjective:       Patient ID: Daisha Mayo is a 79 y.o. female. She presents to the clinic with her .     Chief Complaint: Right Knee Pain    Follow-up        History of Present Illness    CHIEF COMPLAINT:  Ms. Mayo presents today for evaluation of chronic right knee pain.     RIGHT KNEE PAIN:  She reports right knee pain since November 2024, with persistent symptoms leading to evaluation by Dr. Eckert in January. This morning, she experienced severe, sharp pain in her right knee described as feeling "broken" because it was so painful to move. The joint becomes extremely stiff and causes pain when bending the knee.The pain has worsened since December when she got a new vehicle, as she requires a step to enter due to her short stature, which she believes puts additional strain on her right knee. X-ray from January 14 showed joint space narrowing concerning for osteoarthritis. She denies recent trauma, falls,or injuries to the knee. She denies fever, fatigue, or overlying erythema, redness, or warmth. The pain stays in the knee. She has not taken any pain medication today but does endorse improvement in pain with OTC analgesics when she takes them. She reports being advised to complete PT before but was not interested in pursuing at the time because she had recently completed PT for her back.    MEDICAL HISTORY:  History of COVID, hypertension, GERD, and sciatic nerve issues requiring physical therapy.    CURRENT MEDICATIONS:  She takes Losartan 50 mg and hydrochlorothiazide as needed for blood pressure management. She takes Prilosec for GERD although she has not been taking consistently.       ROS:  Constitutional: -fevers  Musculoskeletal: +joint pain, +joint stiffness, +pain with movement       Objective:      Physical Exam  Vitals reviewed.   Constitutional:       General: She is awake. She is not in acute distress.     Appearance: Normal appearance. She is well-developed. She is not " ill-appearing, toxic-appearing or diaphoretic.   HENT:      Head: Normocephalic and atraumatic.   Eyes:      General: No scleral icterus.     Conjunctiva/sclera: Conjunctivae normal.      Pupils: Pupils are equal, round, and reactive to light.   Cardiovascular:      Rate and Rhythm: Normal rate.      Pulses: Normal pulses.   Pulmonary:      Effort: Pulmonary effort is normal. No respiratory distress.   Musculoskeletal:         General: No swelling or tenderness.      Right knee: Crepitus present. No swelling, deformity, effusion, erythema or bony tenderness. Normal range of motion. No tenderness. Normal pulse.   Skin:     General: Skin is warm and dry.   Neurological:      Mental Status: She is alert and oriented to person, place, and time. Mental status is at baseline.      GCS: GCS eye subscore is 4. GCS verbal subscore is 5. GCS motor subscore is 6.   Psychiatric:         Behavior: Behavior is cooperative.           Physical Exam            Assessment:       1. Chronic pain of right knee  - diclofenac sodium (VOLTAREN) 1 % Gel; Apply 2 g topically 2 (two) times daily.  Dispense: 100 g; Refill: 2  - Ambulatory Referral/Consult to Physical Therapy; Future    2. HTN (hypertension), benign    3. Gastroesophageal reflux disease, unspecified whether esophagitis present    4. Encounter for weight management    5. Hypertensive chronic kidney disease with stage 1 through stage 4 chronic kidney disease, or unspecified chronic kidney disease      Plan:         Assessment & Plan      OSTEOARTHRITIS OF RIGHT KNEE/CHRONIC RIGHT KNEE PAIN:  - X-ray from January reviewed and revealed joint space narrowing indicative of osteoarthritis.  - Physical exam showed crepitus in right knee, without overlying redness, warmth, or swelling.  - Ms. Mayo reports stiffness and severe pain, particularly after inactivity.  - Explained osteoarthritis as a progressive joint disease affecting weight-bearing joints.  - Recommend RICE therapy:  Rest, Ice, Compression, Elevation.  - Instructed to apply ice for 20 minutes, 2-3 times daily, especially after increased activity.  - Prescribed Voltaren gel for topical application and suggested OTC Tylenol Arthritis for pain relief.  - Referred to physical therapy for knee osteoarthritis management.  - Discussed impact of recent lifestyle changes on knee pain, such as new vehicle with high step.  - Suggested weight management through dietary changes to alleviate symptoms.    HYPERTENSION:  - Continued current prescription of Losartan 50 mg for management.    GASTROESOPHAGEAL REFLUX DISEASE (GERD):  - Ms. Mayo has been off GERD medication (Prilosec) and reports symptoms have been controlled.  - Advised to take medication as prescribed or when needed.     WEIGHT MANAGEMENT:  - Educated on Mediterranean diet for overall health and weight management.  - Recommend focusing on lean proteins, fresh fruits and vegetables, healthy fats, and portion control.  - Advised weight loss can significantly reduce the amount of pressure exerted onto joint and can have direct impact on overall pain experienced.  - Advised that PT can aid in promoting patient mobility and endurance which improve patient ability to tolerate physical activity promoting weight loss.     Return to clinic if symptoms worsen or fail to improve.             This note was generated with the assistance of ambient listening technology. Verbal consent was obtained by the patient and accompanying visitor(s) for the recording of patient appointment to facilitate this note. I attest to having reviewed and edited the generated note for accuracy, though some syntax or spelling errors may persist. Please contact the author of this note for any clarification.

## 2025-04-22 ENCOUNTER — TELEPHONE (OUTPATIENT)
Dept: INTERNAL MEDICINE | Facility: CLINIC | Age: 80
End: 2025-04-22
Payer: MEDICARE

## 2025-04-22 NOTE — TELEPHONE ENCOUNTER
Last office note faxed to [facility] on 04/22/2025 to fax number [phone]

## 2025-04-22 NOTE — TELEPHONE ENCOUNTER
----- Message from Angela sent at 4/22/2025 10:14 AM CDT -----  Contact: Miguelangel with [facility] [phone]  [email or web address] Patient is calling for Medical Advice regarding:Miguelangel maguire [facility] [phone] calling about pt The auth ref #[id number] need clinical notes/information, please fax to:[phone].  How long has patient had these symptoms:Pharmacy name and phone#:Patient wants a call back or [facility]:callbackComments:Please advise patient replies from provider may take up to 48 hours.

## 2025-04-30 ENCOUNTER — OFFICE VISIT (OUTPATIENT)
Dept: INTERNAL MEDICINE | Facility: CLINIC | Age: 80
End: 2025-04-30
Payer: MEDICARE

## 2025-04-30 VITALS
HEART RATE: 64 BPM | SYSTOLIC BLOOD PRESSURE: 118 MMHG | WEIGHT: 174.19 LBS | HEIGHT: 61 IN | OXYGEN SATURATION: 97 % | DIASTOLIC BLOOD PRESSURE: 72 MMHG | TEMPERATURE: 97 F | BODY MASS INDEX: 32.89 KG/M2

## 2025-04-30 DIAGNOSIS — M25.561 RIGHT KNEE PAIN, UNSPECIFIED CHRONICITY: Primary | ICD-10-CM

## 2025-04-30 DIAGNOSIS — R60.0 LOCALIZED EDEMA: ICD-10-CM

## 2025-04-30 PROCEDURE — 99213 OFFICE O/P EST LOW 20 MIN: CPT | Mod: HCNC,S$GLB,, | Performed by: INTERNAL MEDICINE

## 2025-04-30 PROCEDURE — 99999 PR PBB SHADOW E&M-EST. PATIENT-LVL IV: CPT | Mod: PBBFAC,HCNC,, | Performed by: INTERNAL MEDICINE

## 2025-04-30 PROCEDURE — 3288F FALL RISK ASSESSMENT DOCD: CPT | Mod: CPTII,HCNC,S$GLB, | Performed by: INTERNAL MEDICINE

## 2025-04-30 PROCEDURE — 1125F AMNT PAIN NOTED PAIN PRSNT: CPT | Mod: CPTII,HCNC,S$GLB, | Performed by: INTERNAL MEDICINE

## 2025-04-30 PROCEDURE — 1159F MED LIST DOCD IN RCRD: CPT | Mod: CPTII,HCNC,S$GLB, | Performed by: INTERNAL MEDICINE

## 2025-04-30 PROCEDURE — 3078F DIAST BP <80 MM HG: CPT | Mod: CPTII,HCNC,S$GLB, | Performed by: INTERNAL MEDICINE

## 2025-04-30 PROCEDURE — 1101F PT FALLS ASSESS-DOCD LE1/YR: CPT | Mod: CPTII,HCNC,S$GLB, | Performed by: INTERNAL MEDICINE

## 2025-04-30 PROCEDURE — 1160F RVW MEDS BY RX/DR IN RCRD: CPT | Mod: CPTII,HCNC,S$GLB, | Performed by: INTERNAL MEDICINE

## 2025-04-30 PROCEDURE — 3074F SYST BP LT 130 MM HG: CPT | Mod: CPTII,HCNC,S$GLB, | Performed by: INTERNAL MEDICINE

## 2025-04-30 NOTE — PROGRESS NOTES
CC: knee pain  HPI:  The patient is a 79 y.o. year old female who presents to the office for knee pain.  She reports two week history of worsening pain and swelling.  Pain is primarily on the right.  She now has pain in the left knee, which she feels may be due to compensation.  Right knee pain is 9/10.  It is a constant tightness with intermittent stabbing pain. Left knee pain is 1/10.  She also reports flareup of sciatica.  She had been using a walker for a week.  She has had difficulty with ambulating and getting up from the toilet.  She has been having pain since November, but it acutely worsened 2 weeks ago.  She reports no relief with tylenol arthritis, voltaren gel and lidocaine patches.    PAST MEDICAL HISTORY:  Past Medical History:   Diagnosis Date    Arthritis     Benign hypertension     Early cataracts, bilateral     Encephalopathy due to COVID-19 virus 04/03/2020    resoved 1 year after COVID infection, 2/21, [facility]    SAMSON (generalized anxiety disorder) 02/05/2019    GERD (gastroesophageal reflux disease)     Glaucoma     History of depression     Around [place]; treated with Wellbutrin    Hyperprolactinemia     Juvenile idiopathic scoliosis of lumbar region 11/23/2022    Osteopenia     Postmenopause atrophic vaginitis 04/28/2021    Primary osteoarthritis of left knee 04/28/2021    PT [facility] 2021    Recurrent UTI     Every few months    Right ankle swelling 04/28/2021    Sciatica neuralgia     Temporal lobe seizure 2006    Trigeminal neuralgia     Vitamin D deficiency disease        SURGICAL HISTORY:  Past Surgical History:   Procedure Laterality Date    BREAST BIOPSY      duct excision    CHOLECYSTECTOMY  1994    laporascopic    COLONOSCOPY      EYE SURGERY      LASIK surgery to one eye; cannot remember which one    TOTAL ABDOMINAL HYSTERECTOMY W/ BILATERAL SALPINGOOPHORECTOMY      1985; diffinitive treatment for menorrhagia       MEDS:  Medcard reviewed and updated    ALLERGIES: Allergy Card  reviewed and updated    SOCIAL HISTORY:   The patient is a nonsmoker.    PE:   APPEARANCE: Well nourished, well developed, in no acute distress.      CHEST: Lungs clear to auscultation with unlabored respirations.  CARDIOVASCULAR: Normal S1, S2. No murmurs. No carotid bruits. No pedal edema.  ABDOMEN: Bowel sounds normal. Not distended. Soft. No tenderness or masses.   MUSCULOSKELETAL:  Normal gait, positive swelling of right knee.  PSYCHIATRIC: The patient is oriented to person, place, and time and has a pleasant affect.        ASSESSMENT/PLAN:  Daisha was seen today for knee pain and leg swelling.    Diagnoses and all orders for this visit:    Right knee pain, unspecified chronicity  -     CV Ultrasound doppler venous DVT leg right; Future  -     US Lower Extremity Veins Right; Future  -     Ambulatory referral/consult to Orthopedics; Future    Localized edema  -     CV Ultrasound doppler venous DVT leg right; Future  -     US Lower Extremity Veins Right; Future

## 2025-05-02 ENCOUNTER — HOSPITAL ENCOUNTER (OUTPATIENT)
Dept: CARDIOLOGY | Facility: HOSPITAL | Age: 80
Discharge: HOME | End: 2025-05-02
Attending: INTERNAL MEDICINE
Payer: MEDICARE

## 2025-05-02 DIAGNOSIS — M25.561 RIGHT KNEE PAIN, UNSPECIFIED CHRONICITY: ICD-10-CM

## 2025-05-02 DIAGNOSIS — R60.0 LOCALIZED EDEMA: ICD-10-CM

## 2025-05-02 PROCEDURE — 93971 EXTREMITY STUDY: CPT | Mod: RT

## 2025-05-02 PROCEDURE — 93971 EXTREMITY STUDY: CPT | Mod: 26,HCNC,RT, | Performed by: INTERNAL MEDICINE

## 2025-05-04 ENCOUNTER — RESULTS FOLLOW-UP (OUTPATIENT)
Dept: INTERNAL MEDICINE | Facility: CLINIC | Age: 80
End: 2025-05-04

## 2025-05-13 ENCOUNTER — OFFICE VISIT (OUTPATIENT)
Dept: INTERNAL MEDICINE | Facility: CLINIC | Age: 80
End: 2025-05-13
Payer: MEDICARE

## 2025-05-13 VITALS
SYSTOLIC BLOOD PRESSURE: 124 MMHG | BODY MASS INDEX: 32.89 KG/M2 | DIASTOLIC BLOOD PRESSURE: 88 MMHG | TEMPERATURE: 97 F | OXYGEN SATURATION: 96 % | WEIGHT: 174.19 LBS | HEIGHT: 61 IN | HEART RATE: 65 BPM

## 2025-05-13 DIAGNOSIS — I10 HTN (HYPERTENSION), BENIGN: Primary | ICD-10-CM

## 2025-05-13 DIAGNOSIS — K21.9 GASTROESOPHAGEAL REFLUX DISEASE, UNSPECIFIED WHETHER ESOPHAGITIS PRESENT: ICD-10-CM

## 2025-05-13 PROCEDURE — 1159F MED LIST DOCD IN RCRD: CPT | Mod: CPTII,HCNC,S$GLB, | Performed by: INTERNAL MEDICINE

## 2025-05-13 PROCEDURE — G2211 COMPLEX E/M VISIT ADD ON: HCPCS | Mod: HCNC,S$GLB,, | Performed by: INTERNAL MEDICINE

## 2025-05-13 PROCEDURE — 3074F SYST BP LT 130 MM HG: CPT | Mod: CPTII,HCNC,S$GLB, | Performed by: INTERNAL MEDICINE

## 2025-05-13 PROCEDURE — 3079F DIAST BP 80-89 MM HG: CPT | Mod: CPTII,HCNC,S$GLB, | Performed by: INTERNAL MEDICINE

## 2025-05-13 PROCEDURE — 99214 OFFICE O/P EST MOD 30 MIN: CPT | Mod: HCNC,S$GLB,, | Performed by: INTERNAL MEDICINE

## 2025-05-13 PROCEDURE — 1160F RVW MEDS BY RX/DR IN RCRD: CPT | Mod: CPTII,HCNC,S$GLB, | Performed by: INTERNAL MEDICINE

## 2025-05-13 PROCEDURE — 1125F AMNT PAIN NOTED PAIN PRSNT: CPT | Mod: CPTII,HCNC,S$GLB, | Performed by: INTERNAL MEDICINE

## 2025-05-13 PROCEDURE — 1101F PT FALLS ASSESS-DOCD LE1/YR: CPT | Mod: CPTII,HCNC,S$GLB, | Performed by: INTERNAL MEDICINE

## 2025-05-13 PROCEDURE — 99999 PR PBB SHADOW E&M-EST. PATIENT-LVL III: CPT | Mod: PBBFAC,HCNC,, | Performed by: INTERNAL MEDICINE

## 2025-05-13 PROCEDURE — 3288F FALL RISK ASSESSMENT DOCD: CPT | Mod: CPTII,HCNC,S$GLB, | Performed by: INTERNAL MEDICINE

## 2025-05-13 RX ORDER — LIDOCAINE 30 MG/G
1 CREAM TOPICAL 2 TIMES DAILY
Qty: 30 G | Refills: 2 | Status: SHIPPED | OUTPATIENT
Start: 2025-05-13

## 2025-05-13 RX ORDER — OMEPRAZOLE 40 MG/1
40 CAPSULE, DELAYED RELEASE ORAL
Qty: 90 CAPSULE | Refills: 3 | Status: SHIPPED | OUTPATIENT
Start: 2025-05-13

## 2025-05-13 NOTE — PROGRESS NOTES
Subjective:       Patient ID: Daisha Mayo is a 79 y.o. female.    Chief Complaint: Annual Exam and optical migraine    HPI    The patient is a 79 y.o. old female who presents to the office for a physical.    PAST MEDICAL HISTORY  Past Medical History:   Diagnosis Date    Arthritis     Benign hypertension     Early cataracts, bilateral     Encephalopathy due to COVID-19 virus 04/03/2020    resoved 1 year after COVID infection, 2/21, [facility]    SAMSON (generalized anxiety disorder) 02/05/2019    GERD (gastroesophageal reflux disease)     Glaucoma     History of depression     Around [place]; treated with Wellbutrin    Hyperprolactinemia     Juvenile idiopathic scoliosis of lumbar region 11/23/2022    Osteopenia     Postmenopause atrophic vaginitis 04/28/2021    Primary osteoarthritis of left knee 04/28/2021    PT [facility] 2021    Recurrent UTI     Every few months    Right ankle swelling 04/28/2021    Sciatica neuralgia     Temporal lobe seizure 2006    Trigeminal neuralgia     Vitamin D deficiency disease        SURGICAL HISTORY:  Past Surgical History:   Procedure Laterality Date    BREAST BIOPSY      duct excision    CHOLECYSTECTOMY  1994    laporascopic    COLONOSCOPY      EYE SURGERY      LASIK surgery to one eye; cannot remember which one    TOTAL ABDOMINAL HYSTERECTOMY W/ BILATERAL SALPINGOOPHORECTOMY      1985; diffinitive treatment for menorrhagia         MEDS:  Medcard reviewed and updated    ALLERGIES: Allergy Card reviewed and updated    SOCIAL HISTORY:   The patient is a nonsmoker, denies alcohol or illicit drug use.    SCREENINGS:  Last cholesterol:2025 .  Last colonoscopy: 2014  Last mammogram: 2025  Last Pap smear: several years ago  Last tetanus: 2019  Last Pneumovax: 2015/ 2018  Last eye exam: 2025  Last bone density: 2024  Last menstrual period: postmenopausal    Review of Systems   Constitutional:  Positive for appetite change. Negative for chills, fatigue, fever and unexpected weight change.    HENT:  Negative for ear discharge, ear pain, postnasal drip, rhinorrhea and sore throat.    Eyes:  Positive for visual disturbance. Negative for photophobia and pain.   Respiratory:  Positive for cough. Negative for shortness of breath and wheezing.    Cardiovascular:  Negative for chest pain and palpitations.   Gastrointestinal:  Positive for blood in stool. Negative for abdominal pain, constipation, diarrhea, nausea, vomiting and reflux.   Endocrine: Negative for polydipsia and polyuria.   Genitourinary:  Negative for bladder incontinence, dysuria, frequency and hematuria.   Musculoskeletal:  Positive for arthralgias. Negative for back pain, gait problem and joint swelling.   Integumentary:  Negative for rash.   Neurological:  Positive for headaches. Negative for vertigo and seizures.        Positive optical migraines   Psychiatric/Behavioral:  Positive for depressed mood. Negative for suicidal ideas. The patient is nervous/anxious.              Objective:      Physical Exam  Constitutional:       General: She is not in acute distress.     Appearance: She is not diaphoretic.   HENT:      Head: Normocephalic and atraumatic.      Right Ear: External ear normal.      Left Ear: External ear normal.      Nose: Nose normal.      Mouth/Throat:      Pharynx: No oropharyngeal exudate.   Eyes:      General: No scleral icterus.     Conjunctiva/sclera: Conjunctivae normal.      Pupils: Pupils are equal, round, and reactive to light.   Neck:      Thyroid: No thyromegaly.   Cardiovascular:      Rate and Rhythm: Normal rate and regular rhythm.      Heart sounds: Normal heart sounds. No murmur heard.  Pulmonary:      Effort: Pulmonary effort is normal.      Breath sounds: Normal breath sounds. No stridor.   Abdominal:      General: Bowel sounds are normal. There is no distension.      Palpations: Abdomen is soft. There is no mass.      Tenderness: There is no abdominal tenderness.   Musculoskeletal:         General: Normal  range of motion.   Lymphadenopathy:      Cervical: No cervical adenopathy.   Skin:     General: Skin is warm and dry.   Neurological:      Mental Status: She is alert and oriented to person, place, and time.      Cranial Nerves: No cranial nerve deficit.      Deep Tendon Reflexes: Reflexes are normal and symmetric.           Assessment:       1. Gastroesophageal reflux disease, unspecified whether esophagitis present  - omeprazole (PRILOSEC) 40 MG capsule; Take 1 capsule (40 mg total) by mouth before breakfast.  Dispense: 90 capsule; Refill: 3    2. HTN (hypertension), benign  - CBC Auto Differential; Future  - Comprehensive Metabolic Panel; Future  - Lipid Panel; Future  - TSH; Future  - Urinalysis; Future      Plan:    Daisha was seen today for annual exam and optical migraine.    Diagnoses and all orders for this visit:    HTN (hypertension), benign  -     CBC Auto Differential; Future  -     Comprehensive Metabolic Panel; Future  -     Lipid Panel; Future  -     TSH; Future  -     Urinalysis; Future    Gastroesophageal reflux disease, unspecified whether esophagitis present  -     omeprazole (PRILOSEC) 40 MG capsule; Take 1 capsule (40 mg total) by mouth before breakfast.    Other orders  -     LIDOcaine 4 % Gel; Apply 1 Application topically 2 (two) times a day.  -     LIDOcaine 3 % Crea; Apply 1 Application topically 2 (two) times a day.

## 2025-05-15 ENCOUNTER — LAB VISIT (OUTPATIENT)
Dept: LAB | Facility: HOSPITAL | Age: 80
End: 2025-05-15
Attending: INTERNAL MEDICINE
Payer: MEDICARE

## 2025-05-15 ENCOUNTER — RESULTS FOLLOW-UP (OUTPATIENT)
Dept: INTERNAL MEDICINE | Facility: CLINIC | Age: 80
End: 2025-05-15

## 2025-05-15 DIAGNOSIS — I10 HTN (HYPERTENSION), BENIGN: ICD-10-CM

## 2025-05-15 LAB
ABSOLUTE EOSINOPHIL (OHS): 0.08 K/UL
ABSOLUTE MONOCYTE (OHS): 0.56 K/UL (ref 0.3–1)
ABSOLUTE NEUTROPHIL COUNT (OHS): 2.54 K/UL (ref 1.8–7.7)
ALBUMIN SERPL BCP-MCNC: 3.7 G/DL (ref 3.5–5.2)
ALP SERPL-CCNC: 105 UNIT/L (ref 40–150)
ALT SERPL W/O P-5'-P-CCNC: 19 UNIT/L (ref 10–44)
ANION GAP (OHS): 6 MMOL/L (ref 8–16)
AST SERPL-CCNC: 17 UNIT/L (ref 11–45)
BASOPHILS # BLD AUTO: 0.04 K/UL
BASOPHILS NFR BLD AUTO: 0.8 %
BILIRUB SERPL-MCNC: 0.8 MG/DL (ref 0.1–1)
BUN SERPL-MCNC: 11 MG/DL (ref 8–23)
CALCIUM SERPL-MCNC: 9.3 MG/DL (ref 8.7–10.5)
CHLORIDE SERPL-SCNC: 106 MMOL/L (ref 95–110)
CHOLEST SERPL-MCNC: 237 MG/DL (ref 120–199)
CHOLEST/HDLC SERPL: 4.3 {RATIO} (ref 2–5)
CO2 SERPL-SCNC: 30 MMOL/L (ref 23–29)
CREAT SERPL-MCNC: 0.7 MG/DL (ref 0.5–1.4)
ERYTHROCYTE [DISTWIDTH] IN BLOOD BY AUTOMATED COUNT: 12.4 % (ref 11.5–14.5)
GFR SERPLBLD CREATININE-BSD FMLA CKD-EPI: >60 ML/MIN/1.73/M2
GLUCOSE SERPL-MCNC: 94 MG/DL (ref 70–110)
HCT VFR BLD AUTO: 43.4 % (ref 37–48.5)
HDLC SERPL-MCNC: 55 MG/DL (ref 40–75)
HDLC SERPL: 23.2 % (ref 20–50)
HGB BLD-MCNC: 13.9 GM/DL (ref 12–16)
IMM GRANULOCYTES # BLD AUTO: 0.01 K/UL (ref 0–0.04)
IMM GRANULOCYTES NFR BLD AUTO: 0.2 % (ref 0–0.5)
LDLC SERPL CALC-MCNC: 165 MG/DL (ref 63–159)
LYMPHOCYTES # BLD AUTO: 1.99 K/UL (ref 1–4.8)
MCH RBC QN AUTO: 31.6 PG (ref 27–31)
MCHC RBC AUTO-ENTMCNC: 32 G/DL (ref 32–36)
MCV RBC AUTO: 99 FL (ref 82–98)
NONHDLC SERPL-MCNC: 182 MG/DL
NUCLEATED RBC (/100WBC) (OHS): 0 /100 WBC
PLATELET # BLD AUTO: 285 K/UL (ref 150–450)
PMV BLD AUTO: 9.5 FL (ref 9.2–12.9)
POTASSIUM SERPL-SCNC: 4.8 MMOL/L (ref 3.5–5.1)
PROT SERPL-MCNC: 7.2 GM/DL (ref 6–8.4)
RBC # BLD AUTO: 4.4 M/UL (ref 4–5.4)
RELATIVE EOSINOPHIL (OHS): 1.5 %
RELATIVE LYMPHOCYTE (OHS): 38.1 % (ref 18–48)
RELATIVE MONOCYTE (OHS): 10.7 % (ref 4–15)
RELATIVE NEUTROPHIL (OHS): 48.7 % (ref 38–73)
SODIUM SERPL-SCNC: 142 MMOL/L (ref 136–145)
TRIGL SERPL-MCNC: 85 MG/DL (ref 30–150)
TSH SERPL-ACNC: 2.41 UIU/ML (ref 0.4–4)
WBC # BLD AUTO: 5.22 K/UL (ref 3.9–12.7)

## 2025-05-15 PROCEDURE — 84443 ASSAY THYROID STIM HORMONE: CPT | Mod: HCNC

## 2025-05-15 PROCEDURE — 36415 COLL VENOUS BLD VENIPUNCTURE: CPT | Mod: HCNC,PN

## 2025-05-15 PROCEDURE — 80053 COMPREHEN METABOLIC PANEL: CPT | Mod: HCNC

## 2025-05-15 PROCEDURE — 85025 COMPLETE CBC W/AUTO DIFF WBC: CPT | Mod: HCNC

## 2025-05-15 PROCEDURE — 80061 LIPID PANEL: CPT | Mod: HCNC

## 2025-05-16 ENCOUNTER — HOSPITAL ENCOUNTER (OUTPATIENT)
Dept: RADIOLOGY | Facility: HOSPITAL | Age: 80
Discharge: HOME | End: 2025-05-16
Attending: PHYSICIAN ASSISTANT
Payer: MEDICARE

## 2025-05-16 ENCOUNTER — OFFICE VISIT (OUTPATIENT)
Dept: SPORTS MEDICINE | Facility: CLINIC | Age: 80
End: 2025-05-16
Payer: MEDICARE

## 2025-05-16 VITALS
SYSTOLIC BLOOD PRESSURE: 158 MMHG | HEART RATE: 107 BPM | HEIGHT: 61 IN | WEIGHT: 174.19 LBS | DIASTOLIC BLOOD PRESSURE: 81 MMHG | BODY MASS INDEX: 32.89 KG/M2

## 2025-05-16 DIAGNOSIS — M25.561 ACUTE PAIN OF RIGHT KNEE: Primary | ICD-10-CM

## 2025-05-16 DIAGNOSIS — M25.561 RIGHT KNEE PAIN, UNSPECIFIED CHRONICITY: ICD-10-CM

## 2025-05-16 DIAGNOSIS — M25.461 EFFUSION OF RIGHT KNEE: ICD-10-CM

## 2025-05-16 PROCEDURE — 99999 PR PBB SHADOW E&M-EST. PATIENT-LVL V: CPT | Mod: PBBFAC,HCNC,, | Performed by: PHYSICIAN ASSISTANT

## 2025-05-16 PROCEDURE — 73564 X-RAY EXAM KNEE 4 OR MORE: CPT | Mod: 26,50,, | Performed by: RADIOLOGY

## 2025-05-16 PROCEDURE — 73564 X-RAY EXAM KNEE 4 OR MORE: CPT | Mod: TC,50,HCNC

## 2025-05-16 NOTE — PROGRESS NOTES
Subjective:          Chief Complaint: Daisha Mayo is a 79 y.o. female who had concerns including Pain of the Right Knee.    History of Present Illness    CHIEF COMPLAINT:  - Right knee pain and swelling    HPI:  Daisha presents with right knee pain that began in November 2024 with mild soreness. Pain is localized to the back of the knee, laterally, and around the joint lines. She rates pain at rest as 3-4/10 and at its worst as 7-8/10. Pain is intermittent and described as a sudden grabbing sensation in her knee. She reports daily pain for about a month, with fluctuating intensity.    In January 2025, she mentioned the pain to Dr. Eckert, who ordered an XR showing some joint space narrowing. On April 16, her knee buckled while at her daughter Zayra's house. The following day, while using the bathroom, she experienced sudden, excruciating right knee pain that left her unable to get off the commode. She saw Nurse Practitioner Shola that day and recommended RICE and Voltaren gel. She then saw her PCP on 4/30/25 and reported that this pain differed from previous experiences due to swelling and warmth in the knee, prompting an ultrasound to rule out a blood clot, which was negative.    She reports difficulty walking due to frequent, severe pain. For pain management, she has been using ice, Voltaren gel, arthritis Tylenol, and previously used lidocaine patches (discontinued due to a latex allergy).    She mentions a history of left knee pain during COVID-19 in March 2020, which left her unable to walk and in a wheelchair for four months. She underwent PT for this issue. She also reports three falls from her porch steps before COVID-19, which may have impacted her knees. She recalls landing under a truck during one fall and hitting her face during another.    She also reports bilateral shin pain, ongoing for an unspecified period. She denies any locking or catching in the knee, and denies any significant pain  in the left knee currently.    PREVIOUS TREATMENTS:  - PT for left knee during COVID (March 2020): Painful but helped patient regain ability to walk after 4 months  - Ice for current right knee pain: Minimal benefit  - Voltaren gel for current right knee pain  - Tylenol Arthritis for current right knee pain  - Lidocaine patches for current right knee pain: Discontinued due to latex allergy         Pain  Associated symptoms include joint swelling. Pertinent negatives include no abdominal pain, chest pain, chills, congestion, coughing, fever, headaches, myalgias, nausea, numbness, rash, sore throat or vomiting.       Review of Systems   Constitutional: Negative. Negative for chills, fever, weight gain and weight loss.   HENT:  Negative for congestion and sore throat.    Eyes:  Negative for blurred vision and double vision.   Cardiovascular:  Negative for chest pain, leg swelling and palpitations.   Respiratory:  Negative for cough and shortness of breath.    Hematologic/Lymphatic: Does not bruise/bleed easily.   Skin:  Negative for itching, poor wound healing and rash.   Musculoskeletal:  Positive for joint pain and joint swelling. Negative for back pain, muscle weakness, myalgias and stiffness.   Gastrointestinal:  Negative for abdominal pain, constipation, diarrhea, nausea and vomiting.   Genitourinary: Negative.  Negative for frequency and hematuria.   Neurological:  Negative for dizziness, headaches, numbness, paresthesias and sensory change.   Psychiatric/Behavioral:  Negative for altered mental status and depression. The patient is not nervous/anxious.    Allergic/Immunologic: Negative for hives.                   Objective:        General: Daisha is well-developed, well-nourished, appears stated age, in no acute distress, alert and oriented to time, place and person.          General    Vitals reviewed.  Constitutional: She is oriented to person, place, and time. She appears well-developed and well-nourished.  No distress.   HENT:   Head: Normocephalic and atraumatic.   Eyes: EOM are normal.   Cardiovascular:  Normal rate and regular rhythm.            Pulmonary/Chest: Effort normal. No respiratory distress.   Neurological: She is alert and oriented to person, place, and time. She has normal reflexes. No cranial nerve deficit. Coordination normal.   Psychiatric: She has a normal mood and affect. Her behavior is normal. Judgment and thought content normal.     General Musculoskeletal Exam   Gait: abnormal and antalgic       Right Knee Exam     Inspection   Erythema: absent  Scars: absent  Swelling: absent  Effusion: present  Deformity: absent  Bruising: absent    Tenderness   The patient is tender to palpation of the lateral joint line and iliotibial band.    Crepitus   The patient has crepitus of the patella.    Range of Motion   Extension:  0 normal   Flexion:  120 normal     Tests   Meniscus   Charlene:  Medial - negative Lateral - positive  Ligament Examination   Lachman: normal (-1 to 2mm)   PCL-Posterior Drawer: normal (0 to 2mm)     MCL - Valgus: normal (0 to 2mm)  LCL - Varus: normal  Pivot Shift: normal (Equal)  Reverse Pivot Shift: normal (Equal)  Posterolateral Corner: stable  Patella   Passive Patellar Tilt: neutral  Patellar Tracking: normal  Patellar Glide (quadrants): Lateral - 1   Medial - 2  Patellar Grind: negative    Other   Popliteal (Baker's) Cyst: absent  Sensation: normal    Left Knee Exam     Inspection   Erythema: absent  Scars: absent  Swelling: absent  Effusion: absent  Deformity: absent  Bruising: absent    Tenderness   The patient is experiencing no tenderness.     Crepitus   The patient has crepitus of the patella.    Range of Motion   Extension:  0 normal   Flexion:  130 normal     Tests   Meniscus   Charlene:  Medial - negative Lateral - negative  Stability   Lachman: normal (-1 to 2mm)   PCL-Posterior Drawer: normal (0 to 2mm)  MCL - Valgus: normal (0 to 2mm)  LCL - Varus: normal (0 to  2mm)  Pivot Shift: normal (Equal)  Reverse Pivot Shift: normal (Equal)  Posterolateral Corner: stable  Patella   Passive Patellar Tilt: neutral  Patellar Tracking: normal  Patellar Glide (Quadrants): Lateral - 1 Medial - 2  Patellar Grind: negative    Other   Popliteal (Baker's) Cyst: absent  Sensation: normal    Muscle Strength   Right Lower Extremity   Hip Abduction: 5/5   Quadriceps:  4/5   Hamstrin/5   Left Lower Extremity   Hip Abduction: 5/5   Quadriceps:  5/5   Hamstrin/5     Reflexes     Left Side  Achilles:  2+  Quadriceps:  2+    Right Side   Achilles:  2+  Quadriceps:  2+    Vascular Exam     Right Pulses  Dorsalis Pedis:      2+  Posterior Tibial:      2+        Left Pulses  Dorsalis Pedis:      2+  Posterior Tibial:      2+        RADIOGRAPHS: 25  Bilateral knees:          Assessment:           ICD-10-CM ICD-9-CM   1. Acute pain of right knee  M25.561 719.46   2. Effusion of right knee  M25.461 719.06         Plan:         Assessment & Plan    IMAGING:  -I made the decision to obtain old records of the patient including previous notes and imaging. New imaging was ordered today of the extremity or extremities evaluated. I independently reviewed and interpreted the radiographs and/or MRIs today as well as prior imaging. Reviewed with patient in detail.  - Ordered MRI Right Knee to evaluate for assess extent of cartilage damage and assess LCL.    PROCEDURES:  - Discussed getting an MRI to assess the extent of arthritis.  - Daisha declined steroid injection, preferring to wait for MRI results first.    DME:  -[id number] - I, performed a custom orthotic / brace adjustment, fitting and training with the patient. The patient demonstrated understanding and proper care. This was performed for 15 minutes. Visco skin    PATIENT INSTRUCTIONS:  - Use compression brace during daytime.  - Remove brace when icing knee or sleeping.  - Hand wash brace and air dry.     FOLLOW UP:  - Follow up after MRI results  are available. Possible CSI right knee at that appt. Discussed visco supplementation in future.             This note was generated with the assistance of ambient listening technology. Verbal consent was obtained by the patient and accompanying visitor(s) for the recording of patient appointment to facilitate this note. I attest to having reviewed and edited the generated note for accuracy, though some syntax or spelling errors may persist. Please contact the author of this note for any clarification.       [facility] patient questionnaires have been collected today.

## 2025-05-21 ENCOUNTER — PATIENT MESSAGE (OUTPATIENT)
Dept: SPORTS MEDICINE | Facility: CLINIC | Age: 80
End: 2025-05-21
Payer: MEDICARE

## 2025-05-21 DIAGNOSIS — F40.240 CLAUSTROPHOBIA: ICD-10-CM

## 2025-05-21 DIAGNOSIS — M25.561 ACUTE PAIN OF RIGHT KNEE: Primary | ICD-10-CM

## 2025-05-21 RX ORDER — DIAZEPAM 5 MG/1
5 TABLET ORAL
Qty: 2 TABLET | Refills: 0 | Status: SHIPPED | OUTPATIENT
Start: 2025-05-21 | End: 2025-06-20

## 2025-05-26 ENCOUNTER — HOSPITAL ENCOUNTER (OUTPATIENT)
Dept: RADIOLOGY | Facility: HOSPITAL | Age: 80
Discharge: HOME | End: 2025-05-26
Attending: PHYSICIAN ASSISTANT
Payer: MEDICARE

## 2025-05-26 DIAGNOSIS — M25.461 EFFUSION OF RIGHT KNEE: ICD-10-CM

## 2025-05-26 DIAGNOSIS — M25.561 ACUTE PAIN OF RIGHT KNEE: ICD-10-CM

## 2025-05-26 PROCEDURE — 73721 MRI JNT OF LWR EXTRE W/O DYE: CPT | Mod: TC,RT

## 2025-05-26 PROCEDURE — 73721 MRI JNT OF LWR EXTRE W/O DYE: CPT | Mod: 26,RT,, | Performed by: RADIOLOGY

## 2025-05-30 ENCOUNTER — PATIENT MESSAGE (OUTPATIENT)
Dept: SPORTS MEDICINE | Facility: CLINIC | Age: 80
End: 2025-05-30

## 2025-05-30 ENCOUNTER — OFFICE VISIT (OUTPATIENT)
Dept: SPORTS MEDICINE | Facility: CLINIC | Age: 80
End: 2025-05-30
Payer: MEDICARE

## 2025-05-30 DIAGNOSIS — G89.29 CHRONIC PAIN OF RIGHT KNEE: ICD-10-CM

## 2025-05-30 DIAGNOSIS — M17.11 PRIMARY OSTEOARTHRITIS OF RIGHT KNEE: Primary | ICD-10-CM

## 2025-05-30 DIAGNOSIS — M71.21 BAKER CYST, RIGHT: ICD-10-CM

## 2025-05-30 DIAGNOSIS — M25.561 CHRONIC PAIN OF RIGHT KNEE: ICD-10-CM

## 2025-05-30 NOTE — PROGRESS NOTES
The patient location is: home  The chief complaint leading to consultation is: MRI results right knee    Visit type: audiovisual    Face to Face time with patient: 20 minutes  30 minutes of total time spent on the encounter, which includes face to face time and non-face to face time preparing to see the patient (eg, review of tests), Obtaining and/or reviewing separately obtained history, Documenting clinical information in the electronic or other health record, Independently interpreting results (not separately reported) and communicating results to the patient/family/caregiver, or Care coordination (not separately reported).       Each patient to whom he or she provides medical services by telemedicine is:  (1) informed of the relationship between the physician and patient and the respective role of any other health care provider with respect to management of the patient; and (2) notified that he or she may decline to receive medical services by telemedicine and may withdraw from such care at any time.    Notes: Previous HPI from 5/16/25  HPI:  Daisha presents with right knee pain that began in November 2024 with mild soreness. Pain is localized to the back of the knee, laterally, and around the joint lines. She rates pain at rest as 3-4/10 and at its worst as 7-8/10. Pain is intermittent and described as a sudden grabbing sensation in her knee. She reports daily pain for about a month, with fluctuating intensity.     In January 2025, she mentioned the pain to Dr. Eckert, who ordered an XR showing some joint space narrowing. On April 16, her knee buckled while at her daughter Zayra's house. The following day, while using the bathroom, she experienced sudden, excruciating right knee pain that left her unable to get off the commode. She saw Nurse Practitioner Shola that day and recommended RICE and Voltaren gel. She then saw her PCP on 4/30/25 and reported that this pain differed from previous experiences due  to swelling and warmth in the knee, prompting an ultrasound to rule out a blood clot, which was negative.     She reports difficulty walking due to frequent, severe pain. For pain management, she has been using ice, Voltaren gel, arthritis Tylenol, and previously used lidocaine patches (discontinued due to a latex allergy).     She mentions a history of left knee pain during COVID-19 in March 2020, which left her unable to walk and in a wheelchair for four months. She underwent PT for this issue. She also reports three falls from her porch steps before COVID-19, which may have impacted her knees. She recalls landing under a truck during one fall and hitting her face during another.     She also reports bilateral shin pain, ongoing for an unspecified period. She denies any locking or catching in the knee, and denies any significant pain in the left knee currently.     PREVIOUS TREATMENTS:  - PT for left knee during COVID (March 2020): Painful but helped patient regain ability to walk after 4 months  - Ice for current right knee pain: Minimal benefit  - Voltaren gel for current right knee pain  - Tylenol Arthritis for current right knee pain  - Lidocaine patches for current right knee pain: Discontinued due to latex allergy    MRI right knee: 5/26/25  FINDINGS:  Medial compartment:     Medial meniscus: Complex tear mostly horizontal in orientation involving the body and posterior horn extending to the inferior articular surface.     Articular cartilage: Generalized chondral thinning and surface irregularity.     Bone marrow: No bone marrow edema.  Early marginal osteophyte formation.     MCL: Intact     Lateral compartment:     Lateral meniscus: Extensive horizontal longitudinal tear with a fragment flipped from the posterior horn to the anterior horn.     Articular cartilage: Signal degeneration and surface irregularity.     Bone marrow:Small areas of subchondral edema and early marginal osteophyte formation.     LCL  complex/popliteus tendon:Intact     Proximal tibiofibular joint: Unremarkable     Intercondylar compartment:     ACL:Intact noting some myxoid degeneration.     PCL: Intact     Patellofemoral compartment:     Articular cartilage: Extensive multifocal full-thickness chondral loss mostly involving the patella and to a lesser extent trochlea.     Extensor mechanism: Maintained     Bone marrow:Marginal osteophyte formation and multifocal patchy sub chondral bone marrow edema of the patella.     Miscellaneous: Joint effusion and synovitis.  Large complex Baker's cyst with free fluid extending from the caudal aspect of the cyst suggesting cyst leakage/rupture.  Nonspecific prepatellar and infrapatellar soft tissue edema and free fluid could indicate contusion and or bursitis.     Generalized periarticular soft tissue edema.    Assessment:  Right knee osteoarthritis  2.  Right knee pain  3.  Right Liu's cyst    Plan:  We have discussed a variety of treatment options including medications, injections, physical therapy and other alternative treatments. I also explained the indications, risks and benefits of surgery.  Patient's pain is refractory HEP, conservative management, and NSAIDs. Pt would like to proceed with visco-supplementation.    Medical Necessity for viscosupplementation use: After thorough evaluation of the patient, I have determined that viscosupplementation treatment is medically necessary. The patient has painful degenerative joint disease (DJD) of the knee(s) with failure of conservative treatments including lifestyle modifications and rehabilitation exercises. Oral analgesics including NSAIDs have not adequately controlled the patient's symptoms. There is radiographic evidence of Kellgren-Travis grade II (or greater) osteoarthritic (OA) changes, or if lack of radiographic evidence, there is arthroscopic or other evidence of chondrosis of the knee(s).     I made the decision to obtain old records of the  patient including previous notes and imaging. I independently reviewed and interpreted lab results today as well as prior imaging.  Reviewed with patient in detail.    1. Pre-authorization placed for right Synvisc-One injection.  2. Ice compress to the affected area 2-3x a day for 15-20 minutes as needed for pain management.  3. RTC to see [facility] for right knee Synvisc One injection and evaluation and possible aspiration of right Baker's cyst if clinically significant.    All of the patient's questions were answered and the patient will contact us if they have any questions or concerns in the interim.

## 2025-06-03 ENCOUNTER — TELEPHONE (OUTPATIENT)
Dept: ENDOSCOPY | Facility: HOSPITAL | Age: 80
End: 2025-06-03
Payer: MEDICARE

## 2025-06-03 ENCOUNTER — TELEPHONE (OUTPATIENT)
Dept: SPORTS MEDICINE | Facility: CLINIC | Age: 80
End: 2025-06-03
Payer: MEDICARE

## 2025-06-05 ENCOUNTER — OFFICE VISIT (OUTPATIENT)
Dept: SPORTS MEDICINE | Facility: CLINIC | Age: 80
End: 2025-06-05
Payer: MEDICARE

## 2025-06-05 DIAGNOSIS — G89.29 CHRONIC PAIN OF RIGHT KNEE: ICD-10-CM

## 2025-06-05 DIAGNOSIS — M17.11 PRIMARY OSTEOARTHRITIS OF RIGHT KNEE: Primary | ICD-10-CM

## 2025-06-05 DIAGNOSIS — M25.461 EFFUSION OF RIGHT KNEE: ICD-10-CM

## 2025-06-05 DIAGNOSIS — M25.561 CHRONIC PAIN OF RIGHT KNEE: ICD-10-CM

## 2025-06-05 DIAGNOSIS — M71.21 BAKER CYST, RIGHT: ICD-10-CM

## 2025-06-05 PROCEDURE — 99999 PR PBB SHADOW E&M-EST. PATIENT-LVL III: CPT | Mod: PBBFAC,,, | Performed by: NEUROMUSCULOSKELETAL MEDICINE & OMM

## 2025-06-06 ENCOUNTER — PATIENT MESSAGE (OUTPATIENT)
Dept: SPORTS MEDICINE | Facility: CLINIC | Age: 80
End: 2025-06-06
Payer: MEDICARE

## 2025-06-06 DIAGNOSIS — M71.21 BAKER CYST, RIGHT: ICD-10-CM

## 2025-06-06 DIAGNOSIS — M25.561 CHRONIC PAIN OF RIGHT KNEE: ICD-10-CM

## 2025-06-06 DIAGNOSIS — G89.29 CHRONIC PAIN OF RIGHT KNEE: ICD-10-CM

## 2025-06-06 DIAGNOSIS — M17.11 PRIMARY OSTEOARTHRITIS OF RIGHT KNEE: Primary | ICD-10-CM

## 2025-06-11 ENCOUNTER — PATIENT MESSAGE (OUTPATIENT)
Dept: SPORTS MEDICINE | Facility: CLINIC | Age: 80
End: 2025-06-11
Payer: MEDICARE

## 2025-06-26 ENCOUNTER — CLINICAL SUPPORT (OUTPATIENT)
Dept: REHABILITATION | Facility: HOSPITAL | Age: 80
End: 2025-06-26
Payer: MEDICARE

## 2025-06-26 DIAGNOSIS — G89.29 CHRONIC PAIN OF RIGHT KNEE: ICD-10-CM

## 2025-06-26 DIAGNOSIS — M17.11 PRIMARY OSTEOARTHRITIS OF RIGHT KNEE: ICD-10-CM

## 2025-06-26 DIAGNOSIS — R29.898 WEAKNESS OF RIGHT LOWER EXTREMITY: ICD-10-CM

## 2025-06-26 DIAGNOSIS — R26.9 GAIT ABNORMALITY: Primary | ICD-10-CM

## 2025-06-26 DIAGNOSIS — M71.21 BAKER CYST, RIGHT: ICD-10-CM

## 2025-06-26 DIAGNOSIS — M25.561 CHRONIC PAIN OF RIGHT KNEE: ICD-10-CM

## 2025-06-26 DIAGNOSIS — M25.661 DECREASED RANGE OF MOTION OF RIGHT KNEE: ICD-10-CM

## 2025-06-26 PROCEDURE — 97110 THERAPEUTIC EXERCISES: CPT

## 2025-06-26 PROCEDURE — 97161 PT EVAL LOW COMPLEX 20 MIN: CPT

## 2025-06-26 NOTE — PROGRESS NOTES
Outpatient Rehab    Physical Therapy Evaluation    Patient Name: Milvia Mayo  MRN: [id number]  YOB: 1945  Encounter Date: 6/26/2025    Therapy Diagnosis:   Encounter Diagnoses   Name Primary?    Primary osteoarthritis of right knee     Baker cyst, right     Chronic pain of right knee     Gait abnormality Yes    Weakness of right lower extremity     Decreased range of motion of right knee      Physician: Zuleima Elizabeth PA-C    Physician Orders: Eval and Treat  Medical Diagnosis: Primary osteoarthritis of right knee  Baker cyst, right  Chronic pain of right knee  Surgical Diagnosis: Not applicable for this Episode   Surgical Date: Not applicable for this Episode  Days Since Last Surgery: Not applicable for this Episode    Visit # / Visits Authorized:  1 / 1  Insurance Authorization Period: 6/6/2025 to 12/31/2025  Date of Evaluation: 6/26/2025  Plan of Care Certification: 6/26/2025 to 9/18/2025    Time In: 0903   Time Out: 1000  Total Time (in minutes): 57   Total Billable Time (in minutes): 57    Intake Outcome Measure for FOTO Survey    Therapist reviewed FOTO scores for Milvia Mayo on 6/26/2025.   FOTO report - see Media section or FOTO account episode details.     Intake Score: 42%    Precautions:       Subjective   History of Present Illness  Milvia is a 79 y.o. female who reports to physical therapy with a chief concern of R knee pain.     The patient reports a medical diagnosis of Primary osteoarthritis of right knee, Baker cyst, right, Chronic pain of right knee.            History of Present Condition/Illness: Patient states that over the past 7-8 months she has been having worsening R knee pain. Back in April she was getting off of the toilet and have very intense pain in the knee and needed help to get back to her bed. Ever since then the pain has been even more intense. She had her knee drained and had a gel injection which helped a bit but not too much. She also had an MRI that showed  arthritis and a tear in her meniscus. She knows that she needs to get her knee stronger to help the pain. She has discussed a TKA but does not want to go through with that just yet. Her pain is worse with prolonged standing, walking, getting out of a chair, and stair navigation. Her pain improves with rest, medication, and the injection.     Pain     Patient reports a current pain level of 1/10. Pain at best is reported as 0/10. Pain at worst is reported as 5/10.   Location: R knee  Clinical Progression (since onset): Stable  Pain Qualities: Aching, Dull, Sharp  Pain-Relieving Factors: Other (Comment)  Other Pain-Relieving Factors: rest, medication, and the injection  Pain-Aggravating Factors: Other (Comment)  Other Pain-Aggravating Factors: prolonged standing, walking, getting out of a chair, and stair navigation         Living Arrangements  Living Situation  Housing: Home independently  Living Arrangements: Spouse/significant other  Support Systems: Family members    Home Setup  Type of Structure: House  Home Access: Stairs with rails  Entrance Stairs - Number of Steps: 4  Entrance Stairs - Rails: Right  Number of Levels in Home: One level        Employment  Patient does not report that: Does the patient's condition impact their ability to work?  Employment Status: Retired          Past Medical History/Physical Systems Review:   Daisha Mayo  has a past medical history of Arthritis, Benign hypertension, Early cataracts, bilateral, Encephalopathy due to COVID-19 virus, SAMSON (generalized anxiety disorder), GERD (gastroesophageal reflux disease), Glaucoma, History of depression, Hyperprolactinemia, Juvenile idiopathic scoliosis of lumbar region, Osteopenia, Postmenopause atrophic vaginitis, Primary osteoarthritis of left knee, Recurrent UTI, Right ankle swelling, Sciatica neuralgia, Temporal lobe seizure, Trigeminal neuralgia, and Vitamin D deficiency disease.    Daisha Mayo  has a past surgical history  that includes Cholecystectomy (1994); Colonoscopy; Eye surgery; Total abdominal hysterectomy w/ bilateral salpingoophorectomy; and Breast biopsy.    Daisha has a current medication list which includes the following prescription(s): ascorbic acid (vitamin c), calcium citrate-vitamin d3, cyanocobalamin, diazepam, diclofenac sodium, dorzolamide-timolol (pf), hydrochlorothiazide, latanoprost, lidocaine, lidocaine, losartan, multivitamin-minerals-lutein, fish oil-omega-3 fatty acids, omeprazole, thiamine mononitrate (vit b1), turmeric, vitamin d3, and vitamin e.    Review of patient's allergies indicates:   Allergen Reactions    Latex      Other reaction(s): Rash  Other reaction(s): Rash        Objective   Posture                 Bilaterally, knee position is: Genu Valgus       Knee Observations  Right Knee Observations  Present: Edema and Effusion  Left Knee Observations  Not Present: Edema and Effusion            Knee Palpation  Right Knee Palpation  Abnormal: Bony Prominence/Bursa  Unremarkable: Muscle and Tendon/Ligament  Right Knee Bony Prominence/Bursa Palpation Observations: There is TTP at the medial and lateral joint line       Left Knee Palpation  Unremarkable: Muscle, Bony Prominence/Bursa, and Tendon/Ligament               Knee Range of Motion   Right Knee   Active (deg) Passive (deg) Pain   Flexion 120 125 Yes   Extension -4 0         Left Knee   Active (deg) Passive (deg) Pain   Flexion 125 127     Extension 0 2                        Hip Strength - Planes of Motion   Right Strength Right Pain Left Strength Left  Pain   Flexion (L2) 4+   4+     Extension 3   3     ABduction 3-   3-     ADduction 4+   4+     Internal Rotation 4   4     External Rotation 3+   3+         Knee Strength   Right Strength Right Pain Left Strength Left  Pain   Flexion (S2) 3+   4     Prone Flexion           Extension (L3) 4 Yes 4+                Knee Special Tests  Knee Meniscal Tests  Positive: Right Lateral Charlene and Right  Medial Charlene  Negative: Left Lateral Charlene and Left Medial Charlene                  Knee Patellar Screening       Right Patellar Mobility  Normal: Medial and Lateral  Hypomobile: Superior and Inferior  Left Patellar Mobility  Normal: Medial, Lateral, Superior, and Inferior           Transfers Assessment  Sit to Stand Assistance: Independent  Chair to Bed Assistance: Independent  Bed to Chair Assistance: Independent      Sit to Stand Testing  The patient completed 5 sit to stand transfers in 18 sec. B knee valgus, need 1 UE assistance at times             Ambulation Assistance Required  Surface With  Assistive Device Without Assistive Device Details   Level   Independent      Uneven   Independent     Curb           Stairs Assistance Required   Assistance Level Upper Extremity Support Pattern   Ascending Independent       Descending Independent            Gait Analysis  Base of Support: Normal  Gait Pattern: Antalgic  Walking Speed: Decreased    Right Side Walking Observations  Decreased: Stance Time  Right Foot Contact Pattern: Flat foot    Left Side Walking Observations  Increased: Stance Time  Left Foot Contact Pattern: Heel to toe  Knee Observations During Gait  Right: Knee Varus Thrust         Treatment:  Therapeutic Exercise  TE 1: Bridges, 15x  TE 2: SL clamshells with RTB, 10x each side  TE 3: LAQs, 15x      Time Entry(in minutes):  PT Evaluation (Low) Time Entry: 47  Therapeutic Exercise Time Entry: 10    Assessment & Plan   Assessment  Pat presents with a condition of Low complexity.   Presentation of Symptoms: Stable  Will Comorbidities Impact Care: No       Functional Limitations: Activity tolerance, Ambulating on uneven surfaces, Completing self-care activities, Completing work/school activities, Decreased ambulation distance/endurance, Functional mobility, Gait limitations, Increased risk of fall, Maintaining balance, Painful locomotion/ambulation, Participating in leisure activities,  Participating in sports, Range of motion, Standing tolerance, Transfers, Squatting  Impairments: Abnormal gait, Abnormal or restricted range of motion, Impaired physical strength, Impaired balance, Pain with functional activity, Weight-bearing intolerance  Personal Factors Affecting Prognosis: Schedule, Transportation    Patient Goal for Therapy (PT): to get her pain under control  Prognosis: Fair  Assessment Details: Daisha is a 79 year old female with a medical diagnosis of Primary osteoarthritis of right knee, Baker cyst, right, and Chronic pain of right knee. She presents today a with a long history of R knee pain that has recently exacerbated. She demonstrates decreased and painful R knee ROM, R LE weakness, decreased R patellar mobility, + meniscus signs, gait abnormalities, and impaired overall functional ability. They would benefit from skilled PT services to normalize kinetic chain mobility, strength, and function to safely return to their prior level of activity.    Plan  From a physical therapy perspective, the patient would benefit from: Skilled Rehab Services    Planned therapy interventions include: Therapeutic exercise, Therapeutic activities, Neuromuscular re-education, Manual therapy, ADLs/IADLs, and Other (Comment). Dry Needling (prn)  Planned modalities to include: Biofeedback, Electrical stimulation - attended, Electrical stimulation - passive/unattended, Thermotherapy (hot pack), and Cryotherapy (cold pack).        Visit Frequency: 2 times Per Week for 12 Weeks.       This plan was discussed with Patient.   Discussion participants: Agreed Upon Plan of Care             The patient's spiritual, cultural, and educational needs were considered, and the patient is agreeable to the plan of care and goals.     Education  Education was done with Patient. The patient's learning style includes Demonstration, Listening, and Pictures/video. The patient Demonstrates understanding and Verbalizes  understanding.                 Goals:   Active       Long term goals       Pt will improve FOTO score to </= 55% to decrease perceived limitation with mobility       Start:  06/28/25    Expected End:  09/18/25            Pt will improve impaired LE strength by 1 MMT grade to improve strength for functional tasks       Start:  06/28/25    Expected End:  09/18/25            Patient will be able to perform navigate stairs without pain to demonstrate decreased symptom irritability.        Start:  06/28/25    Expected End:  09/18/25               Short term goals       Pt will be compliant with HEP to supplement PT in restoring pain free function.       Start:  06/28/25    Expected End:  08/07/25            Pt will improve impaired LE strength by 1/2 MMT grade to improve strength for functional tasks       Start:  06/28/25    Expected End:  08/07/25            Patient will be able to perform sit to stands without pain to demonstrate decreased symptom irritability.        Start:  06/28/25    Expected End:  08/07/25                MARGARITO QUILES, PT

## 2025-07-01 ENCOUNTER — CLINICAL SUPPORT (OUTPATIENT)
Dept: REHABILITATION | Facility: HOSPITAL | Age: 80
End: 2025-07-01
Payer: MEDICARE

## 2025-07-01 DIAGNOSIS — M25.661 DECREASED RANGE OF MOTION OF RIGHT KNEE: ICD-10-CM

## 2025-07-01 DIAGNOSIS — R29.898 WEAKNESS OF RIGHT LOWER EXTREMITY: ICD-10-CM

## 2025-07-01 DIAGNOSIS — R26.9 GAIT ABNORMALITY: Primary | ICD-10-CM

## 2025-07-01 PROCEDURE — 97112 NEUROMUSCULAR REEDUCATION: CPT

## 2025-07-01 PROCEDURE — 97140 MANUAL THERAPY 1/> REGIONS: CPT

## 2025-07-01 PROCEDURE — 97110 THERAPEUTIC EXERCISES: CPT

## 2025-07-01 NOTE — PROGRESS NOTES
"  Outpatient Rehab    Physical Therapy Visit    Patient Name: Milvia Mayo  MRN: [id number]  YOB: 1945  Encounter Date: 7/1/2025    Therapy Diagnosis:   Encounter Diagnoses   Name Primary?    Gait abnormality Yes    Weakness of right lower extremity     Decreased range of motion of right knee      Physician: Zuleima Elizabeth PA-C    Physician Orders: Eval and Treat  Medical Diagnosis: Primary osteoarthritis of right knee  Baker cyst, right  Chronic pain of right knee  Surgical Diagnosis: Not applicable for this Episode   Surgical Date: Not applicable for this Episode  Days Since Last Surgery: Not applicable for this Episode    Visit # / Visits Authorized:  1 / 20  Insurance Authorization Period: 7/1/2025 to 12/31/2025  Date of Evaluation: 6/26/2025  Plan of Care Certification: 6/26/2025 to 9/18/2025      PT/PTA:     Number of PTA visits since last PT visit:   Time In: 0901   Time Out: 1000  Total Time (in minutes): 59   Total Billable Time (in minutes): 55    FOTO:  Intake Score:  %  Survey Score 2:  %  Survey Score 3:  %    Precautions:       Subjective   Patient states that she is still having knee pain. She was able to do her exercises a few times and they went well..  Pain reported as 2/10. R knee    Objective            Treatment:  Therapeutic Exercise  TE 1: Nu step, 8 minutes level 4 resistance  TE 2: DL shuttle leg press, 50# for 5 minutes  Manual Therapy  MT 1: Seated edge of table, tibial distraction + posterior mobs, grade III  MT 2: Patellar mobs in all directions, grade III  MT 3: Anterior tibial mobs + ER, grade III  MT 4: Posterior tibial mobs + IR, grade III  Balance/Neuromuscular Re-Education  NMR 1: Bridges, 3 x 8  NMR 2: SL clamshells with RTB, 3 x 8 each side  NMR 3: SAQs with 5#, 3 x 8  NMR 4: LAQs with 5#, 3 x 8  NMR 5: Lateral walking with RTB, 8 laps at plinth there and back  NMR 6: Semi tandem stance on foam pad, 3 x 30" each foot forward    Time Entry(in minutes):  Manual " Therapy Time Entry: 13  Neuromuscular Re-Education Time Entry: 29  Therapeutic Exercise Time Entry: 13    Assessment & Plan   Assessment: Daisha presents today with continued reports of R knee pain. Manual interventions were applied to the R knee to improve her tibiofemoral and patellofemoral mobility. She responded well to this today. She was progressed with quad and hip strengthening activities today with good tolerance. Static balance on unstable surfaces was challenging for her today.   Evaluation/Treatment Tolerance: Patient tolerated treatment well    The patient will continue to benefit from skilled outpatient physical therapy in order to address the deficits listed in the problem list on the initial evaluation, provide patient and family education, and maximize the patients level of independence in the home and community environments.     The patient's spiritual, cultural, and educational needs were considered, and the patient is agreeable to the plan of care and goals.           Plan: Continue to progress per POC.    Goals:   Active       Long term goals       Pt will improve FOTO score to </= 55% to decrease perceived limitation with mobility (Progressing)       Start:  06/28/25    Expected End:  09/18/25            Pt will improve impaired LE strength by 1 MMT grade to improve strength for functional tasks (Progressing)       Start:  06/28/25    Expected End:  09/18/25            Patient will be able to perform navigate stairs without pain to demonstrate decreased symptom irritability.  (Progressing)       Start:  06/28/25    Expected End:  09/18/25               Short term goals       Pt will be compliant with HEP to supplement PT in restoring pain free function. (Progressing)       Start:  06/28/25    Expected End:  08/07/25            Pt will improve impaired LE strength by 1/2 MMT grade to improve strength for functional tasks (Progressing)       Start:  06/28/25    Expected End:  08/07/25             Patient will be able to perform sit to stands without pain to demonstrate decreased symptom irritability.  (Progressing)       Start:  06/28/25    Expected End:  08/07/25                MARGARITO QUILES PT

## 2025-07-08 ENCOUNTER — CLINICAL SUPPORT (OUTPATIENT)
Dept: REHABILITATION | Facility: HOSPITAL | Age: 80
End: 2025-07-08
Payer: MEDICARE

## 2025-07-08 DIAGNOSIS — M25.661 DECREASED RANGE OF MOTION OF RIGHT KNEE: ICD-10-CM

## 2025-07-08 DIAGNOSIS — R26.9 GAIT ABNORMALITY: Primary | ICD-10-CM

## 2025-07-08 DIAGNOSIS — R29.898 WEAKNESS OF RIGHT LOWER EXTREMITY: ICD-10-CM

## 2025-07-08 PROCEDURE — 97112 NEUROMUSCULAR REEDUCATION: CPT

## 2025-07-08 PROCEDURE — 97140 MANUAL THERAPY 1/> REGIONS: CPT

## 2025-07-08 PROCEDURE — 97110 THERAPEUTIC EXERCISES: CPT

## 2025-07-08 NOTE — PROGRESS NOTES
Outpatient Rehab    Physical Therapy Visit    Patient Name: Milvia Mayo  MRN: [id number]  YOB: 1945  Encounter Date: 7/8/2025    Therapy Diagnosis:   Encounter Diagnoses   Name Primary?    Gait abnormality Yes    Weakness of right lower extremity     Decreased range of motion of right knee        Physician: Zuleima Elizabeth PA-C    Physician Orders: Eval and Treat  Medical Diagnosis: Primary osteoarthritis of right knee  Baker cyst, right  Chronic pain of right knee  Surgical Diagnosis: Not applicable for this Episode   Surgical Date: Not applicable for this Episode  Days Since Last Surgery: Not applicable for this Episode    Visit # / Visits Authorized:  2 / 20  Insurance Authorization Period: 7/1/2025 to 12/31/2025  Date of Evaluation: 6/26/2025  Plan of Care Certification: 6/26/2025 to 9/18/2025      PT/PTA:     Number of PTA visits since last PT visit:   Time In: 0952   Time Out: 1052  Total Time (in minutes): 60   Total Billable Time (in minutes): 40    FOTO:  Intake Score:  %  Survey Score 2:  %  Survey Score 3:  %    Precautions:       Subjective   Patient states that she does feel like she is walking better. She has not really been doing her exercises at home..  Pain reported as 2/10. R knee    Objective            Treatment:  Therapeutic Exercise  TE 1: Nu step, 8 minutes level 4 resistance  TE 2: DL shuttle leg press, 50# for 5 minutes  TE 3: SL shuttle leg press 25#, 2 x 10 each side  Manual Therapy  MT 1: Seated edge of table, tibial distraction + posterior mobs, grade III  MT 2: Patellar mobs in all directions, grade III  MT 3: Anterior tibial mobs + ER, grade III  MT 4: Posterior tibial mobs + IR, grade III  Balance/Neuromuscular Re-Education  NMR 1: Bridges with RTB, 3 x 8  NMR 2: SL clamshells with RTB, 3 x 8 each side  NMR 3: SAQs with 7#, 3 x 8  NMR 4: LAQs with 7#, 3 x 8  NMR 5: Lateral walking with RTB, 8 laps at plinth there and back  NMR 6: Semi tandem stance on foam pad, 3 x  "30" each foot forward    Time Entry(in minutes):  Manual Therapy Time Entry: 13  Neuromuscular Re-Education Time Entry: 27  Therapeutic Exercise Time Entry: 15    Assessment & Plan   Assessment: Daisha presents to PT today with gradually improved symptoms compared to last week. Her R knee continues to be addressed manually as she is having some pain with her ROM. She was progressed with resistance with open chain quad loading today with good tolerance. Hip strengthening exercises are appropriately fatiguing for her.        The patient will continue to benefit from skilled outpatient physical therapy in order to address the deficits listed in the problem list on the initial evaluation, provide patient and family education, and maximize the patients level of independence in the home and community environments.     The patient's spiritual, cultural, and educational needs were considered, and the patient is agreeable to the plan of care and goals.           Plan: Continue to progress per POC.    Goals:   Active       Long term goals       Pt will improve FOTO score to </= 55% to decrease perceived limitation with mobility (Progressing)       Start:  06/28/25    Expected End:  09/18/25            Pt will improve impaired LE strength by 1 MMT grade to improve strength for functional tasks (Progressing)       Start:  06/28/25    Expected End:  09/18/25            Patient will be able to perform navigate stairs without pain to demonstrate decreased symptom irritability.  (Progressing)       Start:  06/28/25    Expected End:  09/18/25               Short term goals       Pt will be compliant with HEP to supplement PT in restoring pain free function. (Progressing)       Start:  06/28/25    Expected End:  08/07/25            Pt will improve impaired LE strength by 1/2 MMT grade to improve strength for functional tasks (Progressing)       Start:  06/28/25    Expected End:  08/07/25            Patient will be able to perform " sit to stands without pain to demonstrate decreased symptom irritability.  (Progressing)       Start:  06/28/25    Expected End:  08/07/25                  MARGARITO QUILES PT

## 2025-07-10 ENCOUNTER — CLINICAL SUPPORT (OUTPATIENT)
Dept: REHABILITATION | Facility: HOSPITAL | Age: 80
End: 2025-07-10
Payer: MEDICARE

## 2025-07-10 DIAGNOSIS — R29.898 WEAKNESS OF RIGHT LOWER EXTREMITY: ICD-10-CM

## 2025-07-10 DIAGNOSIS — R26.9 GAIT ABNORMALITY: Primary | ICD-10-CM

## 2025-07-10 DIAGNOSIS — M25.661 DECREASED RANGE OF MOTION OF RIGHT KNEE: ICD-10-CM

## 2025-07-10 PROCEDURE — 97110 THERAPEUTIC EXERCISES: CPT

## 2025-07-10 PROCEDURE — 97112 NEUROMUSCULAR REEDUCATION: CPT

## 2025-07-10 NOTE — PROGRESS NOTES
Outpatient Rehab    Physical Therapy Visit    Patient Name: Milvia Mayo  MRN: [id number]  YOB: 1945  Encounter Date: 7/10/2025    Therapy Diagnosis:   Encounter Diagnoses   Name Primary?    Gait abnormality Yes    Weakness of right lower extremity     Decreased range of motion of right knee      Physician: Zuleima Elizabeth PA-C    Physician Orders: Eval and Treat  Medical Diagnosis: Primary osteoarthritis of right knee  Baker cyst, right  Chronic pain of right knee  Surgical Diagnosis: Not applicable for this Episode   Surgical Date: Not applicable for this Episode  Days Since Last Surgery: Not applicable for this Episode    Visit # / Visits Authorized:  3 / 20  Insurance Authorization Period: 7/1/2025 to 12/31/2025  Date of Evaluation: 6/26/2025  Plan of Care Certification: 6/26/2025 to 9/18/2025      PT/PTA:     Number of PTA visits since last PT visit:   Time In: 0902   Time Out: 1000  Total Time (in minutes): 58   Total Billable Time (in minutes): 30    FOTO:  Intake Score:  %  Survey Score 2:  %  Survey Score 3:  %    Precautions:       Subjective   Patient states that she is noticing some progress thus far. She does get some random pains in the knee but it seems to be happening less often..  Pain reported as 2/10. R knee    Objective            Treatment:  Therapeutic Exercise  TE 1: Nu step, 8 minutes level 4 resistance  TE 2: DL shuttle leg press, 62# for 5 minutes  TE 3: SL shuttle leg press 25#, 2 x 10 each side  Manual Therapy  MT 1: Seated edge of table, tibial distraction + posterior mobs, grade III  MT 2: Patellar mobs in all directions, grade III  MT 3: Anterior tibial mobs + ER, grade III  MT 4: Posterior tibial mobs + IR, grade III  Balance/Neuromuscular Re-Education  NMR 1: Bridges with RTB, 3 x 8  NMR 2: SL clamshells with RTB, 3 x 8 each side  NMR 3: SAQs with 7#, 3 x 8  NMR 4: LAQs with 7#, 3 x 8  NMR 5: Lateral walking with RTB, 8 laps at plinth there and back  NMR 6: Semi  "tandem stance on foam pad, 3 x 30" each foot forward    Time Entry(in minutes):  Manual Therapy Time Entry: 12  Neuromuscular Re-Education Time Entry: 25  Therapeutic Exercise Time Entry: 15    Assessment & Plan   Assessment: Daisha continues to report gradually improving symptoms at this time. Manual interventions continue to be performed to help improve her tibiofemoral and patellofemoral mobility. Her quad and hip strengthening exercises continue to challenge her at this time. Closed chain loading was progressed on the shuttle.   Evaluation/Treatment Tolerance: Patient tolerated treatment well    The patient will continue to benefit from skilled outpatient physical therapy in order to address the deficits listed in the problem list on the initial evaluation, provide patient and family education, and maximize the patients level of independence in the home and community environments.     The patient's spiritual, cultural, and educational needs were considered, and the patient is agreeable to the plan of care and goals.           Plan: Continue to progress per POC.    Goals:   Active       Long term goals       Pt will improve FOTO score to </= 55% to decrease perceived limitation with mobility (Progressing)       Start:  06/28/25    Expected End:  09/18/25            Pt will improve impaired LE strength by 1 MMT grade to improve strength for functional tasks (Progressing)       Start:  06/28/25    Expected End:  09/18/25            Patient will be able to perform navigate stairs without pain to demonstrate decreased symptom irritability.  (Progressing)       Start:  06/28/25    Expected End:  09/18/25               Short term goals       Pt will be compliant with HEP to supplement PT in restoring pain free function. (Progressing)       Start:  06/28/25    Expected End:  08/07/25            Pt will improve impaired LE strength by 1/2 MMT grade to improve strength for functional tasks (Progressing)       Start:  " 06/28/25    Expected End:  08/07/25            Patient will be able to perform sit to stands without pain to demonstrate decreased symptom irritability.  (Progressing)       Start:  06/28/25    Expected End:  08/07/25                    MARGARITO QUILES PT

## 2025-07-15 ENCOUNTER — CLINICAL SUPPORT (OUTPATIENT)
Dept: REHABILITATION | Facility: HOSPITAL | Age: 80
End: 2025-07-15
Payer: MEDICARE

## 2025-07-15 DIAGNOSIS — M25.661 DECREASED RANGE OF MOTION OF RIGHT KNEE: ICD-10-CM

## 2025-07-15 DIAGNOSIS — R26.9 GAIT ABNORMALITY: Primary | ICD-10-CM

## 2025-07-15 DIAGNOSIS — R29.898 WEAKNESS OF RIGHT LOWER EXTREMITY: ICD-10-CM

## 2025-07-15 PROCEDURE — 97110 THERAPEUTIC EXERCISES: CPT

## 2025-07-15 PROCEDURE — 97112 NEUROMUSCULAR REEDUCATION: CPT

## 2025-07-15 NOTE — PROGRESS NOTES
"  Outpatient Rehab    Physical Therapy Visit    Patient Name: Milvia Mayo  MRN: [id number]  YOB: 1945  Encounter Date: 7/15/2025    Therapy Diagnosis:   Encounter Diagnoses   Name Primary?    Gait abnormality Yes    Weakness of right lower extremity     Decreased range of motion of right knee        Physician: Zuleima Elizabeth PA-C    Physician Orders: Eval and Treat  Medical Diagnosis: Primary osteoarthritis of right knee  Baker cyst, right  Chronic pain of right knee  Surgical Diagnosis: Not applicable for this Episode   Surgical Date: Not applicable for this Episode  Days Since Last Surgery: Not applicable for this Episode    Visit # / Visits Authorized:  4 / 20  Insurance Authorization Period: 7/1/2025 to 12/31/2025  Date of Evaluation: 6/26/2025  Plan of Care Certification: 6/26/2025 to 9/18/2025      PT/PTA:     Number of PTA visits since last PT visit:   Time In: 0902   Time Out: 1000  Total Time (in minutes): 58   Total Billable Time (in minutes): 30    FOTO:  Intake Score:  %  Survey Score 2:  %  Survey Score 3:  %    Precautions:       Subjective   Patient states that she is noticing good progress in terms of her pain..  Pain reported as 2/10. R knee    Objective            Treatment:  Therapeutic Exercise  TE 1: Nu step, 8 minutes level 4 resistance  TE 2: DL shuttle leg press, 62# for 5 minutes  TE 3: SL shuttle leg press 25#, 2 x 10 each side  Manual Therapy  MT 1: Seated edge of table, tibial distraction + posterior mobs, grade III  MT 2: Patellar mobs in all directions, grade III  MT 3: Anterior tibial mobs + ER, grade III  MT 4: Posterior tibial mobs + IR, grade III  Balance/Neuromuscular Re-Education  NMR 1: Bridges with GTB, 3 x 8  NMR 2: SL clamshells with GTB, 3 x 8 each side  NMR 3: SAQs with 8#, 3 x 8  NMR 4: LAQs with 8#, 3 x 8  NMR 5: Lateral walking with RTB, 8 laps at plinth there and back  Therapeutic Activity  TA 1: Sit to stands, 3 x 8  TA 2: Step ups on 6" step, 2 x 10 " each side    Time Entry(in minutes):  Manual Therapy Time Entry: 10  Neuromuscular Re-Education Time Entry: 20  Therapeutic Activity Time Entry: 10  Therapeutic Exercise Time Entry: 15    Assessment & Plan   Assessment: Daisha continues to report gradually improving symptoms at this time. Her knee mobility continues to be addressed manually and she is responding well to this. She was progressed with open chain knee loading. Functional strengthening activities were initiated today with good tolerance.   Evaluation/Treatment Tolerance: Patient tolerated treatment well    The patient will continue to benefit from skilled outpatient physical therapy in order to address the deficits listed in the problem list on the initial evaluation, provide patient and family education, and maximize the patients level of independence in the home and community environments.     The patient's spiritual, cultural, and educational needs were considered, and the patient is agreeable to the plan of care and goals.           Plan: Continue to progress per POC.    Goals:   Active       Long term goals       Pt will improve FOTO score to </= 55% to decrease perceived limitation with mobility (Progressing)       Start:  06/28/25    Expected End:  09/18/25            Pt will improve impaired LE strength by 1 MMT grade to improve strength for functional tasks (Progressing)       Start:  06/28/25    Expected End:  09/18/25            Patient will be able to perform navigate stairs without pain to demonstrate decreased symptom irritability.  (Progressing)       Start:  06/28/25    Expected End:  09/18/25               Short term goals       Pt will be compliant with HEP to supplement PT in restoring pain free function. (Progressing)       Start:  06/28/25    Expected End:  08/07/25            Pt will improve impaired LE strength by 1/2 MMT grade to improve strength for functional tasks (Progressing)       Start:  06/28/25    Expected End:   08/07/25            Patient will be able to perform sit to stands without pain to demonstrate decreased symptom irritability.  (Progressing)       Start:  06/28/25    Expected End:  08/07/25                      MARGARITO QUILES PT

## 2025-07-22 ENCOUNTER — CLINICAL SUPPORT (OUTPATIENT)
Dept: REHABILITATION | Facility: HOSPITAL | Age: 80
End: 2025-07-22
Payer: MEDICARE

## 2025-07-22 DIAGNOSIS — M25.661 DECREASED RANGE OF MOTION OF RIGHT KNEE: ICD-10-CM

## 2025-07-22 DIAGNOSIS — R29.898 WEAKNESS OF RIGHT LOWER EXTREMITY: ICD-10-CM

## 2025-07-22 DIAGNOSIS — R26.9 GAIT ABNORMALITY: Primary | ICD-10-CM

## 2025-07-22 PROCEDURE — 97112 NEUROMUSCULAR REEDUCATION: CPT

## 2025-07-22 PROCEDURE — 97110 THERAPEUTIC EXERCISES: CPT

## 2025-07-22 PROCEDURE — 97530 THERAPEUTIC ACTIVITIES: CPT

## 2025-07-22 NOTE — PROGRESS NOTES
Outpatient Rehab    Physical Therapy Visit    Patient Name: Milvia Mayo  MRN: [id number]  YOB: 1945  Encounter Date: 7/22/2025    Therapy Diagnosis:   Encounter Diagnoses   Name Primary?    Gait abnormality Yes    Weakness of right lower extremity     Decreased range of motion of right knee      Physician: Zuleima Elizabeth PA-C    Physician Orders: Eval and Treat  Medical Diagnosis: Primary osteoarthritis of right knee  Baker cyst, right  Chronic pain of right knee  Surgical Diagnosis: Not applicable for this Episode   Surgical Date: Not applicable for this Episode  Days Since Last Surgery: Not applicable for this Episode    Visit # / Visits Authorized:  5 / 20  Insurance Authorization Period: 7/1/2025 to 12/31/2025  Date of Evaluation: 6/26/2025  Plan of Care Certification: 6/26/2025 to 9/18/2025      PT/PTA:     Number of PTA visits since last PT visit:   Time In: 0850   Time Out: 0950  Total Time (in minutes): 60   Total Billable Time (in minutes): 40    FOTO:  Intake Score:  %  Survey Score 2:  %  Survey Score 3:  %    Precautions:       Subjective   Patient states that she is noticing good progress in terms of her pain. She feels like the past few days has been a bit of a plateau in progress but overall she does feel better..  Pain reported as 2/10. R knee    Objective            Treatment:  Therapeutic Exercise  TE 1: Nu step, 8 minutes level 4 resistance  TE 2: DL shuttle leg press, 62# for 5 minutes  TE 3: SL shuttle leg press 37#, 2 x 10 each side  Manual Therapy  MT 1: Seated edge of table, tibial distraction + posterior mobs, grade III  MT 2: Patellar mobs in all directions, grade III  MT 3: Anterior tibial mobs + ER, grade III  MT 4: Posterior tibial mobs + IR, grade III  Balance/Neuromuscular Re-Education  NMR 1: Bridges with GTB, 3 x 8  NMR 2: SL clamshells with GTB, 3 x 8 each side  NMR 3: SAQs with 8#, 3 x 8  NMR 4: LAQs with 8#, 3 x 8  NMR 6: Semi tandem stance on foam pad, 3 x  "30" each foot forward  Therapeutic Activity  TA 1: Sit to stands, 3 x 8  TA 2: Step ups on 6" step, 2 x 10 each side  TA 3: Lateral walking with RTB, 8 laps at plinth there and back    Time Entry(in minutes):  Manual Therapy Time Entry: 10  Neuromuscular Re-Education Time Entry: 20  Therapeutic Activity Time Entry: 10  Therapeutic Exercise Time Entry: 15    Assessment & Plan   Assessment: Daisha presents today with minimal reports of symptoms at this time. Her knee mobility continues to be addressed manually at this time. She continues to be challenged with open chain quad strengthening with good tolerance. Hip strengthening is appropriately challenging for her at this time.   Evaluation/Treatment Tolerance: Patient tolerated treatment well    The patient will continue to benefit from skilled outpatient physical therapy in order to address the deficits listed in the problem list on the initial evaluation, provide patient and family education, and maximize the patients level of independence in the home and community environments.     The patient's spiritual, cultural, and educational needs were considered, and the patient is agreeable to the plan of care and goals.           Plan: Continue to progress per POC.    Goals:   Active       Long term goals       Pt will improve FOTO score to </= 55% to decrease perceived limitation with mobility (Progressing)       Start:  06/28/25    Expected End:  09/18/25            Pt will improve impaired LE strength by 1 MMT grade to improve strength for functional tasks (Progressing)       Start:  06/28/25    Expected End:  09/18/25            Patient will be able to perform navigate stairs without pain to demonstrate decreased symptom irritability.  (Progressing)       Start:  06/28/25    Expected End:  09/18/25               Short term goals       Pt will be compliant with HEP to supplement PT in restoring pain free function. (Progressing)       Start:  06/28/25    Expected " End:  08/07/25            Pt will improve impaired LE strength by 1/2 MMT grade to improve strength for functional tasks (Progressing)       Start:  06/28/25    Expected End:  08/07/25            Patient will be able to perform sit to stands without pain to demonstrate decreased symptom irritability.  (Progressing)       Start:  06/28/25    Expected End:  08/07/25                MARGARITO QUILES, PT

## 2025-07-23 NOTE — PROGRESS NOTES
Outpatient Rehab    Physical Therapy Visit    Patient Name: Milvia Mayo  MRN: [id number]  YOB: 1945  Encounter Date: 7/24/2025    Therapy Diagnosis:   Encounter Diagnoses   Name Primary?    Gait abnormality Yes    Weakness of right lower extremity     Decreased range of motion of right knee        Physician: Zuleima Elizabeth PA-C    Physician Orders: Eval and Treat  Medical Diagnosis: Primary osteoarthritis of right knee  Baker cyst, right  Chronic pain of right knee  Surgical Diagnosis: Not applicable for this Episode   Surgical Date: Not applicable for this Episode  Days Since Last Surgery: Not applicable for this Episode    Visit # / Visits Authorized:  6 / 20  Insurance Authorization Period: 7/1/2025 to 12/31/2025  Date of Evaluation: 6/26/2025  Plan of Care Certification: 6/26/2025 to 9/18/2025      PT/PTA:     Number of PTA visits since last PT visit:   Time In: 0902   Time Out: 0958  Total Time (in minutes): 56   Total Billable Time (in minutes): 30    FOTO:  Intake Score:  %  Survey Score 2:  %  Survey Score 3:  %    Precautions:       Subjective   Patient states that her knee is a bit sore from her last session.  She had some soreness in her muscles as well that has improved..  Pain reported as 2/10. R knee    Objective            Treatment:  Therapeutic Exercise  TE 1: Nu step, 6 minutes level 4 resistance  TE 2: DL shuttle leg press, 62# for 5 minutes  TE 3: SL shuttle leg press 37#, 2 x 10 each side  Manual Therapy  MT 1: Seated edge of table, tibial distraction + posterior mobs, grade III  MT 2: Patellar mobs in all directions, grade III  MT 3: Anterior tibial mobs + ER, grade III  MT 4: Posterior tibial mobs + IR, grade III  Balance/Neuromuscular Re-Education  NMR 1: Bridges with GTB, 3 x 8  NMR 2: SL clamshells with GTB, 3 x 8 each side  NMR 3: SAQs with 8#, 3 x 8  NMR 4: LAQs with 8#, 3 x 8  NMR 5: Lateral walking with RTB, 8 laps at plinth there and back  NMR 6: Semi tandem stance  "on foam pad, 3 x 30" each foot forward  Therapeutic Activity  TA 1: Sit to stands with 10#, 3 x 8  TA 2: Step ups on 6" step, 2 x 10 each side  TA 3: Lateral walking with RTB, 8 laps at plinth there and back    Time Entry(in minutes):  Manual Therapy Time Entry: 10  Neuromuscular Re-Education Time Entry: 20  Therapeutic Activity Time Entry: 10  Therapeutic Exercise Time Entry: 13    Assessment & Plan   Assessment: Daisha continues with a limitation in her knee flexion that is being addressed manually.  Following tibiofemoral and patellofemoral mobilizations her flexion range of motion improves and is pain-free. She struggled a bit with open chain knee extension loading today so resistance was decreased a bit.  She tolerated closed-chain loading and functional strengthening well today.  Evaluation/Treatment Tolerance: Patient tolerated treatment well    The patient will continue to benefit from skilled outpatient physical therapy in order to address the deficits listed in the problem list on the initial evaluation, provide patient and family education, and maximize the patients level of independence in the home and community environments.     The patient's spiritual, cultural, and educational needs were considered, and the patient is agreeable to the plan of care and goals.           Plan: Continue to progress per POC.    Goals:   Active       Long term goals       Pt will improve FOTO score to </= 55% to decrease perceived limitation with mobility (Progressing)       Start:  06/28/25    Expected End:  09/18/25            Pt will improve impaired LE strength by 1 MMT grade to improve strength for functional tasks (Progressing)       Start:  06/28/25    Expected End:  09/18/25            Patient will be able to perform navigate stairs without pain to demonstrate decreased symptom irritability.  (Progressing)       Start:  06/28/25    Expected End:  09/18/25               Short term goals       Pt will be compliant " with HEP to supplement PT in restoring pain free function. (Progressing)       Start:  06/28/25    Expected End:  08/07/25            Pt will improve impaired LE strength by 1/2 MMT grade to improve strength for functional tasks (Progressing)       Start:  06/28/25    Expected End:  08/07/25            Patient will be able to perform sit to stands without pain to demonstrate decreased symptom irritability.  (Progressing)       Start:  06/28/25    Expected End:  08/07/25                  MARGARITO QUILES, PT

## 2025-07-24 ENCOUNTER — CLINICAL SUPPORT (OUTPATIENT)
Dept: REHABILITATION | Facility: HOSPITAL | Age: 80
End: 2025-07-24
Payer: MEDICARE

## 2025-07-24 DIAGNOSIS — R29.898 WEAKNESS OF RIGHT LOWER EXTREMITY: ICD-10-CM

## 2025-07-24 DIAGNOSIS — M25.661 DECREASED RANGE OF MOTION OF RIGHT KNEE: ICD-10-CM

## 2025-07-24 DIAGNOSIS — R26.9 GAIT ABNORMALITY: Primary | ICD-10-CM

## 2025-07-24 PROCEDURE — 97112 NEUROMUSCULAR REEDUCATION: CPT

## 2025-07-24 PROCEDURE — 97110 THERAPEUTIC EXERCISES: CPT

## 2025-07-29 NOTE — PROGRESS NOTES
Outpatient Rehab    Physical Therapy Visit    Patient Name: Milvia Mayo  MRN: [id number]  YOB: 1945  Encounter Date: 7/30/2025    Therapy Diagnosis:   Encounter Diagnoses   Name Primary?    Gait abnormality Yes    Weakness of right lower extremity     Decreased range of motion of right knee          Physician: Zuleima Elizabeth PA-C    Physician Orders: Eval and Treat  Medical Diagnosis: Primary osteoarthritis of right knee  Baker cyst, right  Chronic pain of right knee  Surgical Diagnosis: Not applicable for this Episode   Surgical Date: Not applicable for this Episode  Days Since Last Surgery: Not applicable for this Episode    Visit # / Visits Authorized:  7 / 20  Insurance Authorization Period: 7/1/2025 to 12/31/2025  Date of Evaluation: 6/26/2025  Plan of Care Certification: 6/26/2025 to 9/18/2025      PT/PTA:     Number of PTA visits since last PT visit:   Time In: 0900   Time Out: 1000  Total Time (in minutes): 60   Total Billable Time (in minutes): 30    FOTO:  Intake Score:  %  Survey Score 2:  %  Survey Score 3:  %    Precautions:       Subjective   Daisha states that after her session last week she hurt her low back.  It has been hurting her on the right side and the pain has been about the same since our last session.  She thinks it may be due to the increased weight from the leg press.  The knees have been doing fine..  Pain reported as 2/10. R knee    Objective            Treatment:  Therapeutic Exercise  TE 1: Nu step, 6 minutes level 4 resistance  TE 2: DL shuttle leg press, 50# for 5 minutes  TE 4: Seated lumbar flexion with stability ball  TE 5: LTRs, 3 x 8 each side  Manual Therapy  MT 1: Seated edge of table, tibial distraction + posterior mobs, grade III  MT 2: Patellar mobs in all directions, grade III  MT 3: Anterior tibial mobs + ER, grade III  MT 4: Posterior tibial mobs + IR, grade III  MT 5: Seated lumbar flexion FMP, 5 rounds  Balance/Neuromuscular Re-Education  NMR 1:  Bridges with GTB, 3 x 8  NMR 2: SL clamshells with GTB, 3 x 8 each side  NMR 3: SAQs with 7#, 3 x 8  NMR 4: LAQs with 7#, 3 x 8  NMR 5: Lateral walking with RTB, 8 laps at plint there and back    Time Entry(in minutes):  Manual Therapy Time Entry: 15  Neuromuscular Re-Education Time Entry: 25  Therapeutic Exercise Time Entry: 15    Assessment & Plan   Assessment: Daisha presents today with new reports of right-sided low back and hip pain.  This occurred after exercise progression from her following treatment session.  Her lumbar spine range of motion was painful today.  This was addressed manually and she did experience good symptom relief within the session.  Care was taken with her hip strengthening and closed chain loading exercises today and some exercises were progressed.  She tolerated the session very well.  Her hip and lumbar spine we will continue to be monitored moving forward.  Evaluation/Treatment Tolerance: Patient tolerated treatment well    The patient will continue to benefit from skilled outpatient physical therapy in order to address the deficits listed in the problem list on the initial evaluation, provide patient and family education, and maximize the patients level of independence in the home and community environments.     The patient's spiritual, cultural, and educational needs were considered, and the patient is agreeable to the plan of care and goals.           Plan: Continue to progress per POC.    Goals:   Active       Long term goals       Pt will improve FOTO score to </= 55% to decrease perceived limitation with mobility (Progressing)       Start:  06/28/25    Expected End:  09/18/25            Pt will improve impaired LE strength by 1 MMT grade to improve strength for functional tasks (Progressing)       Start:  06/28/25    Expected End:  09/18/25            Patient will be able to perform navigate stairs without pain to demonstrate decreased symptom irritability.  (Progressing)        Start:  06/28/25    Expected End:  09/18/25               Short term goals       Pt will be compliant with HEP to supplement PT in restoring pain free function. (Progressing)       Start:  06/28/25    Expected End:  08/07/25            Pt will improve impaired LE strength by 1/2 MMT grade to improve strength for functional tasks (Progressing)       Start:  06/28/25    Expected End:  08/07/25            Patient will be able to perform sit to stands without pain to demonstrate decreased symptom irritability.  (Progressing)       Start:  06/28/25    Expected End:  08/07/25                    MARGARITO QUILES, PT

## 2025-07-30 ENCOUNTER — CLINICAL SUPPORT (OUTPATIENT)
Dept: REHABILITATION | Facility: HOSPITAL | Age: 80
End: 2025-07-30
Payer: MEDICARE

## 2025-07-30 DIAGNOSIS — M25.661 DECREASED RANGE OF MOTION OF RIGHT KNEE: ICD-10-CM

## 2025-07-30 DIAGNOSIS — R29.898 WEAKNESS OF RIGHT LOWER EXTREMITY: ICD-10-CM

## 2025-07-30 DIAGNOSIS — R26.9 GAIT ABNORMALITY: Primary | ICD-10-CM

## 2025-07-30 PROCEDURE — 97112 NEUROMUSCULAR REEDUCATION: CPT

## 2025-07-30 PROCEDURE — 97110 THERAPEUTIC EXERCISES: CPT

## 2025-08-05 ENCOUNTER — CLINICAL SUPPORT (OUTPATIENT)
Dept: REHABILITATION | Facility: HOSPITAL | Age: 80
End: 2025-08-05
Payer: MEDICARE

## 2025-08-05 DIAGNOSIS — R26.9 GAIT ABNORMALITY: Primary | ICD-10-CM

## 2025-08-05 DIAGNOSIS — R29.898 WEAKNESS OF RIGHT LOWER EXTREMITY: ICD-10-CM

## 2025-08-05 DIAGNOSIS — M25.661 DECREASED RANGE OF MOTION OF RIGHT KNEE: ICD-10-CM

## 2025-08-05 PROCEDURE — 97110 THERAPEUTIC EXERCISES: CPT

## 2025-08-05 PROCEDURE — 97112 NEUROMUSCULAR REEDUCATION: CPT

## 2025-08-05 PROCEDURE — 97140 MANUAL THERAPY 1/> REGIONS: CPT

## 2025-08-05 NOTE — PROGRESS NOTES
Outpatient Rehab    Physical Therapy Progress Note    Patient Name: Milvia Mayo  MRN: [id number]  YOB: 1945  Encounter Date: 8/5/2025    Therapy Diagnosis:   Encounter Diagnoses   Name Primary?    Gait abnormality Yes    Weakness of right lower extremity     Decreased range of motion of right knee      Physician: Zuleima Elizabeth PA-C    Physician Orders: Eval and Treat  Medical Diagnosis: Primary osteoarthritis of right knee  Baker cyst, right  Chronic pain of right knee  Surgical Diagnosis: Not applicable for this Episode   Surgical Date: Not applicable for this Episode  Days Since Last Surgery: Not applicable for this Episode    Visit # / Visits Authorized:  8 / 20  Insurance Authorization Period: 7/1/2025 to 12/31/2025  Date of Evaluation: 6/26/2025  Plan of Care Certification: 6/26/2025 to 9/18/2025      PT/PTA:     Number of PTA visits since last PT visit:   Time In: 1305   Time Out: 1400  Total Time (in minutes): 55   Total Billable Time (in minutes): 55    FOTO:  Intake Score (%): 42  Survey Score 2 (%): 66  Survey Score 3 (%): Not applicable for this Episode    Precautions:       Subjective   Patient states that her knee has been feeling fine over the past week.  She is still suffering with her low back pain that is radiating down her right leg and her hamstring area.  She has had issues with her sciatic nerve in the past and this seems to be similar..  Pain reported as 1/10. R knee    Objective       Knee Range of Motion   Right Knee   Active (deg) Passive (deg) Pain   Flexion 120 125     Extension 0 0         Left Knee   Active (deg) Passive (deg) Pain   Flexion 125 127     Extension 0 4                        Hip Strength - Planes of Motion   Right Strength Right Pain Left Strength Left  Pain   Flexion (L2) 5   5     Extension 3+   3+     ABduction 3   3     ADduction 5   5     Internal Rotation 4+   4+     External Rotation 4   4         Knee Strength   Right Strength Right Pain Left  "Strength Left  Pain   Flexion (S2) 4   4     Prone Flexion           Extension (L3) 4+   5                Knee Patellar Screening       Right Patellar Mobility  Normal: Medial, Lateral, Superior, and Inferior  Left Patellar Mobility  Normal: Medial, Lateral, Superior, and Inferior              Treatment:  Therapeutic Exercise  TE 1: Nu step, 6 minutes level 4 resistance  TE 2: DL shuttle leg press, 62# for 4 minutes  TE 3: SL shuttle leg press 37#, 2 x 10 each side  TE 4: Seated lumbar flexion with stability ball  TE 5: Sciatic nerve glides, 3 x 8 on the R  TE 6: Assessment as above  Manual Therapy  MT 1: Seated edge of table, tibial distraction + posterior mobs, grade III  MT 2: Patellar mobs in all directions, grade III  MT 3: Anterior tibial mobs + ER, grade III  MT 6: Manual lumbar traction, grade III with oscillations  MT 7: R hip long axis distraction, grade III  Balance/Neuromuscular Re-Education  NMR 1: Bridges with GTB, 3 x 8  NMR 2: SL clamshells with GTB, 3 x 8 each side  NMR 4: LAQs with 10#, 3 x 8  NMR 5: Lateral walking with RTB, 8 laps at plinth there and back  NMR 6: Semi tandem stance on foam pad, 3 x 30" each foot forward    Time Entry(in minutes):  Manual Therapy Time Entry: 15  Neuromuscular Re-Education Time Entry: 15  Therapeutic Exercise Time Entry: 25    Assessment & Plan   Assessment: Daisha has been treated for a total of 9 visits over the past 5-1/2 weeks.  Since the onset of treatment she has demonstrated good progress in terms of her knee pain.  Her flexion remains full and her extension was improved to full.  She has demonstrated good progress in terms of her hip and knee strength as she has been challenged with resistance exercises.  She recently has experienced an exacerbation of low back pain that is radiating into her right lower extremity.  This is being addressed at this time.  Progress remains a great candidate for continued skilled PT services at this time. "   Evaluation/Treatment Tolerance: Patient tolerated treatment well    The patient will continue to benefit from skilled outpatient physical therapy in order to address the deficits listed in the problem list on the initial evaluation, provide patient and family education, and maximize the patients level of independence in the home and community environments.     The patient's spiritual, cultural, and educational needs were considered, and the patient is agreeable to the plan of care and goals.           Plan: Continue to progress per POC.    Goals:   Active       Long term goals       Pt will improve FOTO score to </= 55% to decrease perceived limitation with mobility (Progressing)       Start:  06/28/25    Expected End:  09/18/25            Pt will improve impaired LE strength by 1 MMT grade to improve strength for functional tasks (Progressing)       Start:  06/28/25    Expected End:  09/18/25            Patient will be able to perform navigate stairs without pain to demonstrate decreased symptom irritability.  (Progressing)       Start:  06/28/25    Expected End:  09/18/25              Resolved       Short term goals       Pt will be compliant with HEP to supplement PT in restoring pain free function. (Met)       Start:  06/28/25    Expected End:  08/07/25    Resolved:  08/05/25         Pt will improve impaired LE strength by 1/2 MMT grade to improve strength for functional tasks (Met)       Start:  06/28/25    Expected End:  08/07/25    Resolved:  08/05/25         Patient will be able to perform sit to stands without pain to demonstrate decreased symptom irritability.  (Met)       Start:  06/28/25    Expected End:  08/07/25    Resolved:  08/05/25             MARGARITO QUILES PT

## 2025-08-07 NOTE — PROGRESS NOTES
Outpatient Rehab    Physical Therapy Visit    Patient Name: Milvia Mayo  MRN: [id number]  YOB: 1945  Encounter Date: 8/8/2025    Therapy Diagnosis:   Encounter Diagnoses   Name Primary?    Gait abnormality Yes    Weakness of right lower extremity     Decreased range of motion of right knee      Physician: Zuleima Elizabeth PA-C    Physician Orders: Eval and Treat  Medical Diagnosis: Primary osteoarthritis of right knee  Baker cyst, right  Chronic pain of right knee  Surgical Diagnosis: Not applicable for this Episode   Surgical Date: Not applicable for this Episode  Days Since Last Surgery: Not applicable for this Episode    Visit # / Visits Authorized:  9 / 20  Insurance Authorization Period: 7/1/2025 to 12/31/2025  Date of Evaluation: 6/26/2025  Plan of Care Certification: 6/26/2025 to 9/18/2025      PT/PTA: PTA   Number of PTA visits since last PT visit:1  Time In: 1200   Time Out: 1300  Total Time (in minutes): 60   Total Billable Time (in minutes):   60     FOTO:  Intake Score (%): 42  Survey Score 2 (%): 66  Survey Score 3 (%): Not applicable for this Episode    Precautions:     Subjective   Patient reports continued discomfort throughout lumbar spine upon entering treatment session today..  Pain reported as 4/10.      Objective    Objective Measures updated at progress report unless specified.      Treatment:   Therapeutic Exercise  TE 1: Nu step, 6 minutes level 4 resistance  TE 2: DL shuttle leg press, 62# for 4 minutes NPT   TE 3: SL shuttle leg press 37#, 2 x 10 each side NPT   TE 4: Seated lumbar flexion with stability ball  TE 5: Sciatic nerve glides, 3 x 8 on the R  TE 6: single knee to chest 10 x 10 second hold each side   Manual Therapy  MT 1: Seated edge of table, tibial distraction + posterior mobs, grade III   MT 2: Patellar mobs in all directions, grade III  MT 3: Anterior tibial mobs + ER, grade III  MT 6: Manual lumbar traction, grade III with oscillations NPT   MT 7: R hip long  "axis distraction, grade III  Balance/Neuromuscular Re-Education  NMR 1: Bridges with GTB, 3 x 8  NMR 2: SL clamshells with GTB, 3 x 8 each side  NMR 4: LAQs with 10#, 3 x 8  NMR 5: Lateral walking with RTB, 8 laps at plinth there and back NPT   NMR 6: Semi tandem stance on foam pad, 3 x 30" each foot forward NPT      Time Entry(in minutes):  Manual Therapy Time Entry: 15  Neuromuscular Re-Education Time Entry: 20  Therapeutic Exercise Time Entry: 25    Assessment & Plan   Assessment:   Patient opted to hold off on shuttle press today due to patient thinking increased lumbar spine discomfort since performing task. Will assess how patient felt after holding task today.  Patient with tightness throughout anterior right thigh upon palpation, Physical Therapist Assistant performed pin rolling to area to help decrease tightness throughout lower extremity. Patient performed single knee to chest today as well due to patient with increased tightness throughout proximal hamstring attachments when attempting to perform piriformis stretching. Physical Therapist Assistant also discussed with patient her using heat when at home to help decrease tightness throughout anterior thigh.       The patient will continue to benefit from skilled outpatient physical therapy in order to address the deficits listed in the problem list on the initial evaluation, provide patient and family education, and maximize the patients level of independence in the home and community environments.     The patient's spiritual, cultural, and educational needs were considered, and the patient is agreeable to the plan of care and goals.       Plan:   Continue with Physical Therapist Plan of Care.     Goals:   Active       Long term goals       Pt will improve FOTO score to </= 55% to decrease perceived limitation with mobility (Progressing)       Start:  06/28/25    Expected End:  09/18/25            Pt will improve impaired LE strength by 1 MMT grade to " improve strength for functional tasks (Progressing)       Start:  06/28/25    Expected End:  09/18/25            Patient will be able to perform navigate stairs without pain to demonstrate decreased symptom irritability.  (Progressing)       Start:  06/28/25    Expected End:  09/18/25              Resolved       Short term goals       Pt will be compliant with HEP to supplement PT in restoring pain free function. (Met)       Start:  06/28/25    Expected End:  08/07/25    Resolved:  08/05/25         Pt will improve impaired LE strength by 1/2 MMT grade to improve strength for functional tasks (Met)       Start:  06/28/25    Expected End:  08/07/25    Resolved:  08/05/25         Patient will be able to perform sit to stands without pain to demonstrate decreased symptom irritability.  (Met)       Start:  06/28/25    Expected End:  08/07/25    Resolved:  08/05/25             Pedro Chang, PTA

## 2025-08-08 ENCOUNTER — PATIENT MESSAGE (OUTPATIENT)
Dept: REHABILITATION | Facility: HOSPITAL | Age: 80
End: 2025-08-08

## 2025-08-08 ENCOUNTER — CLINICAL SUPPORT (OUTPATIENT)
Dept: REHABILITATION | Facility: HOSPITAL | Age: 80
End: 2025-08-08
Payer: MEDICARE

## 2025-08-08 DIAGNOSIS — R26.9 GAIT ABNORMALITY: Primary | ICD-10-CM

## 2025-08-08 DIAGNOSIS — M25.661 DECREASED RANGE OF MOTION OF RIGHT KNEE: ICD-10-CM

## 2025-08-08 DIAGNOSIS — R29.898 WEAKNESS OF RIGHT LOWER EXTREMITY: ICD-10-CM

## 2025-08-08 PROCEDURE — 97140 MANUAL THERAPY 1/> REGIONS: CPT | Mod: CQ

## 2025-08-08 PROCEDURE — 97110 THERAPEUTIC EXERCISES: CPT | Mod: CQ

## 2025-08-08 PROCEDURE — 97112 NEUROMUSCULAR REEDUCATION: CPT | Mod: CQ

## 2025-08-12 ENCOUNTER — CLINICAL SUPPORT (OUTPATIENT)
Dept: REHABILITATION | Facility: HOSPITAL | Age: 80
End: 2025-08-12
Payer: MEDICARE

## 2025-08-12 DIAGNOSIS — R26.9 GAIT ABNORMALITY: Primary | ICD-10-CM

## 2025-08-12 DIAGNOSIS — R29.898 WEAKNESS OF RIGHT LOWER EXTREMITY: ICD-10-CM

## 2025-08-12 DIAGNOSIS — M25.661 DECREASED RANGE OF MOTION OF RIGHT KNEE: ICD-10-CM

## 2025-08-12 PROCEDURE — 97110 THERAPEUTIC EXERCISES: CPT

## 2025-08-12 PROCEDURE — 97530 THERAPEUTIC ACTIVITIES: CPT

## 2025-08-12 PROCEDURE — 97112 NEUROMUSCULAR REEDUCATION: CPT

## 2025-08-14 ENCOUNTER — CLINICAL SUPPORT (OUTPATIENT)
Dept: REHABILITATION | Facility: HOSPITAL | Age: 80
End: 2025-08-14
Payer: MEDICARE

## 2025-08-14 DIAGNOSIS — R26.9 GAIT ABNORMALITY: Primary | ICD-10-CM

## 2025-08-14 DIAGNOSIS — M25.661 DECREASED RANGE OF MOTION OF RIGHT KNEE: ICD-10-CM

## 2025-08-14 DIAGNOSIS — R29.898 WEAKNESS OF RIGHT LOWER EXTREMITY: ICD-10-CM

## 2025-08-14 PROCEDURE — 97140 MANUAL THERAPY 1/> REGIONS: CPT | Mod: CQ

## 2025-08-14 PROCEDURE — 97110 THERAPEUTIC EXERCISES: CPT | Mod: CQ

## 2025-08-19 ENCOUNTER — CLINICAL SUPPORT (OUTPATIENT)
Dept: REHABILITATION | Facility: HOSPITAL | Age: 80
End: 2025-08-19
Payer: MEDICARE

## 2025-08-19 DIAGNOSIS — M25.661 DECREASED RANGE OF MOTION OF RIGHT KNEE: ICD-10-CM

## 2025-08-19 DIAGNOSIS — R29.898 WEAKNESS OF RIGHT LOWER EXTREMITY: ICD-10-CM

## 2025-08-19 DIAGNOSIS — R26.9 GAIT ABNORMALITY: Primary | ICD-10-CM

## 2025-08-19 PROCEDURE — 97110 THERAPEUTIC EXERCISES: CPT

## 2025-08-19 PROCEDURE — 97112 NEUROMUSCULAR REEDUCATION: CPT

## 2025-08-21 ENCOUNTER — CLINICAL SUPPORT (OUTPATIENT)
Dept: REHABILITATION | Facility: HOSPITAL | Age: 80
End: 2025-08-21
Payer: MEDICARE

## 2025-08-21 DIAGNOSIS — R29.898 WEAKNESS OF RIGHT LOWER EXTREMITY: ICD-10-CM

## 2025-08-21 DIAGNOSIS — M25.661 DECREASED RANGE OF MOTION OF RIGHT KNEE: ICD-10-CM

## 2025-08-21 DIAGNOSIS — R26.9 GAIT ABNORMALITY: Primary | ICD-10-CM

## 2025-08-21 PROCEDURE — 97530 THERAPEUTIC ACTIVITIES: CPT

## 2025-08-21 PROCEDURE — 97112 NEUROMUSCULAR REEDUCATION: CPT

## 2025-08-27 ENCOUNTER — CLINICAL SUPPORT (OUTPATIENT)
Dept: REHABILITATION | Facility: HOSPITAL | Age: 80
End: 2025-08-27
Payer: MEDICARE

## 2025-08-27 DIAGNOSIS — M25.661 DECREASED RANGE OF MOTION OF RIGHT KNEE: ICD-10-CM

## 2025-08-27 DIAGNOSIS — R29.898 WEAKNESS OF RIGHT LOWER EXTREMITY: ICD-10-CM

## 2025-08-27 DIAGNOSIS — R26.9 GAIT ABNORMALITY: Primary | ICD-10-CM

## 2025-08-27 PROCEDURE — 97530 THERAPEUTIC ACTIVITIES: CPT

## 2025-08-27 PROCEDURE — 97110 THERAPEUTIC EXERCISES: CPT

## 2025-08-27 PROCEDURE — 97112 NEUROMUSCULAR REEDUCATION: CPT

## 2025-08-28 ENCOUNTER — PATIENT MESSAGE (OUTPATIENT)
Dept: INTERNAL MEDICINE | Facility: CLINIC | Age: 80
End: 2025-08-28
Payer: MEDICARE